# Patient Record
Sex: MALE | Race: WHITE | Employment: FULL TIME | URBAN - METROPOLITAN AREA
[De-identification: names, ages, dates, MRNs, and addresses within clinical notes are randomized per-mention and may not be internally consistent; named-entity substitution may affect disease eponyms.]

---

## 2018-12-29 ENCOUNTER — HOSPITAL ENCOUNTER (EMERGENCY)
Age: 53
Discharge: HOME | End: 2018-12-29
Attending: EMERGENCY MEDICINE
Payer: OTHER GOVERNMENT

## 2018-12-29 VITALS
TEMPERATURE: 98.3 F | RESPIRATION RATE: 18 BRPM | OXYGEN SATURATION: 99 % | DIASTOLIC BLOOD PRESSURE: 101 MMHG | BODY MASS INDEX: 35.31 KG/M2 | WEIGHT: 233 LBS | HEIGHT: 68 IN | SYSTOLIC BLOOD PRESSURE: 164 MMHG | HEART RATE: 86 BPM

## 2018-12-29 DIAGNOSIS — Z00.8 ENCOUNTER FOR WORK CAPABILITY ASSESSMENT: Primary | ICD-10-CM

## 2018-12-29 PROCEDURE — 99282 EMERGENCY DEPT VISIT SF MDM: CPT

## 2018-12-29 ASSESSMENT — ENCOUNTER SYMPTOMS
SORE THROAT: 0
EYE REDNESS: 0
EYE DISCHARGE: 0
NAUSEA: 0
SHORTNESS OF BREATH: 0
DIARRHEA: 0
VOMITING: 0
WHEEZING: 0
BACK PAIN: 0
EYE PAIN: 0
ABDOMINAL PAIN: 0
COUGH: 0
SINUS PRESSURE: 0

## 2019-02-16 ENCOUNTER — APPOINTMENT (OUTPATIENT)
Dept: GENERAL RADIOLOGY | Age: 54
End: 2019-02-16
Payer: OTHER GOVERNMENT

## 2019-02-16 ENCOUNTER — HOSPITAL ENCOUNTER (EMERGENCY)
Age: 54
Discharge: HOME | End: 2019-02-17
Attending: EMERGENCY MEDICINE
Payer: OTHER GOVERNMENT

## 2019-02-16 ENCOUNTER — APPOINTMENT (OUTPATIENT)
Dept: CT IMAGING | Age: 54
End: 2019-02-16
Payer: OTHER GOVERNMENT

## 2019-02-16 VITALS
SYSTOLIC BLOOD PRESSURE: 133 MMHG | HEART RATE: 95 BPM | DIASTOLIC BLOOD PRESSURE: 85 MMHG | HEIGHT: 68 IN | BODY MASS INDEX: 35.37 KG/M2 | TEMPERATURE: 97.4 F | WEIGHT: 233.38 LBS | RESPIRATION RATE: 22 BRPM | OXYGEN SATURATION: 100 %

## 2019-02-16 DIAGNOSIS — M54.31 SCIATICA OF RIGHT SIDE: Primary | ICD-10-CM

## 2019-02-16 DIAGNOSIS — M25.511 ACUTE PAIN OF RIGHT SHOULDER: ICD-10-CM

## 2019-02-16 LAB
CO2: 28 MMOL/L (ref 22–29)
GFR AFRICAN AMERICAN: >60
GFR NON-AFRICAN AMERICAN: >60 ML/MIN/1.73
GLUCOSE BLD-MCNC: 97 MG/DL (ref 74–99)
POC ANION GAP: 9 MMOL/L (ref 7–16)
POC BUN: 15 MG/DL (ref 8–23)
POC CHLORIDE: 104 MMOL/L (ref 100–108)
POC CREATININE: 1 MG/DL (ref 0.7–1.2)
POC POTASSIUM: 4 MMOL/L (ref 3.5–5)
POC SODIUM: 141 MMOL/L (ref 132–146)

## 2019-02-16 PROCEDURE — 82374 ASSAY BLOOD CARBON DIOXIDE: CPT

## 2019-02-16 PROCEDURE — 6360000002 HC RX W HCPCS: Performed by: EMERGENCY MEDICINE

## 2019-02-16 PROCEDURE — 82435 ASSAY OF BLOOD CHLORIDE: CPT

## 2019-02-16 PROCEDURE — 82947 ASSAY GLUCOSE BLOOD QUANT: CPT

## 2019-02-16 PROCEDURE — 85014 HEMATOCRIT: CPT

## 2019-02-16 PROCEDURE — 72131 CT LUMBAR SPINE W/O DYE: CPT

## 2019-02-16 PROCEDURE — 96374 THER/PROPH/DIAG INJ IV PUSH: CPT

## 2019-02-16 PROCEDURE — 82565 ASSAY OF CREATININE: CPT

## 2019-02-16 PROCEDURE — 99284 EMERGENCY DEPT VISIT MOD MDM: CPT

## 2019-02-16 PROCEDURE — 84520 ASSAY OF UREA NITROGEN: CPT

## 2019-02-16 PROCEDURE — 84295 ASSAY OF SERUM SODIUM: CPT

## 2019-02-16 PROCEDURE — 96372 THER/PROPH/DIAG INJ SC/IM: CPT

## 2019-02-16 PROCEDURE — 84132 ASSAY OF SERUM POTASSIUM: CPT

## 2019-02-16 PROCEDURE — 80051 ELECTROLYTE PANEL: CPT

## 2019-02-16 PROCEDURE — 73030 X-RAY EXAM OF SHOULDER: CPT

## 2019-02-16 RX ORDER — PREGABALIN 100 MG/1
200 CAPSULE ORAL 2 TIMES DAILY
COMMUNITY

## 2019-02-16 RX ORDER — NAPROXEN 500 MG/1
500 TABLET ORAL 2 TIMES DAILY
Qty: 14 TABLET | Refills: 0 | Status: SHIPPED | OUTPATIENT
Start: 2019-02-16 | End: 2019-03-12

## 2019-02-16 RX ORDER — KETOROLAC TROMETHAMINE 30 MG/ML
30 INJECTION, SOLUTION INTRAMUSCULAR; INTRAVENOUS ONCE
Status: COMPLETED | OUTPATIENT
Start: 2019-02-16 | End: 2019-02-16

## 2019-02-16 RX ORDER — DEXAMETHASONE SODIUM PHOSPHATE 10 MG/ML
10 INJECTION INTRAMUSCULAR; INTRAVENOUS ONCE
Status: COMPLETED | OUTPATIENT
Start: 2019-02-16 | End: 2019-02-16

## 2019-02-16 RX ORDER — METHYLPREDNISOLONE 4 MG/1
TABLET ORAL
Qty: 1 KIT | Refills: 0 | Status: SHIPPED | OUTPATIENT
Start: 2019-02-16 | End: 2019-02-22

## 2019-02-16 RX ADMIN — KETOROLAC TROMETHAMINE 30 MG: 30 INJECTION, SOLUTION INTRAMUSCULAR at 23:28

## 2019-02-16 RX ADMIN — DEXAMETHASONE SODIUM PHOSPHATE 10 MG: 10 INJECTION INTRAMUSCULAR; INTRAVENOUS at 23:28

## 2019-02-16 ASSESSMENT — PAIN SCALES - GENERAL
PAINLEVEL_OUTOF10: 9
PAINLEVEL_OUTOF10: 9

## 2019-02-16 ASSESSMENT — ENCOUNTER SYMPTOMS
SHORTNESS OF BREATH: 0
EYE DISCHARGE: 0
SINUS PRESSURE: 0
COUGH: 0
NAUSEA: 0
EYE PAIN: 0
BACK PAIN: 0
EYE REDNESS: 0
WHEEZING: 0
ABDOMINAL PAIN: 0
VOMITING: 0
DIARRHEA: 0
SORE THROAT: 0

## 2019-02-16 ASSESSMENT — PAIN DESCRIPTION - ORIENTATION: ORIENTATION: RIGHT

## 2019-02-16 ASSESSMENT — PAIN DESCRIPTION - FREQUENCY: FREQUENCY: CONTINUOUS

## 2019-02-16 ASSESSMENT — PAIN DESCRIPTION - LOCATION: LOCATION: LEG;FOOT;SHOULDER

## 2019-03-12 ENCOUNTER — INITIAL CONSULT (OUTPATIENT)
Dept: NEUROSURGERY | Age: 54
End: 2019-03-12

## 2019-03-12 VITALS
HEIGHT: 68 IN | HEART RATE: 97 BPM | BODY MASS INDEX: 35.61 KG/M2 | WEIGHT: 235 LBS | SYSTOLIC BLOOD PRESSURE: 144 MMHG | DIASTOLIC BLOOD PRESSURE: 95 MMHG

## 2019-03-12 DIAGNOSIS — M54.16 LUMBAR RADICULOPATHY: Primary | ICD-10-CM

## 2019-03-12 PROCEDURE — 99203 OFFICE O/P NEW LOW 30 MIN: CPT | Performed by: PHYSICIAN ASSISTANT

## 2019-03-12 ASSESSMENT — ENCOUNTER SYMPTOMS
EYES NEGATIVE: 1
RESPIRATORY NEGATIVE: 1
GASTROINTESTINAL NEGATIVE: 1
ALLERGIC/IMMUNOLOGIC NEGATIVE: 1
BACK PAIN: 1

## 2023-12-12 ENCOUNTER — DOCUMENTATION (OUTPATIENT)
Dept: SURGERY | Facility: HOSPITAL | Age: 58
End: 2023-12-12
Payer: OTHER GOVERNMENT

## 2023-12-12 NOTE — PROGRESS NOTES
Received vcml from [facility] with [facility] calling to confirm pt has been scheduled and asking for a call back to [phone] ext [phone]. I called and left her a voicemail to advise, yes the pt has been scheduled.

## 2023-12-19 ENCOUNTER — APPOINTMENT (OUTPATIENT)
Dept: PHYSICAL THERAPY | Facility: HOSPITAL | Age: 58
End: 2023-12-19
Payer: OTHER GOVERNMENT

## 2023-12-27 ENCOUNTER — APPOINTMENT (OUTPATIENT)
Dept: PHYSICAL THERAPY | Facility: HOSPITAL | Age: 58
End: 2023-12-27
Payer: OTHER GOVERNMENT

## 2024-01-23 ENCOUNTER — APPOINTMENT (OUTPATIENT)
Dept: SURGERY | Facility: HOSPITAL | Age: 59
End: 2024-01-23
Payer: OTHER GOVERNMENT

## 2024-01-25 ENCOUNTER — DOCUMENTATION (OUTPATIENT)
Dept: SURGERY | Facility: HOSPITAL | Age: 59
End: 2024-01-25
Payer: OTHER GOVERNMENT

## 2024-01-25 NOTE — PROGRESS NOTES
Received a fax from Travis Lipscomb with the [facility], requesting a status update for the pt. I called him back to advise the pt's appointments were cancelled at his request with no further follow-up.  advised he will notate this and cancel out consult and if the  wants to proceed at another time, he can.

## 2024-02-05 ENCOUNTER — APPOINTMENT (OUTPATIENT)
Dept: SURGERY | Facility: HOSPITAL | Age: 59
End: 2024-02-05
Payer: OTHER GOVERNMENT

## 2024-04-02 ENCOUNTER — TELEPHONE (OUTPATIENT)
Dept: SURGERY | Facility: CLINIC | Age: 59
End: 2024-04-02
Payer: OTHER GOVERNMENT

## 2024-04-02 NOTE — TELEPHONE ENCOUNTER
Outbound call to schedule patient for initial consults with Bariatric Surgery.  Had been scheduled in January and canceled those visits.  Patient was advised by [facility]in[facility] the [facility] to cancel them and try injectables for weight loss.  He has not been able to obtain those medications for over  year and would like to explore the option for surgery again.  Patient aware he needs to be seen through the main campus due to their partnership with the [facility]. Appointments scheduled.

## 2024-04-08 ENCOUNTER — PATIENT MESSAGE (OUTPATIENT)
Dept: SURGERY | Facility: HOSPITAL | Age: 59
End: 2024-04-08
Payer: OTHER GOVERNMENT

## 2024-04-12 ENCOUNTER — DOCUMENTATION (OUTPATIENT)
Dept: SURGERY | Facility: HOSPITAL | Age: 59
End: 2024-04-12
Payer: OTHER GOVERNMENT

## 2024-04-12 NOTE — PROGRESS NOTES
Received an email from Conch[facility] from [facility] calling to get status on referrals. Pt scheduled. [facility] contacted.

## 2024-04-15 ENCOUNTER — TELEPHONE (OUTPATIENT)
Dept: SURGERY | Facility: HOSPITAL | Age: 59
End: 2024-04-15
Payer: OTHER GOVERNMENT

## 2024-04-15 NOTE — TELEPHONE ENCOUNTER
----- Message from Tessa Ge sent at 4/12/2024  1:30 PM EDT -----  Regarding: FW: Bariatric Surgery - New Patient Visits  Contact: [phone]    ----- Message -----  From: Manuel Marrufo  Sent: 4/12/2024   1:17 PM EDT  To: [id number] Clinical Support Staff  Subject: Bariatric Surgery - New Patient Visits           Hello.        Whenever you have the time ma'am,  would you please give me a call? I have a few questions I am confused about.        Thank you. It is not urgent.     Manuel Marrufo  [facility]   [phone]

## 2024-04-15 NOTE — TELEPHONE ENCOUNTER
Telephone call placed to patient as requested per [employer] message. Inquiring if he will need to repeat testing/evaluations that he had [facility] the [facility]. He has records scanned into his chart and these will be reviewed. Will provide recommendations at the time of his visit with Dr. Sarmiento. He verbalized understanding.

## 2024-05-23 ENCOUNTER — PATIENT MESSAGE (OUTPATIENT)
Dept: SURGERY | Facility: HOSPITAL | Age: 59
End: 2024-05-23
Payer: OTHER GOVERNMENT

## 2024-05-31 RX ORDER — PREGABALIN 200 MG/1
200 CAPSULE ORAL 2 TIMES DAILY
COMMUNITY

## 2024-05-31 RX ORDER — ROSUVASTATIN CALCIUM 10 MG/1
10 TABLET, COATED ORAL DAILY
COMMUNITY

## 2024-05-31 RX ORDER — DIAZEPAM 5 MG/1
5 TABLET ORAL 2 TIMES DAILY
COMMUNITY

## 2024-05-31 RX ORDER — CHOLECALCIFEROL (VITAMIN D3) 50 MCG
50 TABLET ORAL DAILY
COMMUNITY

## 2024-05-31 RX ORDER — CYCLOBENZAPRINE HCL 10 MG
10 TABLET ORAL 3 TIMES DAILY PRN
COMMUNITY

## 2024-05-31 RX ORDER — LISINOPRIL 20 MG/1
20 TABLET ORAL DAILY
COMMUNITY

## 2024-05-31 RX ORDER — OMEPRAZOLE 20 MG/1
20 TABLET, DELAYED RELEASE ORAL 2 TIMES DAILY
COMMUNITY

## 2024-05-31 RX ORDER — LOPERAMIDE HYDROCHLORIDE 2 MG/1
2 CAPSULE ORAL 3 TIMES DAILY PRN
COMMUNITY

## 2024-05-31 RX ORDER — SERTRALINE HYDROCHLORIDE 100 MG/1
200 TABLET, FILM COATED ORAL DAILY
COMMUNITY

## 2024-05-31 NOTE — PROGRESS NOTES
Subjective     Date: 5/31/2024 Time: 4:13 PM  Name: Manuel Marrufo  MRN: [id number]    This is a 59 y.o. male with morbid obesity (There is no height or weight on file to calculate BMI.) who presents to clinic for consideration of bariatric surgery. he has attempted and failed multiple diet and exercise regimens for weight loss. Initial Onset of obesity was  8 Year(s) Ago.  Their goal for surgery is to  be healthier , lose weight, and improve liver function, spinal stenosis symptoms, sciatic nerve pain  . The patient has tried multiple diets to lose weight including Low Carb, Medications , and [facility] provided  and smaller portions, nutrisystem, smaller portions . The patient was most successful with the Low Carb, Medications , and [facility] provided  and smaller portions, nutrisystem, smaller portions. The most pounds lost on this diet were 25 lbs. The patient considers their dietary weakness to be  snacking/munchies, recently quit smoking  The patient reports a  highest weight ever of 267 pounds and lowest weight ever of 135 pounds {Distribution of Obesity:84364}. The patient exercises daily  30 Minutes/Day Types of Exercise : walking and stair climbing    Comorbidities: anxiety, back paintakes medications, depressed moodtakes medications and sees a therapist/psychiatrist, joint pain{NSAIDS (Optional):06468}, sleep apnea using an appliance, and hypertension controlled with oral meds, GERD on PPI   There is no problem list on file for this patient.      SLEEVE Gastric Surgery For Morbid Obesity Laparoscopic Longitudinal Gastrectomy     0 = No symptoms while on omeprazole     PMH:   Past Medical History:   Diagnosis Date    Fatty liver disease, nonalcoholic     GERD (gastroesophageal reflux disease)     HLD (hyperlipidemia)     HTN (hypertension)     FRANK on CPAP     PTSD (post-traumatic stress disorder)     Spinal stenosis         PSH:   Past Surgical History:   Procedure Laterality Date    BACK SURGERY      MALIGNANT  SKIN LESION EXCISION      left shoulder and armpit    TONSILLECTOMY          Denies personal/family hx of VTE.    FAMILY HISTORY:  No family history on file.     SOCIAL HISTORY:       MEDICATIONS:  Prior to Admission Medications:  Medication Documentation Review Audit    **Prior to Admission medications have not yet been reviewed**          ALLERGIES:  Not on File    REVIEW OF SYSTEMS:  GENERAL: Negative for malaise, significant weight loss and fever  HEAD: Negative for headache, swelling.  NECK: Negative for lumps, goiter, pain and significant neck swelling  RESPIRATORY: Negative for cough, wheezing or shortness of breath.  CARDIOVASCULAR: Negative for chest pain, leg swelling or palpitations.  GI: Negative for abdominal discomfort, blood in stools or black stools or change in bowel habits  : No history of dysuria, frequency or incontinence  MUSCULOSKELETAL: Negative for joint pain or swelling, back pain or muscle pain.  SKIN: Negative for lesions, rash, and itching.  PSYCH: Negative for sleep disturbance, mood disorder and recent psychosocial stressors.  ENDOCRINE: Negative for cold or heat intolerance, polyuria, polydipsia and goiter.    Objective   PHYSICAL EXAM:  There were no vitals taken for this visit.  General appearance: obese, NAD  Neuro: AOx3  Head: EOMI; no swelling or lesions of scalp or face  ENT:  no lumps or lymphadenopathy, thyroid normal to palpation; oropharynx clear, no swelling or erythema  Skin: warm, no erythema or rashes  Lungs: clear to percussion and auscultation  Heart: regular rhythm and S1, S2 normal  Abdomen: soft, non-tender, no masses, no organomegaly  Extremities: Normal exam of the extremities. No swelling or pain.  Psych: no hurried speech, no flight of ideas, normal affect    Assessment/Plan   IMPRESSION:  Manuel Marrufo is a 59 y.o. male with a BMI of There is no height or weight on file to calculate BMI. with the following diagnoses and co-morbidities:     Past Medical History:    Diagnosis Date    Fatty liver disease, nonalcoholic     GERD (gastroesophageal reflux disease)     HLD (hyperlipidemia)     HTN (hypertension)     FRANK on CPAP     PTSD (post-traumatic stress disorder)     Spinal stenosis        This patient does meet the criteria for a surgical weight loss procedure according to NIH guidelines.  The risks of sleeve gastrectomy, Michael-en-Y gastric bypass, and duodenal switch surgery including but not limited to bleeding, leak along staple lines, infection, dehydration, ulcers, internal hernia, DVT/PE, pneumonia, myocardial infarction, prolonged nausea/vomiting, incomplete resolution of associated medical conditions, reflux, weight regain, vitamin/mineral deficiencies, and death have been explained to the patient and Manuel Marrufo has expressed understanding and acceptance of them.     We discussed the lifestyle changes necessary to be successful following surgery.    The increased risk of substance and alcohol abuse following bariatric surgery was discussed with the patient, along with the negative consequences of substance/alcohol use after surgery including addiction, worsening of mental health disorders, and injury to the stomach. The risk of smoking and vaping (tobacco or any other substance) after bariatric surgery was explained to the patient. This includes risk of anastamotic ulcers, gastritis, bleeding, perforation, stricture, and PO intolerance.  The patient expressed understanding and acceptance of these risks.    The benefits of the above surgeries including weight loss, improvement/resolution of associated medical and mental health conditions, improved mobility, and decreased mortality have been explained the the patient and Manuel Marrufo has expressed understanding and acceptance of them.      PLAN:  The plan of treatment for Manuel Marrufo is to continue with the consultations and tests ordered today in hopes of qualifying for pre-operative clearance for bariatric surgery.  This includes:    Consult Nutrition for education and *** months of MSWL  Consult Psychology  Consult Cardiology  Consult Pulmonology  Labs ordered  EGD  PCP for medical optimization  Consult sleep medicine - concern for FRANK  Recommend at least *** lbs of weight loss prior to surgery.  ***        *** minutes were spent with patient including history, physical exam, and education.

## 2024-06-14 NOTE — PROGRESS NOTES
Surgeon: Torsten  Patient is considering:   Sleeve Gastrectomy    ASSESSMENT:  Current weight:  265 lbs   Ht: 68  in   BMI: 40.29       Initial start weight: 265  lbs  Pre-Op Excess Body Weight (EBW):   101 lbs  Target Post-Op weight goal:  199 - 214.2 lbs    Food allergies/intolerances:   mild lactose intolerance - avoids milk   Chewing/Swallowing/Dentition:  dentures   Nausea / Vomiting / Hx Gastroparesis:  denies  Diarrhea/ Constipation: denies  Exercise level:  30 minutes walking (to mailbox and back, uphill 1/2) every other day, limited d/t spinal stenosis, up and down stairs t/o day, leg lifts while sitting   Smoking/Tobacco use: quit smoking 1 month ago   Vitamins/Minerals supplements:  vitamin D, vitamin B12  Medications:   see list  Past diet attempts:   RD monitored, IMF, low calorie, low CHO, OTC diet pills, Rx diet pills - phentermine/topiramate, starvation  Hours of sleep/night:  6    24 HOUR RECALL/DIET HISTORY:  Breakfast:  scrambled eggs, with onions, peppers, sausage and slice of whole wheat toast  Snack:    Lunch: skipped  Snack:   Dinner: spaghetti and meatballs  Snack: pringles   Beverages: 16oz diet pop, coffee/tea 12oz, 64oz water, 12oz juice  Alcohol: denies    Person responsible for cooking & shopping? Self, health aid cooks   Grocery stores frequented:  [employer]    Grocery trips per week/month:  Every 2 weeks   Food Insecurity: Low  How often do you eat sweet snacks?  Once/week  How often do you eat savory snacks?  Once/week  How often do you eat out?  Once every 2 weeks - order in  Do you feel overly stuffed?  Denies   Binge Eating? Denies, but IDs with feeling disgusted after eating/over eating  Night Eating?  denies  Emotional Eating?  Yes, triggered by boredom, depression - opts for M7M's. Frequency is rare       READINESS TO LEARN:  Motivation to learn: Interested        Understanding of instruction: Good    Anticipated Compliance: Good       Family Support:  home  health aide, mom, sister    Educational Materials Provided:    Plate Method  High Protein Snack List  High Protein Drink  High Protein food list  Schedules for support group meetings  Goals sheet    Patient's weight is self-reported. Patient will scheduled to attend a Virtual Education Class to review the 2 week Pre-op diet, Post op fluid, protein, vitamin/mineral supplementation, exercise goals and post op diet progression.    Patient presents with excessive calorie obesity seeking  sleeve gastrectomy.    Patient seen today to complete nutrition evaluation for weight loss surgery. Patient reports he's been looking into have surgery. Patient reports trying multiple diets and has been taking topamax for weight loss. Patient reports limitations to exercise d/t spinal stenosis but tries to move his body daily. Patient reports main source of fluid is water. No significant dietary behaviors noted. Patient reports he tends to only eat 2 meals/day, due to low appetite.   Recommended to begin to eat 3 meals/day, avoid skipping meals. Encouraged to have a protein drink in replacement of meal to avoid skipping meals. Reviewed balanced meals by using plate method. Encouraged to eat protein rich foods at each meal, balanced with fiber rich foods. Reviewed fluids to eliminate and replace with SF, non-carbonated, caffeine free fluids. Reviewed postop behaviors and encouraged to begin practicing. Recommended to start daily MVI. Recommended to continue regular exercise. Encouraged to add resistance bands or weights if tolerated.     Patient was receptive to nutritional recommendations, asked numerous questions, and verbalized understanding of the weight loss surgery diet.  Patient expressed understanding about the importance of strict dietary compliance post-surgery to avoid nutritional deficiencies and achieve optimal weight loss and verbalized intent to follow dietary recommendations.      Malnutrition Screening:  Significant  unintentional weight loss? No  Eating less than 75% of usual intake for more than 2 weeks? No      Nutrition Diagnosis:   1. Overweight/obesity related to excess energy intake as evidenced by BMI = 40 kg/m^2.  2. Food- and nutrition-related knowledge deficit related to lack of prior exposure to surgical weight loss information as evidenced by pt new to surgical program.    Nutrition Interventions:   1. Modify type and amount of food and nutrients within meals and snacks.  2. Comprehensive Nutrition Education    Recommendations:  1. Structure meal patterns, eating three meals daily. Limit snacks to only as needed. You may have a protein drink in replacement to one meal/day to avoid skipping this meal.   2. Build meals around protein rich foods. Aim for 3-4 ounces (20-30 grams) protein per meal. Lean proteins include chicken, turkey, fish, lean cuts of beef and 90% lean ground beef, pork, shrimp, low fat dairy products, and eggs.   3. Fill half your plate with non-starchy vegetables. Select high fiber starches like  sweet potatoes, peas, beans, lentils, quinoa, whole wheat breads and pastas, and brown rice. Keep portion of starches to 1/4 plate (1/2 cup - 1 cup per meal).  4. Drink 64oz of calorie-free, caffeine-free, and non-carbonated beverages.   5. Practice no drinking 30 minutes before meals, nothing with meals and wait 30 minutes after meals to drink again. Make meals last 30 minutes-chew thoroughly.   6. Limit or omit eating out/sweets/savory snacks to 1-2 times per week.  7. Begin daily multivitamin.  No gummies. Centrum adult complete, equate kids chewable, or Women's One a Day are all acceptable options.  8. Increase physical activity by 10-15 minutes as tolerated to an end goal of 60 minutes 5 x per week. Consistency is the key.  9. We will follow up with your progress on 8/2/24 at 1:00 PM. You will need to weigh yourself before this appointment and provide a 24 hour food recall.    Pre-op Goal weight: lose  5% of body weight    Nutrition Monitoring and Evaluation: 1-2 pound weight loss per week  Criteria: weight check  Need for Follow-up:     Patient does meet National Institutes Health guidelines for weight loss surgery, however needs to demonstrate consistent effort in making dietary changes before giving clearance. It is anticipated that the patient will need at least 1    nutritional follow-up visits prior to clearance for surgery.

## 2024-06-17 ENCOUNTER — NUTRITION (OUTPATIENT)
Dept: SURGERY | Facility: HOSPITAL | Age: 59
End: 2024-06-17
Payer: OTHER GOVERNMENT

## 2024-06-17 VITALS — BODY MASS INDEX: 40.16 KG/M2 | HEIGHT: 68 IN | WEIGHT: 265 LBS

## 2024-06-17 DIAGNOSIS — Z71.3 ENCOUNTER FOR NUTRITION EVALUATION PRIOR TO BARIATRIC SURGERY: ICD-10-CM

## 2024-06-17 DIAGNOSIS — Z98.84 BARIATRIC SURGERY STATUS: Primary | ICD-10-CM

## 2024-06-17 NOTE — PROGRESS NOTES
Subjective     Date: 6/17/2024 Time: 3:07 PM  Name: Manuel Marrufo  MRN: [id number]    This is a 59 y.o. male with morbid obesity (Body mass index is 41.37 kg/m².) who presents to clinic for consideration of bariatric surgery. he has attempted and failed multiple diet and exercise regimens for weight loss. Initial Onset of obesity was  8 Year(s) Ago.  Their goal for surgery is to  be healthier , lose weight, and improve liver function, spinal stenosis symptoms, sciatic nerve pain  . The patient has tried multiple diets to lose weight including Low Carb, Medications , and [facility] provided  and smaller portions, nutrisystem, smaller portions . The patient was most successful with the Low Carb, Medications , and [facility] provided  and smaller portions, nutrisystem, smaller portions. The most pounds lost on this diet were 25 lbs. The patient considers their dietary weakness to be  snacking/munchies, recently quit smoking  The patient reports a  highest weight ever of 267 pounds and lowest weight ever of 135 pounds Distribution of Obesity: is central. The patient exercises daily  30 Minutes/Day Types of Exercise : walking and stair climbing    Comorbidities: anxiety, back paintakes medications, depressed moodtakes medications and sees a therapist/psychiatrist, joint pain, sleep apnea using an appliance, and hypertension controlled with oral meds, GERD on PPI   There is no problem list on file for this patient.      SLEEVE Gastric Surgery For Morbid Obesity Laparoscopic Longitudinal Gastrectomy     0 = No symptoms while on omeprazole     PMH:   Past Medical History:   Diagnosis Date    Fatty liver disease, nonalcoholic     GERD (gastroesophageal reflux disease)     HLD (hyperlipidemia)     HTN (hypertension)     FRANK on CPAP     PTSD (post-traumatic stress disorder)     Spinal stenosis         PSH:   Past Surgical History:   Procedure Laterality Date    BACK SURGERY      MALIGNANT SKIN LESION EXCISION      left shoulder and  armpit    TONSILLECTOMY          Denies personal/family hx of VTE.    FAMILY HISTORY:  No family history on file.     SOCIAL HISTORY:       MEDICATIONS:  Prior to Admission Medications:  Medication Documentation Review Audit    **Prior to Admission medications have not yet been reviewed**          ALLERGIES:  Not on File    REVIEW OF SYSTEMS:  GENERAL: Negative for malaise, significant weight loss and fever  HEAD: Negative for headache, swelling.  NECK: Negative for lumps, goiter, pain and significant neck swelling  RESPIRATORY: Negative for cough, wheezing or shortness of breath.  CARDIOVASCULAR: Negative for chest pain, leg swelling or palpitations.  GI: Negative for abdominal discomfort, blood in stools or black stools or change in bowel habits  : No history of dysuria, frequency or incontinence  MUSCULOSKELETAL: Negative for joint pain or swelling, back pain or muscle pain.  SKIN: Negative for lesions, rash, and itching.  PSYCH: Negative for sleep disturbance, mood disorder and recent psychosocial stressors.  ENDOCRINE: Negative for cold or heat intolerance, polyuria, polydipsia and goiter.    Objective   PHYSICAL EXAM:  There were no vitals taken for this visit.  General appearance: obese, NAD  Neuro: AOx3  Head: EOMI; no swelling or lesions of scalp or face  ENT:  no lumps or lymphadenopathy, thyroid normal to palpation; oropharynx clear, no swelling or erythema  Skin: warm, no erythema or rashes  Lungs: clear to percussion and auscultation  Heart: regular rhythm and S1, S2 normal  Abdomen: soft, non-tender, no masses, no organomegaly  Extremities: Normal exam of the extremities. No swelling or pain.  Psych: no hurried speech, no flight of ideas, normal affect    Assessment/Plan   IMPRESSION:  Manuel Marrufo is a 59 y.o. male with a BMI of Body mass index is 41.37 kg/m². with the following diagnoses and co-morbidities:     Past Medical History:   Diagnosis Date    Fatty liver disease, nonalcoholic     GERD  (gastroesophageal reflux disease)     HLD (hyperlipidemia)     HTN (hypertension)     FRANK on CPAP     PTSD (post-traumatic stress disorder)     Spinal stenosis        This patient does meet the criteria for a surgical weight loss procedure according to NIH guidelines.    The risks of sleeve gastrectomy, Michael-en-Y gastric bypass surgery including but not limited to bleeding, leak along staple lines, infection, dehydration, ulcers, internal hernia, DVT/PE, pneumonia, myocardial infarction, prolonged nausea/vomiting, incomplete resolution of associated medical conditions, reflux, weight regain, vitamin/mineral deficiencies, and death have been explained to the patient and Manuel Marrufo has expressed understanding and acceptance of them.     We discussed the lifestyle changes necessary to be successful following surgery.    [facility] and decided to proceed with sleeve gastrectomy.  He does not want gastric bypass.    The increased risk of substance and alcohol abuse following bariatric surgery was discussed with the patient, along with the negative consequences of substance/alcohol use after surgery including addiction, worsening of mental health disorders, and injury to the stomach. The risk of smoking and vaping (tobacco or any other substance) after bariatric surgery was explained to the patient. This includes risk of anastamotic ulcers, gastritis, bleeding, perforation, stricture, and PO intolerance.  The patient expressed understanding and acceptance of these risks.    The possible benefits of the above surgeries including weight loss, improvement/resolution of associated medical and mental health conditions, improved mobility, and decreased mortality have been explained the the patient and Manuel Marrufo has expressed understanding and acceptance of them.      PLAN:  The plan of treatment for Manuel Marrufo is to continue with the consultations and tests  ordered today in hopes of qualifying for pre-operative clearance for bariatric surgery. This includes:    Consult Nutrition for education and MSWL if required  Consult Psychology report  Consult Cardiology report  Consult Pulmonology report  Labs ordered  EGD report  PCP for medical optimization  Consult sleep medicine - concern for FRANK  Recommend at least 5-10 lbs of weight loss prior to surgery.

## 2024-06-18 ENCOUNTER — APPOINTMENT (OUTPATIENT)
Dept: SURGERY | Facility: HOSPITAL | Age: 59
End: 2024-06-18
Payer: OTHER GOVERNMENT

## 2024-06-18 ENCOUNTER — OFFICE VISIT (OUTPATIENT)
Dept: SURGERY | Facility: HOSPITAL | Age: 59
End: 2024-06-18
Payer: OTHER GOVERNMENT

## 2024-06-18 VITALS
BODY MASS INDEX: 41.24 KG/M2 | OXYGEN SATURATION: 99 % | WEIGHT: 272.1 LBS | HEART RATE: 65 BPM | SYSTOLIC BLOOD PRESSURE: 109 MMHG | DIASTOLIC BLOOD PRESSURE: 79 MMHG | HEIGHT: 68 IN

## 2024-06-18 DIAGNOSIS — Z13.21 ENCOUNTER FOR VITAMIN DEFICIENCY SCREENING: ICD-10-CM

## 2024-06-18 DIAGNOSIS — F43.10 POSTTRAUMATIC STRESS DISORDER: ICD-10-CM

## 2024-06-18 DIAGNOSIS — K21.9 GASTROESOPHAGEAL REFLUX DISEASE WITHOUT ESOPHAGITIS: ICD-10-CM

## 2024-06-18 DIAGNOSIS — E66.01 MORBID OBESITY (MULTI): ICD-10-CM

## 2024-06-18 DIAGNOSIS — G47.33 OBSTRUCTIVE SLEEP APNEA: ICD-10-CM

## 2024-06-18 DIAGNOSIS — K76.0 FATTY LIVER DISEASE, NONALCOHOLIC: ICD-10-CM

## 2024-06-18 DIAGNOSIS — Z71.3 PRE-BARIATRIC SURGERY NUTRITION EVALUATION: ICD-10-CM

## 2024-06-18 DIAGNOSIS — I10 PRIMARY HYPERTENSION: ICD-10-CM

## 2024-06-18 DIAGNOSIS — E66.01 MORBID OBESITY WITH BMI OF 40.0-44.9, ADULT (MULTI): Primary | ICD-10-CM

## 2024-06-18 PROCEDURE — 99205 OFFICE O/P NEW HI 60 MIN: CPT | Performed by: SURGERY

## 2024-06-18 PROCEDURE — 99215 OFFICE O/P EST HI 40 MIN: CPT | Performed by: SURGERY

## 2024-06-18 PROCEDURE — 3074F SYST BP LT 130 MM HG: CPT | Performed by: SURGERY

## 2024-06-18 PROCEDURE — 3078F DIAST BP <80 MM HG: CPT | Performed by: SURGERY

## 2024-06-18 PROCEDURE — 3008F BODY MASS INDEX DOCD: CPT | Performed by: SURGERY

## 2024-06-18 RX ORDER — TIZANIDINE 4 MG/1
4 TABLET ORAL EVERY 8 HOURS PRN
COMMUNITY
Start: 2024-06-13 | End: 2024-07-13

## 2024-06-18 RX ORDER — ARIPIPRAZOLE 10 MG/1
10 TABLET ORAL DAILY
COMMUNITY

## 2024-06-18 RX ORDER — PROPRANOLOL HYDROCHLORIDE 80 MG/1
120 TABLET ORAL 2 TIMES DAILY
COMMUNITY

## 2024-06-18 RX ORDER — ALBUTEROL SULFATE 0.83 MG/ML
2 SOLUTION RESPIRATORY (INHALATION) EVERY 4 HOURS PRN
COMMUNITY

## 2024-06-18 ASSESSMENT — ENCOUNTER SYMPTOMS
OCCASIONAL FEELINGS OF UNSTEADINESS: 0
DEPRESSION: 1
LOSS OF SENSATION IN FEET: 1

## 2024-06-20 ENCOUNTER — PATIENT MESSAGE (OUTPATIENT)
Dept: SURGERY | Facility: HOSPITAL | Age: 59
End: 2024-06-20
Payer: OTHER GOVERNMENT

## 2024-07-03 ENCOUNTER — PATIENT MESSAGE (OUTPATIENT)
Dept: SURGERY | Facility: HOSPITAL | Age: 59
End: 2024-07-03
Payer: OTHER GOVERNMENT

## 2024-07-09 ENCOUNTER — PATIENT MESSAGE (OUTPATIENT)
Dept: SURGERY | Facility: HOSPITAL | Age: 59
End: 2024-07-09
Payer: OTHER GOVERNMENT

## 2024-07-12 NOTE — PATIENT COMMUNICATION
Received patient message. Telephone call to patient. Unable to reach at this time. Left voice message. Did not receive sleep records, but have additional records to review with patient. Will follow up at a later time.

## 2024-07-19 ENCOUNTER — TELEPHONE (OUTPATIENT)
Dept: SURGERY | Facility: HOSPITAL | Age: 59
End: 2024-07-19
Payer: MEDICARE

## 2024-07-19 NOTE — TELEPHONE ENCOUNTER
Attempted to contact patient to review records. Unable to reach at this time. Left voicemail requesting return call.     Patient returned call a few minutes later. He states [facility]. He will call and clarify the provider. He is having his sleep study completed tonight. He is dropping his PCP form off to be completed and returned to the program. Encouraged to call with questions.     1154: Patient returned call. He states his psychological evaluation was for PTSD. He is reaching out to his PCP regarding psychological evaluation for bariatric surgery He states he will contact office when he has further information.

## 2024-07-25 ENCOUNTER — PATIENT MESSAGE (OUTPATIENT)
Dept: SURGERY | Facility: HOSPITAL | Age: 59
End: 2024-07-25
Payer: MEDICARE

## 2024-08-01 ENCOUNTER — TELEPHONE (OUTPATIENT)
Dept: SURGERY | Facility: HOSPITAL | Age: 59
End: 2024-08-01
Payer: MEDICARE

## 2024-08-01 NOTE — TELEPHONE ENCOUNTER
Received message from patient requesting return call in regards to his dietitian appointment scheduled for tomorrow. He states he will be admitted to the hospital and will need to cancel. He will send a inContactt message when he is ready to be rescheduled. Will send message to dietitan.

## 2024-08-02 ENCOUNTER — APPOINTMENT (OUTPATIENT)
Dept: SURGERY | Facility: CLINIC | Age: 59
End: 2024-08-02
Payer: OTHER GOVERNMENT

## 2024-08-09 ENCOUNTER — PATIENT MESSAGE (OUTPATIENT)
Dept: SURGERY | Facility: HOSPITAL | Age: 59
End: 2024-08-09
Payer: MEDICARE

## 2024-08-12 ENCOUNTER — DOCUMENTATION (OUTPATIENT)
Dept: SURGERY | Facility: HOSPITAL | Age: 59
End: 2024-08-12
Payer: MEDICARE

## 2024-08-12 NOTE — PROGRESS NOTES
Patient sent message notifying clinic of recent lung cancer diagnosis. He will not pursue bariatric surgery at this time.

## 2024-09-09 ENCOUNTER — APPOINTMENT (OUTPATIENT)
Dept: PRIMARY CARE | Facility: CLINIC | Age: 59
End: 2024-09-09
Payer: MEDICARE

## 2024-11-24 ENCOUNTER — APPOINTMENT (OUTPATIENT)
Dept: RADIOLOGY | Facility: HOSPITAL | Age: 59
End: 2024-11-24
Payer: OTHER GOVERNMENT

## 2024-11-24 ENCOUNTER — CLINICAL SUPPORT (OUTPATIENT)
Dept: EMERGENCY MEDICINE | Facility: HOSPITAL | Age: 59
End: 2024-11-24
Payer: OTHER GOVERNMENT

## 2024-11-24 ENCOUNTER — HOSPITAL ENCOUNTER (INPATIENT)
Facility: HOSPITAL | Age: 59
LOS: 3 days | Discharge: HOME | End: 2024-11-27
Attending: EMERGENCY MEDICINE | Admitting: INTERNAL MEDICINE
Payer: OTHER GOVERNMENT

## 2024-11-24 DIAGNOSIS — C79.31 MALIGNANT MELANOMA METASTATIC TO BRAIN (MULTI): Primary | ICD-10-CM

## 2024-11-24 LAB
ABO GROUP (TYPE) IN BLOOD: NORMAL
ALBUMIN SERPL BCP-MCNC: 4 G/DL (ref 3.4–5)
ALP SERPL-CCNC: 93 U/L (ref 33–120)
ALT SERPL W P-5'-P-CCNC: 29 U/L (ref 10–52)
ANION GAP SERPL CALC-SCNC: 13 MMOL/L (ref 10–20)
ANTIBODY SCREEN: NORMAL
APPEARANCE UR: CLEAR
APTT PPP: 30 SECONDS (ref 27–38)
AST SERPL W P-5'-P-CCNC: 42 U/L (ref 9–39)
BASOPHILS # BLD AUTO: 0.05 X10*3/UL (ref 0–0.1)
BASOPHILS NFR BLD AUTO: 0.5 %
BILIRUB SERPL-MCNC: 0.3 MG/DL (ref 0–1.2)
BILIRUB UR STRIP.AUTO-MCNC: NEGATIVE MG/DL
BUN SERPL-MCNC: 13 MG/DL (ref 6–23)
CALCIUM SERPL-MCNC: 8.7 MG/DL (ref 8.6–10.6)
CHLORIDE SERPL-SCNC: 102 MMOL/L (ref 98–107)
CO2 SERPL-SCNC: 25 MMOL/L (ref 21–32)
COLOR UR: NORMAL
CREAT SERPL-MCNC: 1 MG/DL (ref 0.5–1.3)
EGFRCR SERPLBLD CKD-EPI 2021: 87 ML/MIN/1.73M*2
EOSINOPHIL # BLD AUTO: 0.57 X10*3/UL (ref 0–0.7)
EOSINOPHIL NFR BLD AUTO: 6.2 %
ERYTHROCYTE [DISTWIDTH] IN BLOOD BY AUTOMATED COUNT: 16.6 % (ref 11.5–14.5)
GLUCOSE SERPL-MCNC: 94 MG/DL (ref 74–99)
GLUCOSE UR STRIP.AUTO-MCNC: NORMAL MG/DL
HCT VFR BLD AUTO: 29.5 % (ref 41–52)
HGB BLD-MCNC: 8.8 G/DL (ref 13.5–17.5)
IMM GRANULOCYTES # BLD AUTO: 0.07 X10*3/UL (ref 0–0.7)
IMM GRANULOCYTES NFR BLD AUTO: 0.8 % (ref 0–0.9)
INR PPP: 1 (ref 0.9–1.1)
KETONES UR STRIP.AUTO-MCNC: NEGATIVE MG/DL
LEUKOCYTE ESTERASE UR QL STRIP.AUTO: NEGATIVE
LYMPHOCYTES # BLD AUTO: 2.16 X10*3/UL (ref 1.2–4.8)
LYMPHOCYTES NFR BLD AUTO: 23.4 %
MCH RBC QN AUTO: 20.2 PG (ref 26–34)
MCHC RBC AUTO-ENTMCNC: 29.8 G/DL (ref 32–36)
MCV RBC AUTO: 68 FL (ref 80–100)
MONOCYTES # BLD AUTO: 0.27 X10*3/UL (ref 0.1–1)
MONOCYTES NFR BLD AUTO: 2.9 %
NEUTROPHILS # BLD AUTO: 6.1 X10*3/UL (ref 1.2–7.7)
NEUTROPHILS NFR BLD AUTO: 66.2 %
NITRITE UR QL STRIP.AUTO: NEGATIVE
NRBC BLD-RTO: 0 /100 WBCS (ref 0–0)
PH UR STRIP.AUTO: 6.5 [PH]
PLATELET # BLD AUTO: 421 X10*3/UL (ref 150–450)
POTASSIUM SERPL-SCNC: 4.4 MMOL/L (ref 3.5–5.3)
PROT SERPL-MCNC: 6.4 G/DL (ref 6.4–8.2)
PROT UR STRIP.AUTO-MCNC: NEGATIVE MG/DL
PROTHROMBIN TIME: 11.2 SECONDS (ref 9.8–12.8)
RBC # BLD AUTO: 4.35 X10*6/UL (ref 4.5–5.9)
RBC # UR STRIP.AUTO: NEGATIVE /UL
RH FACTOR (ANTIGEN D): NORMAL
SODIUM SERPL-SCNC: 136 MMOL/L (ref 136–145)
SP GR UR STRIP.AUTO: 1.03
UROBILINOGEN UR STRIP.AUTO-MCNC: NORMAL MG/DL
WBC # BLD AUTO: 9.2 X10*3/UL (ref 4.4–11.3)

## 2024-11-24 PROCEDURE — 93005 ELECTROCARDIOGRAM TRACING: CPT

## 2024-11-24 PROCEDURE — 93010 ELECTROCARDIOGRAM REPORT: CPT | Performed by: EMERGENCY MEDICINE

## 2024-11-24 PROCEDURE — 2500000004 HC RX 250 GENERAL PHARMACY W/ HCPCS (ALT 636 FOR OP/ED)

## 2024-11-24 PROCEDURE — 99285 EMERGENCY DEPT VISIT HI MDM: CPT | Performed by: EMERGENCY MEDICINE

## 2024-11-24 PROCEDURE — 70450 CT HEAD/BRAIN W/O DYE: CPT | Performed by: RADIOLOGY

## 2024-11-24 PROCEDURE — 70450 CT HEAD/BRAIN W/O DYE: CPT

## 2024-11-24 PROCEDURE — 1210000001 HC SEMI-PRIVATE ROOM DAILY

## 2024-11-24 PROCEDURE — 96374 THER/PROPH/DIAG INJ IV PUSH: CPT

## 2024-11-24 PROCEDURE — 2500000001 HC RX 250 WO HCPCS SELF ADMINISTERED DRUGS (ALT 637 FOR MEDICARE OP)

## 2024-11-24 PROCEDURE — 85025 COMPLETE CBC W/AUTO DIFF WBC: CPT

## 2024-11-24 PROCEDURE — 81003 URINALYSIS AUTO W/O SCOPE: CPT

## 2024-11-24 PROCEDURE — 71045 X-RAY EXAM CHEST 1 VIEW: CPT

## 2024-11-24 PROCEDURE — 36415 COLL VENOUS BLD VENIPUNCTURE: CPT

## 2024-11-24 PROCEDURE — 99285 EMERGENCY DEPT VISIT HI MDM: CPT | Mod: 25

## 2024-11-24 PROCEDURE — 85610 PROTHROMBIN TIME: CPT

## 2024-11-24 PROCEDURE — 96375 TX/PRO/DX INJ NEW DRUG ADDON: CPT

## 2024-11-24 PROCEDURE — 86901 BLOOD TYPING SEROLOGIC RH(D): CPT

## 2024-11-24 PROCEDURE — 80053 COMPREHEN METABOLIC PANEL: CPT

## 2024-11-24 RX ORDER — TADALAFIL 5 MG/1
5 TABLET ORAL DAILY
COMMUNITY

## 2024-11-24 RX ORDER — ONDANSETRON HYDROCHLORIDE 2 MG/ML
4 INJECTION, SOLUTION INTRAVENOUS EVERY 6 HOURS PRN
Status: DISCONTINUED | OUTPATIENT
Start: 2024-11-24 | End: 2024-11-25

## 2024-11-24 RX ORDER — OXYCODONE HYDROCHLORIDE 5 MG/1
5 TABLET ORAL ONCE
Status: COMPLETED | OUTPATIENT
Start: 2024-11-24 | End: 2024-11-24

## 2024-11-24 RX ORDER — ALBUTEROL SULFATE 90 UG/1
2 INHALANT RESPIRATORY (INHALATION) EVERY 6 HOURS PRN
COMMUNITY

## 2024-11-24 RX ORDER — ACETAMINOPHEN 325 MG/1
975 TABLET ORAL ONCE
Status: COMPLETED | OUTPATIENT
Start: 2024-11-24 | End: 2024-11-24

## 2024-11-24 RX ORDER — ACETAMINOPHEN 325 MG/1
325 TABLET ORAL EVERY 6 HOURS PRN
COMMUNITY

## 2024-11-24 RX ORDER — HYDROMORPHONE HYDROCHLORIDE 1 MG/ML
0.2 INJECTION, SOLUTION INTRAMUSCULAR; INTRAVENOUS; SUBCUTANEOUS
Status: DISCONTINUED | OUTPATIENT
Start: 2024-11-24 | End: 2024-11-25

## 2024-11-24 RX ORDER — ONDANSETRON HYDROCHLORIDE 2 MG/ML
4 INJECTION, SOLUTION INTRAVENOUS ONCE
Status: COMPLETED | OUTPATIENT
Start: 2024-11-24 | End: 2024-11-24

## 2024-11-24 RX ORDER — MONTELUKAST SODIUM 10 MG/1
10 TABLET ORAL NIGHTLY
COMMUNITY

## 2024-11-24 RX ORDER — FUROSEMIDE 40 MG/1
40 TABLET ORAL DAILY
COMMUNITY

## 2024-11-24 RX ORDER — DIAZEPAM 5 MG/ML
5 INJECTION, SOLUTION INTRAMUSCULAR; INTRAVENOUS ONCE
Status: COMPLETED | OUTPATIENT
Start: 2024-11-24 | End: 2024-11-24

## 2024-11-24 RX ORDER — OXYCODONE HYDROCHLORIDE 5 MG/1
5 TABLET ORAL EVERY 6 HOURS PRN
COMMUNITY
End: 2024-11-27 | Stop reason: HOSPADM

## 2024-11-24 RX ORDER — MIDAZOLAM HYDROCHLORIDE 1 MG/ML
2 INJECTION INTRAMUSCULAR; INTRAVENOUS ONCE
Status: DISCONTINUED | OUTPATIENT
Start: 2024-11-24 | End: 2024-11-24

## 2024-11-24 RX ORDER — ONDANSETRON 4 MG/1
4 TABLET, FILM COATED ORAL EVERY 8 HOURS PRN
COMMUNITY

## 2024-11-24 ASSESSMENT — COLUMBIA-SUICIDE SEVERITY RATING SCALE - C-SSRS
6. HAVE YOU EVER DONE ANYTHING, STARTED TO DO ANYTHING, OR PREPARED TO DO ANYTHING TO END YOUR LIFE?: NO
2. HAVE YOU ACTUALLY HAD ANY THOUGHTS OF KILLING YOURSELF?: NO
1. IN THE PAST MONTH, HAVE YOU WISHED YOU WERE DEAD OR WISHED YOU COULD GO TO SLEEP AND NOT WAKE UP?: NO

## 2024-11-24 ASSESSMENT — PAIN - FUNCTIONAL ASSESSMENT: PAIN_FUNCTIONAL_ASSESSMENT: 0-10

## 2024-11-24 ASSESSMENT — PAIN SCALES - GENERAL: PAINLEVEL_OUTOF10: 0 - NO PAIN

## 2024-11-24 ASSESSMENT — PAIN DESCRIPTION - PROGRESSION: CLINICAL_PROGRESSION: NOT CHANGED

## 2024-11-24 NOTE — ED PROVIDER NOTES
History of Present Illness     History provided by: Patient  Limitations to History: None  External Records Reviewed with Brief Summary:  [facility]    CT head shows interval development of innumerable supratentorial and infratentorial enhancing metastases with large amount of associated vasogenic edema. No midline shift herniation. CT C-spine shows no acute fracture or subluxation. CT PE is negative for acute PE. There are numerous bilateral pulmonary nodules which have increased in size.     HPI:  Manuel Marrufo is a 59 y.o. 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 Patient presented to ED for headache that had been present for eight days. He notes that he has been progressively feeling more off-balance and weak with  nausea and vomiting, but denies vision changes. The headache has stayed persistently in the occipital region and not radiated.         Physical Exam   Triage vitals:  T 36.9 °C (98.4 °F)  HR 72  /73  RR 16  O2 99 % None (Room air)    General: Awake, alert, in no acute distress  Eyes: Gaze conjugate. EOMI. No scleral icterus or injection. PERRLA  HENT: Normo-cephalic, atraumatic. No stridor. Healing wound to posterior left neck from previous procedure, slight drooping of R nasolabial fold  CV: Regular rate, regular rhythm. Radial pulses 2+ bilaterally. No murmurs.  Resp: Breathing non-labored, speaking in full sentences.  Clear to auscultation bilaterally. No wheezing, rales, rhonchi.   GI: Soft, non-distended, non-tender. No rebound or guarding.   MSK/Extremities: No gross bony deformities. Moving all extremities. No edema.   Skin: Warm. Appropriate color. Healing wound to mid thoracic region from previous procedure  Neuro: Alert and oriented x3. Mild drooping of R nasolabial fold. Speech is fluent.  Gross strength 5/5 in tact in B/l upper  and lower extremities. Sensation intact in b/l UE and LE, but endorses decreased sensation in LE. Finger to nose and heel to shin normal.   Psych: Appropriate mood and affect      Medical Decision Making & ED Course   Medical Decision Makin y.o. male [facility] to [facility]. Patient is hemodynamically stable on arrival. He is Aox3. Physical exam is notable for mild drooping of the R nasolabial fold. Per [facility], this has been present for some time. Patient also notes mild decreased sensation of the L upper extremity. CT head at outside hospital today showed interval development of innumerable supratentorial and infratentorial enhancing metastases with large amount of associated vasogenic edema. He received 10 mg decadron prior to arrival. Will order pre-op labs and consult neurosurgery for further recommendations. Patient provided pain medication and zofran.     CMP was unremarkable.  CBC showed no leukocytosis. Hemoglobin of 8.8 with no previous to compare to. This could be due to patient's melanoma. Patient denies any recent trauma. Chest x-ray shows small amount of patchy by basilar atelectasis.      ED Course:  ED Course as of 24 1500   Sun 2024   1632 ED Attending Documentation: This patient was seen by the resident physician.  I have seen and examined the patient, agree with the workup, evaluation, management and diagnosis. I reviewed and edited the above documentation where necessary. I have looked at the EKG and agree with the interpretation. On my evaluation of the patient: 59-year-old gentleman with a history of metastatic melanoma who presents with 8 days of headache and a slight right-sided facial droop a little bit of ataxia at home who [facility].  Neurosurgery to be consulted, no need for emergent intervention airway wise, will control symptoms here, obtain an MRI  and disposition according to neurosurgery.    Kenji Fontaine MD  EM Attending Physician   [RG]   2132 Urinalysis with Reflex Culture and Microscopic  Unremarkable [MG]   2133 CT head wo IV contrast  supra and infratentorial hemorrhagic  metastasis with involvement of the brainstem and associated vasogenic  Edema.  No midline shift. 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 It demonstrates ventricular rate of 69. MO interval 162, QRS 82, . Normal axis. No ST elevations. No previous EKGs to compare.        The patient was discussed with the following consultants/services:  neurosurgery  ----          Disposition   Patient was signed out to Dr. Patel at 19:00 pending completion of their work-up.  Please see the next provider's transition of care note for the remainder of the patient's care.     Procedures   Procedures    Patient seen and discussed with ED attending physician.    Mann Pérez DO  Emergency Medicine PGY1     Mann Pérez DO  Resident  11/25/24 1500

## 2024-11-25 LAB
ALBUMIN SERPL BCP-MCNC: 3.9 G/DL (ref 3.4–5)
ANION GAP SERPL CALC-SCNC: 15 MMOL/L (ref 10–20)
ATRIAL RATE: 69 BPM
BASOPHILS # BLD AUTO: 0.02 X10*3/UL (ref 0–0.1)
BASOPHILS NFR BLD AUTO: 0.2 %
BUN SERPL-MCNC: 13 MG/DL (ref 6–23)
CALCIUM SERPL-MCNC: 9.5 MG/DL (ref 8.6–10.6)
CHLORIDE SERPL-SCNC: 105 MMOL/L (ref 98–107)
CO2 SERPL-SCNC: 21 MMOL/L (ref 21–32)
CREAT SERPL-MCNC: 0.84 MG/DL (ref 0.5–1.3)
EGFRCR SERPLBLD CKD-EPI 2021: >90 ML/MIN/1.73M*2
EOSINOPHIL # BLD AUTO: 0 X10*3/UL (ref 0–0.7)
EOSINOPHIL NFR BLD AUTO: 0 %
ERYTHROCYTE [DISTWIDTH] IN BLOOD BY AUTOMATED COUNT: 16.5 % (ref 11.5–14.5)
GLUCOSE SERPL-MCNC: 127 MG/DL (ref 74–99)
HCT VFR BLD AUTO: 29.3 % (ref 41–52)
HGB BLD-MCNC: 9.4 G/DL (ref 13.5–17.5)
HOLD SPECIMEN: NORMAL
IMM GRANULOCYTES # BLD AUTO: 0.1 X10*3/UL (ref 0–0.7)
IMM GRANULOCYTES NFR BLD AUTO: 1.2 % (ref 0–0.9)
LYMPHOCYTES # BLD AUTO: 1.73 X10*3/UL (ref 1.2–4.8)
LYMPHOCYTES NFR BLD AUTO: 20.4 %
MAGNESIUM SERPL-MCNC: 2.3 MG/DL (ref 1.6–2.4)
MCH RBC QN AUTO: 20.8 PG (ref 26–34)
MCHC RBC AUTO-ENTMCNC: 32.1 G/DL (ref 32–36)
MCV RBC AUTO: 65 FL (ref 80–100)
MONOCYTES # BLD AUTO: 0.3 X10*3/UL (ref 0.1–1)
MONOCYTES NFR BLD AUTO: 3.5 %
NEUTROPHILS # BLD AUTO: 6.34 X10*3/UL (ref 1.2–7.7)
NEUTROPHILS NFR BLD AUTO: 74.7 %
NRBC BLD-RTO: 0 /100 WBCS (ref 0–0)
P AXIS: 42 DEGREES
P OFFSET: 195 MS
P ONSET: 137 MS
PHOSPHATE SERPL-MCNC: 3.7 MG/DL (ref 2.5–4.9)
PLATELET # BLD AUTO: 299 X10*3/UL (ref 150–450)
POTASSIUM SERPL-SCNC: 4.2 MMOL/L (ref 3.5–5.3)
PR INTERVAL: 162 MS
Q ONSET: 218 MS
QRS COUNT: 12 BEATS
QRS DURATION: 82 MS
QT INTERVAL: 432 MS
QTC CALCULATION(BAZETT): 462 MS
QTC FREDERICIA: 452 MS
R AXIS: 16 DEGREES
RBC # BLD AUTO: 4.51 X10*6/UL (ref 4.5–5.9)
SODIUM SERPL-SCNC: 137 MMOL/L (ref 136–145)
T AXIS: 17 DEGREES
T OFFSET: 434 MS
VENTRICULAR RATE: 69 BPM
WBC # BLD AUTO: 8.5 X10*3/UL (ref 4.4–11.3)

## 2024-11-25 PROCEDURE — 1200000002 HC GENERAL ROOM WITH TELEMETRY DAILY

## 2024-11-25 PROCEDURE — 2500000004 HC RX 250 GENERAL PHARMACY W/ HCPCS (ALT 636 FOR OP/ED)

## 2024-11-25 PROCEDURE — 2500000001 HC RX 250 WO HCPCS SELF ADMINISTERED DRUGS (ALT 637 FOR MEDICARE OP)

## 2024-11-25 PROCEDURE — 85025 COMPLETE CBC W/AUTO DIFF WBC: CPT

## 2024-11-25 PROCEDURE — 2500000002 HC RX 250 W HCPCS SELF ADMINISTERED DRUGS (ALT 637 FOR MEDICARE OP, ALT 636 FOR OP/ED)

## 2024-11-25 PROCEDURE — 99233 SBSQ HOSP IP/OBS HIGH 50: CPT

## 2024-11-25 PROCEDURE — 99223 1ST HOSP IP/OBS HIGH 75: CPT

## 2024-11-25 PROCEDURE — 99223 1ST HOSP IP/OBS HIGH 75: CPT | Performed by: NURSE PRACTITIONER

## 2024-11-25 PROCEDURE — 36415 COLL VENOUS BLD VENIPUNCTURE: CPT

## 2024-11-25 PROCEDURE — 80069 RENAL FUNCTION PANEL: CPT

## 2024-11-25 PROCEDURE — 83735 ASSAY OF MAGNESIUM: CPT

## 2024-11-25 RX ORDER — HYDROMORPHONE HYDROCHLORIDE 1 MG/ML
0.4 INJECTION, SOLUTION INTRAMUSCULAR; INTRAVENOUS; SUBCUTANEOUS
Status: DISCONTINUED | OUTPATIENT
Start: 2024-11-25 | End: 2024-11-27 | Stop reason: HOSPADM

## 2024-11-25 RX ORDER — ONDANSETRON 4 MG/1
4 TABLET, FILM COATED ORAL EVERY 6 HOURS PRN
Status: DISCONTINUED | OUTPATIENT
Start: 2024-11-25 | End: 2024-11-25

## 2024-11-25 RX ORDER — OXYCODONE HYDROCHLORIDE 5 MG/1
10 TABLET ORAL EVERY 6 HOURS PRN
Status: DISCONTINUED | OUTPATIENT
Start: 2024-11-25 | End: 2024-11-25

## 2024-11-25 RX ORDER — OXYCODONE HYDROCHLORIDE 5 MG/1
10 TABLET ORAL EVERY 4 HOURS PRN
Status: DISCONTINUED | OUTPATIENT
Start: 2024-11-25 | End: 2024-11-25

## 2024-11-25 RX ORDER — OXYCODONE HYDROCHLORIDE 10 MG/1
10 TABLET ORAL
Status: DISCONTINUED | OUTPATIENT
Start: 2024-11-25 | End: 2024-11-27 | Stop reason: HOSPADM

## 2024-11-25 RX ORDER — MONTELUKAST SODIUM 10 MG/1
10 TABLET ORAL NIGHTLY
Status: DISCONTINUED | OUTPATIENT
Start: 2024-11-25 | End: 2024-11-27 | Stop reason: HOSPADM

## 2024-11-25 RX ORDER — CHOLECALCIFEROL (VITAMIN D3) 25 MCG
2000 TABLET ORAL DAILY
Status: DISCONTINUED | OUTPATIENT
Start: 2024-11-25 | End: 2024-11-27 | Stop reason: HOSPADM

## 2024-11-25 RX ORDER — OXYCODONE HYDROCHLORIDE 5 MG/1
5 TABLET ORAL EVERY 4 HOURS PRN
Status: DISCONTINUED | OUTPATIENT
Start: 2024-11-25 | End: 2024-11-25

## 2024-11-25 RX ORDER — OXYCODONE HYDROCHLORIDE 5 MG/1
5 TABLET ORAL EVERY 6 HOURS PRN
Status: DISCONTINUED | OUTPATIENT
Start: 2024-11-25 | End: 2024-11-25

## 2024-11-25 RX ORDER — PROCHLORPERAZINE MALEATE 10 MG
10 TABLET ORAL EVERY 6 HOURS PRN
Status: DISCONTINUED | OUTPATIENT
Start: 2024-11-25 | End: 2024-11-27 | Stop reason: HOSPADM

## 2024-11-25 RX ORDER — DIAZEPAM 10 MG/1
10 TABLET ORAL DAILY
Status: DISCONTINUED | OUTPATIENT
Start: 2024-11-25 | End: 2024-11-27 | Stop reason: HOSPADM

## 2024-11-25 RX ORDER — LOPERAMIDE HYDROCHLORIDE 2 MG/1
2 CAPSULE ORAL 3 TIMES DAILY PRN
Status: DISCONTINUED | OUTPATIENT
Start: 2024-11-25 | End: 2024-11-27 | Stop reason: HOSPADM

## 2024-11-25 RX ORDER — MONTELUKAST SODIUM 10 MG/1
10 TABLET ORAL NIGHTLY
Status: DISCONTINUED | OUTPATIENT
Start: 2024-11-25 | End: 2024-11-25

## 2024-11-25 RX ORDER — LISINOPRIL 5 MG/1
5 TABLET ORAL DAILY
Status: DISCONTINUED | OUTPATIENT
Start: 2024-11-25 | End: 2024-11-27 | Stop reason: HOSPADM

## 2024-11-25 RX ORDER — SERTRALINE HYDROCHLORIDE 100 MG/1
200 TABLET, FILM COATED ORAL DAILY
Status: DISCONTINUED | OUTPATIENT
Start: 2024-11-25 | End: 2024-11-27 | Stop reason: HOSPADM

## 2024-11-25 RX ORDER — ARIPIPRAZOLE 10 MG/1
10 TABLET ORAL DAILY
Status: DISCONTINUED | OUTPATIENT
Start: 2024-11-25 | End: 2024-11-27 | Stop reason: HOSPADM

## 2024-11-25 RX ORDER — OXYCODONE HYDROCHLORIDE 5 MG/1
5 TABLET ORAL ONCE
Status: COMPLETED | OUTPATIENT
Start: 2024-11-25 | End: 2024-11-25

## 2024-11-25 RX ORDER — ROSUVASTATIN CALCIUM 10 MG/1
10 TABLET, COATED ORAL DAILY
Status: DISCONTINUED | OUTPATIENT
Start: 2024-11-25 | End: 2024-11-27 | Stop reason: HOSPADM

## 2024-11-25 RX ORDER — DIAZEPAM 10 MG/1
5 TABLET ORAL NIGHTLY
Status: DISCONTINUED | OUTPATIENT
Start: 2024-11-25 | End: 2024-11-27 | Stop reason: HOSPADM

## 2024-11-25 RX ORDER — PANTOPRAZOLE SODIUM 40 MG/1
40 TABLET, DELAYED RELEASE ORAL
Status: DISCONTINUED | OUTPATIENT
Start: 2024-11-25 | End: 2024-11-27 | Stop reason: HOSPADM

## 2024-11-25 RX ORDER — PROPRANOLOL HYDROCHLORIDE 120 MG/1
120 CAPSULE, EXTENDED RELEASE ORAL 2 TIMES DAILY
Status: DISCONTINUED | OUTPATIENT
Start: 2024-11-25 | End: 2024-11-27 | Stop reason: HOSPADM

## 2024-11-25 RX ORDER — POLYETHYLENE GLYCOL 3350 17 G/17G
17 POWDER, FOR SOLUTION ORAL DAILY PRN
Status: DISCONTINUED | OUTPATIENT
Start: 2024-11-25 | End: 2024-11-27 | Stop reason: HOSPADM

## 2024-11-25 RX ORDER — FLUTICASONE FUROATE AND VILANTEROL 200; 25 UG/1; UG/1
1 POWDER RESPIRATORY (INHALATION)
Status: DISCONTINUED | OUTPATIENT
Start: 2024-11-25 | End: 2024-11-27 | Stop reason: HOSPADM

## 2024-11-25 RX ORDER — ALBUTEROL SULFATE 90 UG/1
2 INHALANT RESPIRATORY (INHALATION) EVERY 6 HOURS PRN
Status: DISCONTINUED | OUTPATIENT
Start: 2024-11-25 | End: 2024-11-27 | Stop reason: HOSPADM

## 2024-11-25 RX ORDER — ACETAMINOPHEN 325 MG/1
975 TABLET ORAL EVERY 8 HOURS
Status: DISCONTINUED | OUTPATIENT
Start: 2024-11-25 | End: 2024-11-27 | Stop reason: HOSPADM

## 2024-11-25 SDOH — HEALTH STABILITY: MENTAL HEALTH: BEHAVIORS/MOOD: ANXIOUS;RESTLESS;COOPERATIVE

## 2024-11-25 SDOH — HEALTH STABILITY: MENTAL HEALTH: BEHAVIORAL HEALTH(WDL): EXCEPTIONS TO WDL

## 2024-11-25 ASSESSMENT — PAIN - FUNCTIONAL ASSESSMENT
PAIN_FUNCTIONAL_ASSESSMENT: 0-10

## 2024-11-25 ASSESSMENT — PAIN DESCRIPTION - FREQUENCY
FREQUENCY: CONSTANT/CONTINUOUS

## 2024-11-25 ASSESSMENT — ENCOUNTER SYMPTOMS
ENDOCRINE NEGATIVE: 1
BACK PAIN: 1
VOMITING: 1
ALLERGIC/IMMUNOLOGIC NEGATIVE: 1
CARDIOVASCULAR NEGATIVE: 1
NAUSEA: 1
RESPIRATORY NEGATIVE: 1
EYES NEGATIVE: 1
ACTIVITY CHANGE: 1
PSYCHIATRIC NEGATIVE: 1
HEADACHES: 1

## 2024-11-25 ASSESSMENT — PAIN DESCRIPTION - PROGRESSION
CLINICAL_PROGRESSION: RAPIDLY IMPROVING
CLINICAL_PROGRESSION: GRADUALLY IMPROVING
CLINICAL_PROGRESSION: GRADUALLY IMPROVING
CLINICAL_PROGRESSION: GRADUALLY WORSENING
CLINICAL_PROGRESSION: GRADUALLY WORSENING

## 2024-11-25 ASSESSMENT — ACTIVITIES OF DAILY LIVING (ADL)
AMBULATION_ASSISTANCE: INDEPENDENT
TOILETING: INDEPENDENT

## 2024-11-25 ASSESSMENT — PAIN DESCRIPTION - LOCATION
LOCATION: HEAD

## 2024-11-25 ASSESSMENT — PAIN DESCRIPTION - PAIN TYPE
TYPE: ACUTE PAIN

## 2024-11-25 ASSESSMENT — PAIN DESCRIPTION - ORIENTATION
ORIENTATION: RIGHT;LEFT
ORIENTATION: RIGHT;LEFT

## 2024-11-25 ASSESSMENT — PAIN SCALES - GENERAL
PAINLEVEL_OUTOF10: 6
PAINLEVEL_OUTOF10: 8
PAINLEVEL_OUTOF10: 9

## 2024-11-25 ASSESSMENT — PAIN DESCRIPTION - ONSET
ONSET: ONGOING

## 2024-11-25 ASSESSMENT — PAIN DESCRIPTION - DESCRIPTORS
DESCRIPTORS: ACHING;TIGHTNESS
DESCRIPTORS: ACHING

## 2024-11-25 NOTE — CONSULTS
"SUPPORTIVE AND PALLIATIVE ONCOLOGY CONSULT    Inpatient consult to [facility] Adult Supportive Oncology  Consult performed by: Edward Wagoner APRN-CNP  Consult ordered by: Mariam Joy MD  Reason for consult: pain        SERVICE DATE: 11/25/2024      PALLIATIVE MEDICINE OUTPATIENT PROVIDER:  None  CURRENT ATTENDING PROVIDER: Mariam Joy MD;Emily*     Medical Oncologist: No care team member to display   Radiation Oncologist: No care team member to display  Primary Physician: Amy Argueta  [phone]    REASON FOR CONSULT/CHIEF CONSULT COMPLAINT: pain management    Subjective   HISTORY OF PRESENT ILLNESS: Manuel Marrufo is a 59 y.o. male diagnosed with  RLL CA (s/p partial resection and biopsy), spinal stenosis (s/p L4-S1 lumbar decompression in 2021). PMH significant for L shoulder Melanoma (s/p resection in 2019),HTN, HLD, fatty liver disease, obesity, FRANK on CPAP, PTSD, GERD. Admitted 11/24/2024 for further evaluation and management of  hemorrhagic brain mets seen on imaging . Course complicated by lingering, moderate to severe H/A for the past 8 days and also reports increased nausea. Supportive and Palliative Oncology is consulted for pain management.     Patient was seen at bedside with his mother, brother Mason and they were providing support. Patient reports that he has pain to the back of his head that radiates down to his neck. These headaches started 9 days ago and it has persisted causing increased nausea and \"wobbling when I walk\". Patients family expressed their concerns of the unknown and how it is fueling patients increased anxiety. Patient reports that pending MRI result would determine if he would stay at  or go to [facility]. Patient endorsed that he is DNR/DNI. His brother Mason is his medical decision maker, and he has POA paperwork at home and he plans to bring it in.       Pain Assessment:  Onset: 9 days ago   Location:  back of head   Duration: Intermittent every half hour then goes " "away, only to come back an hour later   Characteristics:   Rating: Severe 8-9/10.    Descriptors: stabbing   Aggravating: movement    Relieving: Analgesics   Intolerances:Manuel Marrufo is allergic to buspirone and milk.   Personal Pain Goal: 2    Interference with Function: Very Much   Coping Strategies: none   Emotional Response: Anxiety   Barriers to Pain Management: None    Opioid Requirements  Past 24 h opioid requirements (11/24/24 at 0800 to 11/25/24 at 0800):   Hydromorphone 0.2 mg IV x 2 doses = 0.4 mg = 5 OME  Oxycodone IR 5 mg PO x 1 doses = 5 mg = 6.25mg OME  Total 24h OME use:  11.25mg     Note: OME calculations based on equianalgesic table below. Please note this table is based on best available evidence but conversions are still approximate. These are NOT opioid DOSES for individual patient use; this is equivalency information.  Drug Parenteral Enteral   Morphine 10 25   Oxycodone N/A 20   Hydromorphone 2 5   Fentanyl 0.15 N/A   Tramadol N/A 120   Citation: Pablo AVILES. Demystifying opioid conversion calculations: A guide for effective dosing, Second edition. [place]: American Society of Health-System Pharmacists, 2018.    OARRS/PDMP reviewed 11/25/2024 no aberrant behavior noted.    Symptom Assessment:  Pain:very much   Headache: very much   Dizziness:a little  Lack of energy: none  Difficulty sleeping:  situational given hospital environment   Worrying: very much  Anxiety: very much  Depressive symptoms: very much  Pain in mouth/swallowing: none  Dry mouth: none  Taste changes: none  Shortness of breath: none  Lack of appetite: a little   Nausea: very much  Vomiting: none  Constipation: none  Diarrhea:  has \"IBSD\" so has issues with diarrhea, usually takes Imodium in the morning every day to help with  diarrhea   Sore muscles: none  Numbness or tingling in hands/feet/other: none  Weight loss:  25lbs weight loss since July 2024  Other: none        Information obtained from: chart review, " interview of patient, interview of family, discussion with RN, and discussion with primary team  ______________________________________________________________________     Oncology History    No history exists.       Past Medical History:   Diagnosis Date    Awareness under anesthesia 2021    During back surgery    Fatty liver disease, nonalcoholic     GERD (gastroesophageal reflux disease)     HLD (hyperlipidemia)     HTN (hypertension)     FRANK on CPAP     PTSD (post-traumatic stress disorder)     Spinal stenosis      Past Surgical History:   Procedure Laterality Date    BACK SURGERY      MALIGNANT SKIN LESION EXCISION      left shoulder and armpit    TONSILLECTOMY       Family History   Problem Relation Name Age of Onset    Arthritis Mother Patrizia Marrufo     Alcohol abuse Father      Cirrhosis Father      Cervical cancer Sister Kaity Rebolledo     Hypertension Sister Kaity Rebolledo     Other (bladder cancer) Maternal Grandmother Coral Ferrera         SOCIAL HISTORY:  Support system Mother and brother Mason at bedside    Social History:  reports that he quit smoking about 6 months ago. His smoking use included cigarettes. He started smoking about 40 years ago. He has a 20 pack-year smoking history. He has never used smokeless tobacco. He reports that he does not drink alcohol and does not use drugs.    REVIEW OF SYSTEMS:  Review of systems negative unless noted in HPI.       Objective       Lab Results   Component Value Date    WBC 9.2 11/24/2024    HGB 8.8 (L) 11/24/2024    HCT 29.5 (L) 11/24/2024    MCV 68 (L) 11/24/2024     11/24/2024      Lab Results   Component Value Date    GLUCOSE 94 11/24/2024    CALCIUM 8.7 11/24/2024     11/24/2024    K 4.4 11/24/2024    CO2 25 11/24/2024     11/24/2024    BUN 13 11/24/2024    CREATININE 1.00 11/24/2024     Lab Results   Component Value Date    ALT 29 11/24/2024    AST 42 (H) 11/24/2024    ALKPHOS 93 11/24/2024    BILITOT 0.3 11/24/2024     Estimated Creatinine  Clearance: 100 mL/min (by C-G formula based on SCr of 1 mg/dL).     No results found for this or any previous visit (from the past 4464 hours).  Wt Readings from Last 5 Encounters:   11/24/24 117 kg (258 lb)   11/24/24 117 kg (258 lb)   06/18/24 123 kg (272 lb 1.6 oz)   06/17/24 120 kg (265 lb)       Current Outpatient Medications   Medication Instructions    acetaminophen (TYLENOL) 325 mg, oral, Every 6 hours PRN    albuterol 90 mcg/actuation inhaler 2 puffs, inhalation, Every 6 hours PRN    ARIPiprazole (ABILIFY) 10 mg, oral, Daily    budesonide-glycopyr-formoterol (BREZTRI) 160-9-4.8 mcg/actuation HFA aerosol inhaler 2 puffs, inhalation, 2 times daily RT    cholecalciferol (VITAMIN D-3) 50 mcg, oral, Daily    diazePAM (Valium) 5 mg tablet Take 2 tablets by mouth ( 10 mg ) in the morning and 1 tablet by mouth at bedtime ( 5 mg ) for a total dose of 15 mg daily.    furosemide (LASIX) 40 mg, oral, Daily    lisinopril 5 mg, oral, Daily    loperamide (IMODIUM) 2 mg, oral, 3 times daily PRN    montelukast (SINGULAIR) 10 mg, oral, Nightly    ondansetron (ZOFRAN) 4 mg, oral, Every 8 hours PRN    oxyCODONE (ROXICODONE) 5 mg, oral, Every 6 hours PRN, Take with Tylenol as directed     pregabalin (LYRICA) 200 mg, oral, 2 times daily    propranolol LA (INDERAL LA) 120 mg, oral, 2 times daily    rosuvastatin (CRESTOR) 10 mg, oral, Daily    sertraline (ZOLOFT) 200 mg, oral, Daily    tadalafil (CIALIS) 5 mg, oral, Daily     Scheduled medications   acetaminophen, 975 mg, oral, q8h  ARIPiprazole, 10 mg, oral, Daily  cholecalciferol, 2,000 Units, oral, Daily  dexAMETHasone, 4 mg, intravenous, q6h  diazePAM, 10 mg, oral, Daily  diazePAM, 5 mg, oral, Nightly  tiotropium, 2 puff, inhalation, Daily   And  fluticasone furoate-vilanteroL, 1 puff, inhalation, Daily  lisinopril, 5 mg, oral, Daily  montelukast, 10 mg, oral, Nightly  pantoprazole, 40 mg, oral, Daily before breakfast  pregabalin, 200 mg, oral, BID  propranolol LA, 120 mg,  oral, BID  rosuvastatin, 10 mg, oral, Daily  sertraline, 200 mg, oral, Daily      Continuous medications     PRN medications  albuterol, 2 puff, q6h PRN  alteplase, 2 mg, PRN  HYDROmorphone, 0.4 mg, q3h PRN  loperamide, 2 mg, TID PRN  ondansetron, 4 mg, q6h PRN  oxyCODONE, 5 mg, q4h PRN  oxyCODONE, 5 mg, q6h PRN  oxygen, , Continuous PRN - O2/gases  polyethylene glycol, 17 g, Daily PRN         Allergies:   Allergies   Allergen Reactions    Buspirone GI Upset and Other    Milk Diarrhea                PHYSICAL EXAMINATION:  Vital Signs:   Vital signs reviewed  Vitals:    11/25/24 0746   BP: (!) 151/105   Pulse: 80   Resp: 16   Temp:    SpO2: 98%     Pain Score: 6     Physical Exam  Cardiovascular:      Rate and Rhythm: Regular rhythm.      Heart sounds: Normal heart sounds.   Pulmonary:      Breath sounds: Normal breath sounds.   Abdominal:      General: Abdomen is flat. Bowel sounds are normal.      Comments: Obese appearing    Skin:     General: Skin is warm.   Neurological:      Mental Status: He is alert and oriented to person, place, and time.   Psychiatric:         Mood and Affect: Mood normal.         Behavior: Behavior normal.         Thought Content: Thought content normal.         Judgment: Judgment normal.           ASSESSMENT/PLAN:  Manuel Marrufo is a 59 y.o. male diagnosed with  RLL CA (s/p partial resection and biopsy), spinal stenosis (s/p L4-S1 lumbar decompression in 2021). PMH significant for L shoulder Melanoma (s/p resection in 2019),HTN, HLD, fatty liver disease, obesity, FRANK on CPAP, PTSD, GERD. Admitted 11/24/2024 for further evaluation and management of  hemorrhagic brain mets seen on imaging. Course complicated by lingering, moderate to severe H/A for the past 8 days and also reports increased nausea. Supportive and Palliative Oncology is consulted for pain management.      Pain:  headache pain related to metastatic melanoma to brain and RLL   Pain is: cancer related pain  Type:  neuropathic  Pain control: sub-optimally controlled  Home regimen:  Oxycodone 5mg q6hrs PRN and Pregabalin 200mg BID   Intolerances/previously tried:    Personalized pain goal: Mild  Total OME usage for the past 24 hours:  11.25  Continue Dex 4mg q6hrs scheduled   Continue Pregablin 200mg BID   discontinue Oxycodone 5mg q4hrs PRN patient reports that it is ineffective unless he uses 3 tablets of tylenol in addition.   Remove Oxycodone 5mg q6hrs PRN [dublicate order]   Start Oxycodone 10mg q3hrs PRN   Integrative oncology consult placed. Patient reports that he has had acupuncture in the past and it helped  Continue Acetaminophen 975mg q8hrs Scheduled   Continue dilaudid 0.4mg q3hrs PRN   Continue to monitor pain scores and administer PRN medications as appropriate  Continue/initiate nonpharmacologic pain management strategies including ice/heat therapy, distraction techniques, deep breathing/relaxation techniques, calming music, and repositioning  Continue to monitor for signs of opioid efficacy (pain scores, improved functionality) and toxicity (pinpoint pupils, excess sedation/drowsiness/confusion, respiratory depression, etc.)    Nausea:  Intermittent nausea with vomiting related to  brain mets    Home regimen:  Ondansetron   Improving  Continue Zofran 4mg q6hr PRN   Start Compazine 10mg q6hrs PRN for nausea second line      Constipation  At risk for constipation related to medication side effects (including opioids), currently not constipated  Usual bowel pattern: every day and has IBSD  Home regimen: none  LBM 11/24/24  Continue Miralax 17 grams daily   Continue Imodium 2 mg TID PRN   Goal to have BM without straining q48-72h, adjust regimen as needed    Altered Mood:  Acute on chronic anxiety and depression related to health concerns and PTSD    controlled with home regimen   Home regimen: sertraline 200mg and Valium 10mg   abilify 10mg daily   Continue Zoloft 200mg daily   Continue Abilify 10mg daily    Continue diazepam 5mg at night and Diazepam 10mg daily.    Sleeping Difficulty:  Impaired sleep related to hospital environment  Home regimen:  none  With change of room he will do better     Decreased appetite:  Appetite loss related to disease process  Weight loss 25lbs weight loss since July 2024   Home regimen:  none  Currently on Dex     Medical Decision Making/Goals of Care/Advance Care Planning:  Patient's current clinical condition, including diagnosis, prognosis, and management plan, and goals of care were discussed.   Life limiting disease: metastatic malignancy  Family: Supportive family   Performance status: Major  limitations due to pain  Joys/meaning/strength: Family  Understanding of health: Demonstrates fair understanding of disease process, understands plan for MRI then determination if he would undergo surgery with neurosurgery   Information:Wants full disclosure  Goals: symptom control and cancer directed therapy  Worries and fears now and future: ongoing symptoms, inability to receive cancer treatment, and MRI result     Minimum acceptable outcome/QOL:  In an event that my heart stops I do not want to be resuscitated . Additionally I do not want to be intubated  and do not want artificial nutrition via NG and Peg tube    Code status discussion:  DNR/DNI    Advance Directives  Existence of Advance Directives:  Yes, but NOT documented in medical record  Decision maker: TELMACHANDLER is Brother Mason Be @ [phone]   Code Status: DNR DNI    Introduction to Supportive and Palliative Oncology:  Spoke with patient, brother and mom at bedside  Introduced the role and philosophy of Supportive and Palliative oncology in the evaluation and management of symptoms during cancer treatment  Palliative care was introduced as a service for patients with serious illness to help with symptoms, assist with goals of care conversations, navigate complex decision making, improve quality of life for patients, and  provide support both patients and families.  Patient seemed to appreciate the extra layer of support.     Supportive Interventions: Interventions: Music Therapy: offered declined, Art Therapy: offered declined, SPO Spiritual Care: offered declined  Integrative oncology referral placed     Disposition:  Please  start the process of having prior authorization with meds to beds deliver medications to patient prior to discharge via [facility] pharmacy. Prescriptions will need to be sent 48-72 hours prior to discharge so that a prior authorization can be completed.     Discharge date: unknown pending acute issues, pain control, and symptom control  Will assess if patient needs an appointment with Outpatient Supportive Oncology None      Signature and billing:    Medical complexity was high level due to due to complexity of problems, extensive data review, and high risk of management/treatment.    I spent 75 minutes in the care of this patient which included chart review, interviewing patient/family, discussion with primary team, coordination of care, and documentation.      DATA   Diagnostic tests and information reviewed for today's visit:  Conversation with primary team, Conversation with mother and brother at bedside        Some elements copied from  oncology and Neurosurgery  note on 11/25/24, the elements have been updated and all reflect current decision making from today, 11/25/2024.    Plan of Care discussed with: Provider, RN, Patient and Family/Significant Other: mother and brother Mason at bedside     Thank you for asking Supportive and Palliative Oncology to assist with care of this patient.  Recommendations will be communicated back to the consulting service by way of shared electronic medical record/secure chat/email or face-to-face.   We will continue to follow.  Please contact us for additional questions or concerns.      SIGNATURE: ROSSI Awan  PAGER/CONTACT:  Contact information:  Supportive and  Palliative Oncology  Monday-Friday 8 AM-5 PM  Epic Secure chat or pager [phone].  After hours and weekends:  pager [phone]

## 2024-11-25 NOTE — PROGRESS NOTES
Manuel Marrufo is a 59 y.o. 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    Subjective   No acute events overnight        Objective     Physical Exam  Alert and oriented x 3  Face symmetric  Tongue midline  Shrug shoulders symmetrically  Follows command x 4 5/5  No pronator drift  Sensation intact to light touch.  No dysmetria       Last Recorded Vitals  Blood pressure (!) 151/105, pulse 80, temperature 36.9 °C (98.4 °F), temperature source Temporal, resp. rate 16, weight 117 kg (258 lb), SpO2 98%.  Intake/Output last 3 Shifts:  No intake/output data recorded.    Relevant Results                 CT head wo IV contrast   Final Result   1. Numerous hyperdense metastatic lesions throughout the cerebrum,   cerebellum, and brainstem with associated vasogenic edema. Varying   degrees of density within some of the lesions suggests trace   intralesional hemorrhage. MRI with contrast is recommended for   further evaluation.   2. No evidence of midline shift.        I personally reviewed the images/study and I agree with the findings   as stated by Rebecca Russell DO, PGY-3. This study was interpreted   at [place], [place].        MACRO:   None        Signed by: Tomi Varma 11/24/2024 10:05 PM   Dictation workstation:   [id number]      XR chest 1 view   Final Result   1. Small amount of patchy bibasilar atelectasis. No other acute   intrathoracic.        I personally reviewed the images/study and I agree with the findings   as stated by Dr. Eduardo Mccullough. This study was interpreted at   [place], [place].        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 male w/ h/o L shoulder melanoma s/p resection in 2019, recently diagnosed RLL mass s/p partial resection and biopsy [facility]), HTN, HLD, fatty liver disease, obesity, FRANK on CPAP, PTSD, GERD, p/w HAx8 days, CTH enumerous hemorrhagic supra and infra tentorial small mets w associated edema. Neurosurgery is consulted for further evaluation.     Recommendations:   [place] team primary   - Please obtain MRI brain with and without contrast  - Recommend rad onc consultation for eval of whole-body radiation   - Recommend dex 4q6    - Neurosurgery will continue to follow and provide further recs pending MRI            Travon Patterson MD

## 2024-11-25 NOTE — PROGRESS NOTES
Pharmacy Medication History Review    Manuel Marrufo is a 59 y.o. 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 Pharmacy reviewed the patient's [id number] medications and allergies for accuracy.    Medications ADDED:  Acetaminophen 325 mg tablet   Albuterol 90 mcg / actuation inhaler   Montelukast 10 mg tablet   Ondansetron 4 mg tablet   Oxycodone Hcl 5 mg tablet   Tadalafil 5 mg tablet     Medications CHANGED:  Lisinopril 20 mg tablet - updated to Lisinopril 5 mg tablets   Propranolol 80 mg capsule - updated to Propranolol 120 mg capsule   Medications REMOVED:   Cyclobenzaprine 10 mg tablet   Omeprazole 20 mg capsule EC       The list below reflects the updated PTA list.   Prior to Admission Medications   Prescriptions Last Dose Informant   ARIPiprazole (Abilify) 10 mg tablet 11/23/2024 Morning Self, Other   Sig: Take 1 tablet (10 mg) by mouth once daily.   acetaminophen (Tylenol) 325 mg tablet 11/24/2024 Morning Self, Other   Sig: Take 1 tablet (325 mg) by mouth every 6 hours if needed for mild pain (1 - 3).   albuterol 90 mcg/actuation inhaler 11/24/2024 Morning Self, Other   Sig: Inhale 2 puffs every 6 hours if needed for wheezing or shortness of breath.   budesonide-glycopyr-formoterol (BREZTRI) 160-9-4.8 mcg/actuation HFA aerosol inhaler 11/24/2024 Morning Self, Other   Sig: Inhale 2 puffs 2 times a day.   cholecalciferol (Vitamin D-3) 50 MCG (2000 UT) tablet 11/23/2024 Morning Self, Other   Sig: Take 1 tablet (50 mcg) by mouth once daily.   diazePAM (Valium) 5 mg tablet 11/23/2024 Bedtime Self, Other   Sig: Take 2 tablets by mouth ( 10 mg ) in the morning and 1 tablet by mouth at bedtime ( 5 mg ) for a total dose of 15 mg daily.   furosemide (Lasix) 40 mg tablet 11/24/2024 Morning Self, Other   Sig: Take 1 tablet (40 mg) by mouth once daily.   lisinopril 5 mg tablet 11/24/2024 Morning Self, Other   Sig: Take 1 tablet (5 mg) by mouth once daily.   loperamide (Imodium) 2 mg capsule  11/23/2024 Evening Self, Other   Sig: Take 1 capsule (2 mg) by mouth 3 times a day as needed for diarrhea.   montelukast (Singulair) 10 mg tablet Past Week Self, Other   Sig: Take 1 tablet (10 mg) by mouth once daily at bedtime.   ondansetron (Zofran) 4 mg tablet Past Month Self, Other   Sig: Take 1 tablet (4 mg) by mouth every 8 hours if needed for nausea or vomiting.   oxyCODONE (Roxicodone) 5 mg immediate release tablet 11/24/2024 Morning Self, Other   Sig: Take 1 tablet (5 mg) by mouth every 6 hours if needed for severe pain (7 - 10). Take with Tylenol as directed   pregabalin (Lyrica) 200 mg capsule 11/24/2024 Morning Self, Other   Sig: Take 1 capsule (200 mg) by mouth 2 times a day.   propranolol LA (Inderal LA) 120 mg 24 hr capsule 11/23/2024 Evening Self, Other   Sig: Take 1 capsule (120 mg) by mouth 2 times a day.   rosuvastatin (Crestor) 10 mg tablet 11/24/2024 Morning Self, Other   Sig: Take 1 tablet (10 mg) by mouth once daily.   sertraline (Zoloft) 100 mg tablet 11/23/2024 Self, Other   Sig: Take 2 tablets (200 mg) by mouth once daily.   tadalafil (Cialis) 5 mg tablet 11/23/2024 Self, Other   Sig: Take 1 tablet (5 mg) by mouth once daily.      Facility-Administered Medications: None            The list below reflects the updated allergy list. Please review each documented allergy for additional clarification and justification.  Allergies  Reviewed by Minal Milton RN on 11/24/2024        Severity Reactions Comments    Buspirone Not Specified GI Upset, Other     Milk Not Specified Diarrhea             Patient declines M2B at discharge.     Sources:   [facility]RS - 11/19/2024 Diazepam 5 mg tablet 75 # / 30 days                     - 11/13/2024 Pregabalin 200 mg capsule 180 # / 90 days                     -  11/12/2024 Oxycodone Hcl ( Ir ) 5 mg tablet 120 # / 30 days                     -  11/06/2024 Oxycodone Hcl ( Ir ) 5 mg tablet 28 # / 7 days   Patient Interview  Patient dispense [facility]  "Continuity of Care Document generated on 11/24/2024    chart review  Care Everywhere     Additional Comments:  Patients PTA list was completed using [facility] Continuity of Care Document generated on 11/24/2024. Medications listed on this document were compared to patients most recent dispense history and then verified with patient during interview for accuracy. Any changes , updates , or removals were made to reflect [facility] document and also again were confirmed with patient.     Patient is a reliable historian and appears adherent to all current home medications,       Clare Peterson  Pharmacy Technician  11/24/24     Secure Chat preferred   If no response call [phone] or Vocera \"Med Rec\"   "

## 2024-11-25 NOTE — ASSESSMENT & PLAN NOTE
Manuel Marrufo is a 59 y.o. male w/ PMHx of L shoulder Melanoma (s/p resection in 2019), recently diagnosed RLL CA (s/p partial resection and biopsy), spinal stenosis (s/p L4-S1 lumbar decompression in 2021), HTN, HLD, nonalcoholic fatty liver disease, obesity, FRANK on CPAP, PTSD and GERD who [place] [facility] [place] with c/f hemorrhagic brain mets seen on imaging. [place] [facility] [place]surgery evaluation and workup.    #New Hemorrhagic Brain lesions  - pt reporting moderate-severe H/A x8 days, new ataxia s/s x1 day  - CTH (11/24 @ OSH) completed which showed numerous hemorrhagic lesions w/ associated edema  - CT C-spine (11/24 @ OSH) showed no acute fracture or subluxation, CT PE (11/24 @ OSH) was negative for acute PE, but showed numerous bilateral pulmonary nodules which have increased in size  - s/p x1 dose Dexamethasone 10mg @ OSH  - CTH w/o contrast (11/24) showed numerous hyperdense metastatic lesions throughout the cerebrum, cerebellum, and brainstem with associated vasogenic edema. Varying degrees of density within some of the lesions suggests trace intralesional hemorrhage, no evidence of midline shift  - CXR (11/24) small amount of bibasilar atelectasis  - MRI Brain w/ and w/o contrast (11/24) pending  - Neurosurgery consulted (11/24), rec obtaining MRI brain, CTH noncontrast, starting maintainance Dexamethasone 4mg Q6 and consulting Radiation Oncology for eval of whole-body radiation  - start Dexamethasone 4mg Q6  - Radiation Oncology consulted (11/25) recs pending  - start Tylenol 975mg Q8 for mild pain and H/A  - start Dilaudid IV 0.2mg Q3 PRN for severe pain (to avoid PO i/s/o nausea, pt states takes Percocet at home)  - start Zofran IV 4mg Q6 PRN  - Consider Supportive Onc consult in AM    #Melanoma w/ mets to RLL  - Melanoma of L shoulder (dx. 2019) s/p excision, no [facility]  - RLL CA (dx. 6/2024, on immunotherapy), bx consistent w/ metastatic melanoma  - pt reported only completing 1 cycle of  immunotherapy, unsure of drug/dose (likely nivulumab and ipilumab, [facility] to confirm)  - confirm and update primary oncologist in AM    # Spinal Stenosis of lumbar region w/ neurogenic claudication  # Hx Sciatica  - Previously underwent L4-S1 lumbar decompression in 2021 for preoperative symptoms of low back pain that radiated to lateral thighs and anterior shins  - pt previously receiving [facility] injections for pain at [facility], last injection 7/2/24  - continue w/ home Pregabalin 200mg BID    #Hx HTN  #Hx HLD  - continue w/ home Propranolol LA 120mg BID  - continue w/ home Rosuvastatin 10mg nightly  - continue w/ home Lisinopril 5mg daily    #FRANK  - CPAP for sleep    #Hx IBS  - continue w/ home Imodium 2mg TID PRN    #PTSD  #Generalized anxiety disorder  - continue w/ home Sertraline 200mg daily  - continue w/ home Aripiprazole 10mg daily  - continue w/ home PO Diazepam 10mg daily, 5mg at bedtime    #Prophy  - HOLD AC i/s/o active hemorrhagic lesions  - start Protonix 40mg daily (home Omeprazole alternative)    DISPO  - Full Code - confirmed on admit  - NOK: Patrizia Marrufo (mother) #[phone]  - PT/OT thomas

## 2024-11-25 NOTE — CONSULTS
Consults    Reason For Consult  Metastatic melanoma    History Of Present Illness  Manuel Marrufo is a 59 y.o. male w/ h/o L shoulder melanoma s/p resection in 2019, recently diagnosed RLL mass s/p partial resection and biopsy [facility]), HTN, HLD, fatty liver disease, obesity, FRANK on CPAP, PTSD, GERD, p/w HAx8 days, CTH enumerous hemorrhagic supra and infra tentorial small mets w associated edema. Neurosurgery is consulted for further evaluation.     Patient is a very pleasant 59-year-old male who is a  and gets most of his care at [facility].  He reports that in 2019 he was found to have a left shoulder skin lesion that was first biopsied and then resected which found to be melanoma.  He did not have any other follow-up for that.  Around a month and a half ago he was found to have right lower lobe lung lesions that underwent a biopsy for and found to be metastatic melanoma.  Since then he was has been on to immunotherapy medications that he does not remember their names.  Rest of his history is as mentioned above.  He denies any blood thinner use.  Over the past 8 days he developed moderate to severe headaches that has been lingering every day.  He also has generalized anxiety is at baseline with vomiting however noticed that with these headaches exacerbate his nausea symptoms.  He had 1 episode of emesis this morning.  Currently he is wide-awake and denies any altered mental status, loss of consciousness, weakness, numbness, vision changes.  He also gets treatment for his lumbar spine stenosis with pain management and injections.  He denies any prior history of brain cancer or surgeries.  He is a former smoker and quit smoking in May 2024.     Past Medical History  He has a past medical history of Awareness under anesthesia (2021), Fatty liver disease, nonalcoholic, GERD (gastroesophageal reflux disease), HLD (hyperlipidemia), HTN (hypertension), FRANK on CPAP, PTSD (post-traumatic stress  disorder), and Spinal stenosis.    Surgical History  He has a past surgical history that includes Back surgery; Malignant skin lesion excision; and Tonsillectomy.     Social History  He reports that he quit smoking about 6 months ago. His smoking use included cigarettes. He started smoking about 40 years ago. He has a 20 pack-year smoking history. He has never used smokeless tobacco. He reports that he does not drink alcohol and does not use drugs.    Family History  Family History   Problem Relation Name Age of Onset    Arthritis Mother Patrizia Marrufo     Alcohol abuse Father      Cirrhosis Father      Cervical cancer Sister Kaity Rebolledo     Hypertension Sister Kaity Rebolledo     Other (bladder cancer) Maternal Grandmother Coral Ferrera         Allergies  Buspirone and Milk    Review of Systems   10 point ROS is obtained and negative except the ones mentioned in the HPI    Physical Exam  Constitutional:       Appearance: He is normal weight.   HENT:      Head: Normocephalic and atraumatic.      Right Ear: External ear normal.      Left Ear: External ear normal.      Nose: Nose normal.      Mouth/Throat:      Mouth: Mucous membranes are moist.   Eyes:      Extraocular Movements: Extraocular movements intact.      Pupils: Pupils are equal, round, and reactive to light.   Cardiovascular:      Rate and Rhythm: Normal rate.      Pulses: Normal pulses.   Pulmonary:      Effort: Pulmonary effort is normal.   Abdominal:      Palpations: Abdomen is soft.   Musculoskeletal:      Cervical back: Normal range of motion.   Skin:     General: Skin is warm.   Neurological:      Comments: Alert and oriented x 3  Face symmetric  Tongue midline  Shrug shoulders symmetrically  Follows command x 4 5/5  No pronator drift  Sensation intact to light touch.  No dysmetria          Last Recorded Vitals  Blood pressure (!) 183/101, pulse 72, temperature 36.9 °C (98.4 °F), temperature source Temporal, resp. rate 16, weight 117 kg (258 lb), SpO2  98%.    Relevant Results  As above         Assessment/Plan     Manuel Marrufo is a 59 y.o. male w/ h/o L shoulder melanoma s/p resection in 2019, recently diagnosed RLL mass s/p partial resection and biopsy ([facility]), HTN, HLD, fatty liver disease, obesity, FRANK on CPAP, PTSD, GERD, p/w HAx8 days, CTH enumerous hemorrhagic supra and infra tentorial small mets w associated edema. Neurosurgery is consulted for further evaluation.       Recs:  Recommend admission to medical oncology.  Please obtain MRI brain with and without contrast.  If MRI cannot be obtained tonight recommend obtaining a repeat CT head noncontrast for stability of hemorrhagic lesions.  Recommend radiation oncology consultation for evaluation of whole-body radiation.  Patient already received 10 mg dexamethasone.  Recommend maintenance dose 4mg every 6hr.  Neurosurgery will continue to follow and provide further recs pending MRI

## 2024-11-25 NOTE — PROGRESS NOTES
Internal Medicine Progress Note     Manuel Marrufo is a 59 y.o. male w/ PMHx of L shoulder Melanoma (s/p resection in 2019), recently diagnosed RLL CA (s/p partial resection and biopsy), spinal stenosis (s/p L4-S1 lumbar decompression in 2021), HTN, HLD, nonalcoholic fatty liver disease, obesity, FRANK on CPAP, PTSD and GERD who presents to [facility] ED with c/f hemorrhagic brain mets seen on imaging. Admitted to [facility] for urgent Neurosurgery evaluation and workup.     Subjective     Patient was seen and evaluated at bedside. Patient reports that he has been experiencing increased headache for the last 8-9 days, some radiation to the neck but nowhere else, no changes in vision or hearing, and associated nausea. He does not feel IV zofran helps and would prefer PO instead. Patient has stated [facility] and [facility], but understands limitations of insurance. Patient also stated he is on immunotherapy (likely nivulumab + ipilumab) recently got 3 weeks ago, with next immunotherapy scheduled for early Dec. Patient pending MRI, however patient has VNS delaying MRI.         Medications     Medications:   Scheduled medications  acetaminophen, 975 mg, oral, q8h  ARIPiprazole, 10 mg, oral, Daily  cholecalciferol, 2,000 Units, oral, Daily  dexAMETHasone, 4 mg, intravenous, q6h  diazePAM, 10 mg, oral, Daily  diazePAM, 5 mg, oral, Nightly  tiotropium, 2 puff, inhalation, Daily   And  fluticasone furoate-vilanteroL, 1 puff, inhalation, Daily  lisinopril, 5 mg, oral, Daily  montelukast, 10 mg, oral, Nightly  pantoprazole, 40 mg, oral, Daily before breakfast  pregabalin, 200 mg, oral, BID  propranolol LA, 120 mg, oral, BID  rosuvastatin, 10 mg, oral, Daily  sertraline, 200 mg, oral, Daily      Continuous medications     PRN medications  PRN medications: albuterol, alteplase, HYDROmorphone, loperamide, ondansetron, oxyCODONE, oxyCODONE, oxygen, polyethylene glycol     Objective     Vitals  Visit Vitals  BP  123/77 (BP Location: Right arm, Patient Position: Lying)   Pulse 73   Temp 36.9 °C (98.4 °F) (Temporal)   Resp 17   Wt 117 kg (258 lb)   SpO2 96%   BMI 39.23 kg/m²   Smoking Status Former   BSA 2.37 m²     No intake or output data in the 24 hours ending 11/25/24 1347    Physical Exam  Physical Exam  Constitutional:       Appearance: Normal appearance.      Comments: Slight droop in R of face   Cardiovascular:      Rate and Rhythm: Normal rate and regular rhythm.      Pulses: Normal pulses.      Heart sounds: Normal heart sounds.   Pulmonary:      Effort: Pulmonary effort is normal.      Breath sounds: Normal breath sounds.   Abdominal:      General: Abdomen is flat. Bowel sounds are normal.      Palpations: Abdomen is soft.   Neurological:      Mental Status: He is alert and oriented to person, place, and time. Mental status is at baseline.      Comments: 5/5 UE and LE sensation and motor function bilaterally  CNS intact II to XII although facial droop on R face prominent   Psychiatric:         Mood and Affect: Mood normal.         Behavior: Behavior normal.         Thought Content: Thought content normal.          Labs  Lab Results   Component Value Date    WBC 8.5 11/25/2024    HGB 9.4 (L) 11/25/2024    HCT 29.3 (L) 11/25/2024    MCV 65 (L) 11/25/2024     11/25/2024      Lab Results   Component Value Date    GLUCOSE 127 (H) 11/25/2024    CALCIUM 9.5 11/25/2024     11/25/2024    K 4.2 11/25/2024    CO2 21 11/25/2024     11/25/2024    BUN 13 11/25/2024    CREATININE 0.84 11/25/2024      Lab Results   Component Value Date    ALT 29 11/24/2024    AST 42 (H) 11/24/2024    ALKPHOS 93 11/24/2024    BILITOT 0.3 11/24/2024        Imaging  === 11/24/24 ===    XR CHEST 1 VIEW    - Impression -  1. Small amount of patchy bibasilar atelectasis. No other acute  intrathoracic.    I personally reviewed the images/study and I agree with the findings  as stated by Dr. Eduardo Mccullough. This study was interpreted  at  [place], [place].    MACRO:  None    Signed by: Bowen Peterson 11/24/2024 5:34 PM  Dictation workstation:   [id number]   === 11/24/24 ===    CT HEAD WO IV CONTRAST    - Impression -  1. Numerous hyperdense metastatic lesions throughout the cerebrum,  cerebellum, and brainstem with associated vasogenic edema. Varying  degrees of density within some of the lesions suggests trace  intralesional hemorrhage. MRI with contrast is recommended for  further evaluation.  2. No evidence of midline shift.    I personally reviewed the images/study and I agree with the findings  as stated by Rebecca Russell DO, PGY-3. This study was interpreted  at [place], [place].    MACRO:  None    Signed by: Tomi Varma 11/24/2024 10:05 PM  Dictation workstation:   [id number]         Assessment/Plan     Manuel Marrufo is a 59 y.o. male      #New Hemorrhagic Brain lesions likely secondary to mets from melanoma    :: CTH (11/24 @ OSH) completed which showed numerous hemorrhagic lesions w/ associated edema  ::  CT C-spine (11/24 @ OSH) showed no acute fracture or subluxation, CT PE (11/24 @ OSH) was negative for acute PE, but showed numerous bilateral pulmonary nodules which have increased in size  :: s/p x1 dose Dexamethasone 10mg @ OSH  :: CTH w/o contrast (11/24) showed numerous hyperdense metastatic lesions throughout the cerebrum, cerebellum, and brainstem with associated vasogenic edema. Varying degrees of density within some of the lesions suggests trace intralesional hemorrhage, no evidence of midline shift  :: CXR (11/24) small amount of bibasilar atelectasis  :: MRI Brain w/ and w/o contrast (11/24) pending  :: Neurosurgery consulted (11/24), rec obtaining MRI brain, CTH noncontrast, starting maintainance Dexamethasone 4mg Q6 and consulting Radiation Oncology for eval of whole-body radiation  :: [facility] [place]  [facility]/28/07. Looks like battery was replaced 3/3/21 and it says Vendor [employer], Model 106, serial number [id number]     Plan:   - Continue with Dexamethasone 4mg Q6  - Neurosurgery following, appreciate further recs. MRI pending (delayed due to VNS)   - Radiation Oncology consulted (11/25) recs pending  - Pain regimen: oxy 5 PRN for moderate pain, oxy 10 PRN for severe pain, 0.4 Dilaudid for breakthrough   - Zofran PO 4mg Q6 PRN  - Supportive Onc consulted, appreciate recs      #Melanoma w/ mets to RLL  - Melanoma of L shoulder (dx. 2019) s/p excision, no [facility]  - RLL CA (dx. 6/2024, on immunotherapy), bx consistent w/ metastatic melanoma  - pt reported only completing 1 cycle of immunotherapy, unsure of drug/dose (likely nivulumab and ipilumab)       # Spinal Stenosis of lumbar region w/ neurogenic claudication  # Hx Sciatica  - Previously underwent L4-S1 lumbar decompression in 2021 for preoperative symptoms of low back pain that radiated to lateral thighs and anterior shins  - pt previously receiving steroid injections for pain at [facility], last injection 7/2/24  - continue w/ home Pregabalin 200mg BID     #Hx HTN  #Hx HLD  - continue w/ home Propranolol LA 120mg BID  - continue w/ home Rosuvastatin 10mg nightly  - continue w/ home Lisinopril 5mg daily     #FRANK  - CPAP for sleep     #Hx IBS  - continue w/ home Imodium 2mg TID PRN     #PTSD  #Generalized anxiety disorder  - continue w/ home Sertraline 200mg daily  - continue w/ home Aripiprazole 10mg daily  - continue w/ home PO Diazepam 10mg daily, 5mg at bedtime     #Prophy  - HOLD AC i/s/o active hemorrhagic lesions  - start Protonix 40mg daily (home Omeprazole alternative)         NOK: Patrizia Marrufo (mother) #[phone]      Diet: Regular   DVT Prophylaxis: SCDs  Code Status: DNR/DNI   NOK: Patrizia Marrufo (mother) #[phone]    Patient and plan discussed with attending physician Dr. Joy.              Mehul Villanueva DO  Department of Internal  Medicine, PGY-1    Mehul Villanueva, DO

## 2024-11-25 NOTE — PROGRESS NOTES
Emergency Department Transition of Care Note       Signout   I received Manuel Marrufo in signout from Dr. Pérez.  Please see the ED Provider Note for all HPI, PE and MDM up to the time of signout at 1900.  This is in addition to the primary record.    In brief Manuel Marrufo is an 59 y.o. male  with metastatic melanoma (currently on nivulumab and ipilumab) to the lungs [facility] to [facility] brain with headache x 8 days.    At the time of signout we were awaiting:  MR brain per NSGY  Final NSGY recs    ED Course & Medical Decision Making   Medical Decision Making:  Under my care, neurosurgery evaluated the patient at the bedside.  As MR brain is not yet scheduled, will obtain CT head now to follow hemorrhagic mets for any further bleeding.  Patient [facility], and we will now start 4 mg Decadron every 6 hours.  Following head CT, the patient was admitted to the medical oncology team with neurosurgery following, with plan to still obtain MR brain as ordered.    ED Course:  ED Course as of 11/24/24 2133   Sun Nov 24, 2024   1632 ED Attending Documentation: This patient was seen by the resident physician.  I have seen and examined the patient, agree with the workup, evaluation, management and diagnosis. I reviewed and edited the above documentation where necessary. I have looked at the EKG and agree with the interpretation. On my evaluation of the patient: 59-year-old gentleman with a history of metastatic melanoma who presents with 8 days of headache and a slight right-sided facial droop a little bit of ataxia at home who is [facility] onset [facility].  Neurosurgery to be consulted, no need for emergent intervention airway wise, will control symptoms here, obtain an MRI and disposition according to neurosurgery.    Kenji Fontaine MD  EM Attending Physician   [RG]   2132 Urinalysis with Reflex Culture and  Microscopic  Unremarkable [MG]   2133 CT head wo IV contrast  supra and infratentorial hemorrhagic  metastasis with involvement of the brainstem and associated vasogenic  Edema.  No midline shift. 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  The patient was informed of his diagnosis.  The patient was given the opportunity to ask questions and I answered them. The patient agreed to be admitted to the hospital.    Patient seen and discussed with ED attending physician.    Camila Patel MD  Emergency Medicine

## 2024-11-25 NOTE — H&P
History Of Present Illness  Manuel Marrufo is a 59 y.o. male w/ PMHx of L shoulder Melanoma (s/p resection in 2019), recently diagnosed RLL CA (s/p partial resection and biopsy), spinal stenosis (s/p L4-S1 lumbar decompression in 2021), HTN, HLD, nonalcoholic fatty liver disease, obesity, FRANK on CPAP, PTSD and GERD who presents to [facility] ED with c/f hemorrhagic brain mets seen on imaging. He endorses having a lingering, moderate to severe H/A for the past 8 days and also reports increased nausea associated with his H/A w/ some emesis at home this AM. He also reports new ataxia and issues with his balance/coordination which he initially noticed last night at a family holiday gathering where he had to “hold onto the walls” to get around his sisters house, [facility]. [facility] which showed numerous hemorrhagic lesions w/ associated edema. CT C-spine also showed no acute fracture or subluxation and a CT PE was negative for acute PE, but showed numerous bilateral pulmonary nodules which have increased in size. He was then transferred to  for urgent Neurosurgery consultation.    Pt states that he is currently on immunotherapy but has only received one dose and cannot remember what the drug is called. He currently denies CP or SOB, remains on RA. He reports some mild swelling located in his BL hands and feet. Also reporting no issues with voiding, last BM 11/24. Neuro examination: A&Ox4, positive slight R-sided facial droop, shoulder shrug symmetrical, strength 5/5 intact in BL UE and LE, sensation 5/5 intact in BL LE but slight decrease in sensation of BL LE. Discussed with him plan to review MRI results, and await further next steps with primary team tomorrow. Admitted for further workup. Denies recent exposure to sick contacts. Denies fever/chills, C/D, incontinence, numbness or tingling in extremities, bleeding, dizziness, cough, SOB, CP and vision  changes.     ROS: A complete review of systems was performed and is negative except for as mentioned above in the HPI.    ED Course:  Vitals: T 36.9, HR 72, RR 16, /95, Spox 98% on RA  Labs: WBC 9.9, hgb 8.8, plts 421, Scr. 1.00, AST 42  Imaging: CTH w/o contrast (11/24) Numerous hyperdense metastatic lesions throughout the cerebrum, cerebellum, and brainstem with associated vasogenic edema. Varying degrees of density within some of the lesions suggests trace intralesional hemorrhage. No evidence of midline shift  CXR (11/24) Small amount of patchy bibasilar atelectasis  EKG (11/24) NSR, no ST elevations  Medications: Tylenol 975 x1, Zofran 4mg x1, Oxycodone 5mg x1     Past Medical History  He has a past medical history of Awareness under anesthesia (2021), Fatty liver disease, nonalcoholic, GERD (gastroesophageal reflux disease), HLD (hyperlipidemia), HTN (hypertension), FRANK on CPAP, PTSD (post-traumatic stress disorder), and Spinal stenosis.    Surgical History  He has a past surgical history that includes Back surgery; Malignant skin lesion excision; and Tonsillectomy.    Oncology History  - Melanoma of L shoulder (dx. 2019) s/p excision, no [facility] follow-[facility]  - RLL CA (dx. 6/2024, on immunotherapy), bx consistent w/ metastatic melanoma  - pt reported only completing 1 cycle of immunotherapy, unsure of drug/dose (likely nivulumab and ipilumab, request records from [facility] to confirm)    Social History  He reports that he quit smoking about 6 months ago. His smoking use included cigarettes. He started smoking about 40 years ago. He has a 20 pack-year smoking history. He has never used smokeless tobacco. He reports that he does not drink alcohol and does not use drugs.     Allergies  Buspirone and Milk    Review of Systems   Constitutional:  Positive for activity change.   Eyes: Negative.    Respiratory: Negative.     Cardiovascular: Negative.    Gastrointestinal:  Positive for nausea and vomiting.   Endocrine:  Negative.    Genitourinary: Negative.    Musculoskeletal:  Positive for back pain.   Skin:  Positive for pallor.   Allergic/Immunologic: Negative.    Neurological:  Positive for headaches.   Psychiatric/Behavioral: Negative.          Physical Exam  Vitals reviewed.   Constitutional:       Appearance: Normal appearance. He is ill-appearing.   HENT:      Head: Normocephalic and atraumatic.      Nose: Nose normal.      Mouth/Throat:      Mouth: Mucous membranes are moist.      Pharynx: Oropharynx is clear.   Eyes:      Extraocular Movements: Extraocular movements intact.      Pupils: Pupils are equal, round, and reactive to light.   Cardiovascular:      Rate and Rhythm: Normal rate and regular rhythm.      Pulses: Normal pulses.      Heart sounds: Normal heart sounds.   Pulmonary:      Effort: Pulmonary effort is normal.      Breath sounds: Normal breath sounds.   Abdominal:      General: Bowel sounds are normal.      Palpations: Abdomen is soft.   Musculoskeletal:         General: Normal range of motion.   Skin:     General: Skin is warm.      Coloration: Skin is pale.   Neurological:      Mental Status: He is alert and oriented to person, place, and time.      Motor: Weakness present.      Coordination: Coordination abnormal.   Psychiatric:         Mood and Affect: Mood normal.         Behavior: Behavior normal.       Last Recorded Vitals  Blood pressure (!) 178/95, pulse 77, temperature 36.9 °C (98.4 °F), temperature source Temporal, resp. rate 16, weight 117 kg (258 lb), SpO2 96%.    Relevant Results  Scheduled medications  acetaminophen, 975 mg, oral, q8h  ARIPiprazole, 10 mg, oral, Daily  cholecalciferol, 2,000 Units, oral, Daily  dexAMETHasone, 4 mg, intravenous, q6h  diazePAM, 10 mg, oral, Daily  diazePAM, 5 mg, oral, Nightly  tiotropium, 2 puff, inhalation, Daily   And  fluticasone furoate-vilanteroL, 1 puff, inhalation, Daily  lisinopril, 5 mg, oral, Daily  montelukast, 10 mg, oral, Nightly  pantoprazole, 40  mg, oral, Daily before breakfast  pregabalin, 200 mg, oral, BID  propranolol LA, 120 mg, oral, BID  rosuvastatin, 10 mg, oral, Daily  sertraline, 200 mg, oral, Daily      Continuous medications     PRN medications  PRN medications: albuterol, alteplase, HYDROmorphone, loperamide, ondansetron, oxygen, polyethylene glycol     CT head wo IV contrast    Result Date: 11/24/2024  Interpreted By:  Tomi Varma,  and Drew Fernandez STUDY: CT HEAD WO IV CONTRAST;  11/24/2024 9:02 pm   INDICATION: Signs/Symptoms:hemorrhagic mets, [facility].   Hx of L shoulder melanoma s/p resection in 2019, recently diagnosed RLL mass s/p partial resection and biopsy ([facility]) presenting with HA 8 days, CTH from OSH with numerous hemorrhagic supra and infra tentorial small mets w associated edema. Neurosurgery is consulted for further evaluation.   COMPARISON: None.   ACCESSION NUMBER(S): [id number]   ORDERING CLINICIAN: BERKLEY CEVALLOS   TECHNIQUE: Noncontrast axial CT images of head were obtained with coronal and sagittal reconstructed images.   FINDINGS: BRAIN PARENCHYMA: There are numerous scattered hyperdense foci throughout the bilateral cerebral and cerebellar hemispheres with involvement of the brainstem highly suggestive of metastasis (ex. Series 201, image 23, 20, 15, 12 and series 206, image 90). There are scattered areas of white matter hypoattenuation throughout the bilateral and cerebellar hemispheres suggestive of vasogenic edema with overlying sulcal effacement. No midline shift.   VENTRICLES and EXTRA-AXIAL SPACES: There is prominence of ventricles and sulci compatible with diffuse parenchymal volume loss.  No abnormal extra-axial fluid collection.   ORBITS: The visualized orbits and globes are within normal limits.   EXTRACRANIAL SOFT TISSUES: Within normal limits.   PARANASAL SINUSES/MASTOIDS: The visualized paranasal sinuses and mastoid air cells are well aerated.   CALVARIUM: No depressed skull  fracture.       1. Numerous hyperdense metastatic lesions throughout the cerebrum, cerebellum, and brainstem with associated vasogenic edema. Varying degrees of density within some of the lesions suggests trace intralesional hemorrhage. MRI with contrast is recommended for further evaluation. 2. No evidence of midline shift.   I personally reviewed the images/study and I agree with the findings as stated by Rebecca Russell DO, PGY-3. This study was interpreted at [facility], [place], [place].   MACRO: None   Signed by: Tomi Varma 11/24/2024 10:05 PM Dictation workstation:   [id number]    XR chest 1 view    Result Date: 11/24/2024  Interpreted By:  Bowen Peterson and Ohs Zachary STUDY: XR CHEST 1 VIEW;  11/24/2024 4:17 pm   INDICATION: Signs/Symptoms:pre-op.   COMPARISON: Chest radiograph 01/24/2019.   ACCESSION NUMBER(S): [id number]   ORDERING CLINICIAN: BERKLEY CEVALLOS   FINDINGS: AP radiograph of the chest was provided.   MEDICAL DEVICES: Left chest wall device with leads terminating over the left neck.   CARDIOMEDIASTINAL SILHOUETTE: Cardiomediastinal silhouette is normal in size and configuration.   LUNGS: Mild bibasilar atelectasis. No pneumothorax, pleural effusion or focal consolidation.   ABDOMEN: No remarkable upper abdominal findings.   BONES: No acute osseous changes.       1. Small amount of patchy bibasilar atelectasis. No other acute intrathoracic.   I personally reviewed the images/study and I agree with the findings as stated by Dr. Eduardo Mccullough. This study was interpreted at [place], [place].   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 male w/ PMHx of L shoulder Melanoma (s/p resection in 2019), recently diagnosed RLL CA (s/p partial resection  and biopsy), spinal stenosis (s/p L4-S1 lumbar decompression in 2021), HTN, HLD, nonalcoholic fatty liver disease, obesity, FRANK on CPAP, PTSD and GERD who presents [place] [facility] with c/f hemorrhagic brain mets seen on imaging. Admitted to [facility] for urgent Neurosurgery evaluation and workup.    #New Hemorrhagic Brain lesions  - pt reporting moderate-severe H/A x8 days, new ataxia s/s x1 day  - CTH (11/24 @ OSH) completed which showed numerous hemorrhagic lesions w/ associated edema  - CT C-spine (11/24 @ OSH) showed no acute fracture or subluxation, CT PE (11/24 @ OSH) was negative for acute PE, but showed numerous bilateral pulmonary nodules which have increased in size  - s/p x1 dose Dexamethasone 10mg @ OSH  - CTH w/o contrast (11/24) showed numerous hyperdense metastatic lesions throughout the cerebrum, cerebellum, and brainstem with associated vasogenic edema. Varying degrees of density within some of the lesions suggests trace intralesional hemorrhage, no evidence of midline shift  - CXR (11/24) small amount of bibasilar atelectasis  - MRI Brain w/ and w/o contrast (11/24) pending  - Neurosurgery consulted (11/24), rec obtaining MRI brain, CTH noncontrast, starting maintainance Dexamethasone 4mg Q6 and consulting Radiation Oncology for eval of whole-body radiation  - start Dexamethasone 4mg Q6  - Radiation Oncology consulted (11/25) recs pending  - start Tylenol 975mg Q8 for mild pain and H/A  - start Dilaudid IV 0.2mg Q3 PRN for severe pain (to avoid PO i/s/o nausea, pt states takes Percocet at home)  - start Zofran IV 4mg Q6 PRN  - Consider Supportive Onc consult in AM    #Melanoma w/ mets to RLL  - Melanoma of L shoulder (dx. 2019) s/p excision, no [facility] follow-[facility]  - RLL CA (dx. 6/2024, on immunotherapy), bx consistent w/ metastatic melanoma  - pt reported only completing 1 cycle of immunotherapy, unsure of drug/dose (likely nivulumab and ipilumab, [facility] records from [facility] to confirm)  - confirm and update primary  oncologist in AM    # Spinal Stenosis of lumbar region w/ neurogenic claudication  # Hx Sciatica  - Previously underwent L4-S1 lumbar decompression in 2021 for preoperative symptoms of low back pain that radiated to lateral thighs and anterior shins  - pt previously receiving steroid injections for pain at [facility], last injection 7/2/24  - continue w/ home Pregabalin 200mg BID    #Hx HTN  #Hx HLD  - continue w/ home Propranolol LA 120mg BID  - continue w/ home Rosuvastatin 10mg nightly  - continue w/ home Lisinopril 5mg daily    #FRANK  - CPAP for sleep    #Hx IBS  - continue w/ home Imodium 2mg TID PRN    #PTSD  #Generalized anxiety disorder  - continue w/ home Sertraline 200mg daily  - continue w/ home Aripiprazole 10mg daily  - continue w/ home PO Diazepam 10mg daily, 5mg at bedtime    #Prophy  - HOLD AC i/s/o active hemorrhagic lesions  - start Protonix 40mg daily (home Omeprazole alternative)    DISPO  - Full Code - confirmed on admit  - NOK: Patrizia Marrufo (mother) #[phone]  - PT/OT thomas    I spent 90 minutes in the professional and overall care of this patient.    Cristin Ayala, APRN-CNP

## 2024-11-26 ENCOUNTER — APPOINTMENT (OUTPATIENT)
Dept: RADIOLOGY | Facility: HOSPITAL | Age: 59
End: 2024-11-26
Payer: OTHER GOVERNMENT

## 2024-11-26 LAB
ALBUMIN SERPL BCP-MCNC: 4 G/DL (ref 3.4–5)
ANION GAP SERPL CALC-SCNC: 14 MMOL/L (ref 10–20)
BASOPHILS # BLD AUTO: 0.03 X10*3/UL (ref 0–0.1)
BASOPHILS NFR BLD AUTO: 0.3 %
BUN SERPL-MCNC: 23 MG/DL (ref 6–23)
CALCIUM SERPL-MCNC: 9.5 MG/DL (ref 8.6–10.6)
CHLORIDE SERPL-SCNC: 102 MMOL/L (ref 98–107)
CO2 SERPL-SCNC: 25 MMOL/L (ref 21–32)
CREAT SERPL-MCNC: 1.07 MG/DL (ref 0.5–1.3)
EGFRCR SERPLBLD CKD-EPI 2021: 80 ML/MIN/1.73M*2
EOSINOPHIL # BLD AUTO: 0.01 X10*3/UL (ref 0–0.7)
EOSINOPHIL NFR BLD AUTO: 0.1 %
ERYTHROCYTE [DISTWIDTH] IN BLOOD BY AUTOMATED COUNT: 17 % (ref 11.5–14.5)
GLUCOSE SERPL-MCNC: 114 MG/DL (ref 74–99)
HCT VFR BLD AUTO: 29.6 % (ref 41–52)
HGB BLD-MCNC: 8.7 G/DL (ref 13.5–17.5)
IMM GRANULOCYTES # BLD AUTO: 0.13 X10*3/UL (ref 0–0.7)
IMM GRANULOCYTES NFR BLD AUTO: 1.1 % (ref 0–0.9)
LYMPHOCYTES # BLD AUTO: 2.48 X10*3/UL (ref 1.2–4.8)
LYMPHOCYTES NFR BLD AUTO: 21 %
MAGNESIUM SERPL-MCNC: 2.33 MG/DL (ref 1.6–2.4)
MCH RBC QN AUTO: 20.4 PG (ref 26–34)
MCHC RBC AUTO-ENTMCNC: 29.4 G/DL (ref 32–36)
MCV RBC AUTO: 70 FL (ref 80–100)
MONOCYTES # BLD AUTO: 0.43 X10*3/UL (ref 0.1–1)
MONOCYTES NFR BLD AUTO: 3.6 %
NEUTROPHILS # BLD AUTO: 8.71 X10*3/UL (ref 1.2–7.7)
NEUTROPHILS NFR BLD AUTO: 73.9 %
NRBC BLD-RTO: 0 /100 WBCS (ref 0–0)
PHOSPHATE SERPL-MCNC: 3.7 MG/DL (ref 2.5–4.9)
PLATELET # BLD AUTO: 419 X10*3/UL (ref 150–450)
POTASSIUM SERPL-SCNC: 3.9 MMOL/L (ref 3.5–5.3)
RBC # BLD AUTO: 4.26 X10*6/UL (ref 4.5–5.9)
SODIUM SERPL-SCNC: 137 MMOL/L (ref 136–145)
WBC # BLD AUTO: 11.8 X10*3/UL (ref 4.4–11.3)

## 2024-11-26 PROCEDURE — 70553 MRI BRAIN STEM W/O & W/DYE: CPT

## 2024-11-26 PROCEDURE — A9575 INJ GADOTERATE MEGLUMI 0.1ML: HCPCS | Performed by: STUDENT IN AN ORGANIZED HEALTH CARE EDUCATION/TRAINING PROGRAM

## 2024-11-26 PROCEDURE — 99222 1ST HOSP IP/OBS MODERATE 55: CPT | Performed by: PHYSICIAN ASSISTANT

## 2024-11-26 PROCEDURE — 83735 ASSAY OF MAGNESIUM: CPT

## 2024-11-26 PROCEDURE — 99223 1ST HOSP IP/OBS HIGH 75: CPT

## 2024-11-26 PROCEDURE — 36415 COLL VENOUS BLD VENIPUNCTURE: CPT

## 2024-11-26 PROCEDURE — 97161 PT EVAL LOW COMPLEX 20 MIN: CPT | Mod: GP | Performed by: PHYSICAL THERAPIST

## 2024-11-26 PROCEDURE — 99233 SBSQ HOSP IP/OBS HIGH 50: CPT | Performed by: STUDENT IN AN ORGANIZED HEALTH CARE EDUCATION/TRAINING PROGRAM

## 2024-11-26 PROCEDURE — 2500000002 HC RX 250 W HCPCS SELF ADMINISTERED DRUGS (ALT 637 FOR MEDICARE OP, ALT 636 FOR OP/ED)

## 2024-11-26 PROCEDURE — 1200000003 HC ONCOLOGY  ROOM WITH TELEMETRY DAILY

## 2024-11-26 PROCEDURE — 84100 ASSAY OF PHOSPHORUS: CPT

## 2024-11-26 PROCEDURE — 2500000001 HC RX 250 WO HCPCS SELF ADMINISTERED DRUGS (ALT 637 FOR MEDICARE OP)

## 2024-11-26 PROCEDURE — 2500000004 HC RX 250 GENERAL PHARMACY W/ HCPCS (ALT 636 FOR OP/ED)

## 2024-11-26 PROCEDURE — 99233 SBSQ HOSP IP/OBS HIGH 50: CPT | Performed by: NURSE PRACTITIONER

## 2024-11-26 PROCEDURE — 85025 COMPLETE CBC W/AUTO DIFF WBC: CPT

## 2024-11-26 PROCEDURE — 70553 MRI BRAIN STEM W/O & W/DYE: CPT | Performed by: RADIOLOGY

## 2024-11-26 PROCEDURE — 2550000001 HC RX 255 CONTRASTS: Performed by: STUDENT IN AN ORGANIZED HEALTH CARE EDUCATION/TRAINING PROGRAM

## 2024-11-26 RX ORDER — GADOTERATE MEGLUMINE 376.9 MG/ML
21 INJECTION INTRAVENOUS
Status: COMPLETED | OUTPATIENT
Start: 2024-11-26 | End: 2024-11-26

## 2024-11-26 RX ORDER — HYDROXYZINE HYDROCHLORIDE 10 MG/1
10 TABLET, FILM COATED ORAL ONCE
Status: COMPLETED | OUTPATIENT
Start: 2024-11-26 | End: 2024-11-26

## 2024-11-26 RX ORDER — DEXAMETHASONE 4 MG/1
4 TABLET ORAL EVERY 6 HOURS SCHEDULED
Status: DISCONTINUED | OUTPATIENT
Start: 2024-11-26 | End: 2024-11-27

## 2024-11-26 SDOH — SOCIAL STABILITY: SOCIAL INSECURITY: WITHIN THE LAST YEAR, HAVE YOU BEEN AFRAID OF YOUR PARTNER OR EX-PARTNER?: NO

## 2024-11-26 SDOH — SOCIAL STABILITY: SOCIAL INSECURITY: DO YOU FEEL UNSAFE GOING BACK TO THE PLACE WHERE YOU ARE LIVING?: NO

## 2024-11-26 SDOH — ECONOMIC STABILITY: FOOD INSECURITY: WITHIN THE PAST 12 MONTHS, THE FOOD YOU BOUGHT JUST DIDN'T LAST AND YOU DIDN'T HAVE MONEY TO GET MORE.: NEVER TRUE

## 2024-11-26 SDOH — SOCIAL STABILITY: SOCIAL INSECURITY
WITHIN THE LAST YEAR, HAVE YOU BEEN KICKED, HIT, SLAPPED, OR OTHERWISE PHYSICALLY HURT BY YOUR PARTNER OR EX-PARTNER?: NO

## 2024-11-26 SDOH — SOCIAL STABILITY: SOCIAL INSECURITY
WITHIN THE LAST YEAR, HAVE YOU BEEN RAPED OR FORCED TO HAVE ANY KIND OF SEXUAL ACTIVITY BY YOUR PARTNER OR EX-PARTNER?: NO

## 2024-11-26 SDOH — ECONOMIC STABILITY: INCOME INSECURITY: IN THE PAST 12 MONTHS HAS THE ELECTRIC, GAS, OIL, OR WATER COMPANY THREATENED TO SHUT OFF SERVICES IN YOUR HOME?: NO

## 2024-11-26 SDOH — ECONOMIC STABILITY: HOUSING INSECURITY: IN THE LAST 12 MONTHS, WAS THERE A TIME WHEN YOU WERE NOT ABLE TO PAY THE MORTGAGE OR RENT ON TIME?: NO

## 2024-11-26 SDOH — ECONOMIC STABILITY: FOOD INSECURITY: HOW HARD IS IT FOR YOU TO PAY FOR THE VERY BASICS LIKE FOOD, HOUSING, MEDICAL CARE, AND HEATING?: NOT HARD AT ALL

## 2024-11-26 SDOH — SOCIAL STABILITY: SOCIAL INSECURITY: ABUSE: ADULT

## 2024-11-26 SDOH — ECONOMIC STABILITY: FOOD INSECURITY: WITHIN THE PAST 12 MONTHS, YOU WORRIED THAT YOUR FOOD WOULD RUN OUT BEFORE YOU GOT THE MONEY TO BUY MORE.: NEVER TRUE

## 2024-11-26 SDOH — ECONOMIC STABILITY: TRANSPORTATION INSECURITY: IN THE PAST 12 MONTHS, HAS LACK OF TRANSPORTATION KEPT YOU FROM MEDICAL APPOINTMENTS OR FROM GETTING MEDICATIONS?: YES

## 2024-11-26 SDOH — SOCIAL STABILITY: SOCIAL INSECURITY: HAVE YOU HAD ANY THOUGHTS OF HARMING ANYONE ELSE?: NO

## 2024-11-26 SDOH — ECONOMIC STABILITY: HOUSING INSECURITY: AT ANY TIME IN THE PAST 12 MONTHS, WERE YOU HOMELESS OR LIVING IN A SHELTER (INCLUDING NOW)?: NO

## 2024-11-26 SDOH — SOCIAL STABILITY: SOCIAL INSECURITY: WITHIN THE LAST YEAR, HAVE YOU BEEN HUMILIATED OR EMOTIONALLY ABUSED IN OTHER WAYS BY YOUR PARTNER OR EX-PARTNER?: NO

## 2024-11-26 SDOH — ECONOMIC STABILITY: HOUSING INSECURITY: IN THE PAST 12 MONTHS, HOW MANY TIMES HAVE YOU MOVED WHERE YOU WERE LIVING?: 1

## 2024-11-26 SDOH — SOCIAL STABILITY: SOCIAL INSECURITY: DO YOU FEEL ANYONE HAS EXPLOITED OR TAKEN ADVANTAGE OF YOU FINANCIALLY OR OF YOUR PERSONAL PROPERTY?: NO

## 2024-11-26 SDOH — SOCIAL STABILITY: SOCIAL INSECURITY: ARE YOU OR HAVE YOU BEEN THREATENED OR ABUSED PHYSICALLY, EMOTIONALLY, OR SEXUALLY BY ANYONE?: NO

## 2024-11-26 SDOH — SOCIAL STABILITY: SOCIAL INSECURITY: WERE YOU ABLE TO COMPLETE ALL THE BEHAVIORAL HEALTH SCREENINGS?: YES

## 2024-11-26 SDOH — SOCIAL STABILITY: SOCIAL INSECURITY: HAS ANYONE EVER THREATENED TO HURT YOUR FAMILY OR YOUR PETS?: NO

## 2024-11-26 SDOH — SOCIAL STABILITY: SOCIAL INSECURITY: HAVE YOU HAD THOUGHTS OF HARMING ANYONE ELSE?: NO

## 2024-11-26 SDOH — SOCIAL STABILITY: SOCIAL INSECURITY: DOES ANYONE TRY TO KEEP YOU FROM HAVING/CONTACTING OTHER FRIENDS OR DOING THINGS OUTSIDE YOUR HOME?: NO

## 2024-11-26 SDOH — SOCIAL STABILITY: SOCIAL INSECURITY: ARE THERE ANY APPARENT SIGNS OF INJURIES/BEHAVIORS THAT COULD BE RELATED TO ABUSE/NEGLECT?: NO

## 2024-11-26 ASSESSMENT — COGNITIVE AND FUNCTIONAL STATUS - GENERAL
MOVING FROM LYING ON BACK TO SITTING ON SIDE OF FLAT BED WITH BEDRAILS: A LITTLE
MOBILITY SCORE: 18
DRESSING REGULAR LOWER BODY CLOTHING: A LITTLE
HELP NEEDED FOR BATHING: A LOT
CLIMB 3 TO 5 STEPS WITH RAILING: A LOT
CLIMB 3 TO 5 STEPS WITH RAILING: A LOT
STANDING UP FROM CHAIR USING ARMS: A LITTLE
WALKING IN HOSPITAL ROOM: A LITTLE
DRESSING REGULAR LOWER BODY CLOTHING: A LOT
MOBILITY SCORE: 17
STANDING UP FROM CHAIR USING ARMS: A LITTLE
MOVING TO AND FROM BED TO CHAIR: A LITTLE
DAILY ACTIVITIY SCORE: 20
TURNING FROM BACK TO SIDE WHILE IN FLAT BAD: A LITTLE
DAILY ACTIVITIY SCORE: 16
MOVING TO AND FROM BED TO CHAIR: A LITTLE
CLIMB 3 TO 5 STEPS WITH RAILING: A LITTLE
PERSONAL GROOMING: A LITTLE
TOILETING: A LITTLE
HELP NEEDED FOR BATHING: A LITTLE
MOBILITY SCORE: 22
TOILETING: A LITTLE
TURNING FROM BACK TO SIDE WHILE IN FLAT BAD: A LITTLE
EATING MEALS: A LITTLE
PERSONAL GROOMING: A LITTLE
WALKING IN HOSPITAL ROOM: A LITTLE
WALKING IN HOSPITAL ROOM: A LITTLE
DRESSING REGULAR UPPER BODY CLOTHING: A LITTLE
PATIENT BASELINE BEDBOUND: NO

## 2024-11-26 ASSESSMENT — LIFESTYLE VARIABLES
HOW OFTEN DO YOU HAVE A DRINK CONTAINING ALCOHOL: NEVER
HOW OFTEN DO YOU HAVE 6 OR MORE DRINKS ON ONE OCCASION: NEVER
AUDIT-C TOTAL SCORE: 0
SKIP TO QUESTIONS 9-10: 1
PRESCIPTION_ABUSE_PAST_12_MONTHS: NO
AUDIT-C TOTAL SCORE: 0
SUBSTANCE_ABUSE_PAST_12_MONTHS: NO
HOW MANY STANDARD DRINKS CONTAINING ALCOHOL DO YOU HAVE ON A TYPICAL DAY: PATIENT DOES NOT DRINK

## 2024-11-26 ASSESSMENT — ACTIVITIES OF DAILY LIVING (ADL)
BATHING: NEEDS ASSISTANCE
PATIENT'S MEMORY ADEQUATE TO SAFELY COMPLETE DAILY ACTIVITIES?: YES
LACK_OF_TRANSPORTATION: YES
GROOMING: NEEDS ASSISTANCE
HEARING - LEFT EAR: FUNCTIONAL
LACK_OF_TRANSPORTATION: YES
DRESSING YOURSELF: NEEDS ASSISTANCE
HEARING - RIGHT EAR: FUNCTIONAL
TOILETING: NEEDS ASSISTANCE
JUDGMENT_ADEQUATE_SAFELY_COMPLETE_DAILY_ACTIVITIES: YES
ADEQUATE_TO_COMPLETE_ADL: YES
WALKS IN HOME: NEEDS ASSISTANCE
ADLS_ADDRESSED: NO
FEEDING YOURSELF: INDEPENDENT
ADL_ASSISTANCE: INDEPENDENT

## 2024-11-26 ASSESSMENT — PAIN - FUNCTIONAL ASSESSMENT
PAIN_FUNCTIONAL_ASSESSMENT: 0-10

## 2024-11-26 ASSESSMENT — PAIN SCALES - GENERAL
PAINLEVEL_OUTOF10: 8
PAINLEVEL_OUTOF10: 7
PAINLEVEL_OUTOF10: 5 - MODERATE PAIN
PAINLEVEL_OUTOF10: 8
PAINLEVEL_OUTOF10: 8

## 2024-11-26 ASSESSMENT — ENCOUNTER SYMPTOMS
NERVOUS/ANXIOUS: 1
FATIGUE: 1
NAUSEA: 1
DYSPHORIC MOOD: 1
ACTIVITY CHANGE: 1
NECK PAIN: 1

## 2024-11-26 ASSESSMENT — PAIN DESCRIPTION - DESCRIPTORS
DESCRIPTORS: ACHING
DESCRIPTORS: SORE

## 2024-11-26 ASSESSMENT — PAIN DESCRIPTION - LOCATION: LOCATION: HEAD

## 2024-11-26 ASSESSMENT — COLUMBIA-SUICIDE SEVERITY RATING SCALE - C-SSRS
1. IN THE PAST MONTH, HAVE YOU WISHED YOU WERE DEAD OR WISHED YOU COULD GO TO SLEEP AND NOT WAKE UP?: NO
6. HAVE YOU EVER DONE ANYTHING, STARTED TO DO ANYTHING, OR PREPARED TO DO ANYTHING TO END YOUR LIFE?: NO
2. HAVE YOU ACTUALLY HAD ANY THOUGHTS OF KILLING YOURSELF?: NO

## 2024-11-26 ASSESSMENT — PATIENT HEALTH QUESTIONNAIRE - PHQ9
1. LITTLE INTEREST OR PLEASURE IN DOING THINGS: NEARLY EVERY DAY
2. FEELING DOWN, DEPRESSED OR HOPELESS: NEARLY EVERY DAY
SUM OF ALL RESPONSES TO PHQ9 QUESTIONS 1 & 2: 6

## 2024-11-26 NOTE — PROGRESS NOTES
11/26/24 1100   Discharge Planning   Living Arrangements Alone   Support Systems Parent;Family members   [facility]istance [facility]eded [facility] through [facility]   Type of Residence Private residence   Number of Stairs to Enter Residence 0   Number of Stairs Within Residence 10   Who is requesting discharge planning? Provider   Home or Post Acute Services In home services   Type of Home Care Services Home health aide   Expected Discharge Disposition Home   Does the patient need discharge transport arranged? No   Financial Resource Strain   How hard is it for you to pay for the very basics like food, housing, medical care, and heating? Not hard   Housing Stability   In the last 12 months, was there a time when you were not able to pay the mortgage or rent on time? N   At any time in the past 12 months, were you homeless or living in a shelter (including now)? N     Care Transitions Note   11/26/24  Met with pt at bedside. Pt has hx of melanoma. [facility]. Pt is admitted for further workup and potential radiation planning. He needs an MRI.  Pt lives in [place] by himself. His brother Mason Be is his emergency contact (p: [phone]). He is  and he has two adult children that live in [place]. He is supported by his mother, brother, and sister who live nearby in [place]. Pt is not currently working due to being disabled.   He has a HHA through the VA that comes 4 days a week to assist with cleaning, cooking, and laundry. He has spinal stenosis and states he has not been walking well for years. He has a cane, but only uses it at home. He has a chair lift installed on both sets of his stairs so he does not need to go up and down. No skilled stays or home care in the past. He drives himself and says his mom or siblings will pick him up at discharge.   Pt follows with PCP and oncologist at the [facility]. LSW made call to [facility] to report patient admission at  ([facility] p: [phone] ext. [phone]). Will follow for dc planning  needs.   Ghada Matamoros, MSW, LSW

## 2024-11-26 NOTE — PROGRESS NOTES
Massage Therapy / AcupunctAcupuncture Visit:     Manuel Marrufo was referred by [facility] Visit:     Manuel Marrufo was referred by [facility]  .                      Provider reviewed plan for the acupuncture session, precautions and contraindications. Patient/guardian/hospital staff has given consent to treat with full understanding of what to expect during thesession. Before acupuncture began, provider explained to the patient to communicate at any time if the procedure was causing discomfort past their tolerance level. Patient agreed to advise acupuncturist. The acupuncturist counseled the patient on the risks of acupuncture treatment including pain, infection, bleeding, and no relief of pain. The patient was positioned comfortably. There was no evidence of infection at the site of needle insertions.       No annotated images are attached to the encounter.                       .                      Provider reviewed plan for the acupuncture session, precautions and contraindications. Patient/guardian/hospital staff has given consent to treat with full understanding of what to expect during thesession. Before acupuncture began, provider explained to the patient to communicate at any time if the procedure was causing discomfort past their tolerance level. Patient agreed to advise acupuncturist. The acupuncturist counseled the patient on the risks of acupuncture treatment including pain, infection, bleeding, and no relief of pain. The patient was positioned comfortably. There was no evidence of infection at the site of needle insertions.       No annotated images are attached to the encounter.                     ure Note:

## 2024-11-26 NOTE — NURSING NOTE
Patient ambulating in room on his own. Patient educated on need for bed alarm and asking for assistance when getting out of bed. Patient still refusing bed alarm at this time. Patient re-educated on risk for falling. Patient understands and continues to ambulate in room without assistance

## 2024-11-26 NOTE — PROGRESS NOTES
"  Internal Medicine Progress Note     Manuel Marrufo is a 59 y.o. male w/ PMHx of L shoulder Melanoma (s/p resection in 2019), recently diagnosed RLL CA (s/p partial resection and biopsy), spinal stenosis (s/p L4-S1 lumbar decompression in 2021), HTN, HLD, nonalcoholic fatty liver disease, obesity, FRANK on CPAP, PTSD and GERD who [place] [facility] [place] with c/f hemorrhagic brain mets seen on imaging. Admitted to [facility] for urgent Neurosurgery evaluation and workup.     Subjective     Patient was seen and evaluated at bedside. No acute events overnight. Still pending MRI. Sent over information about VNS to radiology tech yesterday afternoon. Neurosurgery eval pending MRI. Otherwise, patient reports that pain is well controlled with current pain regimen and understands why MRI is delayed and says that it has happened before. Still insistent on leaving before Thursday but understands if he has to stay.         Medications     Medications:   Scheduled medications  acetaminophen, 975 mg, oral, q8h  ARIPiprazole, 10 mg, oral, Daily  cholecalciferol, 2,000 Units, oral, Daily  dexAMETHasone, 4 mg, intravenous, q6h  diazePAM, 10 mg, oral, Daily  diazePAM, 5 mg, oral, Nightly  tiotropium, 2 puff, inhalation, Daily   And  fluticasone furoate-vilanteroL, 1 puff, inhalation, Daily  lisinopril, 5 mg, oral, Daily  montelukast, 10 mg, oral, Nightly  pantoprazole, 40 mg, oral, Daily before breakfast  pregabalin, 200 mg, oral, BID  propranolol LA, 120 mg, oral, BID  rosuvastatin, 10 mg, oral, Daily  sertraline, 200 mg, oral, Daily      Continuous medications     PRN medications  PRN medications: albuterol, alteplase, HYDROmorphone, loperamide, oxyCODONE, oxygen, polyethylene glycol, prochlorperazine     Objective     Vitals  Visit Vitals  /89 (BP Location: Right arm, Patient Position: Lying)   Pulse 65   Temp 36.5 °C (97.7 °F) (Temporal)   Resp 18   Ht 1.727 m (5' 7.99\")   Wt 117 kg (257 lb 15 oz)   SpO2 95%   BMI 39.23 kg/m²   Smoking " Status Former   BSA 2.37 m²       Intake/Output Summary (Last 24 hours) at 11/26/2024 0827  Last data filed at 11/26/2024 0700  Gross per 24 hour   Intake 360 ml   Output 0 ml   Net 360 ml       Physical Exam  Physical Exam  Constitutional:       Appearance: Normal appearance.      Comments: Slight droop in R of face   Cardiovascular:      Rate and Rhythm: Normal rate and regular rhythm.      Pulses: Normal pulses.      Heart sounds: Normal heart sounds.   Pulmonary:      Effort: Pulmonary effort is normal.      Breath sounds: Normal breath sounds.   Abdominal:      General: Abdomen is flat. Bowel sounds are normal.      Palpations: Abdomen is soft.   Neurological:      Mental Status: He is alert and oriented to person, place, and time. Mental status is at baseline.      Comments: 5/5 UE and LE sensation and motor function bilaterally  CNS intact II to XII although facial droop on R face prominent   Psychiatric:         Mood and Affect: Mood normal.         Behavior: Behavior normal.         Thought Content: Thought content normal.          Labs  Lab Results   Component Value Date    WBC 8.5 11/25/2024    HGB 9.4 (L) 11/25/2024    HCT 29.3 (L) 11/25/2024    MCV 65 (L) 11/25/2024     11/25/2024      Lab Results   Component Value Date    GLUCOSE 127 (H) 11/25/2024    CALCIUM 9.5 11/25/2024     11/25/2024    K 4.2 11/25/2024    CO2 21 11/25/2024     11/25/2024    BUN 13 11/25/2024    CREATININE 0.84 11/25/2024      Lab Results   Component Value Date    ALT 29 11/24/2024    AST 42 (H) 11/24/2024    ALKPHOS 93 11/24/2024    BILITOT 0.3 11/24/2024        Imaging  === 11/24/24 ===    XR CHEST 1 VIEW    - Impression -  1. Small amount of patchy bibasilar atelectasis. No other acute  intrathoracic.    I personally reviewed the images/study and I agree with the findings  as stated by Dr. Eduardo Mccullough. This study was interpreted at  [facility], [place],  [place].    MACRO:  None    Signed by: Bowen Peterson 11/24/2024 5:34 PM  Dictation workstation:   [id number]   === 11/24/24 ===    CT HEAD WO IV CONTRAST    - Impression -  1. Numerous hyperdense metastatic lesions throughout the cerebrum,  cerebellum, and brainstem with associated vasogenic edema. Varying  degrees of density within some of the lesions suggests trace  intralesional hemorrhage. MRI with contrast is recommended for  further evaluation.  2. No evidence of midline shift.    I personally reviewed the images/study and I agree with the findings  as stated by Rebecca Russell DO, PGY-3. This study was interpreted  at [place], [place].    MACRO:  None    Signed by: Tomi Varma 11/24/2024 10:05 PM  Dictation workstation:   [id number]         Assessment/Plan     Manuel Marrufo is a 59 y.o. male w/ PMHx of L shoulder Melanoma (s/p resection in 2019), recently diagnosed RLL CA (s/p partial resection and biopsy), spinal stenosis (s/p L4-S1 lumbar decompression in 2021), HTN, HLD, nonalcoholic fatty liver disease, obesity, FRANK on CPAP, PTSD and GERD who presents to [facility] [place] ED with c/f hemorrhagic brain mets seen on imaging. Admitted to [facility] [place] [facility] for urgent Neurosurgery evaluation and workup.       #New Hemorrhagic Brain lesions likely secondary to mets from melanoma    :: CTH (11/24 @ OSH) completed which showed numerous hemorrhagic lesions w/ associated edema  ::  CT C-spine (11/24 @ OSH) showed no acute fracture or subluxation, CT PE (11/24 @ OSH) was negative for acute PE, but showed numerous bilateral pulmonary nodules which have increased in size  :: s/p x1 dose Dexamethasone 10mg @ OSH  :: CTH w/o contrast (11/24) showed numerous hyperdense metastatic lesions throughout the cerebrum, cerebellum, and brainstem with associated vasogenic edema. Varying degrees of density within some of the lesions suggests trace intralesional hemorrhage, no evidence of midline shift  ::  CXR (11/24) small amount of bibasilar atelectasis  :: MRI Brain w/ and w/o contrast (11/24) pending  :: Neurosurgery consulted (11/24), rec obtaining MRI brain, CTH noncontrast, starting maintainance Dexamethasone 4mg Q6 and consulting Radiation Oncology for eval of whole-body radiation  :: [facility] by Dr. James 6/28/07. Looks like battery was replaced 3/3/21 and it says Vendor [employer], Model 106, serial number [id number]     Plan:   - Continue with Dexamethasone 4mg Q6  - Neurosurgery following, appreciate further recs. MRI pending (delayed due to VNS)   - Radiation Oncology consulted (11/25), recs pending. If no neurosurgery needed, then will likely be set up with [facility].   - Pain regimen: oxy 10 PRN for moderate to  severe pain, 0.4 Dilaudid for breakthrough   - Zofran PO 4mg Q6 PRN  - Supportive Onc consulted, appreciate recs      #Melanoma w/ mets to RLL  - Melanoma of L shoulder (dx. 2019) s/p excision, no [facility]  - RLL CA (dx. 6/2024, on immunotherapy), bx consistent w/ metastatic melanoma  - pt reported only completing 1 cycle of immunotherapy, unsure of drug/dose (likely nivulumab and ipilumab)       # Spinal Stenosis of lumbar region w/ neurogenic claudication  # Hx Sciatica  - Previously underwent L4-S1 lumbar decompression in 2021 for preoperative symptoms of low back pain that radiated to lateral thighs and anterior shins  - pt previously receiving [facility] for [facility], last injection 7/2/24  - continue w/ home Pregabalin 200mg BID     #Hx HTN  #Hx HLD  - continue w/ home Propranolol LA 120mg BID  - continue w/ home Rosuvastatin 10mg nightly  - continue w/ home Lisinopril 5mg daily     #FRANK  - CPAP for sleep     #Hx IBS  - continue w/ home Imodium 2mg TID PRN     #PTSD  #Generalized anxiety disorder  - continue w/ home Sertraline 200mg daily  - continue w/ home Aripiprazole 10mg daily  - continue w/ home PO Diazepam 10mg daily, 5mg  at bedtime     #Prophy  - HOLD AC i/s/o active hemorrhagic lesions  - start Protonix 40mg daily (home Omeprazole alternative)         NOK: Patrizia Marrufo (mother) #[phone]      Diet: Regular   DVT Prophylaxis: SCDs  Code Status: DNR/DNI   NOK: Patrizia Marrufo (mother) #[phone]    Patient and plan discussed with attending physician Dr. Joy.              Mehul Villanueva DO  Department of Internal Medicine, PGY-1    Mehul Villanueva DO

## 2024-11-26 NOTE — HOSPITAL COURSE
Manuel Marrufo is a 59 y.o. male w/ PMHx of L shoulder Melanoma (s/p resection in 2019), recently diagnosed RLL CA (s/p partial resection and biopsy), spinal stenosis (s/p L4-S1 lumbar decompression in 2021), HTN, HLD, nonalcoholic fatty liver disease, obesity, FRANK on CPAP, PTSD and GERD who presents to [facility] [place] [place] ED with c/f hemorrhagic brain mets seen on imaging. He endorses having a lingering, moderate to severe H/A for the past 8 days and also reports increased nausea associated with his H/A w/ some emesis at home this AM. He also reports new ataxia and issues with his balance/coordination, [facility]. [facility], [facility] was completed which showed numerous hemorrhagic lesions w/ associated edema. CT C-spine also showed no acute fracture or subluxation and a CT PE was negative for acute PE, but showed numerous bilateral pulmonary nodules which have increased in size. He was then transferred to  for urgent Neurosurgery consultation. Oncology history is as follows: Melanoma of L shoulder (dx. 2019) s/p excision, no [facility] follow-up  - RLL CA (dx. 6/2024, on immunotherapy), bx consistent w/ metastatic melanoma  - pt reported only completing 1 cycle of immunotherapy, unsure of drug/dose (likely nivulumab and ipilumab)      Upon arrival to , patient was started on dexamethasone 4 mg q6 with Neurosurgery consult placed. Neurosurgery recommended MRI, however given that patient has VNS device, MRI was delayed since epilepsy had to get involved in order to turn off the device for the MRI. MRI brain 11/26  revealed Diffuse, innumerable infratentorial and supratentorial intraparenchymal avidly enhancing and mildly diffusion restricting lesions consistent with metastatic disease. No midline shift or herniation. Tumor board discussion 11/27 determined best approach would be WBT outpatient at the [facility], no acute surgical intervention needed. Patient to be discharged with outpatient follow up  [facility] and Dr. Schaefer (primary oncology) follow up 12/5. Will take dexamethasone 4 mg BID until follow up.     Follow up:   [ ] [facility]  [ ] Follow up with radiation oncology at the [facility] next week with Dr. Katerin Dias. If you do not hear from the [facility], please contact [phone]   [ ] Continue dexamethasone 4 mg twice a day until your follow up appointment at the [facility] with radiation oncology

## 2024-11-26 NOTE — PROGRESS NOTES
Spiritual Care Visit    Clinical Encounter Type  Visited With: Patient  Routine Visit: Introduction  Continue Visiting: Yes  Referral From:   Referral To:     Taoism Encounters  Taoism Needs: Prayer, Taoism articles         Sacramental Encounters  Sacrament of Sick-Anointing: Anointed    Patient received the Sacrament of the Anointing of the Sick by Fr. Amadeo Carmen, [facility] .      Within the Gnosticist Baptism the Sacrament of the Sick, also known as the Anointing of the Sick, is a prayer in which we invoke the healing power of God.  Although considered part of 'Last Rites' the Anointing of the Sick, along with Eucharist and Reconciliation, is a repeatable sacrament and should be received by anyone about to undergo surgery, those in recovery and the elderly.  Those who are actively dying should receive the Anointing of the Sick for physical and spiritual healing.

## 2024-11-26 NOTE — CARE PLAN
The patient's goals for the shift include To remain HDS    The clinical goals for the shift include To remain HDS    Patient remained HDS. Patient had a HA and received medication as ordered. Patient has a rash from immunotherapy      Problem: Nutrition  Goal: Less than 5 days NPO/clear liquids  Outcome: Progressing  Goal: Oral intake greater than 50%  Outcome: Progressing  Goal: Oral intake greater 75%  Outcome: Progressing  Goal: Consume prescribed supplement  Outcome: Progressing  Goal: Adequate PO fluid intake  Outcome: Progressing  Goal: Nutrition support goals are met within 48 hrs  Outcome: Progressing  Goal: Nutrition support is meeting 75% of nutrient needs  Outcome: Progressing  Goal: Tube feed tolerance  Outcome: Progressing  Goal: BG  mg/dL  Outcome: Progressing  Goal: Lab values WNL  Outcome: Progressing  Goal: Electrolytes WNL  Outcome: Progressing  Goal: Promote healing  Outcome: Progressing  Goal: Maintain stable weight  Outcome: Progressing  Goal: Reduce weight from edema/fluid  Outcome: Progressing  Goal: Gradual weight gain  Outcome: Progressing  Goal: Improve ostomy output  Outcome: Progressing     Problem: Pain - Adult  Goal: Verbalizes/displays adequate comfort level or baseline comfort level  Outcome: Progressing     Problem: Safety - Adult  Goal: Free from fall injury  Outcome: Progressing     Problem: Discharge Planning  Goal: Discharge to home or other facility with appropriate resources  Outcome: Progressing     Problem: Chronic Conditions and Co-morbidities  Goal: Patient's chronic conditions and co-morbidity symptoms are monitored and maintained or improved  Outcome: Progressing

## 2024-11-26 NOTE — PROGRESS NOTES
SUPPORTIVE AND PALLIATIVE ONCOLOGY PROGRESS NOTE      SERVICE DATE: 2024      SUBJECTIVE:  Interval Events:    Patient was seen at bedside with no family present. He reports that oxycodone 10mg is more effective at reducing his pain than oxycodone 5mg. He reports that he just moved from the ED at 4am, so he did not have good night sleep. He hopes to discharge from the hospital sometime tomorrow so he can spend the holidays with his family. He hopes that MRI can be done sometime this morning so he can get the information necessary.   He narrates [facility].  LBM: yesterday    N/V: denies   Anxiety: hightened due to unknown. Also given that if he needs surgery he would have to stay in the hospital and miss the holiday. He would like a situation where he can leave and spend time with his family and come back  to discuss his regimen.  H reports good appetite.   Primary team should consider discharging patient on oxycodone 10mg q3hrs PRN for at least 2 weeks, until patient [facility]. [facility] he would not be eligible for outpatient supportive oncology, [facility] would [facility].     Pain Assessment:  Location:  headache   Duration: Intermittent  Characteristics:   Ratin   Descriptors:  stabbing pain    Aggravating: none    Relieving: Analgesics oxycodone    Interference with Function: None    Opioid Requirements  Past 24 h opioid requirements ( at 0800 to 24 at 0800):   Oxycodone IR 10 mg PO x 1 doses = 10 mg = 12.5 OME  Oxycodone IR 5 mg PO x 2 doses = 10 mg = 12.5 OME  Hydromorphone 0.4 mg IV x 1 doses = 0.4 mg = 5 OME  Total 24h OME use:  30      Note: OME calculations based on equianalgesic table below. Please note this table is based on best available evidence but conversions are still approximate. These are NOT opioid DOSES for individual patient use; this is equivalency  information.  Drug Parenteral Enteral   Morphine 10 25   Oxycodone N/A 20   Hydromorphone 2 5   Fentanyl 0.15 N/A   Tramadol N/A 120   Citation: Pablo AVILES. Demystifying opioid conversion calculations: A guide for effective dosing, Second edition. [place]: American Society of Health-System Pharmacists, 2018.    Symptom Assessment:  Nausea none  Vomiting none  Constipation none  Diarrhea  history of IBSD  Difficulty Sleeping  situational due to chaos of the ED     Information obtained from: chart review, interview of patient, and discussion with primary team  ______________________________________________________________________        Objective       Lab Results   Component Value Date    WBC 8.5 11/25/2024    HGB 9.4 (L) 11/25/2024    HCT 29.3 (L) 11/25/2024    MCV 65 (L) 11/25/2024     11/25/2024      Lab Results   Component Value Date    GLUCOSE 127 (H) 11/25/2024    CALCIUM 9.5 11/25/2024     11/25/2024    K 4.2 11/25/2024    CO2 21 11/25/2024     11/25/2024    BUN 13 11/25/2024    CREATININE 0.84 11/25/2024     Lab Results   Component Value Date    ALT 29 11/24/2024    AST 42 (H) 11/24/2024    ALKPHOS 93 11/24/2024    BILITOT 0.3 11/24/2024     Estimated Creatinine Clearance: 119 mL/min (by C-G formula based on SCr of 0.84 mg/dL).       Scheduled medications   acetaminophen, 975 mg, oral, q8h  ARIPiprazole, 10 mg, oral, Daily  cholecalciferol, 2,000 Units, oral, Daily  dexAMETHasone, 4 mg, intravenous, q6h  diazePAM, 10 mg, oral, Daily  diazePAM, 5 mg, oral, Nightly  tiotropium, 2 puff, inhalation, Daily   And  fluticasone furoate-vilanteroL, 1 puff, inhalation, Daily  lisinopril, 5 mg, oral, Daily  montelukast, 10 mg, oral, Nightly  pantoprazole, 40 mg, oral, Daily before breakfast  pregabalin, 200 mg, oral, BID  propranolol LA, 120 mg, oral, BID  rosuvastatin, 10 mg, oral, Daily  sertraline, 200 mg, oral, Daily      Continuous medications     PRN medications  albuterol, 2 puff, q6h  PRN  alteplase, 2 mg, PRN  HYDROmorphone, 0.4 mg, q3h PRN  loperamide, 2 mg, TID PRN  oxyCODONE, 10 mg, q3h PRN  oxygen, , Continuous PRN - O2/gases  polyethylene glycol, 17 g, Daily PRN  prochlorperazine, 10 mg, q6h PRN                    PHYSICAL EXAMINATION:  Vital Signs:   Vital signs reviewed  Vitals:    11/26/24 0833   BP: 124/77   Pulse: 72   Resp: 18   Temp: 36.5 °C (97.7 °F)   SpO2: 94%     Pain Score: 8     Physical Exam  Constitutional:       General: He is not in acute distress.     Appearance: He is obese. He is not ill-appearing.   Cardiovascular:      Heart sounds: Normal heart sounds.   Pulmonary:      Breath sounds: Normal breath sounds.   Abdominal:      Palpations: Abdomen is soft.      Comments: Obese appearing    Musculoskeletal:      Comments: Unsteady gate    Skin:     General: Skin is warm.   Neurological:      Mental Status: He is alert and oriented to person, place, and time.   Psychiatric:         Mood and Affect: Mood normal.         Behavior: Behavior normal.         Thought Content: Thought content normal.         Judgment: Judgment normal.           ASSESSMENT/PLAN:  Manuel Marrufo is a 59 y.o. male diagnosed with  RLL CA (s/p partial resection and biopsy), spinal stenosis (s/p L4-S1 lumbar decompression in 2021). PMH significant for L shoulder Melanoma (s/p resection in 2019),HTN, HLD, fatty liver disease, obesity, FRANK on CPAP, PTSD, GERD. Admitted 11/24/2024 for further evaluation and management of  hemorrhagic brain mets seen on imaging. Course complicated by lingering, moderate to severe H/A for the past 8 days and also reports increased nausea. Supportive and Palliative Oncology is consulted for pain management.      Pain:  headache pain related to metastatic melanoma to brain and RLL   Pain is: cancer related pain  Type: neuropathic  Pain control: sub-optimally controlled  Home regimen:  Oxycodone 5mg q6hrs PRN and Pregabalin 200mg BID   Intolerances/previously tried:     Personalized pain goal: Mild  Total OME usage for the past 24 hours:  30 OME   Continue Dex 4mg q6hrs scheduled   Continue Pregablin 200mg BID   continue Oxycodone 10mg q3hrs PRN   Primary team should consider discharging patient on 2 weeks supply and or until patient [facility] an [facility], if he does not follow with  oncologist.   If he follows with  oncologist outpatient supportive oncology referral will need to be placed for symptom management if necessary.   Pending MRI with result to determine subsequent plan of care.   Integrative oncology consult placed. Patient reports that he has had acupuncture in the past and it helped  Continue Acetaminophen 975mg q8hrs Scheduled   Continue dilaudid 0.4mg q3hrs PRN [discontinue 24 hrs before discharge to maximize oral opioid use].   Continue to monitor pain scores and administer PRN medications as appropriate  Continue/initiate nonpharmacologic pain management strategies including ice/heat therapy, distraction techniques, deep breathing/relaxation techniques, calming music, and repositioning  Continue to monitor for signs of opioid efficacy (pain scores, improved functionality) and toxicity (pinpoint pupils, excess sedation/drowsiness/confusion, respiratory depression, etc.)     Nausea:  Intermittent nausea with vomiting related to  brain mets    Home regimen:  Ondansetron   Improving  Continue Zofran 4mg q6hr PRN   Continue Compazine 10mg q6hrs PRN for nausea second line       Constipation  At risk for constipation related to medication side effects (including opioids), currently not constipated  Usual bowel pattern: every day and has IBSD  Home regimen: none  LBM 11/25/24  Continue Miralax 17 grams daily   Continue Imodium 2 mg TID PRN   Goal to have BM without straining q48-72h, adjust regimen as needed     Altered Mood:  Acute on chronic anxiety and depression related to health concerns and PTSD    controlled with home regimen   Home  regimen: sertraline 200mg and Valium 10mg   abilify 10mg daily   Continue Zoloft 200mg daily   Continue Abilify 10mg daily   Continue diazepam 5mg at night and Diazepam 10mg daily.     Sleeping Difficulty:  Impaired sleep related to hospital environment  Home regimen:  none  With change of room he will do better      Decreased appetite:  Appetite loss related to disease process  Weight loss 25lbs weight loss since July 2024   Home regimen:  none  Currently on Dex      Medical Decision Making/Goals of Care/Advance Care Planning:  Patient's current clinical condition, including diagnosis, prognosis, and management plan, and goals of care were discussed.   Life limiting disease: metastatic malignancy  Family: Supportive family   Performance status: Major  limitations due to pain  Joys/meaning/strength: Family  Understanding of health: Demonstrates fair understanding of disease process, understands plan for MRI then determination if he would undergo surgery with neurosurgery   Information:Wants full disclosure  Goals: symptom control and cancer directed therapy  Worries and fears now and future: ongoing symptoms, inability to receive cancer treatment, and MRI result     Minimum acceptable outcome/QOL:  In an event that my heart stops I do not want to be resuscitated . Additionally I do not want to be intubated  and do not want artificial nutrition via NG and Peg tube    Code status discussion:  DNR/DNI     Advance Directives  Existence of Advance Directives:  Yes, but NOT documented in medical record  Decision maker: FRANCISCO is Brother Mason Be @ [phone]   Code Status: DNR DNI        Supportive and Palliative Oncology encounter:  Spoke with patient at bedside  Emotional support provided  Coordination of care:  medication changes and home-going prescription recommendations    Signature and billing:    Medical complexity was high level due to due to complexity of problems, extensive data review, and high risk of  management/treatment.    I spent 50 minutes in the care of this patient which included chart review, interviewing patient/family, discussion with primary team, coordination of care, and documentation.      DATA   Diagnostic tests and information reviewed for today's visit:  Conversation with primary team       Some elements copied from my note on 11/25/24, the elements have been updated and all reflect current decision making from today, 11/26/2024.    Plan of Care discussed with: Provider, RN, Patient    Thank you for asking Supportive and Palliative Oncology to assist with care of this patient.  Recommendations will be communicated back to the consulting service by way of shared electronic medical record/secure chat/email or face-to-face.   We will continue to follow.  Please contact us for additional questions or concerns.      SIGNATURE: ROSSI Awan  PAGER/CONTACT:  Contact information:  Supportive and Palliative Oncology  Monday-Friday 8 AM-5 PM  Epic Secure chat or pager [phone].  After hours and weekends:  pager [phone]

## 2024-11-26 NOTE — CONSULTS
Inpatient consult to Integrative Medicine  Consult performed by: ROSSI Paul  Consult ordered by: ROSSI Awan          Reason For Consult  Symptom management    History Of Present Illness  Manuel Marrufo is a 59 y.o. male with PMH of left shoulder melanoma s/p resection 2019, recently diagnosed RLL CA s/p partial resection and biopsy, spinal stenosis s/p L4-S1 lumbar decompression in 2021, HTN, HLD, nonalcoholic fatty liver disease, obesity, FRANK on CPAP, PTSD and GERD who presents on 11/24 with c/f hemorrhagic brain mets seen on imaging, with associated symptoms of HA and emesis at home, imbalance with ambulation. Integrative medicine consulted by supportive oncology for non-pharmacological symptom management of pain, anxiety.     Pt resting in bed at time of exam.     Family Hx: Father: cirrhosis. Mother: no known medical Hx     Social Hx: lives at home alone, previously , 2 children. Currently on disability   Tobacco: previous   ETOH: denies   Illicits: denies      Spiritual Hx: Uatsdin     Joys: TV, yard and housework         Information obtained from chart review, discussion with patient/family, and discussion with primary team.       Past Medical History  He has a past medical history of Awareness under anesthesia (2021), Fatty liver disease, nonalcoholic, GERD (gastroesophageal reflux disease), HLD (hyperlipidemia), HTN (hypertension), FRANK on CPAP, PTSD (post-traumatic stress disorder), and Spinal stenosis.    Surgical History  He has a past surgical history that includes Back surgery; Malignant skin lesion excision; and Tonsillectomy.     Social History  He reports that he quit smoking about 6 months ago. His smoking use included cigarettes. He started smoking about 40 years ago. He has a 20 pack-year smoking history. He has never used smokeless tobacco. He reports that he does not drink alcohol and does not use drugs.    Family History  Family History   Problem Relation Name  "Age of Onset    Arthritis Mother Patrizia Marrufo     Alcohol abuse Father      Cirrhosis Father      Cervical cancer Sister Kaity Rebolledo     Hypertension Sister Kaity Rebolledo     Other (bladder cancer) Maternal Grandmother Coral Ferrera         Allergies  Buspirone    Review of Systems   Constitutional:  Positive for activity change and fatigue.   Gastrointestinal:  Positive for nausea.   Musculoskeletal:  Positive for neck pain.   Psychiatric/Behavioral:  Positive for dysphoric mood. The patient is nervous/anxious.    All other systems reviewed and are negative.       Physical Exam  Vitals reviewed.   Constitutional:       General: He is not in acute distress.     Appearance: Normal appearance. He is normal weight. He is ill-appearing.   HENT:      Head: Normocephalic and atraumatic.      Nose: Nose normal.      Mouth/Throat:      Mouth: Mucous membranes are moist.   Eyes:      Extraocular Movements: Extraocular movements intact.      Pupils: Pupils are equal, round, and reactive to light.   Cardiovascular:      Rate and Rhythm: Normal rate.   Pulmonary:      Effort: Pulmonary effort is normal.   Abdominal:      General: There is distension.      Palpations: Abdomen is soft.   Skin:     General: Skin is warm and dry.   Neurological:      General: No focal deficit present.      Mental Status: He is alert and oriented to person, place, and time. Mental status is at baseline.   Psychiatric:         Mood and Affect: Mood normal.         Behavior: Behavior normal.          Last Recorded Vitals  Blood pressure 124/77, pulse 72, temperature 36.5 °C (97.7 °F), temperature source Temporal, resp. rate 18, height 1.727 m (5' 7.99\"), weight 117 kg (257 lb 15 oz), SpO2 94%.    Relevant Results  Scheduled medications  acetaminophen, 975 mg, oral, q8h  ARIPiprazole, 10 mg, oral, Daily  cholecalciferol, 2,000 Units, oral, Daily  dexAMETHasone, 4 mg, intravenous, q6h  diazePAM, 10 mg, oral, Daily  diazePAM, 5 mg, oral, " Nightly  tiotropium, 2 puff, inhalation, Daily   And  fluticasone furoate-vilanteroL, 1 puff, inhalation, Daily  lisinopril, 5 mg, oral, Daily  montelukast, 10 mg, oral, Nightly  pantoprazole, 40 mg, oral, Daily before breakfast  pregabalin, 200 mg, oral, BID  propranolol LA, 120 mg, oral, BID  rosuvastatin, 10 mg, oral, Daily  sertraline, 200 mg, oral, Daily      Continuous medications     PRN medications  PRN medications: albuterol, alteplase, HYDROmorphone, loperamide, oxyCODONE, oxygen, polyethylene glycol, prochlorperazine    Results for orders placed or performed during the hospital encounter of 11/24/24 (from the past 24 hours)   CBC and Auto Differential   Result Value Ref Range    WBC 11.8 (H) 4.4 - 11.3 x10*3/uL    nRBC 0.0 0.0 - 0.0 /100 WBCs    RBC 4.26 (L) 4.50 - 5.90 x10*6/uL    Hemoglobin 8.7 (L) 13.5 - 17.5 g/dL    Hematocrit 29.6 (L) 41.0 - 52.0 %    MCV 70 (L) 80 - 100 fL    MCH 20.4 (L) 26.0 - 34.0 pg    MCHC 29.4 (L) 32.0 - 36.0 g/dL    RDW 17.0 (H) 11.5 - 14.5 %    Platelets 419 150 - 450 x10*3/uL    Neutrophils % 73.9 40.0 - 80.0 %    Immature Granulocytes %, Automated 1.1 (H) 0.0 - 0.9 %    Lymphocytes % 21.0 13.0 - 44.0 %    Monocytes % 3.6 2.0 - 10.0 %    Eosinophils % 0.1 0.0 - 6.0 %    Basophils % 0.3 0.0 - 2.0 %    Neutrophils Absolute 8.71 (H) 1.20 - 7.70 x10*3/uL    Immature Granulocytes Absolute, Automated 0.13 0.00 - 0.70 x10*3/uL    Lymphocytes Absolute 2.48 1.20 - 4.80 x10*3/uL    Monocytes Absolute 0.43 0.10 - 1.00 x10*3/uL    Eosinophils Absolute 0.01 0.00 - 0.70 x10*3/uL    Basophils Absolute 0.03 0.00 - 0.10 x10*3/uL   Magnesium   Result Value Ref Range    Magnesium 2.33 1.60 - 2.40 mg/dL   Renal Function Panel   Result Value Ref Range    Glucose 114 (H) 74 - 99 mg/dL    Sodium 137 136 - 145 mmol/L    Potassium 3.9 3.5 - 5.3 mmol/L    Chloride 102 98 - 107 mmol/L    Bicarbonate 25 21 - 32 mmol/L    Anion Gap 14 10 - 20 mmol/L    Urea Nitrogen 23 6 - 23 mg/dL    Creatinine 1.07  0.50 - 1.30 mg/dL    eGFR 80 >60 mL/min/1.73m*2    Calcium 9.5 8.6 - 10.6 mg/dL    Phosphorus 3.7 2.5 - 4.9 mg/dL    Albumin 4.0 3.4 - 5.0 g/dL     ECG 12 Lead    Result Date: 11/25/2024  Normal sinus rhythm Normal ECG No previous ECGs available See ED provider note for full interpretation and clinical correlation Confirmed by Cris Huerta ([id number]) on 11/25/2024 11:32:37 PM    CT head wo IV contrast    Result Date: 11/24/2024  Interpreted By:  Tomi Varma,  and Drew Fernandez STUDY: CT HEAD WO IV CONTRAST;  11/24/2024 9:02 pm   INDICATION: Signs/Symptoms:hemorrhagic mets, [facility]   Hx of L shoulder melanoma s/p resection in 2019, recently diagnosed RLL mass s/p partial resection and biopsy ([facility]) presenting with HA 8 days, CTH from OSH with numerous hemorrhagic supra and infra tentorial small mets w associated edema. Neurosurgery is consulted for further evaluation.   COMPARISON: None.   ACCESSION NUMBER(S): [id number]   ORDERING CLINICIAN: BERKLEY CEVALLOS   TECHNIQUE: Noncontrast axial CT images of head were obtained with coronal and sagittal reconstructed images.   FINDINGS: BRAIN PARENCHYMA: There are numerous scattered hyperdense foci throughout the bilateral cerebral and cerebellar hemispheres with involvement of the brainstem highly suggestive of metastasis (ex. Series 201, image 23, 20, 15, 12 and series 206, image 90). There are scattered areas of white matter hypoattenuation throughout the bilateral and cerebellar hemispheres suggestive of vasogenic edema with overlying sulcal effacement. No midline shift.   VENTRICLES and EXTRA-AXIAL SPACES: There is prominence of ventricles and sulci compatible with diffuse parenchymal volume loss.  No abnormal extra-axial fluid collection.   ORBITS: The visualized orbits and globes are within normal limits.   EXTRACRANIAL SOFT TISSUES: Within normal limits.   PARANASAL SINUSES/MASTOIDS: The visualized paranasal sinuses and mastoid air  cells are well aerated.   CALVARIUM: No depressed skull fracture.       1. Numerous hyperdense metastatic lesions throughout the cerebrum, cerebellum, and brainstem with associated vasogenic edema. Varying degrees of density within some of the lesions suggests trace intralesional hemorrhage. MRI with contrast is recommended for further evaluation. 2. No evidence of midline shift.   I personally reviewed the images/study and I agree with the findings as stated by Rebecca Russell DO, PGY-3. This study was interpreted at [place], [place].   MACRO: None   Signed by: Tomi Varma 11/24/2024 10:05 PM Dictation workstation:   [id number]    XR chest 1 view    Result Date: 11/24/2024  Interpreted By:  Bowen Peterson and Ohs Zachary STUDY: XR CHEST 1 VIEW;  11/24/2024 4:17 pm   INDICATION: Signs/Symptoms:pre-op.   COMPARISON: Chest radiograph 01/24/2019.   ACCESSION NUMBER(S): [id number]   ORDERING CLINICIAN: BERKLEY CEVALLOS   FINDINGS: AP radiograph of the chest was provided.   MEDICAL DEVICES: Left chest wall device with leads terminating over the left neck.   CARDIOMEDIASTINAL SILHOUETTE: Cardiomediastinal silhouette is normal in size and configuration.   LUNGS: Mild bibasilar atelectasis. No pneumothorax, pleural effusion or focal consolidation.   ABDOMEN: No remarkable upper abdominal findings.   BONES: No acute osseous changes.       1. Small amount of patchy bibasilar atelectasis. No other acute intrathoracic.   I personally reviewed the images/study and I agree with the findings as stated by Dr. Eduardo Mccullough. This study was interpreted at [place], [place].   MACRO: None   Signed by: Bowen Peterson 11/24/2024 5:34 PM Dictation workstation:   [id number]        Assessment/Plan   Introduction to Integrative Medicine:  Spoke with pt at bedside. Patient seemed to appreciate the extra layer of support.   Integrative Medicine  was introduced as a service for patients with serious illness to help with symptoms through non-pharmacological management, such as mindfulness, acupuncture, and gentle bodywork. Such interventions can assist with symptoms such as anxiety, fatigue, nausea, depression and pain.     [facility] Integrative Medicine Symptom Management program offers multi-disciplinary supervised care of cancer patients using Integrative Modalities billed to insurance using NCCN and SIO/ASCO guideline-driven practices.  ESAS is obtained prior to and after each treatment by the practitioner       Pre- Post-   Wellbeing   8  N/A - no treatment today   Coping  4     Pain  9     Fatigue  3     Anxiety   10     Depression  8     Stress  10     Nausea  9              Headache and neck pain:   pain related to malignancy   Pain is well-controlled  Defer to supportive oncology team for adequate PO/IV pain regimen   Recommend integrative therapy modalities as pt allows:  -Acupuncture; provided pt education today. Pt declined treatment, given testing and radiation today. Provided outpatient scheduling information.       - Pt is an adequate candidate for acupuncture; pt is afebrile, WBC 11.8, ANC 8.71  -Acupressure, gua sha   -Gentle bodywork and stretching as tolerated      -Art therapy  -Music therapy  -Chaplaincy   -Pet therapy        Nausea/Vomiting:  Intermittent nausea and vomiting related to opioids, brain metastasis  -Defer to supportive oncology recs for pharmacological management  -Recommend integrative medicine modalities as listed above        Altered Mood:  Anxiety and/or depression r/t health concerns  History of anxiety/depression  -Recommend integrative medicine modalities as listed above      Thank you for allowing us to participate in the care of this patient. Integrative Medicine Team will continue to follow as needed.  Please contact team with any questions or concerns.     Recommend outpatient integrative  medicine interventions through [facility]. Please place referral to [facility] at time of discharge.   In addition, pt can call to establish care; appointment line for [facility]: [phone],  Integrative Oncology Clinic: [phone].        ANNELISE Gilman-CNP (available by [employer])  [facility]  Inpatient Integrative Medicine      I spent 80 minutes in the care of this patient which included chart review, interviewing patient/family, discussion with primary team, coordination of care, and documentation.     Medical Decision Making was high level due to high complexity of problems, extensive data review, and high risk of management/treatment.

## 2024-11-26 NOTE — DISCHARGE INSTRUCTIONS
Mica Marrufo,     [facility] were [facility] which showed some concern for possible spread of your cancer to your brain. Because of this, we started you on steroids and got further imaging of your brain through the MRI and found that there were some masses in your brain likely from spread from your primary cancer (melanoma) . When we asked neurosurgery about this, they decided that there was not any acute intervention needed at this time. Because of this, we also had our radiation oncologist evaluate you and got you set up for radiation [facility] which [facility] after discussing your case at the tumor board. You will continue taking dexamethasone 4 mg twice a day until your follow up appointment with radiation oncology.  Your primary oncologist, Dr. Annabel Schaefer, was also made aware of the admission and will coordinate your immunotherapy once you see her in the outpatient setting. It was a pleasure taking care of you.      Inpatient Oncology Service     Follow Up:   [ ] [facility]  [ ] [facility] with Dr. Katerin Dias. If you do not hear from the [facility], please contact [phone]   [ ] Continue dexamethasone 4 mg twice a day until your [facility] with radiation oncology

## 2024-11-26 NOTE — PROGRESS NOTES
Physical Therapy    Physical Therapy Evaluation    Patient Name: Manuel Marrufo  MRN: [id number]  Department: [facility]  Room: [facility]402-  Today's Date: 11/26/2024   Time Calculation  Start Time: 1440  Stop Time: 1459  Time Calculation (min): 19 min    Assessment/Plan   PT Assessment  PT Assessment Results: Decreased strength, Decreased endurance, Impaired balance, Decreased mobility, Decreased coordination, Decreased cognition, Decreased safety awareness, Orthopedic restrictions, Pain  Rehab Prognosis: Good  Barriers to Discharge: pending MRI and treatment plan  Evaluation/Treatment Tolerance: Patient tolerated treatment well  Medical Staff Made Aware: Yes  Strengths: Ability to acquire knowledge, Attitude of self, Coping skills, Premorbid level of function, Support of Caregivers, Living arrangement secure, Housing layout  Barriers to Participation: Other (Comment) (none)  End of Session Communication: Bedside nurse  Assessment Comment: 58 yo male with malignant metastatic melanoma (mets to lungs) with new ataxia/blanadeficits and new hemorrhagic brain lesions (pending MRI) presents with subtle balance deficits, decreased activity tolerance and reports of subtle cognitive deficits. WIll benefit from intermittent PT while here but is not interested in home therapies.  End of Session Patient Position: Bed, 3 rail up, Alarm off, not on at start of session (per RN pt refusing bed alarm)  IP OR SWING BED PT PLAN  Inpatient or Swing Bed: Inpatient  PT Plan  Treatment/Interventions: Bed mobility, Transfer training, Gait training, Balance training, Neuromuscular re-education, Strengthening, Endurance training, Therapeutic exercise, Therapeutic activity, Home exercise program, Postural re-education  PT Plan: Ongoing PT  PT Frequency: 3 times per week  PT Discharge Recommendations: Other (comment) (pt politely declining PT upon DC (is not interested in home PT))  Equipment Recommended upon Discharge: Other (comment) (no PT  "equipment needs)  PT Recommended Transfer Status: Assist x1, Stand by assist (no device)  PT - OK to Discharge: Yes (PT evaluation completed & DC recs made)    Subjective   General Visit Information:  General  Reason for Referral: Admitted 11/24 with HA x 8 days and ataxia (presenting as transfer from OSH for concern of new mets to the brain); dx: malignant metastatic melanoma (mets to lung), new hemorrhagic brain lesions (cerebrum, cerebellum, brainstem) with vasogenic edema  Past Medical History Relevant to Rehab: metastatic melanoma (currently on nivulumab and ipilumab) to the lungs,  L shoulder melanoma s/p resection in 2019, recently diagnosed RLL mass s/p partial resection and biopsy ([facility]), HTN, HLD, fatty liver disease, obesity, FRANK on CPAP, PTSD, GERD, spinal stenosis (s/p L4-S1 lumbar decompression 2021_, FRANK on CPAP  PT Missed Visit:  (no)  Family/Caregiver Present: No  Prior to Session Communication: Bedside nurse  Patient Position Received: Bed, 2 rail up, Alarm off, not on at start of session (per RN notes, pt refusing bed alarm)  Preferred Learning Style: verbal  General Comment: Pt alert and engaged, reports hx of intermittent chronic back pain (not now); able to eat \"because of the steroids\". Pt able to ambulate as noted, subtle balance deficits. Pt reports being a little confused and having newer memory issues. Pt is frustrated b/c he has been waiting to get an MRI (per RN going to MRI this afternoon); pt wants to go home. Pt reports having an anxiety disorder.  Home Living:  Home Living  Type of Home: House  Lives With: Alone  Home Adaptive Equipment: Cane (shower equipment as noted, WW, 2 chair lifts (has 3 floors in house))  Home Layout: Multi-level (1 KIMBERLY, half bath 1st floor, chairlift up to bed/WIS; other chair lift down to basement laundry (A does it))  Home Access: Stairs to enter with rails  Entrance Stairs-Rails: None  Entrance Stairs-Number of Steps: 1  Bathroom " Shower/Tub: Walk-in shower  Bathroom Toilet: Handicapped height  Bathroom Equipment: Grab bars in shower, Hand-held shower hose, Shower chair with back  Bathroom Accessibility: chairlift up to it  Prior Level of Function:  Prior Function Per Pt/Caregiver Report  Level of [place]:  (limited by back pain chronically; independent ambulation in house with cane/outdoors no device; doesn't need to do stairs (has chairlift), no falls)  Receives Help From:  (HHA 40 hours/week (clean, laundry, cooking); mom takes him grocery shopping every week)  ADL Assistance: Independent  Homemaking Assistance: Needs assistance  Ambulatory Assistance: Independent  Vocational:  (not working)  Hand Dominance: Right  Prior Function Comments: doesn't drive, limited by chronic back pain  Precautions:  Precautions  Medical Precautions: Fall precautions (anemia, protective, back pain, PTSD, anxiety, subtle cognitive deficits)     Vital Signs (Past 2hrs)        Date/Time Vitals Session Patient Position Pulse Resp SpO2 BP MAP (mmHg)    11/26/24 1440 Post PT  Lying  68  --  95 %  126/54  --                   Vital Signs Comment: post gait     Objective   Pain:  Pain Assessment  Pain Assessment: 0-10  0-10 (Numeric) Pain Score:  (reports hx of chronic back pain (not today))  Pain Interventions: Repositioned, Ambulation/increased activity, Rest, Therapeutic presence, Therapeutic touch  Response to Interventions:  (comfortable supine)  Cognition:  Cognition  Overall Cognitive Status: Impaired  Orientation Level: Oriented X4  Following Commands: Follows all commands and directions without difficulty  Safety Judgment: Decreased awareness of need for assistance  Problem Solving: Able to problem solve independently  Cognition Comments: alert, engaged, slow processing possibly, flat affect at times  Safety/Judgement: Exceptions to WFL  Insight: Mild  Impulsive: Mildly    General Assessments:          Activity Tolerance  Endurance: Endurance does not  limit participation in activity    Sensation  Light Touch:  (appears decreased bilateral feet)       Coordination  Movements are Fluid and Coordinated: No (slow, mild dysmetria overall (L > R, UEs > LEs) with FTN, opposition, HTS, toe tapping)    Postural Control  Postural Control: Impaired  Posture Comment: standing: slightly wider MIGUEL ANGEL, mild anterior lean    Static Sitting Balance  Static Sitting-Balance Support: Feet supported, No upper extremity supported  Static Sitting-Level of Assistance: Modified independent  Dynamic Sitting Balance  Dynamic Sitting-Balance Support: Feet supported, Bilateral upper extremity supported  Dynamic Sitting-Level of Assistance: Close supervision  Dynamic Sitting-Balance: Reaching for objects    Static Standing Balance  Static Standing-Balance Support: No upper extremity supported (no device)  Static Standing-Level of Assistance: Close supervision (SBA)  Static Standing-Comment/Number of Minutes: +sway feet together eyes closed, min A for tandem stand either leg, CG/min A for single leg stand either leg  Dynamic Standing Balance  Dynamic Standing-Balance Support: No upper extremity supported (no device)  Dynamic Standing-Level of Assistance: Contact guard (SBA/CGA, no LOB)  Functional Assessments:  ADL  ADL's Addressed: No    Bed Mobility  Bed Mobility: Yes  Bed Mobility 1  Bed Mobility 1: Supine to sitting, Sitting to supine  Level of Assistance 1: Close supervision  Bed Mobility Comments 1: HOB slightly elevated, use of rail, in/out on Left side    Transfers  Transfer: Yes  Transfer 1  Transfer From 1: Sit to, Stand to  Transfer to 1: Sit, Stand  Technique 1: Sit to stand, Stand to sit  Transfer Device 1:  (no device)  Transfer Level of Assistance 1: Contact guard (SBA)    Ambulation/Gait Training  Ambulation/Gait Training Performed: Yes  Ambulation/Gait Training 1  Surface 1: Level tile  Device 1: No device  Assistance 1: Contact guard (SBA, no LOB)  Quality of Gait 1:  (slow,  slightly wider MIGUEL ANGEL, small steps bilaterally, slight anterior lean)  Comments/Distance (ft) 1: 155    Stairs  Stairs: No (N/A (pt has chairlifts))  Extremity/Trunk Assessments:  RUE   RUE :  (AROM grossly: grasp, elbow flex/ext WFL; shoulder elevation approximately 165 with some discomfort; strength:  grasp 5/5, elbow flex/ext >4, shoulder elevation >3 (not resisted))  LUE   LUE:  (AROM grossly: grasp, elbow flex/ext and shoulder elevation WFL; strength: grasp 5/5, elbow flex/ext >4, shoulder elevation >3 (not resisted))  RLE   RLE :  (AROM: ankle DF/PF, knee flexion/ext and hip flexion grossly WFL; strength: ankle DF and knee extension 4+)  LLE   LLE :  (AROM: ankle DF/PF, knee flexion/ext and hip flexion grossly WFL; strength: ankle DF and knee extension 4+)  Outcome Measures:  [facility] Basic Mobility  Turning from your back to your side while in a flat bed without using bedrails: None  Moving from lying on your back to sitting on the side of a flat bed without using bedrails: A little  Moving to and from bed to chair (including a wheelchair): A little  Standing up from a chair using your arms (e.g. wheelchair or bedside chair): A little  To walk in hospital room: A little  Climbing 3-5 steps with railing: A lot  Basic Mobility - Total Score: 18    Encounter Problems       Encounter Problems (Active)       General Goals       Pt will be independent with HEP for back pain and general conditioning (Not Progressing)       Start:  11/26/24    Expected End:  12/10/24            Pt will come supine to/from sit via logrolling (HOB flat, no rail) independently (Progressing)       Start:  11/26/24    Expected End:  12/10/24               Mobility       Pt will come sit to/from stand, bed to/from chair, stand to/from toilet no device independently, no LOB (Progressing)       Start:  11/26/24    Expected End:  12/10/24            Pt will ambulate 1000' no device at gait speed > 0.8 meters seconds independently (Progressing)        Start:  11/26/24    Expected End:  12/10/24            Pt will score >46 on the [place] Balance scale indicating lower risk for falls (Not Progressing)       Start:  11/26/24    Expected End:  12/10/24               Pain - Adult              Education Documentation  Precautions, taught by Yohana Doss PT at 11/26/2024  3:09 PM.  Learner: Patient  Readiness: Acceptance  Method: Explanation, Demonstration  Response: Demonstrated Understanding, Verbalizes Understanding  Comment: PT purpose/POC, safe transfers/ambulation, vitals    Body Mechanics, taught by Yohana Doss PT at 11/26/2024  3:09 PM.  Learner: Patient  Readiness: Acceptance  Method: Explanation, Demonstration  Response: Demonstrated Understanding, Verbalizes Understanding  Comment: PT purpose/POC, safe transfers/ambulation, vitals    Mobility Training, taught by Yohana Doss PT at 11/26/2024  3:09 PM.  Learner: Patient  Readiness: Acceptance  Method: Explanation, Demonstration  Response: Demonstrated Understanding, Verbalizes Understanding  Comment: PT purpose/POC, safe transfers/ambulation, vitals    Education Comments  No comments found.

## 2024-11-26 NOTE — CONSULTS
Radiation Oncology Inpatient Consult    Patient Name:  Manuel Marrufo  MRN:  [id number]  :  1965    Referring Provider: UNSPECIFIEDPROVIDER, IN*  Primary Care Provider: Amy Argueta MD  Care Team: Patient Care Team:  Amy Argueta MD as PCP - General (Internal Medicine)  Dawn Estrada MD as PCP - [employer] PCP    Date of Service: 2024    SUBJECTIVE  History of Present Illness:  This is a 59 year old male with a history of left shoulder melanoma s/p resection in 2019, who was recently diagnosed with lung metastases in 2024, s/p 1 cycle of immunotherapy.  [facility]  He presents to the ED with new headaches, nausea, changes in balance and coordination.  CTH showed numerous hemorrhagic lesions.  CTA chest showed multiple pulmonary nodules.  The patient was transferred to [facility] for neurosurgical evaluation.      MRI brain has been requested, and is pending clearance of spinal stimulator.  Radiation oncology has been asked to follow and assist with potential care coordination.    The patient is found to be stable, resting comfortably.  He endorses some improvement in symptoms following initiation of dexamethasone.  He endorses gradual changes in gait, coordination over a period of 1 week.  He has a history of lumbar stenosis, and received care at the [facility], which has included injections and spinal stimulator.        Prior Radiotherapy: no    Current Systemic Treatment:   Recently initiated on immunotherapy, [facility].     Presence of Pacemaker or ICD: No implantable cardiac device.  The patient does have a spinal stimulator,    Past Medical History:    Past Medical History:   Diagnosis Date    Awareness under anesthesia     During back surgery    Fatty liver disease, nonalcoholic     GERD (gastroesophageal reflux disease)     HLD (hyperlipidemia)     HTN (hypertension)     FRANK on CPAP     PTSD (post-traumatic stress  disorder)     Spinal stenosis         Past Surgical History:    Past Surgical History:   Procedure Laterality Date    BACK SURGERY      MALIGNANT SKIN LESION EXCISION      left shoulder and armpit    TONSILLECTOMY          Family History:  Cancer-related family history includes Cervical cancer in his sister.    Social History:    Social History     Tobacco Use    Smoking status: Former     Current packs/day: 0.00     Average packs/day: 0.5 packs/day for 40.1 years (20.0 ttl pk-yrs)     Types: Cigarettes     Start date: 1984     Quit date: 2024     Years since quittin.5    Smokeless tobacco: Never   Vaping Use    Vaping status: Never Used   Substance Use Topics    Alcohol use: Never     Comment: tried it but never been a drinker    Drug use: Never       Allergies:    Allergies   Allergen Reactions    Buspirone GI Upset and Other        Medications:    Current Facility-Administered Medications:     acetaminophen (Tylenol) tablet 975 mg, 975 mg, oral, q8h, ROSSI Aguilera, 975 mg at 24 0828    albuterol 90 mcg/actuation inhaler 2 puff, 2 puff, inhalation, q6h PRN, ROSSI Aguilera    alteplase (Cathflo Activase) injection 2 mg, 2 mg, intra-catheter, PRN, ROSSI Aguilera    ARIPiprazole (Abilify) tablet 10 mg, 10 mg, oral, Daily, ROSSI Aguilera, 10 mg at 24 0823    cholecalciferol (Vitamin D-3) tablet 2,000 Units, 2,000 Units, oral, Daily, ROSSI Aguilera, 2,000 Units at 24 0824    dexAMETHasone (Decadron) injection 4 mg, 4 mg, intravenous, q6h, Camila Patel MD, 4 mg at 24 0824    diazePAM (Valium) tablet 10 mg, 10 mg, oral, Daily, ROSSI Aguilera, 10 mg at 24 0824    diazePAM (Valium) tablet 5 mg, 5 mg, oral, Nightly, ROSSI Aguilera, 5 mg at 24    tiotropium (Spiriva Respimat) 2.5 mcg/actuation inhaler 2 puff, 2 puff, inhalation, Daily, 2 puff at 24 8804 **AND**  fluticasone furoate-vilanteroL (Breo Ellipta) 200-25 mcg/dose inhaler 1 puff, 1 puff, inhalation, Daily, ROSSI Aguilera, 1 puff at 11/25/24 0645    HYDROmorphone (Dilaudid) injection 0.4 mg, 0.4 mg, intravenous, q3h PRN, Dudley Healy MD, 0.4 mg at 11/25/24 1153    lisinopril tablet 5 mg, 5 mg, oral, Daily, ROSSI Aguilera, 5 mg at 11/26/24 0824    loperamide (Imodium) capsule 2 mg, 2 mg, oral, TID PRN, ROSSI Aguilera    montelukast (Singulair) tablet 10 mg, 10 mg, oral, Nightly, ROSSI Aguilera, 10 mg at 11/25/24 2152    oxyCODONE (Roxicodone) immediate release tablet 10 mg, 10 mg, oral, q3h PRN, Mehul Villanueva, DO, 10 mg at 11/26/24 0707    oxygen (O2) therapy, , inhalation, Continuous PRN - O2/gases, ROSSI Aguilera    pantoprazole (ProtoNix) EC tablet 40 mg, 40 mg, oral, Daily before breakfast, ROSSI Aguilera, 40 mg at 11/26/24 0703    polyethylene glycol (Glycolax, Miralax) packet 17 g, 17 g, oral, Daily PRN, ROSSI Aguilera    pregabalin (Lyrica) capsule 200 mg, 200 mg, oral, BID, ROSSI Aguilera, 200 mg at 11/26/24 0823    prochlorperazine (Compazine) tablet 10 mg, 10 mg, oral, q6h PRN, Mehul Villanueva, DO    propranolol LA (Inderal LA) 24 hr capsule 120 mg, 120 mg, oral, BID, ROSSI Aguilera, 120 mg at 11/26/24 0952    rosuvastatin (Crestor) tablet 10 mg, 10 mg, oral, Daily, ROSSI Aguilera, 10 mg at 11/26/24 0828    sertraline (Zoloft) tablet 200 mg, 200 mg, oral, Daily, ROSSI Aguilera, 200 mg at 11/26/24 0824      Review of Systems:    The patient describes chronic lower back pain with radiation into his upper hips.  He is on disability.  He does not describe having any fevers or chills, no recent illnesses.  He has developed a rash over recent days, which he believes may be related to his immunotherapy.  Denies chest pain or difficulty with breathing.  No  "hemoptysis.  No abdominal discomfort.    Performance Status:  The Karnofsky performance scale today is 50, Requires considerable assistance and frequent medical care (ECOG equivalent 2).        OBJECTIVE  Physical Exam:  /77 (BP Location: Right arm, Patient Position: Lying)   Pulse 72   Temp 36.5 °C (97.7 °F) (Temporal)   Resp 18   Ht 1.727 m (5' 7.99\")   Wt 117 kg (257 lb 15 oz)   SpO2 94%   BMI 39.23 kg/m²    General: awake, alert, resting comfortably, well developed and well nourished in appearance  HEENT: pupils equal and round, no scleral icterus  Pulmonary: Breathing comfortably at rest   Cardiac: regular rate  Abdomen: soft, nondistended, non tender to palpation   EXT: no peripheral edema, no asymmetry noted  MSK: no focal joint swelling noted  Neuro: A&O x 3, expressive aphasia, cranial nerves II through XII grossly intact, sensation and strength intact  Psych: Normal affect       Laboratory Review:         11/26/24 1012  Renal Function Panel  Collected: 11/26/24 0750  Final result  Specimen: Blood, Venous    Glucose 114 High  mg/dL Urea Nitrogen 23 mg/dL   Sodium 137 mmol/L Creatinine 1.07 mg/dL   Potassium 3.9 mmol/L eGFR 80 mL/min/1.73m*2    Chloride 102 mmol/L Calcium 9.5 mg/dL   Bicarbonate 25 mmol/L Phosphorus 3.7 mg/dL    Anion Gap 14 mmol/L Albumin 4.0 g/dL          11/26/24 1011  Magnesium  Collected: 11/26/24 0750  Final result  Specimen: Blood, Venous    Magnesium 2.33 mg/dL            11/26/24 0946  CBC and Auto Differential  Collected: 11/26/24 0750  Final result  Specimen: Blood, Venous    WBC 11.8 High  x10*3/uL Immature Granulocytes %, Automated 1.1 High  %    nRBC 0.0 /100 WBCs Lymphocytes % 21.0 %   RBC 4.26 Low  x10*6/uL Monocytes % 3.6 %   Hemoglobin 8.7 Low  g/dL Eosinophils % 0.1 %   Hematocrit 29.6 Low  % Basophils % 0.3 %   MCV 70 Low  fL Neutrophils Absolute 8.71 High  x10*3/uL    MCH 20.4 Low  pg Immature Granulocytes Absolute, Automated 0.13 x10*3/uL   MCHC 29.4 " Low  g/dL Lymphocytes Absolute 2.48 x10*3/uL   RDW 17.0 High  % Monocytes Absolute 0.43 x10*3/uL   Platelets 419 x10*3/uL Eosinophils Absolute 0.01 x10*3/uL   Neutrophils % 73.9 % Basophils Absolute 0.03 x10*3/uL              Pathology Review:   SURGICAL PATHOLOGY REFERENCE LAB CONSULT  Order: [id number]  Component 3 mo ago   Case Report Surgical Pathology Report                         Case: [id number]                                  Authorizing Provider:  Kenan iGlbert MD    Collected:           08/14/2024 08:17 AM          Ordering Location:     [facility]      Received:            08/14/2024 08:15 AM                                 [facility]                                                  Pathologist:           Mneimneh, Wadad, MD                                                          Specimen:    Block(s) and/or Slide(s), 12 SLIDES & 1 BLOCK CG-ZF-; B1                         FINAL DIAGNOSIS    Lung, right lower lobe,, RB 10, biopsy (SP-CL  A1; 8/7/2024):  -Malignant pleomorphic neoplasm (see comment).        Lung, right lower lobe, RB 6, biopsy (SP-CL  B2; 8/7/2024):   -Benign lung parenchyma, nondiagnostic.   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  PET scan reportedly showed two hypermetabolic densities in the right lower lobe of the lung measuring respectively 4 cm and 1.2 cm, suspicious for malignancy.     Sections of from the RB 10 right lower lobe biopsy reveals a markedly pleomorphic malignant neoplasm involving respiratory mucosa.  The provided immunostains reveal tumor cells to be positive for vimentin and negative for TTF-1, p40, SOX10, MART1, MOC-31, CD45, keratin AE1/AE3, CK5/6 and CK7 (faint nonspecific staining with some  keratin's is noted).  Additional stains were performed at [facility] on block A1, showing tumor cells to be positive for PRAME and negative for CK8/18, S100, SOX10, HMB45, BRAF, NRAS and ERG. MITF is positive in occasional cells.     The findings are consistent with a malignant pleomorphic neoplasm, not otherwise classifiable based on these biopsy findings. However, given the history of melanoma and presence of 2 lung lesions, the morphologic and immunohistochemical findings in the current biopsy, although not entirely specific, raise the possibility of a metastatic dedifferentiated melanoma. Comprehensive broad molecular testing or testing for UV signature mutations may be helpful to further classify the tumor.     Imaging:    CT 11/24  IMPRESSION:  1. Numerous hyperdense metastatic lesions throughout the cerebrum,  cerebellum, and brainstem with associated vasogenic edema. Varying  degrees of density within some of the lesions suggests trace  intralesional hemorrhage. MRI with contrast is recommended for  further evaluation.  2. No evidence of midline shift       ASSESSMENT:  This is a 59 year old male with a history of left shoulder melanoma s/p resection in 2019, who was recently diagnosed with lung metastases in June/2024, s/p 1 cycle of immunotherapy.  [facility]  He presents to the ED with new headaches, nausea, changes in balance and coordination.  CTH showed numerous hemorrhagic lesions.  CTA chest showed multiple pulmonary nodules.  The patient was transferred to [facility] for neurosurgical evaluation.      MRI brain has been requested, and is pending clearance of spinal stimulator.  Radiation oncology has been asked to follow and assist with potential care coordination.    PLAN:   The patient was discussed with my attending physician, Dr. Rowell.     Imaging results, and indications for radiotherapy were reviewed with the patient and his family.  CTH shows multiple small  lesions, consistent with secondary malignancy.  MRI brain is pending.    We discussed treatment strategies, comparing and contrasting whole brain radiotherapy with targeted stereotactic radiosurgery.  We will require MRI for final recommendations.    -Continue Dex with GI Ppx  -Stat MRI brain w/wo contrast  -[facility], pending [facility] results and NSGY recommendations    I spent 55 minutes in the professional and overall care of this patient.

## 2024-11-27 ENCOUNTER — PHARMACY VISIT (OUTPATIENT)
Dept: PHARMACY | Facility: CLINIC | Age: 59
End: 2024-11-27
Payer: MEDICARE

## 2024-11-27 ENCOUNTER — TUMOR BOARD CONFERENCE (OUTPATIENT)
Dept: NEUROSURGERY | Facility: HOSPITAL | Age: 59
End: 2024-11-27
Payer: MEDICARE

## 2024-11-27 VITALS
DIASTOLIC BLOOD PRESSURE: 71 MMHG | WEIGHT: 257.94 LBS | OXYGEN SATURATION: 97 % | HEIGHT: 68 IN | TEMPERATURE: 97.2 F | BODY MASS INDEX: 39.09 KG/M2 | HEART RATE: 60 BPM | RESPIRATION RATE: 16 BRPM | SYSTOLIC BLOOD PRESSURE: 126 MMHG

## 2024-11-27 LAB
ALBUMIN SERPL BCP-MCNC: 4 G/DL (ref 3.4–5)
ANION GAP SERPL CALC-SCNC: 16 MMOL/L (ref 10–20)
BASOPHILS # BLD AUTO: 0.02 X10*3/UL (ref 0–0.1)
BASOPHILS NFR BLD AUTO: 0.1 %
BUN SERPL-MCNC: 30 MG/DL (ref 6–23)
CALCIUM SERPL-MCNC: 9.1 MG/DL (ref 8.6–10.6)
CHLORIDE SERPL-SCNC: 104 MMOL/L (ref 98–107)
CO2 SERPL-SCNC: 19 MMOL/L (ref 21–32)
CREAT SERPL-MCNC: 1.05 MG/DL (ref 0.5–1.3)
EGFRCR SERPLBLD CKD-EPI 2021: 82 ML/MIN/1.73M*2
EOSINOPHIL # BLD AUTO: 0 X10*3/UL (ref 0–0.7)
EOSINOPHIL NFR BLD AUTO: 0 %
ERYTHROCYTE [DISTWIDTH] IN BLOOD BY AUTOMATED COUNT: 16.7 % (ref 11.5–14.5)
GLUCOSE SERPL-MCNC: 166 MG/DL (ref 74–99)
HCT VFR BLD AUTO: 30.4 % (ref 41–52)
HGB BLD-MCNC: 9.1 G/DL (ref 13.5–17.5)
IMM GRANULOCYTES # BLD AUTO: 0.23 X10*3/UL (ref 0–0.7)
IMM GRANULOCYTES NFR BLD AUTO: 1.5 % (ref 0–0.9)
LYMPHOCYTES # BLD AUTO: 3.15 X10*3/UL (ref 1.2–4.8)
LYMPHOCYTES NFR BLD AUTO: 21 %
MAGNESIUM SERPL-MCNC: 2.65 MG/DL (ref 1.6–2.4)
MCH RBC QN AUTO: 20.7 PG (ref 26–34)
MCHC RBC AUTO-ENTMCNC: 29.9 G/DL (ref 32–36)
MCV RBC AUTO: 69 FL (ref 80–100)
MONOCYTES # BLD AUTO: 1.44 X10*3/UL (ref 0.1–1)
MONOCYTES NFR BLD AUTO: 9.6 %
NEUTROPHILS # BLD AUTO: 10.19 X10*3/UL (ref 1.2–7.7)
NEUTROPHILS NFR BLD AUTO: 67.8 %
NRBC BLD-RTO: 0 /100 WBCS (ref 0–0)
PHOSPHATE SERPL-MCNC: 3.9 MG/DL (ref 2.5–4.9)
PLATELET # BLD AUTO: 453 X10*3/UL (ref 150–450)
POTASSIUM SERPL-SCNC: 4.3 MMOL/L (ref 3.5–5.3)
RBC # BLD AUTO: 4.39 X10*6/UL (ref 4.5–5.9)
SODIUM SERPL-SCNC: 135 MMOL/L (ref 136–145)
WBC # BLD AUTO: 15 X10*3/UL (ref 4.4–11.3)

## 2024-11-27 PROCEDURE — 36415 COLL VENOUS BLD VENIPUNCTURE: CPT

## 2024-11-27 PROCEDURE — 2500000002 HC RX 250 W HCPCS SELF ADMINISTERED DRUGS (ALT 637 FOR MEDICARE OP, ALT 636 FOR OP/ED)

## 2024-11-27 PROCEDURE — 80069 RENAL FUNCTION PANEL: CPT

## 2024-11-27 PROCEDURE — RXMED WILLOW AMBULATORY MEDICATION CHARGE

## 2024-11-27 PROCEDURE — 2500000004 HC RX 250 GENERAL PHARMACY W/ HCPCS (ALT 636 FOR OP/ED)

## 2024-11-27 PROCEDURE — 82960 TEST FOR G6PD ENZYME: CPT

## 2024-11-27 PROCEDURE — 94640 AIRWAY INHALATION TREATMENT: CPT

## 2024-11-27 PROCEDURE — 2500000001 HC RX 250 WO HCPCS SELF ADMINISTERED DRUGS (ALT 637 FOR MEDICARE OP)

## 2024-11-27 PROCEDURE — 99239 HOSP IP/OBS DSCHRG MGMT >30: CPT | Performed by: STUDENT IN AN ORGANIZED HEALTH CARE EDUCATION/TRAINING PROGRAM

## 2024-11-27 PROCEDURE — 83735 ASSAY OF MAGNESIUM: CPT

## 2024-11-27 PROCEDURE — 85025 COMPLETE CBC W/AUTO DIFF WBC: CPT

## 2024-11-27 PROCEDURE — 97165 OT EVAL LOW COMPLEX 30 MIN: CPT | Mod: GO

## 2024-11-27 RX ORDER — DEXAMETHASONE 4 MG/1
4 TABLET ORAL EVERY 12 HOURS SCHEDULED
Status: DISCONTINUED | OUTPATIENT
Start: 2024-11-27 | End: 2024-11-27 | Stop reason: HOSPADM

## 2024-11-27 RX ORDER — DAPSONE 100 MG/1
100 TABLET ORAL DAILY
Qty: 30 TABLET | Refills: 0 | Status: SHIPPED | OUTPATIENT
Start: 2024-11-27 | End: 2024-12-27

## 2024-11-27 RX ORDER — SULFAMETHOXAZOLE AND TRIMETHOPRIM 800; 160 MG/1; MG/1
1 TABLET ORAL 3 TIMES WEEKLY
Qty: 12 TABLET | Refills: 0 | Status: SHIPPED | OUTPATIENT
Start: 2024-11-27 | End: 2024-11-27 | Stop reason: HOSPADM

## 2024-11-27 RX ORDER — PANTOPRAZOLE SODIUM 40 MG/1
40 TABLET, DELAYED RELEASE ORAL
Qty: 10 TABLET | Refills: 0 | Status: SHIPPED | OUTPATIENT
Start: 2024-11-28

## 2024-11-27 RX ORDER — OXYCODONE HYDROCHLORIDE 10 MG/1
10 TABLET ORAL
Qty: 16 TABLET | Refills: 0 | Status: SHIPPED | OUTPATIENT
Start: 2024-11-27

## 2024-11-27 RX ORDER — DEXAMETHASONE 4 MG/1
4 TABLET ORAL 2 TIMES DAILY
Qty: 16 TABLET | Refills: 0 | Status: SHIPPED | OUTPATIENT
Start: 2024-11-27 | End: 2024-12-05

## 2024-11-27 ASSESSMENT — COGNITIVE AND FUNCTIONAL STATUS - GENERAL
TOILETING: A LITTLE
WALKING IN HOSPITAL ROOM: A LITTLE
TOILETING: A LITTLE
DAILY ACTIVITIY SCORE: 20
DRESSING REGULAR LOWER BODY CLOTHING: A LITTLE
HELP NEEDED FOR BATHING: A LITTLE
MOBILITY SCORE: 22
CLIMB 3 TO 5 STEPS WITH RAILING: A LITTLE
DAILY ACTIVITIY SCORE: 21
DRESSING REGULAR LOWER BODY CLOTHING: A LITTLE
HELP NEEDED FOR BATHING: A LITTLE
PERSONAL GROOMING: A LITTLE

## 2024-11-27 ASSESSMENT — PAIN SCALES - GENERAL
PAINLEVEL_OUTOF10: 0 - NO PAIN
PAINLEVEL_OUTOF10: 7
PAINLEVEL_OUTOF10: 7
PAINLEVEL_OUTOF10: 4
PAINLEVEL_OUTOF10: 4

## 2024-11-27 ASSESSMENT — ACTIVITIES OF DAILY LIVING (ADL): ADL_ASSISTANCE: INDEPENDENT

## 2024-11-27 ASSESSMENT — PAIN - FUNCTIONAL ASSESSMENT
PAIN_FUNCTIONAL_ASSESSMENT: 0-10
PAIN_FUNCTIONAL_ASSESSMENT: 0-10
PAIN_FUNCTIONAL_ASSESSMENT: UNABLE TO SELF-REPORT

## 2024-11-27 NOTE — CARE PLAN
The patient's goals for the shift include To remain HDS    The clinical goals for the shift include Pt will remain HDS and VSS throughout shift 11/27/24 by 1900.      Problem: Nutrition  Goal: Less than 5 days NPO/clear liquids  Outcome: Progressing  Goal: Oral intake greater than 50%  Outcome: Progressing  Goal: Oral intake greater 75%  Outcome: Progressing  Goal: Consume prescribed supplement  Outcome: Progressing  Goal: Adequate PO fluid intake  Outcome: Progressing  Goal: Nutrition support goals are met within 48 hrs  Outcome: Progressing  Goal: Nutrition support is meeting 75% of nutrient needs  Outcome: Progressing  Goal: Tube feed tolerance  Outcome: Progressing  Goal: BG  mg/dL  Outcome: Progressing  Goal: Lab values WNL  Outcome: Progressing  Goal: Electrolytes WNL  Outcome: Progressing  Goal: Promote healing  Outcome: Progressing  Goal: Maintain stable weight  Outcome: Progressing  Goal: Reduce weight from edema/fluid  Outcome: Progressing  Goal: Gradual weight gain  Outcome: Progressing  Goal: Improve ostomy output  Outcome: Progressing     Problem: Pain - Adult  Goal: Verbalizes/displays adequate comfort level or baseline comfort level  Outcome: Progressing     Problem: Safety - Adult  Goal: Free from fall injury  Outcome: Progressing     Problem: Discharge Planning  Goal: Discharge to home or other facility with appropriate resources  Outcome: Progressing     Problem: Chronic Conditions and Co-morbidities  Goal: Patient's chronic conditions and co-morbidity symptoms are monitored and maintained or improved  Outcome: Progressing     Problem: Pain  Goal: Takes deep breaths with improved pain control throughout the shift  Outcome: Progressing  Goal: Turns in bed with improved pain control throughout the shift  Outcome: Progressing  Goal: Walks with improved pain control throughout the shift  Outcome: Progressing  Goal: Performs ADL's with improved pain control throughout shift  Outcome:  Progressing  Goal: Participates in PT with improved pain control throughout the shift  Outcome: Progressing  Goal: Free from opioid side effects throughout the shift  Outcome: Progressing  Goal: Free from acute confusion related to pain meds throughout the shift  Outcome: Progressing     Problem: Fall/Injury  Goal: Not fall by end of shift  Outcome: Progressing  Goal: Be free from injury by end of the shift  Outcome: Progressing  Goal: Verbalize understanding of personal risk factors for fall in the hospital  Outcome: Progressing  Goal: Verbalize understanding of risk factor reduction measures to prevent injury from fall in the home  Outcome: Progressing  Goal: Use assistive devices by end of the shift  Outcome: Progressing  Goal: Pace activities to prevent fatigue by end of the shift  Outcome: Progressing

## 2024-11-27 NOTE — NURSING NOTE
11/27/24 at 1633  Pt discharged home via wheelchair to lobby in private vehicle. Pt HDS and VSS. Pts IV removed prior to discharge. Pt provided with meds to beds delivery and denies questions on prescriptions. Pt also provided with discharge paperwork and instructions for follow up appointments. Pt denies any further questions.     Chanell Glasgow RN

## 2024-11-27 NOTE — CARE PLAN
The patient's goals for the shift include To remain HDS    The clinical goals for the shift include Pt will remain free of fall or injury through end of shift 11/27  0700      Problem: Pain - Adult  Goal: Verbalizes/displays adequate comfort level or baseline comfort level  Outcome: Progressing     Problem: Safety - Adult  Goal: Free from fall injury  Outcome: Progressing     Problem: Discharge Planning  Goal: Discharge to home or other facility with appropriate resources  Outcome: Progressing     Problem: Chronic Conditions and Co-morbidities  Goal: Patient's chronic conditions and co-morbidity symptoms are monitored and maintained or improved  Outcome: Progressing     Problem: Pain  Goal: Takes deep breaths with improved pain control throughout the shift  Outcome: Progressing  Goal: Turns in bed with improved pain control throughout the shift  Outcome: Progressing  Goal: Walks with improved pain control throughout the shift  Outcome: Progressing  Goal: Performs ADL's with improved pain control throughout shift  Outcome: Progressing  Goal: Participates in PT with improved pain control throughout the shift  Outcome: Progressing  Goal: Free from opioid side effects throughout the shift  Outcome: Progressing  Goal: Free from acute confusion related to pain meds throughout the shift  Outcome: Progressing     Problem: Fall/Injury  Goal: Not fall by end of shift  Outcome: Progressing  Goal: Be free from injury by end of the shift  Outcome: Progressing  Goal: Verbalize understanding of personal risk factors for fall in the hospital  Outcome: Progressing  Goal: Verbalize understanding of risk factor reduction measures to prevent injury from fall in the home  Outcome: Progressing  Goal: Use assistive devices by end of the shift  Outcome: Progressing  Goal: Pace activities to prevent fatigue by end of the shift  Outcome: Progressing

## 2024-11-27 NOTE — PROGRESS NOTES
Occupational Therapy    Evaluation    Patient Name: Manuel Marrufo  MRN: [id number]  Department: [facility]  Room: [address] [place] [address]  Today's Date: 11/27/2024  Time Calculation  Start Time: 1152  Stop Time: 1205  Time Calculation (min): 13 min        Assessment:  End of Session Communication: Bedside nurse  End of Session Patient Position: Bed, 3 rail up, Alarm off, not on at start of session  OT Assessment Results: Decreased endurance, Decreased IADLs    Plan:  Treatment Interventions: ADL retraining, Endurance training, Compensatory technique education  OT Frequency: 2 times per week  OT Discharge Recommendations: No OT needed after discharge  Treatment Interventions: ADL retraining, Endurance training, Compensatory technique education    Subjective   Current Problem:  1. 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 (Multi)  Tumor Board Request    oxyCODONE (Roxicodone) 10 mg immediate release tablet    pantoprazole (ProtoNix) 40 mg EC tablet    dexAMETHasone (Decadron) 4 mg tablet    sulfamethoxazole-trimethoprim (Bactrim DS) 800-160 mg tablet        General:  General  Reason for Referral: Admitted 11/24 with HA x 8 days and ataxia (presenting as transfer from OSH for concern of new mets to the brain); dx: malignant metastatic melanoma (mets to lung), new hemorrhagic brain lesions (cerebrum, cerebellum, brainstem) with vasogenic edema  Past Medical History Relevant to Rehab: metastatic melanoma (currently on nivulumab and ipilumab) to the lungs,  L shoulder melanoma s/p resection in 2019, recently diagnosed RLL mass s/p partial resection and biopsy [facility]), HTN, HLD, fatty liver disease, obesity, FRANK on CPAP, PTSD, GERD, spinal stenosis (s/p L4-S1 lumbar decompression 2021_, FRANK on CPAP  Prior to Session Communication: Bedside nurse  Patient Position Received: Bed, 2 rail up, Alarm off, not on at start of session  General Comment: Pt supine in bed upon arrival, willing to participate in therapy.    Precautions:  Medical  Precautions: Fall precautions    Pain:  Pain Assessment  Pain Assessment: 0-10  0-10 (Numeric) Pain Score: 0 - No pain    Objective   Cognition:  Arousal/Alertness: Appropriate responses to stimuli  Orientation Level: Oriented X4  Following Commands: Follows multistep commands consistently  Insight: Mild  Impulsive: Mildly    Cognition Comments: OT facilitates education re: OT role in psychosocial health intervention to max indep, safety, and engagement in I/ADL tasks; pt with increased interest in additional resources. OT provides pt with interactive handouts including mindful breathing, positive affirmations, journaling, guided imagery, and positive coping skills. Pt highly appreciative and receptive of OT this date.      Home Living:  Type of Home: House  Lives With: Alone (+1 cat)  Home Adaptive Equipment: Cane, Walker rolling or standard  Home Layout: Multi-level (chair lift on both sets of stairs)  Home Access: Stairs to enter without rails  Entrance Stairs-Number of Steps: 1  Bathroom Shower/Tub: Walk-in shower  Bathroom Toilet: Handicapped height  Bathroom Equipment: Grab bars in shower, Shower chair with back, Hand-held shower hose    Prior Function:  ADL Assistance: Independent  Homemaking Assistance: Needs assistance  Ambulatory Assistance: Independent  Hand Dominance: Right  Prior Function Comments: HHA 40hrs/wk to assist with IADLs    IADL History:  Current License: No  Mode of Transportation: Family (mom to assist with transport to-from grocery)  Leisure and Hobbies: Enjoys relaxing, watching TV    ADL:  ADL Comments: Pt politely declines BADL completion this date; anticipate SUP-CGA overall as evidenced by functional ROM and mobility    Bed Mobility/Transfers:   Bed Mobility 1  Bed Mobility 1: Supine to sitting, Sitting to supine  Level of Assistance 1: Close supervision  Bed Mobility Comments 1: HOB slightly elevated    Transfer 1  Technique 1: Sit to stand, Stand to sit  Transfer Device 1:  (no  AD)  Transfer Level of Assistance 1: Close supervision  Trials/Comments 1: demiban safe body mechanics    Vision:  Vision - Basic Assessment  Current Vision: Wears glasses all the time  Patient Visual Report:  (Denies acute visual deficits)    Sensation:  Sensation Comment: Denies N/T; no apparent deficits    Strength:  Strength Comments: BUE grossly WFL    Outcome Measures:  [facility] Daily Activity  Putting on and taking off regular lower body clothing: A little  Bathing (including washing, rinsing, drying): A little  Putting on and taking off regular upper body clothing: None  Toileting, which includes using toilet, bedpan or urinal: A little  Taking care of personal grooming such as brushing teeth: None  Eating Meals: None  Daily Activity - Total Score: 21    OT Adult Other Outcome Measures  4AT: Negative    Education Documentation  Handouts, taught by Juliet Steiner OT at 11/27/2024 12:37 PM.  Learner: Patient  Readiness: Acceptance  Method: Explanation  Response: Verbalizes Understanding, Demonstrated Understanding    Body Mechanics, taught by Juliet Steiner OT at 11/27/2024 12:37 PM.  Learner: Patient  Readiness: Acceptance  Method: Explanation  Response: Verbalizes Understanding, Demonstrated Understanding    Precautions, taught by Juliet Steiner OT at 11/27/2024 12:37 PM.  Learner: Patient  Readiness: Acceptance  Method: Explanation  Response: Verbalizes Understanding, Demonstrated Understanding    ADL Training, taught by Juliet Steiner OT at 11/27/2024 12:37 PM.  Learner: Patient  Readiness: Acceptance  Method: Explanation  Response: Verbalizes Understanding, Demonstrated Understanding    Education Comments  No comments found.        Goals:  Encounter Problems       Encounter Problems (Active)       ADLs       Pt will complete UB /LB bathing tasks with modified independence while seated and AE as needed.  (Progressing)       Start:  11/27/24    Expected End:  12/18/24            Pt will  complete LB dressing with modified independence while seated and/or standing and AE as needed.  (Progressing)       Start:  11/27/24    Expected End:  12/18/24            Pt will complete toilet hygiene while seated /standing with independent level of assistance.   (Progressing)       Start:  11/27/24    Expected End:  12/18/24               BALANCE       Pt will demo improved dynamic sitting /standing balance while engaging in I/ADL task with independent level of assistance and no LOB.  (Progressing)       Start:  11/27/24    Expected End:  12/18/24               MOBILITY       Pt will complete functional ambulation household /community distance with modified independence and LRAD.  (Progressing)       Start:  11/27/24    Expected End:  12/18/24               PSYCHOSOCIAL HEALTH        Pt will identify and implement and least one positive coping strategy during symptoms of anxiousness, fear, anger, and /or depression with minimal cues.   (Progressing)       Start:  11/27/24    Expected End:  12/18/24                    ---------------  Juliet Steiner (OTR/L, OTD)  Inpatient Occupational Therapist   Rehab Office: [phone]

## 2024-11-27 NOTE — PROGRESS NOTES
"  Internal Medicine Progress Note     Manuel Marrufo is a 59 y.o. male w/ PMHx of L shoulder Melanoma (s/p resection in 2019), recently diagnosed RLL CA (s/p partial resection and biopsy), spinal stenosis (s/p L4-S1 lumbar decompression in 2021), HTN, HLD, nonalcoholic fatty liver disease, obesity, FRANK on CPAP, PTSD and GERD who presents to [facility] ED with c/f hemorrhagic brain mets seen on imaging. Admitted to [facility] for urgent Neurosurgery evaluation and workup.     Subjective     Patient was seen and evaluated at bedside. No acute events overnight. Patient reports that pain has been controlled with the oxycodone. He received his MRI brain yesterday which revealed Diffuse, innumerable infratentorial and supratentorial intraparenchymal avidly enhancing and mildly diffusion restricting lesions consistent with metastatic disease. No midline shift or herniation. Neurosurgery made aware of results, final recs pending tumor board discussion.         Medications     Medications:   Scheduled medications  acetaminophen, 975 mg, oral, q8h  ARIPiprazole, 10 mg, oral, Daily  cholecalciferol, 2,000 Units, oral, Daily  dexAMETHasone, 4 mg, oral, q6h TING  diazePAM, 10 mg, oral, Daily  diazePAM, 5 mg, oral, Nightly  tiotropium, 2 puff, inhalation, Daily   And  fluticasone furoate-vilanteroL, 1 puff, inhalation, Daily  lisinopril, 5 mg, oral, Daily  montelukast, 10 mg, oral, Nightly  pantoprazole, 40 mg, oral, Daily before breakfast  pregabalin, 200 mg, oral, BID  propranolol LA, 120 mg, oral, BID  rosuvastatin, 10 mg, oral, Daily  sertraline, 200 mg, oral, Daily      Continuous medications     PRN medications  PRN medications: albuterol, alteplase, HYDROmorphone, loperamide, oxyCODONE, oxygen, polyethylene glycol, prochlorperazine     Objective     Vitals  Visit Vitals  /76 (BP Location: Left arm, Patient Position: Lying)   Pulse 58   Temp 36 °C (96.8 °F) (Temporal)   Resp 16   Ht 1.727 m (5' 7.99\")   Wt 117 kg (257 lb 15 oz)   SpO2 " 96%   BMI 39.23 kg/m²   Smoking Status Former   BSA 2.37 m²       Intake/Output Summary (Last 24 hours) at 11/27/2024 0826  Last data filed at 11/26/2024 2043  Gross per 24 hour   Intake 60 ml   Output --   Net 60 ml       Physical Exam  Physical Exam  Constitutional:       Appearance: Normal appearance.      Comments: Slight droop in R of face   Cardiovascular:      Rate and Rhythm: Normal rate and regular rhythm.      Pulses: Normal pulses.      Heart sounds: Normal heart sounds.   Pulmonary:      Effort: Pulmonary effort is normal.      Breath sounds: Normal breath sounds.   Abdominal:      General: Abdomen is flat. Bowel sounds are normal.      Palpations: Abdomen is soft.   Neurological:      Mental Status: He is alert and oriented to person, place, and time. Mental status is at baseline.      Comments: 5/5 UE and LE sensation and motor function bilaterally  CNS intact II to XII although facial droop on R face prominent   Psychiatric:         Mood and Affect: Mood normal.         Behavior: Behavior normal.         Thought Content: Thought content normal.          Labs  Lab Results   Component Value Date    WBC 11.8 (H) 11/26/2024    HGB 8.7 (L) 11/26/2024    HCT 29.6 (L) 11/26/2024    MCV 70 (L) 11/26/2024     11/26/2024      Lab Results   Component Value Date    GLUCOSE 114 (H) 11/26/2024    CALCIUM 9.5 11/26/2024     11/26/2024    K 3.9 11/26/2024    CO2 25 11/26/2024     11/26/2024    BUN 23 11/26/2024    CREATININE 1.07 11/26/2024      Lab Results   Component Value Date    ALT 29 11/24/2024    AST 42 (H) 11/24/2024    ALKPHOS 93 11/24/2024    BILITOT 0.3 11/24/2024        Imaging  === 11/24/24 ===    XR CHEST 1 VIEW    - Impression -  1. Small amount of patchy bibasilar atelectasis. No other acute  intrathoracic.    I personally reviewed the images/study and I agree with the findings  as stated by Dr. Eduardo Mccullough. This study was interpreted at  [facility]  [place], [place].    MACRO:  None    Signed by: Bowen Peterson 11/24/2024 5:34 PM  Dictation workstation:   [id number]   === 11/24/24 ===    CT HEAD WO IV CONTRAST    - Impression -  1. Numerous hyperdense metastatic lesions throughout the cerebrum,  cerebellum, and brainstem with associated vasogenic edema. Varying  degrees of density within some of the lesions suggests trace  intralesional hemorrhage. MRI with contrast is recommended for  further evaluation.  2. No evidence of midline shift.    I personally reviewed the images/study and I agree with the findings  as stated by Rebecca Russell DO, PGY-3. This study was interpreted  at [place], [place].    MACRO:  None    Signed by: Tomi Varma 11/24/2024 10:05 PM  Dictation workstation:   [id number]         Assessment/Plan     Manuel Marrufo is a 59 y.o. male w/ PMHx of L shoulder Melanoma (s/p resection in 2019), recently diagnosed RLL CA (s/p partial resection and biopsy), spinal stenosis (s/p L4-S1 lumbar decompression in 2021), HTN, HLD, nonalcoholic fatty liver disease, obesity, FRANK on CPAP, PTSD and GERD who presents to [facility] ED with c/f hemorrhagic brain mets seen on imaging. Admitted to [facility] for urgent Neurosurgery evaluation and workup.       #New Hemorrhagic Brain lesions likely secondary to mets from melanoma    :: CTH (11/24 @ OSH) completed which showed numerous hemorrhagic lesions w/ associated edema  ::  CT C-spine (11/24 @ OSH) showed no acute fracture or subluxation, CT PE (11/24 @ OSH) was negative for acute PE, but showed numerous bilateral pulmonary nodules which have increased in size  :: s/p x1 dose Dexamethasone 10mg @ OSH  :: CTH w/o contrast (11/24) showed numerous hyperdense metastatic lesions throughout the cerebrum, cerebellum, and brainstem with associated vasogenic edema. Varying degrees of density within some of the lesions suggests trace intralesional hemorrhage, no evidence of  midline shift  :: CXR (11/24) small amount of bibasilar atelectasis  :: MRI Brain w/ and w/o contrast (11/24) pending  :: Neurosurgery consulted (11/24), rec obtaining MRI brain, CTH noncontrast, starting maintainance Dexamethasone 4mg Q6 and consulting Radiation Oncology for eval of whole-body radiation  :: [facility] VNS placed [facility] by Dr. James 6/28/07. Looks like battery was replaced 3/3/21 and it says Vendor [employer], Model 106, serial number [id number]   :: MRI 11/26 revealed diffuse, innumerable infratentorial and supratentorial intraparenchymal avidly enhancing and mildly diffusion restricting lesions consistent with metastatic disease. No midline shift or herniation.   :: Follows with Dr. Sharon Schaefer at [facility], who was made aware of admission. Patient scheduled for follow up 12/5 to coordinate immunotherapy with nivulumab + ipilumab (confirmed), last received Nov 2024.     Plan:   - Continue with Dexamethasone 4mg Q6. 4mg BID on discharge until follow up with radiation oncology.   - Neurosurgery following, unlikely surgical intervention at this time  appreciate further recs.  - Radiation Oncology consulted (11/25), will need Whole Brain Radiation. [facility] Dias  - Pain regimen: oxy 10 PRN for moderate to  severe pain, 0.4 Dilaudid for breakthrough   - Zofran PO 4mg Q6 PRN         #Melanoma w/ mets to RLL  - Melanoma of L shoulder (dx. 2019) s/p excision, no [facility]  - RLL CA (dx. 6/2024, on immunotherapy), bx consistent w/ metastatic melanoma  - pt reported only completing 1 cycle of immunotherapy, unsure of drug/dose (likely nivulumab and ipilumab)       # Spinal Stenosis of lumbar region w/ neurogenic claudication  # Hx Sciatica  - Previously underwent L4-S1 lumbar decompression in 2021 for preoperative symptoms of low back pain that radiated to lateral thighs and anterior shins  - pt previously receiving steroid injections for pain  at [facility], last injection 7/2/24  - continue w/ home Pregabalin 200mg BID     #Hx HTN  #Hx HLD  - continue w/ home Propranolol LA 120mg BID  - continue w/ home Rosuvastatin 10mg nightly  - continue w/ home Lisinopril 5mg daily     #FRANK  - CPAP for sleep     #Hx IBS  - continue w/ home Imodium 2mg TID PRN     #PTSD  #Generalized anxiety disorder  - continue w/ home Sertraline 200mg daily  - continue w/ home Aripiprazole 10mg daily  - continue w/ home PO Diazepam 10mg daily, 5mg at bedtime     #Prophy  - HOLD AC i/s/o active hemorrhagic lesions  - start Protonix 40mg daily (home Omeprazole alternative)         NOK: Patrizia Marrufo (mother) #[phone]      Diet: Regular   DVT Prophylaxis: SCDs  Code Status: DNR/DNI   NOK: Patrizia Marrufo (mother) #[phone]    Patient and plan discussed with attending physician Dr. Joy.              Mehul Villanueva DO  Department of Internal Medicine, PGY-1    Mehul Villanueva DO

## 2024-11-27 NOTE — DISCHARGE SUMMARY
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  [ ] [facility] up [facility] with Dr. Katerin Dias. If you do not hear from the [facility], please contact [phone]   [ ] Continue dexamethasone 4 mg twice a day until your follow up appointment at the [facility] with radiation oncology     Discharge Meds     Medication List      ASK your doctor about these medications     acetaminophen 325 mg tablet; Commonly known as: Tylenol   albuterol 90 mcg/actuation inhaler; Ask about: Which instructions should   I use?   ARIPiprazole 10 mg tablet; Commonly known as: Abilify   budesonide-glycopyr-formoterol 160-9-4.8 mcg/actuation HFA aerosol   inhaler; Commonly known as: BREZTRI   cholecalciferol 50 MCG (2000 UT) tablet; Commonly known as: Vitamin D-3   diazePAM 5 mg tablet; Commonly known as: Valium   furosemide 40 mg tablet; Commonly known as: Lasix   lisinopril 5 mg tablet   loperamide 2 mg capsule; Commonly known as: Imodium   montelukast 10 mg tablet; Commonly known as: Singulair   ondansetron 4 mg tablet; Commonly known as: Zofran   oxyCODONE 5 mg immediate release tablet; Commonly known as: Roxicodone   pregabalin 200 mg capsule; Commonly known as: Lyrica   propranolol  mg 24 hr capsule; Commonly known as: Inderal LA   rosuvastatin 10 mg tablet; Commonly known as: Crestor   sertraline 100 mg tablet; Commonly known as: Zoloft   tadalafil 5 mg tablet; Commonly known as: Cialis       Test Results Pending At Discharge  Pending Labs       No current pending labs.            Hospital Course  Manuel Marrufo is a 59 y.o. male w/ PMHx of L shoulder Melanoma (s/p resection in 2019), recently diagnosed RLL CA (s/p partial resection and biopsy), spinal stenosis (s/p L4-S1 lumbar decompression in 2021), HTN, HLD, nonalcoholic fatty liver disease, obesity, FRANK on CPAP, PTSD and GERD who presents to [facility]  ED with c/f hemorrhagic brain mets seen on imaging. He endorses having a lingering, moderate to severe H/A for the past 8 days and also reports increased nausea associated with his H/A w/ some emesis at home this AM. He also reports new ataxia and issues with his balance/coordination, prompting [facility]. [facility], [facility] was completed which showed numerous hemorrhagic lesions w/ associated edema. CT C-spine also showed no acute fracture or subluxation and a CT PE was negative for acute PE, but showed numerous bilateral pulmonary nodules which have increased in size. He was then transferred to  for urgent Neurosurgery consultation. Oncology history is as follows: Melanoma of L shoulder (dx. 2019) s/p excision, no [facility] follow-[facility]  - RLL CA (dx. 6/2024, on immunotherapy), bx consistent w/ metastatic melanoma  - pt reported only completing 1 cycle of immunotherapy, unsure of drug/dose (likely nivulumab and ipilumab)      Upon arrival to , patient was started on dexamethasone 4 mg q6 with Neurosurgery consult placed. Neurosurgery recommended MRI, however given that patient has VNS device, MRI was delayed since epilepsy had to get involved in order to turn off the device for the MRI. MRI brain 11/26  revealed Diffuse, innumerable infratentorial and supratentorial intraparenchymal avidly enhancing and mildly diffusion restricting lesions consistent with metastatic disease. No midline shift or herniation. Tumor board discussion 11/27 determined best approach would be WBT outpatient at the [facility], no acute surgical intervention needed. Patient to be discharged with outpatient [facility] and Dr. Schaefer (primary oncology) follow up 12/5. Will take dexamethasone 4 mg BID until follow up.     Follow up:   [ ] [facility]  [ ] Follow up with radiation oncology at the [facility] next week with Dr. Katerin Dias. If  you do not hear from the [facility], please contact [phone]   [ ] Continue dexamethasone 4 mg twice a day until your follow up appointment at the [facility] with radiation oncology     Pertinent Physical Exam At Time of Discharge  Physical Exam  Constitutional:       Appearance: Normal appearance.      Comments: Slight droop in R of face   Cardiovascular:      Rate and Rhythm: Normal rate and regular rhythm.      Pulses: Normal pulses.      Heart sounds: Normal heart sounds.   Pulmonary:      Effort: Pulmonary effort is normal.      Breath sounds: Normal breath sounds.   Abdominal:      General: Abdomen is flat. Bowel sounds are normal.      Palpations: Abdomen is soft.   Neurological:      Mental Status: He is alert and oriented to person, place, and time. Mental status is at baseline.      Comments: 5/5 UE and LE sensation and motor function bilaterally  CNS intact II to XII although facial droop on R face prominent   Psychiatric:         Mood and Affect: Mood normal.         Behavior: Behavior normal.         Thought Content: Thought content normal.     Outpatient Follow-Up  No future appointments.      Mehul Villanueva, DO

## 2024-11-27 NOTE — TUMOR BOARD NOTE
CNS Tumor Board Recommendations       Patient was presented by Bowen Chowdhury PA-C at our CNS Tumor Board on 11/27/24 which included representatives from Radiation oncology, Surgical oncology, Neuro-oncology, Pathology, Radiology, Research, Neurosurgery, Social Work (Neurosurgery).     Current patient presents with history of the following treatment history: PMH left shoulder melanoma s/p resection (2019), recent diagnosis of lung metastases in 06/2024 treated with one cycle of immunotherapy. P/w headaches, nausea, changes in balance and coordination. MRI was difficult to obtain due to spinal stimulator placement. CT with numerous hemorrhagic lesions. CTA with multiple pulmonary nodules.     MRI brain 11/26/24: Numerous hyperdense metastatic lesions through the cerebrum, cerebellum, and brainstem with vasogenic edema.     The CNS Tumor Board tumor board considered available treatment options and made the following recommendations: [facility].     Clinical Trial Status: N/A     National site-specific guidelines were discussed with respect to the case.

## 2024-11-28 LAB — G6PD RBC QL: NORMAL

## 2024-12-04 ENCOUNTER — APPOINTMENT (OUTPATIENT)
Dept: HEMATOLOGY/ONCOLOGY | Facility: HOSPITAL | Age: 59
End: 2024-12-04
Payer: MEDICARE

## 2025-01-27 ENCOUNTER — APPOINTMENT (OUTPATIENT)
Dept: RADIOLOGY | Facility: HOSPITAL | Age: 60
End: 2025-01-27
Payer: OTHER GOVERNMENT

## 2025-01-27 ENCOUNTER — APPOINTMENT (OUTPATIENT)
Dept: CARDIOLOGY | Facility: HOSPITAL | Age: 60
End: 2025-01-27
Payer: OTHER GOVERNMENT

## 2025-01-27 ENCOUNTER — HOSPITAL ENCOUNTER (EMERGENCY)
Facility: HOSPITAL | Age: 60
Discharge: OTHER INSTITUTION | End: 2025-01-29
Attending: STUDENT IN AN ORGANIZED HEALTH CARE EDUCATION/TRAINING PROGRAM
Payer: OTHER GOVERNMENT

## 2025-01-27 DIAGNOSIS — C43.9 METASTATIC MELANOMA (MULTI): Primary | ICD-10-CM

## 2025-01-27 LAB
ALBUMIN SERPL BCP-MCNC: 4 G/DL (ref 3.4–5)
ALP SERPL-CCNC: 63 U/L (ref 33–120)
ALT SERPL W P-5'-P-CCNC: 37 U/L (ref 10–52)
ANION GAP SERPL CALC-SCNC: 11 MMOL/L (ref 10–20)
AST SERPL W P-5'-P-CCNC: 39 U/L (ref 9–39)
ATRIAL RATE: 68 BPM
BASOPHILS # BLD AUTO: 0.04 X10*3/UL (ref 0–0.1)
BASOPHILS NFR BLD AUTO: 0.6 %
BILIRUB SERPL-MCNC: 0.3 MG/DL (ref 0–1.2)
BUN SERPL-MCNC: 17 MG/DL (ref 6–23)
CALCIUM SERPL-MCNC: 9.2 MG/DL (ref 8.6–10.3)
CARDIAC TROPONIN I PNL SERPL HS: 5 NG/L (ref 0–20)
CARDIAC TROPONIN I PNL SERPL HS: 5 NG/L (ref 0–20)
CHLORIDE SERPL-SCNC: 105 MMOL/L (ref 98–107)
CO2 SERPL-SCNC: 27 MMOL/L (ref 21–32)
CREAT SERPL-MCNC: 1.01 MG/DL (ref 0.5–1.3)
DACRYOCYTES BLD QL SMEAR: NORMAL
EGFRCR SERPLBLD CKD-EPI 2021: 86 ML/MIN/1.73M*2
EOSINOPHIL # BLD AUTO: 0.14 X10*3/UL (ref 0–0.7)
EOSINOPHIL NFR BLD AUTO: 2.1 %
ERYTHROCYTE [DISTWIDTH] IN BLOOD BY AUTOMATED COUNT: 21.6 % (ref 11.5–14.5)
GLUCOSE SERPL-MCNC: 89 MG/DL (ref 74–99)
HCT VFR BLD AUTO: 33.4 % (ref 41–52)
HGB BLD-MCNC: 9.8 G/DL (ref 13.5–17.5)
HYPOCHROMIA BLD QL SMEAR: NORMAL
IMM GRANULOCYTES # BLD AUTO: 0.15 X10*3/UL (ref 0–0.7)
IMM GRANULOCYTES NFR BLD AUTO: 2.2 % (ref 0–0.9)
LYMPHOCYTES # BLD AUTO: 1.6 X10*3/UL (ref 1.2–4.8)
LYMPHOCYTES NFR BLD AUTO: 23.7 %
MCH RBC QN AUTO: 19.8 PG (ref 26–34)
MCHC RBC AUTO-ENTMCNC: 29.3 G/DL (ref 32–36)
MCV RBC AUTO: 67 FL (ref 80–100)
MONOCYTES # BLD AUTO: 0.51 X10*3/UL (ref 0.1–1)
MONOCYTES NFR BLD AUTO: 7.5 %
NEUTROPHILS # BLD AUTO: 4.32 X10*3/UL (ref 1.2–7.7)
NEUTROPHILS NFR BLD AUTO: 63.9 %
NRBC BLD-RTO: 0 /100 WBCS (ref 0–0)
OVALOCYTES BLD QL SMEAR: NORMAL
P AXIS: 38 DEGREES
PLATELET # BLD AUTO: 188 X10*3/UL (ref 150–450)
POTASSIUM SERPL-SCNC: 3.8 MMOL/L (ref 3.5–5.3)
PR INTERVAL: 157 MS
PROT SERPL-MCNC: 6.9 G/DL (ref 6.4–8.2)
Q ONSET: 254 MS
QRS COUNT: 11 BEATS
QRS DURATION: 98 MS
QT INTERVAL: 408 MS
QTC CALCULATION(BAZETT): 437 MS
QTC FREDERICIA: 427 MS
R AXIS: 1 DEGREES
RBC # BLD AUTO: 4.96 X10*6/UL (ref 4.5–5.9)
RBC MORPH BLD: NORMAL
SODIUM SERPL-SCNC: 139 MMOL/L (ref 136–145)
T AXIS: 20 DEGREES
T OFFSET: 458 MS
VENTRICULAR RATE: 69 BPM
WBC # BLD AUTO: 6.8 X10*3/UL (ref 4.4–11.3)

## 2025-01-27 PROCEDURE — 2500000002 HC RX 250 W HCPCS SELF ADMINISTERED DRUGS (ALT 637 FOR MEDICARE OP, ALT 636 FOR OP/ED): Performed by: NURSE PRACTITIONER

## 2025-01-27 PROCEDURE — 2500000001 HC RX 250 WO HCPCS SELF ADMINISTERED DRUGS (ALT 637 FOR MEDICARE OP): Performed by: NURSE PRACTITIONER

## 2025-01-27 PROCEDURE — 96365 THER/PROPH/DIAG IV INF INIT: CPT

## 2025-01-27 PROCEDURE — 36415 COLL VENOUS BLD VENIPUNCTURE: CPT | Performed by: NURSE PRACTITIONER

## 2025-01-27 PROCEDURE — 70450 CT HEAD/BRAIN W/O DYE: CPT

## 2025-01-27 PROCEDURE — 94640 AIRWAY INHALATION TREATMENT: CPT

## 2025-01-27 PROCEDURE — 2500000004 HC RX 250 GENERAL PHARMACY W/ HCPCS (ALT 636 FOR OP/ED): Performed by: NURSE PRACTITIONER

## 2025-01-27 PROCEDURE — 2500000004 HC RX 250 GENERAL PHARMACY W/ HCPCS (ALT 636 FOR OP/ED): Mod: JZ

## 2025-01-27 PROCEDURE — 84484 ASSAY OF TROPONIN QUANT: CPT | Performed by: NURSE PRACTITIONER

## 2025-01-27 PROCEDURE — 99221 1ST HOSP IP/OBS SF/LOW 40: CPT | Performed by: NURSE PRACTITIONER

## 2025-01-27 PROCEDURE — 84075 ASSAY ALKALINE PHOSPHATASE: CPT | Performed by: NURSE PRACTITIONER

## 2025-01-27 PROCEDURE — 96372 THER/PROPH/DIAG INJ SC/IM: CPT | Mod: 59 | Performed by: NURSE PRACTITIONER

## 2025-01-27 PROCEDURE — 70450 CT HEAD/BRAIN W/O DYE: CPT | Performed by: RADIOLOGY

## 2025-01-27 PROCEDURE — 93005 ELECTROCARDIOGRAM TRACING: CPT

## 2025-01-27 PROCEDURE — 85025 COMPLETE CBC W/AUTO DIFF WBC: CPT | Performed by: NURSE PRACTITIONER

## 2025-01-27 PROCEDURE — 2500000002 HC RX 250 W HCPCS SELF ADMINISTERED DRUGS (ALT 637 FOR MEDICARE OP, ALT 636 FOR OP/ED)

## 2025-01-27 PROCEDURE — 96376 TX/PRO/DX INJ SAME DRUG ADON: CPT

## 2025-01-27 PROCEDURE — 96375 TX/PRO/DX INJ NEW DRUG ADDON: CPT

## 2025-01-27 PROCEDURE — 99285 EMERGENCY DEPT VISIT HI MDM: CPT | Mod: 25 | Performed by: STUDENT IN AN ORGANIZED HEALTH CARE EDUCATION/TRAINING PROGRAM

## 2025-01-27 PROCEDURE — 96361 HYDRATE IV INFUSION ADD-ON: CPT

## 2025-01-27 RX ORDER — FLUTICASONE FUROATE AND VILANTEROL 200; 25 UG/1; UG/1
1 POWDER RESPIRATORY (INHALATION)
Status: DISCONTINUED | OUTPATIENT
Start: 2025-01-27 | End: 2025-01-29 | Stop reason: HOSPADM

## 2025-01-27 RX ORDER — HYDROMORPHONE HYDROCHLORIDE 1 MG/ML
1 INJECTION, SOLUTION INTRAMUSCULAR; INTRAVENOUS; SUBCUTANEOUS ONCE
Status: COMPLETED | OUTPATIENT
Start: 2025-01-27 | End: 2025-01-27

## 2025-01-27 RX ORDER — MECLIZINE HCL 12.5 MG 12.5 MG/1
25 TABLET ORAL ONCE
Status: DISCONTINUED | OUTPATIENT
Start: 2025-01-27 | End: 2025-01-27

## 2025-01-27 RX ORDER — HYDROMORPHONE HYDROCHLORIDE 1 MG/ML
INJECTION, SOLUTION INTRAMUSCULAR; INTRAVENOUS; SUBCUTANEOUS
Status: COMPLETED
Start: 2025-01-27 | End: 2025-01-27

## 2025-01-27 RX ORDER — ARIPIPRAZOLE 10 MG/1
10 TABLET ORAL DAILY
Status: DISCONTINUED | OUTPATIENT
Start: 2025-01-27 | End: 2025-01-29 | Stop reason: HOSPADM

## 2025-01-27 RX ORDER — DIAZEPAM 5 MG/1
10 TABLET ORAL DAILY
Status: DISCONTINUED | OUTPATIENT
Start: 2025-01-27 | End: 2025-01-29 | Stop reason: HOSPADM

## 2025-01-27 RX ORDER — MECLIZINE HYDROCHLORIDE 25 MG/1
25 TABLET ORAL 3 TIMES DAILY PRN
Status: DISCONTINUED | OUTPATIENT
Start: 2025-01-27 | End: 2025-01-28

## 2025-01-27 RX ORDER — ENOXAPARIN SODIUM 100 MG/ML
40 INJECTION SUBCUTANEOUS DAILY
Status: DISCONTINUED | OUTPATIENT
Start: 2025-01-27 | End: 2025-01-29 | Stop reason: HOSPADM

## 2025-01-27 RX ORDER — ROSUVASTATIN CALCIUM 10 MG/1
10 TABLET, COATED ORAL NIGHTLY
Status: DISCONTINUED | OUTPATIENT
Start: 2025-01-27 | End: 2025-01-29 | Stop reason: HOSPADM

## 2025-01-27 RX ORDER — MECLIZINE HCL 12.5 MG 12.5 MG/1
25 TABLET ORAL ONCE
Status: COMPLETED | OUTPATIENT
Start: 2025-01-27 | End: 2025-01-27

## 2025-01-27 RX ORDER — MONTELUKAST SODIUM 10 MG/1
10 TABLET ORAL NIGHTLY
Status: DISCONTINUED | OUTPATIENT
Start: 2025-01-27 | End: 2025-01-29 | Stop reason: HOSPADM

## 2025-01-27 RX ORDER — PROCHLORPERAZINE EDISYLATE 5 MG/ML
10 INJECTION INTRAMUSCULAR; INTRAVENOUS ONCE
Status: COMPLETED | OUTPATIENT
Start: 2025-01-27 | End: 2025-01-27

## 2025-01-27 RX ORDER — SERTRALINE HYDROCHLORIDE 50 MG/1
200 TABLET, FILM COATED ORAL DAILY
Status: DISCONTINUED | OUTPATIENT
Start: 2025-01-27 | End: 2025-01-29 | Stop reason: HOSPADM

## 2025-01-27 RX ORDER — PROPRANOLOL HYDROCHLORIDE 40 MG/1
120 TABLET ORAL 2 TIMES DAILY
Status: DISCONTINUED | OUTPATIENT
Start: 2025-01-27 | End: 2025-01-29 | Stop reason: HOSPADM

## 2025-01-27 RX ORDER — PANTOPRAZOLE SODIUM 40 MG/1
40 TABLET, DELAYED RELEASE ORAL
Status: DISCONTINUED | OUTPATIENT
Start: 2025-01-28 | End: 2025-01-29 | Stop reason: HOSPADM

## 2025-01-27 RX ORDER — OXYCODONE HYDROCHLORIDE 5 MG/1
5 TABLET ORAL EVERY 6 HOURS PRN
Status: DISCONTINUED | OUTPATIENT
Start: 2025-01-27 | End: 2025-01-29 | Stop reason: HOSPADM

## 2025-01-27 RX ORDER — LISINOPRIL 10 MG/1
5 TABLET ORAL DAILY
Status: DISCONTINUED | OUTPATIENT
Start: 2025-01-27 | End: 2025-01-29 | Stop reason: HOSPADM

## 2025-01-27 RX ORDER — OXYCODONE HYDROCHLORIDE 5 MG/1
10 TABLET ORAL ONCE
Status: COMPLETED | OUTPATIENT
Start: 2025-01-27 | End: 2025-01-27

## 2025-01-27 RX ORDER — ONDANSETRON HYDROCHLORIDE 2 MG/ML
4 INJECTION, SOLUTION INTRAVENOUS ONCE
Status: COMPLETED | OUTPATIENT
Start: 2025-01-27 | End: 2025-01-27

## 2025-01-27 RX ADMIN — PROCHLORPERAZINE EDISYLATE 10 MG: 5 INJECTION INTRAMUSCULAR; INTRAVENOUS at 11:22

## 2025-01-27 RX ADMIN — ROSUVASTATIN 10 MG: 10 TABLET, FILM COATED ORAL at 20:09

## 2025-01-27 RX ADMIN — SODIUM CHLORIDE 1000 ML: 9 INJECTION, SOLUTION INTRAVENOUS at 11:22

## 2025-01-27 RX ADMIN — HYDROMORPHONE HYDROCHLORIDE 1 MG: 1 INJECTION, SOLUTION INTRAMUSCULAR; INTRAVENOUS; SUBCUTANEOUS at 15:03

## 2025-01-27 RX ADMIN — KETAMINE HYDROCHLORIDE 25 MG: 50 INJECTION INTRAMUSCULAR; INTRAVENOUS at 18:16

## 2025-01-27 RX ADMIN — PROMETHAZINE HYDROCHLORIDE 25 MG: 25 INJECTION INTRAMUSCULAR; INTRAVENOUS at 20:57

## 2025-01-27 RX ADMIN — OXYCODONE HYDROCHLORIDE 5 MG: 5 TABLET ORAL at 22:55

## 2025-01-27 RX ADMIN — FLUTICASONE FUROATE AND VILANTEROL TRIFENATATE 1 PUFF: 200; 25 POWDER RESPIRATORY (INHALATION) at 20:58

## 2025-01-27 RX ADMIN — ONDANSETRON 4 MG: 2 INJECTION INTRAMUSCULAR; INTRAVENOUS at 16:17

## 2025-01-27 RX ADMIN — PREGABALIN 200 MG: 75 CAPSULE ORAL at 20:09

## 2025-01-27 RX ADMIN — OXYCODONE 10 MG: 5 TABLET ORAL at 13:42

## 2025-01-27 RX ADMIN — SERTRALINE HYDROCHLORIDE 200 MG: 50 TABLET ORAL at 19:32

## 2025-01-27 RX ADMIN — TIOTROPIUM BROMIDE INHALATION SPRAY 2 PUFF: 3.12 SPRAY, METERED RESPIRATORY (INHALATION) at 21:08

## 2025-01-27 RX ADMIN — MECLIZINE 25 MG: 12.5 TABLET ORAL at 21:07

## 2025-01-27 RX ADMIN — DIAZEPAM 10 MG: 5 TABLET ORAL at 19:32

## 2025-01-27 RX ADMIN — PROPRANOLOL HYDROCHLORIDE 120 MG: 40 TABLET ORAL at 20:11

## 2025-01-27 RX ADMIN — ENOXAPARIN SODIUM 40 MG: 40 INJECTION SUBCUTANEOUS at 19:32

## 2025-01-27 RX ADMIN — MONTELUKAST 10 MG: 10 TABLET, FILM COATED ORAL at 20:09

## 2025-01-27 RX ADMIN — HYDROMORPHONE HYDROCHLORIDE 0.5 MG: 0.5 INJECTION, SOLUTION INTRAMUSCULAR; INTRAVENOUS; SUBCUTANEOUS at 12:02

## 2025-01-27 RX ADMIN — ARIPIPRAZOLE 10 MG: 10 TABLET ORAL at 19:49

## 2025-01-27 RX ADMIN — MECLIZINE 25 MG: 12.5 TABLET ORAL at 11:23

## 2025-01-27 ASSESSMENT — COLUMBIA-SUICIDE SEVERITY RATING SCALE - C-SSRS
6. HAVE YOU EVER DONE ANYTHING, STARTED TO DO ANYTHING, OR PREPARED TO DO ANYTHING TO END YOUR LIFE?: NO
1. IN THE PAST MONTH, HAVE YOU WISHED YOU WERE DEAD OR WISHED YOU COULD GO TO SLEEP AND NOT WAKE UP?: NO
2. HAVE YOU ACTUALLY HAD ANY THOUGHTS OF KILLING YOURSELF?: NO

## 2025-01-27 ASSESSMENT — PAIN SCALES - GENERAL
PAINLEVEL_OUTOF10: 9
PAINLEVEL_OUTOF10: 8
PAINLEVEL_OUTOF10: 7

## 2025-01-27 ASSESSMENT — PAIN DESCRIPTION - PAIN TYPE: TYPE: ACUTE PAIN;CHRONIC PAIN

## 2025-01-27 ASSESSMENT — PAIN - FUNCTIONAL ASSESSMENT: PAIN_FUNCTIONAL_ASSESSMENT: 0-10

## 2025-01-27 NOTE — ED TRIAGE NOTES
Patient presents to the ED due to Head Pain.  Patient has a history of brain,lung and spine cancer.  Last treatment was on Eva.  Patient states his pain is 8/10.

## 2025-01-27 NOTE — ED PROVIDER NOTES
HPI   Chief Complaint   Patient presents with    Headache     ARMAS , hx brain tumor        This is a 59-year-old  male presenting to the emergency room with complaints of a headache and dizziness.  The patient was diagnosed with malignant melanoma to the left shoulder last year.   He started immunotherapy started in November.  He had 2 sessions of immunotherapy.  The cancer metastasized to the brain and lungs.  They discontinued the immunotherapy and started radiation.  His last treatment was on December 24th.  They were unable to administer steroids because of conflicts with the immunotherapy.  The patient reports that he has been feeling dizzy since last night.  He feels like the room is spinning.  He has associated nausea and photophobia.  It is worse with movement.   He denies any chest pain, shortness of breath, palpitations, paresthesias, focal weakness.  Denies any abdominal pain.  Denies any alteration in his urine or bowel function.  He is not experiencing any fever or cold-like symptoms.  [facility].      History provided by:  Patient   used: No            Patient History   Past Medical History:   Diagnosis Date    Awareness under anesthesia 2021    During back surgery    Fatty liver disease, nonalcoholic     GERD (gastroesophageal reflux disease)     HLD (hyperlipidemia)     HTN (hypertension)     FRANK on CPAP     PTSD (post-traumatic stress disorder)     Spinal stenosis      Past Surgical History:   Procedure Laterality Date    BACK SURGERY      MALIGNANT SKIN LESION EXCISION      left shoulder and armpit    TONSILLECTOMY       Family History   Problem Relation Name Age of Onset    Arthritis Mother Patrizia Marrufo     Alcohol abuse Father      Cirrhosis Father      Cervical cancer Sister Kaity Rebolledo     Hypertension Sister Kaity Rebolledo     Other (bladder cancer) Maternal Grandmother Coral Ferrera       Social History     Tobacco Use    Smoking status: Former     Current packs/day: 0.00     Average packs/day: 0.5 packs/day for 40.1 years (20.0 ttl pk-yrs)     Types: Cigarettes     Start date: 1984     Quit date: 2024     Years since quittin.7    Smokeless tobacco: Never   Vaping Use    Vaping status: Never Used   Substance Use Topics    Alcohol use: Never     Comment: tried it but never been a drinker    Drug use: Never       Physical Exam   ED Triage Vitals [25 1011]   Temperature Heart Rate Respirations BP   35.5 °C (95.9 °F) 75 18 132/56      Pulse Ox Temp src Heart Rate Source Patient Position   99 % -- -- --      BP Location FiO2 (%)     -- --       Physical Exam  Vitals and nursing note reviewed.   HENT:      Head: Normocephalic and atraumatic.      Mouth/Throat:      Mouth: Mucous membranes are moist.   Eyes:      Extraocular Movements: Extraocular movements intact.      Pupils: Pupils are equal, round, and reactive to light.   Cardiovascular:      Rate and Rhythm: Normal rate and regular rhythm.   Pulmonary:      Effort: Pulmonary effort is normal.      Breath sounds: Normal breath sounds.   Abdominal:      General: Bowel sounds are normal.      Palpations: Abdomen is soft.   Musculoskeletal:         General: Normal range of motion.      Cervical back: Normal range of motion.   Skin:     General: Skin is warm.      Capillary Refill: Capillary refill takes less than 2 seconds.   Neurological:      General: No focal deficit present.      Mental Status: He is alert.      GCS: GCS eye subscore is 4. GCS verbal subscore is 5. GCS motor subscore is 6.      Cranial Nerves: Cranial nerves 2-12 are intact.      Sensory: Sensation is intact.      Motor: Motor function is intact.      Coordination: Coordination is intact.      Gait: Gait is intact.      Deep Tendon Reflexes: Reflexes are normal and symmetric.      Comments: Negative Hallpike maneuver.  No nystagmus.   Psychiatric:         Mood  and Affect: Mood normal.         Speech: Speech normal.           ED Course & MDM   ED Course as of 01/27/25 2000 Mon Jan 27, 2025   1148 ECG 12 lead  Twelve-lead EKG interpreted by me.  Sinus rhythm at a rate of 69.  AL interval is 157, QRS is 98, QT is 408, QTc is 437.  Normal axis.  Normal interval.  No acute ischemia or injury pattern noted. [CT]      ED Course User Index  [CT] Lena Tim APRN-CNP         Diagnoses as of 01/27/25 2000   Metastatic melanoma (Multi)                 No data recorded     [place] Coma Scale Score: 15 (01/27/25 1018 : Alicia Albarran RN)                           Medical Decision Making  The patient was seen and evaluated with the attending physician, Dr. Van.  The patient is presenting to the emergency room complaints of dizziness, nausea, and photophobia.  Differential diagnosis includes worsening metastases, cerebral edema, CVA, vertigo, or other acute process.  Patient was placed on cardiac monitor and continuous pulse oximetry on his assessment the patient does not have any acute neurological deficits.  He has NIH score of 0.  The patient does not have any nystagmus and has a negative Hallpike.  A saline lock was established.  Laboratory studies were drawn with results as noted.  The patient has a hemoglobin of 9.8 which has been stable.  Admission delta troponins were negative.  CMP was unremarkable.  Twelve-lead EKG did not show any signs of injury or arrhythmia.  The patient was medicated with 1 L of normal saline wide open for hydration, Compazine 10 mg IVP, meclizine 25 mg p.o. and Dilaudid 0.5 mg IVP.  The patient takes oxycodone 10 mg every 6 at home.  He was provided his home medication.  The patient had persistent pain and was further additional doses of Dilaudid without any significant relief of his pain symptoms.  The patient stated that his stomach felt queasy and requested nausea medication.  He was given Zofran 4 mg IVP.  The patient was given pain dose  ketamine with no significant relief of his symptoms.  He states that he still feels like the room is spinning.  He states that the meclizine helped.  We attempted to get an MRI at our facility however the patient has a vagus nerve stimulator.  The patient reports that the stimulator has been turned off for radiation.  He does not have the card or the remote to do with the stimulator.  Further investigation was performed and we will not be able to perform the MRI here.  We consulted with his radiation oncologist and she requested [facility]  She also suggested that we obtain a CAT scan in the interim until we can transfer the patient.  She also suggested that we administer dexamethasone if he has significant edema on his CAT scan.  CT of the head was performed and showed multifocal irregular regions of low-attenuation of parenchymal edema throughout both cerebral hemispheres as well as the left cerebellar hemisphere which are again seen and likely related to patient's known history of intracranial metastatic disease.  There is a round approximate 1 cm probable metastasis evident in the right parietal lobe posteriorly.  We consulted with oncology at [facility].  Week the case was discussed with Dr. Osullivan and the patient was accepted for transfer.  The patient was started on dexamethasone 4 mg every 6 hours.  The patient is to be transferred once a bed is made available.          Procedure  Procedures     ANNELISE Madden-BRAD  01/27/25 2010

## 2025-01-27 NOTE — CONSULTS
[facility] MEDICINE CONSULT NOTE    History Of Present Illness     Manuel Marrufo is a 59 y.o. male with PMHx of left shoulder melanoma s/p resection () with lung, brain and spine metastases who presented with headache and dizziness. He has been feeling dizzy since last night, feels like the room is spinning. He has associated nausea and photophobia. It is worse with movement. He had two sessions of immunotherapy since last November and just finished 2 weeks of radiation. He is not able to be on steroid therapy due to the immunotherapy per his POA/brother at the bedside. His last treatment was on 25. [facility]. He is awaiting transfer to [facility] on the oncology service.  Remainder of ROS reviewed and negative except as indicated in HPI.     Objective     Past Medical History  He has a past medical history of Agoraphobia with panic attacks, Anxiety, Awareness under anesthesia (), COPD (chronic obstructive pulmonary disease) (Multi), Fatty liver disease, nonalcoholic, GERD (gastroesophageal reflux disease), HLD (hyperlipidemia), Melanoma (Multi), FRANK on CPAP, and PTSD (post-traumatic stress disorder).    Surgical History  He has a past surgical history that includes Back surgery; Malignant skin lesion excision; and Tonsillectomy.    Social History     Tobacco Use    Smoking status: Former     Current packs/day: 0.00     Average packs/day: 0.5 packs/day for 40.1 years (20.0 ttl pk-yrs)     Types: Cigarettes     Start date: 1984     Quit date: 2024     Years since quittin.7    Smokeless tobacco: Never   Vaping Use    Vaping status: Never Used   Substance Use Topics    Alcohol use: Never     Comment: tried it but never been a drinker    Drug use: Never       Family History   Problem Relation Name Age of Onset    Arthritis Mother Patrizia Marrufo     Alcohol abuse Father      Cirrhosis Father      Cervical cancer  Sister Kaity Rebolledo     Hypertension Sister Kaity Rebolledo     Other (bladder cancer) Maternal Grandmother Coral Ferrera        Bactrim [sulfamethoxazole-trimethoprim], Buspirone, and Penicillin    Vitals:    01/27/25 1806   BP: 138/80   Pulse: 69   Resp: 16   Temp:    SpO2: 99%       Vitals:    01/27/25 1011   Weight: 119 kg (262 lb 5.6 oz)       Scheduled medications  ARIPiprazole, 10 mg, oral, Daily  diazePAM, 10 mg, oral, Daily  enoxaparin, 40 mg, subcutaneous, Daily  tiotropium, 2 puff, inhalation, Daily   And  fluticasone furoate-vilanteroL, 1 puff, inhalation, Daily  lisinopril, 5 mg, oral, Daily  montelukast, 10 mg, oral, Nightly  [START ON 1/28/2025] pantoprazole, 40 mg, oral, Daily before breakfast  pregabalin, 200 mg, oral, BID  propranolol, 120 mg, oral, BID  rosuvastatin, 10 mg, oral, Nightly  sertraline, 200 mg, oral, Daily      Continuous medications     PRN medications  PRN medications: oxyCODONE, promethazine    Results for orders placed or performed during the hospital encounter of 01/27/25 (from the past 24 hours)   CBC and Auto Differential   Result Value Ref Range    WBC 6.8 4.4 - 11.3 x10*3/uL    nRBC 0.0 0.0 - 0.0 /100 WBCs    RBC 4.96 4.50 - 5.90 x10*6/uL    Hemoglobin 9.8 (L) 13.5 - 17.5 g/dL    Hematocrit 33.4 (L) 41.0 - 52.0 %    MCV 67 (L) 80 - 100 fL    MCH 19.8 (L) 26.0 - 34.0 pg    MCHC 29.3 (L) 32.0 - 36.0 g/dL    RDW 21.6 (H) 11.5 - 14.5 %    Platelets 188 150 - 450 x10*3/uL    Neutrophils % 63.9 40.0 - 80.0 %    Immature Granulocytes %, Automated 2.2 (H) 0.0 - 0.9 %    Lymphocytes % 23.7 13.0 - 44.0 %    Monocytes % 7.5 2.0 - 10.0 %    Eosinophils % 2.1 0.0 - 6.0 %    Basophils % 0.6 0.0 - 2.0 %    Neutrophils Absolute 4.32 1.20 - 7.70 x10*3/uL    Immature Granulocytes Absolute, Automated 0.15 0.00 - 0.70 x10*3/uL    Lymphocytes Absolute 1.60 1.20 - 4.80 x10*3/uL    Monocytes Absolute 0.51 0.10 - 1.00 x10*3/uL    Eosinophils Absolute 0.14 0.00 - 0.70 x10*3/uL    Basophils Absolute 0.04  0.00 - 0.10 x10*3/uL   Comprehensive metabolic panel   Result Value Ref Range    Glucose 89 74 - 99 mg/dL    Sodium 139 136 - 145 mmol/L    Potassium 3.8 3.5 - 5.3 mmol/L    Chloride 105 98 - 107 mmol/L    Bicarbonate 27 21 - 32 mmol/L    Anion Gap 11 10 - 20 mmol/L    Urea Nitrogen 17 6 - 23 mg/dL    Creatinine 1.01 0.50 - 1.30 mg/dL    eGFR 86 >60 mL/min/1.73m*2    Calcium 9.2 8.6 - 10.3 mg/dL    Albumin 4.0 3.4 - 5.0 g/dL    Alkaline Phosphatase 63 33 - 120 U/L    Total Protein 6.9 6.4 - 8.2 g/dL    AST 39 9 - 39 U/L    Bilirubin, Total 0.3 0.0 - 1.2 mg/dL    ALT 37 10 - 52 U/L   Troponin I, High Sensitivity, Initial   Result Value Ref Range    Troponin I, High Sensitivity 5 0 - 20 ng/L   Morphology   Result Value Ref Range    RBC Morphology See Below     Hypochromia Mild     Ovalocytes Few     Teardrop Cells Few    ECG 12 lead   Result Value Ref Range    Ventricular Rate 69 BPM    Atrial Rate 68 BPM    UT Interval 157 ms    QRS Duration 98 ms    QT Interval 408 ms    QTC Calculation(Bazett) 437 ms    P Axis 38 degrees    R Axis 1 degrees    T Axis 20 degrees    QRS Count 11 beats    Q Onset 254 ms    T Offset 458 ms    QTC Fredericia 427 ms   Troponin, High Sensitivity, 1 Hour   Result Value Ref Range    Troponin I, High Sensitivity 5 0 - 20 ng/L       I personally reviewed all pertinent labwork, imaging and vital signs, as well as medications, nursing, therapy, discharge planning and consult notes.     Constitutional: Well developed, awake, alert, calm, oriented x4, no acute distress, cooperative   Eyes: EOMI, clear sclerae   ENMT: mucous membranes moist, no lesions seen   Head/Neck: Neck supple, no apparent injury, head atraumatic   Respiratory/Thorax: CTAB, good chest expansion, respirations even and unlabored   Cardiovascular: Regular rate and rhythm, no murmurs/rubs/gallops, normal S1 and S 2   Gastrointestinal: Abdomen nondistended, soft, nontender, +BS, no bruits   Musculoskeletal: ROM intact, no joint  swelling, normal  strength   Extremities: no cyanosis, contusions or clubbing, or edema   Neurological: no focal deficit, pt alert and oriented x4   Psychological: Appropriate affect and behavior, pleasant   Skin: Warm and dry, no lesions, no rashes       Assessment/Plan     Headache and dizziness  Hx L shoulder melanoma s/p resection (2019) with lung, brain and spine metastases  Pt completed two sessions of immunotherapy since last November and just finished 2 weeks of radiation  [facility]  He is awaiting transfer to [facility] on the oncology service  Continue Phenergan as he says this is most effective  Ketamine being given in the ED presently    Hx anxiety/PTSD/panic attacks  Continue home Abilify, Valium and Zoloft    COPD  Continue home inhalers, pt denies dyspnea    DVT ppx: Lovenoinga Gardner, CNP  [facility]

## 2025-01-28 PROCEDURE — 2500000004 HC RX 250 GENERAL PHARMACY W/ HCPCS (ALT 636 FOR OP/ED): Performed by: NURSE PRACTITIONER

## 2025-01-28 PROCEDURE — 96376 TX/PRO/DX INJ SAME DRUG ADON: CPT

## 2025-01-28 PROCEDURE — 99233 SBSQ HOSP IP/OBS HIGH 50: CPT | Performed by: INTERNAL MEDICINE

## 2025-01-28 PROCEDURE — 2500000002 HC RX 250 W HCPCS SELF ADMINISTERED DRUGS (ALT 637 FOR MEDICARE OP, ALT 636 FOR OP/ED): Performed by: NURSE PRACTITIONER

## 2025-01-28 PROCEDURE — 2500000004 HC RX 250 GENERAL PHARMACY W/ HCPCS (ALT 636 FOR OP/ED): Performed by: INTERNAL MEDICINE

## 2025-01-28 PROCEDURE — 2500000001 HC RX 250 WO HCPCS SELF ADMINISTERED DRUGS (ALT 637 FOR MEDICARE OP): Performed by: NURSE PRACTITIONER

## 2025-01-28 PROCEDURE — 96375 TX/PRO/DX INJ NEW DRUG ADDON: CPT

## 2025-01-28 PROCEDURE — 96372 THER/PROPH/DIAG INJ SC/IM: CPT | Performed by: NURSE PRACTITIONER

## 2025-01-28 PROCEDURE — 96361 HYDRATE IV INFUSION ADD-ON: CPT

## 2025-01-28 PROCEDURE — 2500000001 HC RX 250 WO HCPCS SELF ADMINISTERED DRUGS (ALT 637 FOR MEDICARE OP): Performed by: INTERNAL MEDICINE

## 2025-01-28 RX ORDER — ONDANSETRON HYDROCHLORIDE 2 MG/ML
4 INJECTION, SOLUTION INTRAVENOUS EVERY 8 HOURS
Status: DISCONTINUED | OUTPATIENT
Start: 2025-01-28 | End: 2025-01-29 | Stop reason: HOSPADM

## 2025-01-28 RX ORDER — LOPERAMIDE HYDROCHLORIDE 2 MG/1
2 CAPSULE ORAL 4 TIMES DAILY PRN
Status: DISCONTINUED | OUTPATIENT
Start: 2025-01-28 | End: 2025-01-29 | Stop reason: HOSPADM

## 2025-01-28 RX ADMIN — SERTRALINE HYDROCHLORIDE 200 MG: 50 TABLET ORAL at 08:10

## 2025-01-28 RX ADMIN — DIAZEPAM 10 MG: 5 TABLET ORAL at 08:10

## 2025-01-28 RX ADMIN — TIOTROPIUM BROMIDE INHALATION SPRAY 2 PUFF: 3.12 SPRAY, METERED RESPIRATORY (INHALATION) at 07:18

## 2025-01-28 RX ADMIN — PROMETHAZINE HYDROCHLORIDE 25 MG: 25 INJECTION INTRAMUSCULAR; INTRAVENOUS at 08:09

## 2025-01-28 RX ADMIN — PROPRANOLOL HYDROCHLORIDE 120 MG: 40 TABLET ORAL at 21:42

## 2025-01-28 RX ADMIN — ENOXAPARIN SODIUM 40 MG: 40 INJECTION SUBCUTANEOUS at 08:11

## 2025-01-28 RX ADMIN — PANTOPRAZOLE SODIUM 40 MG: 40 TABLET, DELAYED RELEASE ORAL at 06:00

## 2025-01-28 RX ADMIN — PROPRANOLOL HYDROCHLORIDE 120 MG: 40 TABLET ORAL at 08:29

## 2025-01-28 RX ADMIN — DEXAMETHASONE SODIUM PHOSPHATE 4 MG: 4 INJECTION, SOLUTION INTRAMUSCULAR; INTRAVENOUS at 07:18

## 2025-01-28 RX ADMIN — ONDANSETRON 4 MG: 2 INJECTION INTRAMUSCULAR; INTRAVENOUS at 17:59

## 2025-01-28 RX ADMIN — DEXAMETHASONE SODIUM PHOSPHATE 8 MG: 4 INJECTION, SOLUTION INTRAMUSCULAR; INTRAVENOUS at 13:40

## 2025-01-28 RX ADMIN — OXYCODONE HYDROCHLORIDE 5 MG: 5 TABLET ORAL at 20:21

## 2025-01-28 RX ADMIN — OXYCODONE HYDROCHLORIDE 5 MG: 5 TABLET ORAL at 13:40

## 2025-01-28 RX ADMIN — DEXAMETHASONE SODIUM PHOSPHATE 8 MG: 4 INJECTION, SOLUTION INTRAMUSCULAR; INTRAVENOUS at 20:21

## 2025-01-28 RX ADMIN — OXYCODONE HYDROCHLORIDE 5 MG: 5 TABLET ORAL at 05:53

## 2025-01-28 RX ADMIN — LOPERAMIDE HYDROCHLORIDE 2 MG: 2 CAPSULE ORAL at 21:42

## 2025-01-28 RX ADMIN — PREGABALIN 200 MG: 75 CAPSULE ORAL at 20:21

## 2025-01-28 RX ADMIN — ARIPIPRAZOLE 10 MG: 10 TABLET ORAL at 08:29

## 2025-01-28 RX ADMIN — FLUTICASONE FUROATE AND VILANTEROL TRIFENATATE 1 PUFF: 200; 25 POWDER RESPIRATORY (INHALATION) at 07:19

## 2025-01-28 RX ADMIN — LISINOPRIL 5 MG: 10 TABLET ORAL at 08:11

## 2025-01-28 RX ADMIN — ONDANSETRON 4 MG: 2 INJECTION INTRAMUSCULAR; INTRAVENOUS at 10:48

## 2025-01-28 RX ADMIN — PREGABALIN 200 MG: 75 CAPSULE ORAL at 08:10

## 2025-01-28 RX ADMIN — MONTELUKAST 10 MG: 10 TABLET, FILM COATED ORAL at 20:21

## 2025-01-28 RX ADMIN — SODIUM CHLORIDE, POTASSIUM CHLORIDE, SODIUM LACTATE AND CALCIUM CHLORIDE 500 ML: 600; 310; 30; 20 INJECTION, SOLUTION INTRAVENOUS at 10:49

## 2025-01-28 ASSESSMENT — PAIN - FUNCTIONAL ASSESSMENT
PAIN_FUNCTIONAL_ASSESSMENT: 0-10

## 2025-01-28 ASSESSMENT — PAIN SCALES - GENERAL
PAINLEVEL_OUTOF10: 5 - MODERATE PAIN
PAINLEVEL_OUTOF10: 5 - MODERATE PAIN
PAINLEVEL_OUTOF10: 8
PAINLEVEL_OUTOF10: 8
PAINLEVEL_OUTOF10: 6
PAINLEVEL_OUTOF10: 8
PAINLEVEL_OUTOF10: 0 - NO PAIN
PAINLEVEL_OUTOF10: 0 - NO PAIN
PAINLEVEL_OUTOF10: 2
PAINLEVEL_OUTOF10: 8
PAINLEVEL_OUTOF10: 8

## 2025-01-28 ASSESSMENT — PAIN DESCRIPTION - PAIN TYPE
TYPE: ACUTE PAIN

## 2025-01-28 ASSESSMENT — PAIN DESCRIPTION - LOCATION
LOCATION: HEAD

## 2025-01-28 ASSESSMENT — PAIN DESCRIPTION - DESCRIPTORS: DESCRIPTORS: ACHING

## 2025-01-28 NOTE — PROGRESS NOTES
Pt currently awaiting transfer to [facility] on oncology service.     Subjective   Manuel Marrufo is a 59 y.o. 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 He has been feeling dizzy since last night, feels like the room is spinning. He has associated nausea and photophobia. It is worse with movement. He had two sessions of immunotherapy since last November and just finished 2 weeks of radiation. He is not able to be on steroid therapy due to the immunotherapy per his POA/brother at the bedside. His last treatment was on 12/24/25. [facility]. He is awaiting transfer to [facility] on the oncology service.      1/28: Pt still experiencing throbbing HA, nausea, and dizziness. Last immunotherapy treatment was last Friday for his metastatic melanoma. VSS, labs at baseline. CT head negative for any acute abnormality, edema and mets from metastatic melanoma visualized on imaging. Plan to increase Decadron to 8mg IV q 6hrs, schedule zofran q 8hrs for nausea. Continue phenergan, oxycodone prn. Pt awaiting bed to transfer to [facility].     Objective     Last Recorded Vitals  /72 (BP Location: Left arm, Patient Position: Sitting)   Pulse 74   Temp 36.2 °C (97.1 °F) (Temporal)   Resp 20   Wt 119 kg (262 lb 5.6 oz)   SpO2 96%   Intake/Output last 3 Shifts:    Intake/Output Summary (Last 24 hours) at 1/28/2025 1311  Last data filed at 1/27/2025 1857  Gross per 24 hour   Intake 100 ml   Output --   Net 100 ml       Admission Weight  Weight: 119 kg (262 lb 5.6 oz) (01/27/25 1011)    Daily Weight  01/27/25 : 119 kg (262 lb 5.6 oz)    Image Results  CT head wo IV contrast  Narrative: Interpreted By:  Pedro Hay,   STUDY:  CT HEAD WO IV CONTRAST;  1/27/2025 3:38 pm      INDICATION:  Signs/Symptoms:headache.          COMPARISON:  CT head  without contrast of 11/24/2024. MRI brain of 11/26/2024.      ACCESSION NUMBER(S):  [id number]      ORDERING CLINICIAN:  KAILYN LAYTON      TECHNIQUE:  Contiguous unenhanced axial images were obtained through the brain.      FINDINGS:  INTRACRANIAL:  Generalized atrophy is again seen with compensatory ventricular and  subarachnoid space prominence. Multifocal varying sized regions of  low-attenuation parenchymal edema are again seen throughout both  cerebral hemispheres as well as within the left cerebellar hemisphere  most compatible with previously described edema related to multifocal  metastatic disease. There is a round approximate 1 cm mildly  hyperattenuating mass of the right parietal lobe posteriorly likely  representing a metastasis. No acute intracranial bleed is seen. No  midline shift. No extra-axial fluid collection or hydrocephalus is  seen.      Bones are intact.      EXTRACRANIAL:  Minimal mucosal thickening present in the paranasal sinuses without  air-fluid level. Mastoid air cells are clear.      Impression: Multifocal irregular regions of low-attenuation parenchymal edema  throughout both cerebral hemispheres as well as the left cerebellar  hemispheric are again seen which are likely related to patient's  known history of intracranial metastatic disease.      Round approximate 1 cm probable metastasis evident in the right  parietal lobe posteriorly. MRI could be considered for further  characterization as clinically indicated.      No acute intracranial bleed.      MACRO:  None.      Signed by: Pedro Hay 1/27/2025 3:47 PM  Dictation workstation:   [id number]  ECG 12 lead  Sinus rhythm  Abnormal R-wave progression, early transition  Borderline repolarization abnormality      Physical Exam  General: Appears in pain, grimacing, mild distress.  HEENT: EOMI.  Respiratory: Breathing non labored, CTAB.  Cardiovascular: Normal rate and rhythm, no m/r/g auscultated.   Abdomen: Soft, non-tender,  non-distended.  Neuro: A&O x 3, muscular strength grossly intact.  Extremities: No cyanosis, edema present.     Relevant Results  Results for orders placed or performed during the hospital encounter of 01/27/25 (from the past 96 hours)   CBC and Auto Differential   Result Value Ref Range    WBC 6.8 4.4 - 11.3 x10*3/uL    nRBC 0.0 0.0 - 0.0 /100 WBCs    RBC 4.96 4.50 - 5.90 x10*6/uL    Hemoglobin 9.8 (L) 13.5 - 17.5 g/dL    Hematocrit 33.4 (L) 41.0 - 52.0 %    MCV 67 (L) 80 - 100 fL    MCH 19.8 (L) 26.0 - 34.0 pg    MCHC 29.3 (L) 32.0 - 36.0 g/dL    RDW 21.6 (H) 11.5 - 14.5 %    Platelets 188 150 - 450 x10*3/uL    Neutrophils % 63.9 40.0 - 80.0 %    Immature Granulocytes %, Automated 2.2 (H) 0.0 - 0.9 %    Lymphocytes % 23.7 13.0 - 44.0 %    Monocytes % 7.5 2.0 - 10.0 %    Eosinophils % 2.1 0.0 - 6.0 %    Basophils % 0.6 0.0 - 2.0 %    Neutrophils Absolute 4.32 1.20 - 7.70 x10*3/uL    Immature Granulocytes Absolute, Automated 0.15 0.00 - 0.70 x10*3/uL    Lymphocytes Absolute 1.60 1.20 - 4.80 x10*3/uL    Monocytes Absolute 0.51 0.10 - 1.00 x10*3/uL    Eosinophils Absolute 0.14 0.00 - 0.70 x10*3/uL    Basophils Absolute 0.04 0.00 - 0.10 x10*3/uL   Comprehensive metabolic panel   Result Value Ref Range    Glucose 89 74 - 99 mg/dL    Sodium 139 136 - 145 mmol/L    Potassium 3.8 3.5 - 5.3 mmol/L    Chloride 105 98 - 107 mmol/L    Bicarbonate 27 21 - 32 mmol/L    Anion Gap 11 10 - 20 mmol/L    Urea Nitrogen 17 6 - 23 mg/dL    Creatinine 1.01 0.50 - 1.30 mg/dL    eGFR 86 >60 mL/min/1.73m*2    Calcium 9.2 8.6 - 10.3 mg/dL    Albumin 4.0 3.4 - 5.0 g/dL    Alkaline Phosphatase 63 33 - 120 U/L    Total Protein 6.9 6.4 - 8.2 g/dL    AST 39 9 - 39 U/L    Bilirubin, Total 0.3 0.0 - 1.2 mg/dL    ALT 37 10 - 52 U/L   Troponin I, High Sensitivity, Initial   Result Value Ref Range    Troponin I, High Sensitivity 5 0 - 20 ng/L   Morphology   Result Value Ref Range    RBC Morphology See Below     Hypochromia Mild     Ovalocytes Few      Teardrop Cells Few    ECG 12 lead   Result Value Ref Range    Ventricular Rate 69 BPM    Atrial Rate 68 BPM    HI Interval 157 ms    QRS Duration 98 ms    QT Interval 408 ms    QTC Calculation(Bazett) 437 ms    P Axis 38 degrees    R Axis 1 degrees    T Axis 20 degrees    QRS Count 11 beats    Q Onset 254 ms    T Offset 458 ms    QTC Fredericia 427 ms   Troponin, High Sensitivity, 1 Hour   Result Value Ref Range    Troponin I, High Sensitivity 5 0 - 20 ng/L     CT head wo IV contrast    Result Date: 1/27/2025  Interpreted By:  Pedro Hay, STUDY: CT HEAD WO IV CONTRAST;  1/27/2025 3:38 pm   INDICATION: Signs/Symptoms:headache.     COMPARISON: CT head without contrast of 11/24/2024. MRI brain of 11/26/2024.   ACCESSION NUMBER(S): [id number]   ORDERING CLINICIAN: KAILYN LAYTON   TECHNIQUE: Contiguous unenhanced axial images were obtained through the brain.   FINDINGS: INTRACRANIAL: Generalized atrophy is again seen with compensatory ventricular and subarachnoid space prominence. Multifocal varying sized regions of low-attenuation parenchymal edema are again seen throughout both cerebral hemispheres as well as within the left cerebellar hemisphere most compatible with previously described edema related to multifocal metastatic disease. There is a round approximate 1 cm mildly hyperattenuating mass of the right parietal lobe posteriorly likely representing a metastasis. No acute intracranial bleed is seen. No midline shift. No extra-axial fluid collection or hydrocephalus is seen.   Bones are intact.   EXTRACRANIAL: Minimal mucosal thickening present in the paranasal sinuses without air-fluid level. Mastoid air cells are clear.       Multifocal irregular regions of low-attenuation parenchymal edema throughout both cerebral hemispheres as well as the left cerebellar hemispheric are again seen which are likely related to patient's known history of intracranial metastatic disease.   Round approximate 1 cm probable  metastasis evident in the right parietal lobe posteriorly. MRI could be considered for further characterization as clinically indicated.   No acute intracranial bleed.   MACRO: None.   Signed by: Pedro Hay 1/27/2025 3:47 PM Dictation workstation:   [id number]    ECG 12 lead    Result Date: 1/27/2025  Sinus rhythm Abnormal R-wave progression, early transition Borderline repolarization abnormality     Assessment/Plan    Scheduled medications  ARIPiprazole, 10 mg, oral, Daily  dexAMETHasone, 8 mg, intravenous, q6h  diazePAM, 10 mg, oral, Daily  enoxaparin, 40 mg, subcutaneous, Daily  tiotropium, 2 puff, inhalation, Daily   And  fluticasone furoate-vilanteroL, 1 puff, inhalation, Daily  [Held by provider] lisinopril, 5 mg, oral, Daily  montelukast, 10 mg, oral, Nightly  ondansetron, 4 mg, intravenous, q8h  pantoprazole, 40 mg, oral, Daily before breakfast  pregabalin, 200 mg, oral, BID  propranolol, 120 mg, oral, BID  [Held by provider] rosuvastatin, 10 mg, oral, Nightly  sertraline, 200 mg, oral, Daily      Continuous medications     PRN medications  PRN medications: oxyCODONE, promethazine      Assessment & Plan    Metastatic Melanoma  Headache and Dizziness, Nausea following immunotherapy.  -Hx L shoulder melanoma s/p resection (2019) with lung, brain and spine metastases  -Pt completed last immunotherapy last Friday.   -HA, nausea, dizziness, and photophobia following treatment.   -Awaiting transfer to [facility] for oncology service.   -Continue Phenergan, schedule Zofran for nausea and vomiting.  -Increase Decadron to 8 mg IV q6hrs.     Hx anxiety/PTSD/panic attacks  Continue home Abilify, Valium and Zoloft     COPD  Continue home inhalers, pt denies dyspnea     DVT ppx: Lovenox    BRENDAN ELIZONDO    Pt seen and examined  with my PA student . I have modified the note to reflect my documentation of HPI and assessment and plan.    Chavo Norman MD MRCP

## 2025-01-28 NOTE — PROGRESS NOTES
This progress note represents a emergency department transition note for signout of care.    Patient care was signed out to me. Please see the previous provider's notes for the full history and physical. Briefly, Manuel Marrufo is 59 y.o. male who Had presented to the emergency department the headache and found to have metastases to the brain.  During my shift, I had contacted the transfer center to check if there would be bed availability at [facility].  There will be no [facility] bed availability for this shift.  I attempted to order the MRI but the MRI scan cannot be performed here and would have to done at [facility].  Patient signed out pending transfer to [facility].    Marcelo Nicolas DO  Emergency Medicine    ED Course as of 01/29/25 0946 Mon Jan 27, 2025   1148 ECG 12 lead  Twelve-lead EKG interpreted by me.  Sinus rhythm at a rate of 69.  MS interval is 157, QRS is 98, QT is 408, QTc is 437.  Normal axis.  Normal interval.  No acute ischemia or injury pattern noted. [CT]   Tue Jan 28, 2025   1645 this patient cannot have MRI here due to VNS stimulator. it requires transmit receive coil for the MRI which [facility] do not have here. His other MRI's were at [facility]      ED Course User Index  [[facility]] ANNELISE Madden-CNP  [[facility]] Anshul Nicolas DO         Diagnoses as of 01/29/25 0946   Metastatic melanoma (Multi)

## 2025-01-29 ENCOUNTER — HOSPITAL ENCOUNTER (INPATIENT)
Facility: HOSPITAL | Age: 60
LOS: 2 days | Discharge: HOME | End: 2025-01-31
Attending: INTERNAL MEDICINE | Admitting: INTERNAL MEDICINE
Payer: OTHER GOVERNMENT

## 2025-01-29 VITALS
HEIGHT: 67 IN | BODY MASS INDEX: 41.18 KG/M2 | RESPIRATION RATE: 16 BRPM | TEMPERATURE: 97.1 F | HEART RATE: 67 BPM | DIASTOLIC BLOOD PRESSURE: 88 MMHG | WEIGHT: 262.35 LBS | OXYGEN SATURATION: 95 % | SYSTOLIC BLOOD PRESSURE: 151 MMHG

## 2025-01-29 DIAGNOSIS — R51.9 HEADACHE: Primary | ICD-10-CM

## 2025-01-29 DIAGNOSIS — C79.31 MALIGNANT MELANOMA METASTATIC TO BRAIN (MULTI): ICD-10-CM

## 2025-01-29 LAB
ALBUMIN SERPL BCP-MCNC: 4.1 G/DL (ref 3.4–5)
ANION GAP SERPL CALC-SCNC: 14 MMOL/L (ref 10–20)
BASOPHILS # BLD MANUAL: 0 X10*3/UL (ref 0–0.1)
BASOPHILS NFR BLD MANUAL: 0 %
BUN SERPL-MCNC: 20 MG/DL (ref 6–23)
CALCIUM SERPL-MCNC: 9.4 MG/DL (ref 8.6–10.6)
CHLORIDE SERPL-SCNC: 106 MMOL/L (ref 98–107)
CO2 SERPL-SCNC: 23 MMOL/L (ref 21–32)
CREAT SERPL-MCNC: 1.07 MG/DL (ref 0.5–1.3)
DACRYOCYTES BLD QL SMEAR: ABNORMAL
EGFRCR SERPLBLD CKD-EPI 2021: 80 ML/MIN/1.73M*2
EOSINOPHIL # BLD MANUAL: 0 X10*3/UL (ref 0–0.7)
EOSINOPHIL NFR BLD MANUAL: 0 %
ERYTHROCYTE [DISTWIDTH] IN BLOOD BY AUTOMATED COUNT: 21.5 % (ref 11.5–14.5)
FERRITIN SERPL-MCNC: 34 NG/ML (ref 20–300)
GLUCOSE SERPL-MCNC: 106 MG/DL (ref 74–99)
HCT VFR BLD AUTO: 29.9 % (ref 41–52)
HGB BLD-MCNC: 8.7 G/DL (ref 13.5–17.5)
HGB RETIC QN: 24 PG (ref 28–38)
HYPOCHROMIA BLD QL SMEAR: ABNORMAL
IMM GRANULOCYTES # BLD AUTO: 0.22 X10*3/UL (ref 0–0.7)
IMM GRANULOCYTES NFR BLD AUTO: 2.6 % (ref 0–0.9)
IMMATURE RETIC FRACTION: 28.5 %
IRON SATN MFR SERPL: ABNORMAL %
IRON SERPL-MCNC: 24 UG/DL (ref 35–150)
LYMPHOCYTES # BLD MANUAL: 1.14 X10*3/UL (ref 1.2–4.8)
LYMPHOCYTES NFR BLD MANUAL: 13.4 %
MAGNESIUM SERPL-MCNC: 2.33 MG/DL (ref 1.6–2.4)
MCH RBC QN AUTO: 19.8 PG (ref 26–34)
MCHC RBC AUTO-ENTMCNC: 29.1 G/DL (ref 32–36)
MCV RBC AUTO: 68 FL (ref 80–100)
MONOCYTES # BLD MANUAL: 0.15 X10*3/UL (ref 0.1–1)
MONOCYTES NFR BLD MANUAL: 1.8 %
MYELOCYTES # BLD MANUAL: 0.15 X10*3/UL
MYELOCYTES NFR BLD MANUAL: 1.8 %
NEUTS SEG # BLD MANUAL: 6.98 X10*3/UL (ref 1.2–7)
NEUTS SEG NFR BLD MANUAL: 82.1 %
NRBC BLD-RTO: 0 /100 WBCS (ref 0–0)
OVALOCYTES BLD QL SMEAR: ABNORMAL
PHOSPHATE SERPL-MCNC: 3.4 MG/DL (ref 2.5–4.9)
PLATELET # BLD AUTO: 204 X10*3/UL (ref 150–450)
POTASSIUM SERPL-SCNC: 4.2 MMOL/L (ref 3.5–5.3)
RBC # BLD AUTO: 4.39 X10*6/UL (ref 4.5–5.9)
RBC MORPH BLD: ABNORMAL
RETICS #: 0.1 X10*6/UL (ref 0.02–0.12)
RETICS/RBC NFR AUTO: 2.3 % (ref 0.5–2)
SODIUM SERPL-SCNC: 139 MMOL/L (ref 136–145)
TIBC SERPL-MCNC: ABNORMAL UG/DL
TOTAL CELLS COUNTED BLD: 112
TRANSFERRIN SERPL-MCNC: 409 MG/DL (ref 200–360)
UIBC SERPL-MCNC: >450 UG/DL (ref 110–370)
VARIANT LYMPHS # BLD MANUAL: 0.08 X10*3/UL (ref 0–0.5)
VARIANT LYMPHS NFR BLD: 0.9 %
WBC # BLD AUTO: 8.5 X10*3/UL (ref 4.4–11.3)

## 2025-01-29 PROCEDURE — 82728 ASSAY OF FERRITIN: CPT

## 2025-01-29 PROCEDURE — 85007 BL SMEAR W/DIFF WBC COUNT: CPT

## 2025-01-29 PROCEDURE — 2500000002 HC RX 250 W HCPCS SELF ADMINISTERED DRUGS (ALT 637 FOR MEDICARE OP, ALT 636 FOR OP/ED)

## 2025-01-29 PROCEDURE — 85027 COMPLETE CBC AUTOMATED: CPT

## 2025-01-29 PROCEDURE — 80069 RENAL FUNCTION PANEL: CPT

## 2025-01-29 PROCEDURE — 84466 ASSAY OF TRANSFERRIN: CPT

## 2025-01-29 PROCEDURE — 2500000004 HC RX 250 GENERAL PHARMACY W/ HCPCS (ALT 636 FOR OP/ED)

## 2025-01-29 PROCEDURE — 85045 AUTOMATED RETICULOCYTE COUNT: CPT

## 2025-01-29 PROCEDURE — 36415 COLL VENOUS BLD VENIPUNCTURE: CPT

## 2025-01-29 PROCEDURE — 2500000001 HC RX 250 WO HCPCS SELF ADMINISTERED DRUGS (ALT 637 FOR MEDICARE OP)

## 2025-01-29 PROCEDURE — 83735 ASSAY OF MAGNESIUM: CPT

## 2025-01-29 PROCEDURE — 99223 1ST HOSP IP/OBS HIGH 75: CPT

## 2025-01-29 PROCEDURE — 1170000001 HC PRIVATE ONCOLOGY ROOM DAILY

## 2025-01-29 PROCEDURE — 99222 1ST HOSP IP/OBS MODERATE 55: CPT

## 2025-01-29 PROCEDURE — 94640 AIRWAY INHALATION TREATMENT: CPT

## 2025-01-29 PROCEDURE — 83540 ASSAY OF IRON: CPT

## 2025-01-29 RX ORDER — DIAZEPAM 10 MG/1
5 TABLET ORAL DAILY
Status: DISCONTINUED | OUTPATIENT
Start: 2025-01-30 | End: 2025-01-30

## 2025-01-29 RX ORDER — LISINOPRIL 5 MG/1
5 TABLET ORAL DAILY
Status: DISCONTINUED | OUTPATIENT
Start: 2025-01-29 | End: 2025-01-29

## 2025-01-29 RX ORDER — FLUTICASONE FUROATE AND VILANTEROL 200; 25 UG/1; UG/1
1 POWDER RESPIRATORY (INHALATION)
Status: DISCONTINUED | OUTPATIENT
Start: 2025-01-29 | End: 2025-01-31 | Stop reason: HOSPADM

## 2025-01-29 RX ORDER — OMEPRAZOLE 20 MG/1
20 CAPSULE, DELAYED RELEASE ORAL
COMMUNITY
Start: 2024-02-01

## 2025-01-29 RX ORDER — ARIPIPRAZOLE 10 MG/1
10 TABLET ORAL DAILY
Status: DISCONTINUED | OUTPATIENT
Start: 2025-01-29 | End: 2025-01-31 | Stop reason: HOSPADM

## 2025-01-29 RX ORDER — OXYCODONE HYDROCHLORIDE 10 MG/1
10 TABLET ORAL EVERY 6 HOURS PRN
Status: DISCONTINUED | OUTPATIENT
Start: 2025-01-29 | End: 2025-01-30

## 2025-01-29 RX ORDER — MONTELUKAST SODIUM 10 MG/1
10 TABLET ORAL NIGHTLY
Status: DISCONTINUED | OUTPATIENT
Start: 2025-01-29 | End: 2025-01-31 | Stop reason: HOSPADM

## 2025-01-29 RX ORDER — TADALAFIL 5 MG/1
5 TABLET ORAL DAILY
Status: DISCONTINUED | OUTPATIENT
Start: 2025-01-29 | End: 2025-01-29

## 2025-01-29 RX ORDER — ONDANSETRON HYDROCHLORIDE 2 MG/ML
4 INJECTION, SOLUTION INTRAVENOUS EVERY 6 HOURS
Status: DISCONTINUED | OUTPATIENT
Start: 2025-01-29 | End: 2025-01-31 | Stop reason: HOSPADM

## 2025-01-29 RX ORDER — LOPERAMIDE HYDROCHLORIDE 2 MG/1
6 CAPSULE ORAL DAILY
Status: DISCONTINUED | OUTPATIENT
Start: 2025-01-29 | End: 2025-01-31 | Stop reason: HOSPADM

## 2025-01-29 RX ORDER — ONDANSETRON 4 MG/1
4 TABLET, FILM COATED ORAL EVERY 8 HOURS PRN
Status: DISCONTINUED | OUTPATIENT
Start: 2025-01-29 | End: 2025-01-29

## 2025-01-29 RX ORDER — HYDROMORPHONE HYDROCHLORIDE 1 MG/ML
0.4 INJECTION, SOLUTION INTRAMUSCULAR; INTRAVENOUS; SUBCUTANEOUS
Status: DISCONTINUED | OUTPATIENT
Start: 2025-01-29 | End: 2025-01-30

## 2025-01-29 RX ORDER — ENOXAPARIN SODIUM 100 MG/ML
40 INJECTION SUBCUTANEOUS EVERY 12 HOURS SCHEDULED
Status: DISCONTINUED | OUTPATIENT
Start: 2025-01-29 | End: 2025-01-30

## 2025-01-29 RX ORDER — ACETAMINOPHEN 325 MG/1
650 TABLET ORAL EVERY 6 HOURS PRN
Status: DISCONTINUED | OUTPATIENT
Start: 2025-01-29 | End: 2025-01-30

## 2025-01-29 RX ORDER — DIAZEPAM 10 MG/1
5 TABLET ORAL EVERY 8 HOURS PRN
Status: DISCONTINUED | OUTPATIENT
Start: 2025-01-29 | End: 2025-01-29

## 2025-01-29 RX ORDER — CHOLECALCIFEROL (VITAMIN D3) 25 MCG
2000 TABLET ORAL DAILY
Status: DISCONTINUED | OUTPATIENT
Start: 2025-01-29 | End: 2025-01-31 | Stop reason: HOSPADM

## 2025-01-29 RX ORDER — PROPRANOLOL HYDROCHLORIDE 120 MG/1
120 CAPSULE, EXTENDED RELEASE ORAL 2 TIMES DAILY
Status: DISCONTINUED | OUTPATIENT
Start: 2025-01-29 | End: 2025-01-31 | Stop reason: HOSPADM

## 2025-01-29 RX ORDER — DIAZEPAM 5 MG/1
7.5 TABLET ORAL NIGHTLY
Status: DISCONTINUED | OUTPATIENT
Start: 2025-01-29 | End: 2025-01-30

## 2025-01-29 RX ORDER — OXYCODONE HCL 10 MG/1
10 TABLET, FILM COATED, EXTENDED RELEASE ORAL DAILY
COMMUNITY

## 2025-01-29 RX ORDER — ALBUTEROL SULFATE 90 UG/1
2 INHALANT RESPIRATORY (INHALATION) EVERY 4 HOURS PRN
Status: DISCONTINUED | OUTPATIENT
Start: 2025-01-29 | End: 2025-01-31 | Stop reason: HOSPADM

## 2025-01-29 RX ORDER — OXYCODONE HCL 10 MG/1
10 TABLET, FILM COATED, EXTENDED RELEASE ORAL EVERY 12 HOURS SCHEDULED
Status: DISCONTINUED | OUTPATIENT
Start: 2025-01-29 | End: 2025-01-31 | Stop reason: HOSPADM

## 2025-01-29 RX ORDER — ROSUVASTATIN CALCIUM 10 MG/1
10 TABLET, COATED ORAL NIGHTLY
Status: DISCONTINUED | OUTPATIENT
Start: 2025-01-29 | End: 2025-01-31 | Stop reason: HOSPADM

## 2025-01-29 RX ORDER — DIAZEPAM 10 MG/1
10 TABLET ORAL DAILY
Status: DISCONTINUED | OUTPATIENT
Start: 2025-01-29 | End: 2025-01-29

## 2025-01-29 RX ORDER — LIDOCAINE 50 MG/G
3 PATCH TOPICAL DAILY
COMMUNITY
Start: 2024-09-30 | End: 2025-01-31 | Stop reason: HOSPADM

## 2025-01-29 RX ORDER — TIZANIDINE 2 MG/1
2 TABLET ORAL 2 TIMES DAILY PRN
COMMUNITY

## 2025-01-29 RX ORDER — SERTRALINE HYDROCHLORIDE 100 MG/1
200 TABLET, FILM COATED ORAL DAILY
Status: DISCONTINUED | OUTPATIENT
Start: 2025-01-29 | End: 2025-01-31 | Stop reason: HOSPADM

## 2025-01-29 RX ORDER — MEMANTINE HYDROCHLORIDE 5 MG/1
2 TABLET ORAL 2 TIMES DAILY
COMMUNITY

## 2025-01-29 RX ORDER — DIAZEPAM 10 MG/1
5 TABLET ORAL NIGHTLY
Status: DISCONTINUED | OUTPATIENT
Start: 2025-01-29 | End: 2025-01-29

## 2025-01-29 RX ORDER — OXYCODONE HYDROCHLORIDE 10 MG/1
10 TABLET ORAL EVERY 4 HOURS PRN
Status: DISCONTINUED | OUTPATIENT
Start: 2025-01-29 | End: 2025-01-29

## 2025-01-29 RX ORDER — PANTOPRAZOLE SODIUM 40 MG/1
40 TABLET, DELAYED RELEASE ORAL
Status: DISCONTINUED | OUTPATIENT
Start: 2025-01-29 | End: 2025-01-31 | Stop reason: HOSPADM

## 2025-01-29 RX ORDER — ONDANSETRON 8 MG/1
8 TABLET, ORALLY DISINTEGRATING ORAL 3 TIMES DAILY PRN
COMMUNITY
Start: 2024-11-08

## 2025-01-29 RX ADMIN — ROSUVASTATIN CALCIUM 10 MG: 10 TABLET, FILM COATED ORAL at 21:14

## 2025-01-29 RX ADMIN — Medication 2000 UNITS: at 09:51

## 2025-01-29 RX ADMIN — DEXAMETHASONE SODIUM PHOSPHATE 4 MG: 4 INJECTION, SOLUTION INTRA-ARTICULAR; INTRALESIONAL; INTRAMUSCULAR; INTRAVENOUS; SOFT TISSUE at 21:13

## 2025-01-29 RX ADMIN — PROPRANOLOL HYDROCHLORIDE 120 MG: 120 CAPSULE, EXTENDED RELEASE ORAL at 09:50

## 2025-01-29 RX ADMIN — OXYCODONE HYDROCHLORIDE 10 MG: 10 TABLET ORAL at 13:52

## 2025-01-29 RX ADMIN — ROSUVASTATIN CALCIUM 10 MG: 10 TABLET, FILM COATED ORAL at 03:08

## 2025-01-29 RX ADMIN — PREGABALIN 200 MG: 75 CAPSULE ORAL at 21:12

## 2025-01-29 RX ADMIN — TIOTROPIUM BROMIDE INHALATION SPRAY 2 PUFF: 3.12 SPRAY, METERED RESPIRATORY (INHALATION) at 10:13

## 2025-01-29 RX ADMIN — DEXAMETHASONE SODIUM PHOSPHATE 8 MG: 4 INJECTION, SOLUTION INTRA-ARTICULAR; INTRALESIONAL; INTRAMUSCULAR; INTRAVENOUS; SOFT TISSUE at 03:34

## 2025-01-29 RX ADMIN — OXYCODONE HYDROCHLORIDE 10 MG: 10 TABLET ORAL at 23:33

## 2025-01-29 RX ADMIN — ARIPIPRAZOLE 10 MG: 10 TABLET ORAL at 09:50

## 2025-01-29 RX ADMIN — SERTRALINE 200 MG: 100 TABLET, FILM COATED ORAL at 09:50

## 2025-01-29 RX ADMIN — PROPRANOLOL HYDROCHLORIDE 120 MG: 120 CAPSULE, EXTENDED RELEASE ORAL at 21:12

## 2025-01-29 RX ADMIN — ONDANSETRON 4 MG: 2 INJECTION INTRAMUSCULAR; INTRAVENOUS at 04:56

## 2025-01-29 RX ADMIN — LOPERAMIDE HYDROCHLORIDE 6 MG: 2 CAPSULE ORAL at 09:51

## 2025-01-29 RX ADMIN — ALBUTEROL SULFATE 2 PUFF: 90 AEROSOL, METERED RESPIRATORY (INHALATION) at 10:17

## 2025-01-29 RX ADMIN — FLUTICASONE FUROATE AND VILANTEROL TRIFENATATE 1 PUFF: 200; 25 POWDER RESPIRATORY (INHALATION) at 10:15

## 2025-01-29 RX ADMIN — HYDROMORPHONE HYDROCHLORIDE 0.4 MG: 1 INJECTION, SOLUTION INTRAMUSCULAR; INTRAVENOUS; SUBCUTANEOUS at 15:18

## 2025-01-29 RX ADMIN — DIAZEPAM 7.5 MG: 5 TABLET ORAL at 21:45

## 2025-01-29 RX ADMIN — OXYCODONE HYDROCHLORIDE 10 MG: 10 TABLET, FILM COATED, EXTENDED RELEASE ORAL at 21:13

## 2025-01-29 RX ADMIN — OXYCODONE HYDROCHLORIDE 10 MG: 10 TABLET ORAL at 03:03

## 2025-01-29 RX ADMIN — DIAZEPAM 10 MG: 10 TABLET ORAL at 10:17

## 2025-01-29 RX ADMIN — DEXAMETHASONE SODIUM PHOSPHATE 4 MG: 4 INJECTION, SOLUTION INTRA-ARTICULAR; INTRALESIONAL; INTRAMUSCULAR; INTRAVENOUS; SOFT TISSUE at 15:18

## 2025-01-29 RX ADMIN — PREGABALIN 200 MG: 75 CAPSULE ORAL at 09:51

## 2025-01-29 RX ADMIN — DEXAMETHASONE SODIUM PHOSPHATE 4 MG: 4 INJECTION, SOLUTION INTRA-ARTICULAR; INTRALESIONAL; INTRAMUSCULAR; INTRAVENOUS; SOFT TISSUE at 09:51

## 2025-01-29 RX ADMIN — MONTELUKAST 10 MG: 10 TABLET, FILM COATED ORAL at 21:12

## 2025-01-29 RX ADMIN — ENOXAPARIN SODIUM 40 MG: 100 INJECTION SUBCUTANEOUS at 21:12

## 2025-01-29 SDOH — SOCIAL STABILITY: SOCIAL INSECURITY: ABUSE: ADULT

## 2025-01-29 SDOH — SOCIAL STABILITY: SOCIAL INSECURITY: HAVE YOU HAD THOUGHTS OF HARMING ANYONE ELSE?: NO

## 2025-01-29 SDOH — ECONOMIC STABILITY: INCOME INSECURITY: IN THE PAST 12 MONTHS HAS THE ELECTRIC, GAS, OIL, OR WATER COMPANY THREATENED TO SHUT OFF SERVICES IN YOUR HOME?: NO

## 2025-01-29 SDOH — ECONOMIC STABILITY: FOOD INSECURITY: WITHIN THE PAST 12 MONTHS, THE FOOD YOU BOUGHT JUST DIDN'T LAST AND YOU DIDN'T HAVE MONEY TO GET MORE.: NEVER TRUE

## 2025-01-29 SDOH — SOCIAL STABILITY: SOCIAL INSECURITY: DOES ANYONE TRY TO KEEP YOU FROM HAVING/CONTACTING OTHER FRIENDS OR DOING THINGS OUTSIDE YOUR HOME?: NO

## 2025-01-29 SDOH — SOCIAL STABILITY: SOCIAL INSECURITY: WITHIN THE LAST YEAR, HAVE YOU BEEN AFRAID OF YOUR PARTNER OR EX-PARTNER?: NO

## 2025-01-29 SDOH — SOCIAL STABILITY: SOCIAL INSECURITY: WERE YOU ABLE TO COMPLETE ALL THE BEHAVIORAL HEALTH SCREENINGS?: YES

## 2025-01-29 SDOH — SOCIAL STABILITY: SOCIAL INSECURITY: HAS ANYONE EVER THREATENED TO HURT YOUR FAMILY OR YOUR PETS?: NO

## 2025-01-29 SDOH — SOCIAL STABILITY: SOCIAL INSECURITY: DO YOU FEEL UNSAFE GOING BACK TO THE PLACE WHERE YOU ARE LIVING?: NO

## 2025-01-29 SDOH — ECONOMIC STABILITY: FOOD INSECURITY: WITHIN THE PAST 12 MONTHS, YOU WORRIED THAT YOUR FOOD WOULD RUN OUT BEFORE YOU GOT THE MONEY TO BUY MORE.: NEVER TRUE

## 2025-01-29 SDOH — SOCIAL STABILITY: SOCIAL INSECURITY: WITHIN THE LAST YEAR, HAVE YOU BEEN HUMILIATED OR EMOTIONALLY ABUSED IN OTHER WAYS BY YOUR PARTNER OR EX-PARTNER?: NO

## 2025-01-29 SDOH — SOCIAL STABILITY: SOCIAL INSECURITY: ARE THERE ANY APPARENT SIGNS OF INJURIES/BEHAVIORS THAT COULD BE RELATED TO ABUSE/NEGLECT?: NO

## 2025-01-29 SDOH — SOCIAL STABILITY: SOCIAL INSECURITY: ARE YOU OR HAVE YOU BEEN THREATENED OR ABUSED PHYSICALLY, EMOTIONALLY, OR SEXUALLY BY ANYONE?: NO

## 2025-01-29 SDOH — SOCIAL STABILITY: SOCIAL INSECURITY: HAVE YOU HAD ANY THOUGHTS OF HARMING ANYONE ELSE?: NO

## 2025-01-29 SDOH — SOCIAL STABILITY: SOCIAL INSECURITY: DO YOU FEEL ANYONE HAS EXPLOITED OR TAKEN ADVANTAGE OF YOU FINANCIALLY OR OF YOUR PERSONAL PROPERTY?: NO

## 2025-01-29 ASSESSMENT — PAIN - FUNCTIONAL ASSESSMENT
PAIN_FUNCTIONAL_ASSESSMENT: 0-10

## 2025-01-29 ASSESSMENT — LIFESTYLE VARIABLES
HOW OFTEN DO YOU HAVE A DRINK CONTAINING ALCOHOL: NEVER
AUDIT-C TOTAL SCORE: 0
SKIP TO QUESTIONS 9-10: 1
AUDIT-C TOTAL SCORE: 0
HOW MANY STANDARD DRINKS CONTAINING ALCOHOL DO YOU HAVE ON A TYPICAL DAY: PATIENT DOES NOT DRINK
HOW OFTEN DO YOU HAVE 6 OR MORE DRINKS ON ONE OCCASION: NEVER

## 2025-01-29 ASSESSMENT — PAIN SCALES - GENERAL
PAINLEVEL_OUTOF10: 7
PAINLEVEL_OUTOF10: 6
PAINLEVEL_OUTOF10: 8
PAINLEVEL_OUTOF10: 9
PAINLEVEL_OUTOF10: 7
PAINLEVEL_OUTOF10: 8
PAINLEVEL_OUTOF10: 6
PAINLEVEL_OUTOF10: 8
PAINLEVEL_OUTOF10: 10 - WORST POSSIBLE PAIN
PAINLEVEL_OUTOF10: 8
PAINLEVEL_OUTOF10: 9

## 2025-01-29 ASSESSMENT — ACTIVITIES OF DAILY LIVING (ADL)
ADEQUATE_TO_COMPLETE_ADL: YES
LACK_OF_TRANSPORTATION: YES
JUDGMENT_ADEQUATE_SAFELY_COMPLETE_DAILY_ACTIVITIES: YES
GROOMING: INDEPENDENT
BATHING: INDEPENDENT
TOILETING: INDEPENDENT
WALKS IN HOME: INDEPENDENT
DRESSING YOURSELF: INDEPENDENT
HEARING - RIGHT EAR: FUNCTIONAL
PATIENT'S MEMORY ADEQUATE TO SAFELY COMPLETE DAILY ACTIVITIES?: YES
LACK_OF_TRANSPORTATION: YES
FEEDING YOURSELF: INDEPENDENT
HEARING - LEFT EAR: FUNCTIONAL
ASSISTIVE_DEVICE: CANE;EYEGLASSES

## 2025-01-29 ASSESSMENT — COGNITIVE AND FUNCTIONAL STATUS - GENERAL
PATIENT BASELINE BEDBOUND: NO
MOBILITY SCORE: 24
DAILY ACTIVITIY SCORE: 24

## 2025-01-29 ASSESSMENT — PATIENT HEALTH QUESTIONNAIRE - PHQ9
1. LITTLE INTEREST OR PLEASURE IN DOING THINGS: NEARLY EVERY DAY
SUM OF ALL RESPONSES TO PHQ9 QUESTIONS 1 & 2: 6
2. FEELING DOWN, DEPRESSED OR HOPELESS: NEARLY EVERY DAY

## 2025-01-29 NOTE — CONSULTS
SUPPORTIVE AND PALLIATIVE ONCOLOGY CONSULT    Inpatient consult to [facility] Adult Supportive Oncology  Consult performed by: Mandi Pretty, APRN-CNP  Consult ordered by: Padmaja Osullivan MD      SERVICE DATE: 1/29/2025    PALLIATIVE MEDICINE OUTPATIENT PROVIDER:  [facility] [place]  CURRENT ATTENDING PROVIDER: Padmaja Osullivan MD     Medical Oncologist: No care team member to display   Radiation Oncologist: No care team member to display  Primary Physician: No Assigned PCP Generic Provider  None    REASON FOR CONSULT/CHIEF CONSULT COMPLAINT: Pain and symptom control    Subjective   HISTORY OF PRESENT ILLNESS: Manuel Marrufo is a 59 y.o. 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 PMHx significant for spinal stenosis (s/p L4-S1 lumbar decompression in 2021), HTN, HLD, non-alcoholic fatty liver disease, COPD, obesity, FRANK on CPAP, depression s/p vagal nerve stimulator (SA x2, 2007), PTSD, and GERD. Admitted 1/29/2025 for further evaluation and management of headache pain with associated n/v, dizziness, and photophobia. Supportive and Palliative Oncology is consulted for pain and symptom management.     Summarized Recommendations (1/29/25):   Discontinue acetaminophen PRN   Start scheduled acetaminophen 975mg PO q8h   Start lidocaine 4% TD patch once daily   Consider changing dexamethasone to PO in anticipation for eventual discharge, no c/f n/v  Change oxycodone IR 10mg PO q4h PRN for moderate pain [hold for AMS/lethargy]  Start oxycodone IR 20mg PO q4h PRN for severe pain [reflective of home dosing, hold for AMS/lethargy]  Change hydromorphone 0.5mg IV q3h PRN for breakthrough pain [hold for AMS/lethargy]  Discontinue scheduled ondansetron  Start ondansetron 4mg PO/IV q6h PRN for n/v, first line  Change diazepam 10mg PO AM / 7.5mg PO  "PM [home regimen]  Consider consulting Psych if medications need adjusting given complexity of existing regimen and extensive psych hx    Pain Assessment:  Onset: Acute on chronic  Location: Head, posterior neck, low back--radiates down BLE to feet  Duration: Constant  Characteristics:  Rating: \"it's doing better,\" 8/10  Descriptors: Aching, sore, sharp, shooting  Aggravating: Bright lights, loud noises, movement   Relieving: Analgesics--see EMR, positioning, and modifying activity  Intolerances: None  Personal Pain Goal: Mild   Interference with Function: Very Much  Coping Strategies: Distraction, rest, relaxation  Emotional Response: Anxiety, sadness  Barriers to Pain Management: None    Opioid Requirements  Past 24h opioid requirements:  (1/28-1/29, 7375-4596)  oxycodone IR 5mg x 1 = 6.25 OME  oxycodone IR 10mg x 2 = 20mg = 25 OME  hydromorphone 0.4mg IV x 1 = 5 OME     Total 24h OME use: 36.25 OME    Note: OME calculations based on equianalgesic table below. Please note this table is based on best available evidence but conversions are still approximate. These are NOT opioid DOSES for individual patient use; this is equivalency information.  Drug Parenteral Enteral   Morphine 10 25   Oxycodone N/A 20   Hydromorphone 2 5   Fentanyl 0.15 N/A   Tramadol N/A 120   Citation: Pablo AVILES. Demystifying opioid conversion calculations: A guide for effective dosing, Second edition. [place]: American Society of Health-System Pharmacists, 2018.    OARRS/PDMP reviewed, no aberrant behavior noted.    Symptom Assessment:  Pain: very much   Headache: very much   Dizziness: none  Lack of energy: a little  Difficulty sleeping: somewhat--chronic insomnia, worsened by pain  Worrying: a little  Anxiety: a little  Depressive symptoms: a little  Pain in mouth/swallowing: none  Dry mouth: none  Taste changes: none  Shortness of breath: none  Lack of appetite: none   Nausea: none  Vomiting: none  Constipation: none  Diarrhea: " none--has IBS well controlled with home loperamide  Sore muscles: none  Numbness or tingling in hands/feet/other: none  Weight loss: None per chart review    Information obtained from: chart review, interview of patient, and discussion with primary team  ______________________________________________________________________     Oncology History    No history exists.     Past Medical History:   Diagnosis Date    Agoraphobia with panic attacks     Anxiety     Awareness under anesthesia 2021    During back surgery    COPD (chronic obstructive pulmonary disease) (Multi)     Fatty liver disease, nonalcoholic     GERD (gastroesophageal reflux disease)     HLD (hyperlipidemia)     Melanoma (Multi)     with mets to brain, spine and lungs    FRANK on CPAP     PTSD (post-traumatic stress disorder)      Past Surgical History:   Procedure Laterality Date    BACK SURGERY      MALIGNANT SKIN LESION EXCISION      left shoulder and armpit    TONSILLECTOMY       Family History   Problem Relation Name Age of Onset    Arthritis Mother Patrizia Marrufo     Alcohol abuse Father      Cirrhosis Father      Cervical cancer Sister Kaity Rebolledo     Hypertension Sister Kaity Rebolledo     Other (bladder cancer) Maternal Grandmother Coral Ferrera       SOCIAL HISTORY:   Social History:  reports that he quit smoking about 8 months ago. His smoking use included cigarettes. He started smoking about 40 years ago. He has a 20 pack-year smoking history. He has never used smokeless tobacco. He reports that he does not drink alcohol and does not use drugs.  Mother, brother, and sister are involved in pt's life  Single  Does not drive, relies on family for transportation    REVIEW OF SYSTEMS:  Review of systems negative unless noted in HPI.     Objective     Lab Results   Component Value Date    WBC 8.5 01/29/2025    HGB 8.7 (L) 01/29/2025    HCT 29.9 (L) 01/29/2025    MCV 68 (L) 01/29/2025     01/29/2025      Lab Results   Component Value Date    GLUCOSE  106 (H) 01/29/2025    CALCIUM 9.4 01/29/2025     01/29/2025    K 4.2 01/29/2025    CO2 23 01/29/2025     01/29/2025    BUN 20 01/29/2025    CREATININE 1.07 01/29/2025     Lab Results   Component Value Date    ALT 37 01/27/2025    AST 39 01/27/2025    ALKPHOS 63 01/27/2025    BILITOT 0.3 01/27/2025     Estimated Creatinine Clearance: 96.9 mL/min (by C-G formula based on SCr of 1.07 mg/dL).     Encounter Date: 01/27/25   ECG 12 lead   Result Value    Ventricular Rate 69    Atrial Rate 68    WI Interval 157    QRS Duration 98    QT Interval 408    QTC Calculation(Bazett) 437    P Axis 38    R Axis 1    T Axis 20    QRS Count 11    Q Onset 254    T Offset 458    QTC Fredericia 427    Narrative    Sinus rhythm  Abnormal R-wave progression, early transition  Borderline repolarization abnormality     Wt Readings from Last 5 Encounters:   01/29/25 125 kg (275 lb 5.7 oz)   01/27/25 119 kg (262 lb 5.6 oz)   11/26/24 117 kg (257 lb 15 oz)   11/24/24 117 kg (258 lb)   06/18/24 123 kg (272 lb 1.6 oz)     Current Outpatient Medications   Medication Instructions    acetaminophen (TYLENOL) 325 mg, oral, 4 times daily PRN    albuterol 90 mcg/actuation inhaler 2 puffs, inhalation, 4 times daily PRN    ARIPiprazole (ABILIFY) 10 mg, oral, Daily    budesonide-glycopyr-formoterol (BREZTRI) 160-9-4.8 mcg/actuation HFA aerosol inhaler 2 puffs, inhalation, 2 times daily RT    cholecalciferol (VITAMIN D-3) 50 mcg, oral, Daily    dexAMETHasone (DECADRON) 4 mg, oral, 2 times daily    diazePAM (Valium) 5 mg tablet Take 1 tablet (5 mg) by mouth in the morning and take 1.5 tablets (7.5 mg) in the evening.    furosemide (LASIX) 40 mg, oral, Daily, Edema    lidocaine (Lidoderm) 5 % patch 3 patches, transdermal, Daily    loperamide (Imodium) 2 mg capsule 3 capsules, oral, Daily PRN    memantine (Namenda) 5 mg tablet 2 tablets, oral, 2 times daily    omeprazole (PRILOSEC) 20 mg, oral, 2 times daily before meals    ondansetron ODT  (ZOFRAN-ODT) 8 mg, oral, 3 times daily PRN    oxyCODONE (ROXICODONE) 10 mg, oral, Every 3 hours PRN    oxyCODONE ER (OXYCONTIN) 10 mg, oral, Daily, Do not crush, chew, or split.    pantoprazole (PROTONIX) 40 mg, oral, Daily before breakfast, Do not crush, chew, or split.    pregabalin (LYRICA) 200 mg, oral, 2 times daily    propranolol LA (INDERAL LA) 120 mg, oral, 2 times daily    rosuvastatin (CRESTOR) 10 mg, oral, Daily    sertraline (ZOLOFT) 200 mg, oral, Daily    tadalafil (CIALIS) 5 mg, oral, Daily    tiZANidine (ZANAFLEX) 2 mg, oral, 2 times daily PRN     Scheduled medications   ARIPiprazole, 10 mg, oral, Daily  cholecalciferol, 2,000 Units, oral, Daily  dexAMETHasone, 4 mg, intravenous, q6h  [START ON 1/30/2025] diazePAM, 5 mg, oral, Daily   And  diazePAM, 7.5 mg, oral, Nightly  enoxaparin, 40 mg, subcutaneous, q12h TING  tiotropium, 2 puff, inhalation, Daily   And  fluticasone furoate-vilanteroL, 1 puff, inhalation, Daily  loperamide, 6 mg, oral, Daily  montelukast, 10 mg, oral, Nightly  ondansetron, 4 mg, intravenous, q6h  oxyCODONE ER, 10 mg, oral, q12h TING  pantoprazole, 40 mg, oral, Daily before breakfast  pregabalin, 200 mg, oral, BID  propranolol LA, 120 mg, oral, BID  rosuvastatin, 10 mg, oral, Nightly  sertraline, 200 mg, oral, Daily    Continuous medications     PRN medications  acetaminophen, 650 mg, q6h PRN  albuterol, 2 puff, q4h PRN  HYDROmorphone, 0.4 mg, q3h PRN  oxyCODONE, 10 mg, q6h PRN    Allergies:   Allergies   Allergen Reactions    Bactrim [Sulfamethoxazole-Trimethoprim] Nausea/vomiting    Buspirone GI Upset    Penicillin Unknown     PHYSICAL EXAMINATION:  Vital Signs:   Vital signs reviewed  Vitals:    01/29/25 1330   BP: 113/70   Pulse: 72   Resp: 18   Temp: 36.4 °C (97.5 °F)   SpO2: 95%     Pain Score: 8    Physical Exam  Vitals reviewed.   Constitutional:       Comments: Alert, ill appearing gentleman laying in bed. No signs of acute distress. Pleasant, cooperative, and participating  in interview.     HENT:      Head:      Comments: Normocephalic, atraumatic.     Eyes:      Comments: Sclera clear, EOM intact, wearing glasses.    Pulmonary:      Comments: Symmetrical chest rise. Regular rate and depth of respirations. Room air.   Abdominal:      Comments: Abdomen obese, slightly distended, non tender, and soft.   Musculoskeletal:      Comments: Normal muscle strength and tone. RANDLE x4. Generalized non-pitting edema.   Skin:     Comments: No lesions, rash, or abrasions present on visible skin. Skin color appropriate for ethnicity.   Neurological:      Comments: A&Ox4, follows commands, no apparent sensory deficits.   Psychiatric:      Comments: Mild anxiety and sadness, but behavior otherwise appropriate.       ASSESSMENT/PLAN:  Manuel Marrufo is a 59 y.o. 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 PMHx significant for spinal stenosis (s/p L4-S1 lumbar decompression in 2021), HTN, HLD, non-alcoholic fatty liver disease, COPD, obesity, FRANK on CPAP, depression s/p vagal nerve stimulator (SA x2, 2007), PTSD, and GERD. Admitted 1/29/2025 for further evaluation and management of headache pain with associated n/v, dizziness, and photophobia. Supportive and Palliative Oncology is consulted for pain and symptom management.      Neoplasm Related Pain  Headache, neck, and BLE/feet pain related to known malignancy with metastatic lung and brain involvement.   Type: Somatic, neuropathic  Pain control: Fairly controlled  Home regimen: oxycodone ER (OxyContin) 10mg q12h [per pt he had not yet started], oxycodone IR 10-20mg q6h PRN [using x3-4/day], lidocaine TD patch once daily, acetaminophen PRN [usually 3g/day]  Intolerances: Vicodin [rx: sweating, shaking]  Personalized pain goal: Mild  Total OME usage for the past 24 hours: 36.25 OME  Renal and  hepatic function WNL.  Discontinue acetaminophen PRN   Start scheduled acetaminophen 975mg PO q8h   Start lidocaine 4% TD patch once daily   dexamethasone 4mg IV q6h per primary--likely assisting with pain. Consider changing to PO in anticipation for eventual discharge, no c/f n/v  Continue scheduled oxycodone ER (OxyContin) 10mg PO q12h  Change oxycodone IR 10mg PO q4h PRN for moderate pain [hold for AMS/lethargy]  Start oxycodone IR 20mg PO q4h PRN for severe pain [reflective of home dosing, hold for AMS/lethargy]  Change hydromorphone 0.5mg IV q3h PRN for breakthrough pain [hold for AMS/lethargy]  Continue to monitor pain scores and administer PRN medications as appropriate  Continue/initiate nonpharmacologic pain management strategies including ice/heat therapy, distraction techniques, deep breathing/relaxation techniques, calming music, and repositioning  Continue to monitor for signs of opioid efficacy (pain scores, improved functionality) and toxicity (pinpoint pupils, excess sedation/drowsiness/confusion, respiratory depression, etc.)    Nausea  At risk for nausea and vomiting related to opioid use. Currently denies.   Home regimen: PPI  EKG reviewed from 1/27/25, QTc 437.   PPI per primary   Discontinue scheduled ondansetron  Start ondansetron 4mg PO/IV q6h PRN for n/v, first line    Constipation  At risk for constipation related to opioids and reduced mobility, currently not constipated.  Has hx of diarrhea prone IBS per pt.   Usual bowel pattern: Once daily while on home loperamide  Home regimen: loperamide 6mg once daily [scheduled]  LBM: 1/28/25 AM per pt   Continue loperamide 6mg PO once daily   Goal to have BM without straining q48-72h, adjust regimen as needed  Encourage mobility as tolerated    Altered Mood  Hx of anxiety, PTSD, and depression s/p vagal nerve stimulator (SA x2, 2007).  Well controlled with home regimen.  Allergy to buspirone [rx: n/v]  Previously tried: alprazolam [rx: ineffective],  clonazepam [rx: ineffective]  Home regimen: propanol LA 120mg BID (for anxiety per CPRS), diazepam 10mg AM / 7.5mg PM, aripiprazole 10mg once daily, sertraline 200mg once daily, pregabalin 200mg BID  Continue aripiprazole 10mg PO once daily  Change diazepam 10mg PO AM / 7.5mg PO PM [home regimen]  Continue pregabalin 200mg PO BID  Continue propanol LA 120mg PO BID  Continue sertraline 200mg PO once daily   Consider consulting Psych if medications need adjusting given complexity of existing regimen and extensive psych hx    Sleeping Difficulty  Impaired sleep related to pain, anxiety, and hospital environment.  Hx of chronic insomnia per pt.   Home regimen: None, see pain and anxiety home meds  See pain and anxiety recs    Medical Decision Making/Goals of Care/Advance Care Planning:  (1/29/25) Patient's current clinical condition, including diagnosis, prognosis, and management plan, and goals of care were discussed.   Life limiting disease: Metastatic malignancy  Family: Supportive, brother, mother, sister--although pt states sometimes their supportiveness can fluctuate  Performance status: Moderate limitations due to pain.   Joys/meaning/strength: [place], family   Understanding of health: Demonstrates good understanding of disease process, understands updated recommendations concerning his pain and symptom regimen.   Information: Appreciates full disclosure and timely updates as possible  Medical update: Per primary team   Goals: Pain and symptom control, continued cancer tx  Worries and fears now and future: Death, worsening symptoms   Code status discussion: Confirmed DNR-DNI code status with pt. He endorses that he would be OK with ICU care escalation if needed.     Advance Directives  Existence of Advance Directives: Yes, but NOT on file in medical record  Decision maker: FRANCISCO is pt's brotherMason  Code Status: DNR-DNI    Introduction to Supportive and Palliative Oncology:  Spoke with pt at  bedside.  Introduced the role and philosophy of Supportive and Palliative oncology in the evaluation and management of symptoms during cancer treatment.  Palliative care was introduced as a service for patients with serious illness to help with symptoms, assist with goals of care conversations, navigate complex decision making, improve quality of life for patients, and provide support both patients and families.  Patient seemed to appreciate the extra layer of support.     Disposition:  Please start the process of having prior authorization with meds to beds deliver medications to patient prior to discharge via [facility] pharmacy. Prescriptions will need to be sent 48-72 hours prior to discharge so that a prior authorization can be completed.     Discharge date pending resolution of acute hospital issues.   [facility] will [facility] closer to [facility].     Signature and billing:  Medical complexity was high level due to due to complexity of problems, extensive data review, and high risk of management/treatment.    I spent 75 minutes in the care of this patient which included chart review, interviewing patient/family, discussion with primary team, coordination of care, and documentation.    DATA   Diagnostic tests and information reviewed for today's visit:  Conversation with primary team, Most recent labs and imaging results, Most recent EKG, Medications     Some elements copied from NSGY and Oncology's notes on 1/28/25, the elements have been updated and all reflect current decision making from today, 1/29/2025.    Plan of Care discussed with: Primary team, pt     Thank you for asking Supportive and Palliative Oncology to assist with care of this patient.  Recommendations will be communicated back to the consulting service by way of shared electronic medical record/secure chat/email or face-to-face.   We will continue to follow.  Please contact us for additional questions or concerns.    SIGNATURE:  Mandi Pretty, APRN-CNP  PAGER/CONTACT:  Contact information:  Supportive and Palliative Oncology  Monday-Friday 8 AM-5 PM  Epic Secure chat or pager [phone].  After hours and weekends:  pager [phone]

## 2025-01-29 NOTE — PROGRESS NOTES
Subjective   No headache, well controlled with pain medication. No seizure or behavioral changes.     Objective     Vitals:    01/29/25 1330   BP: 113/70   Pulse: 72   Resp: 18   Temp: 36.4 °C (97.5 °F)   SpO2: 95%      Physical exam:     General:  no pallor, clubbing, cyanosis, dehydration, rash  Neuro: alert and oriented fully, normal sensory / motor exam, no meningeal signs  CV:  RRR, S1 S2 MO. No friction rubs  Lungs: B/L equal air entry, normal breath sound, no crackles   Abd:  Soft, benign, non-tender, distended   Psych:  Appropriate affect, linear thought, insight present   Upper extremities: No edema  Lower extremities: Bilateral pitting edema    Labs:  Most recent labs:   Results from last 7 days   Lab Units 01/29/25  0750 01/27/25  1109   WBC AUTO x10*3/uL 8.5 6.8   HEMOGLOBIN g/dL 8.7* 9.8*   HEMATOCRIT % 29.9* 33.4*   PLATELETS AUTO x10*3/uL 204 188     Results from last 7 days   Lab Units 01/29/25  0750 01/27/25  1109   SODIUM mmol/L 139 139   POTASSIUM mmol/L 4.2 3.8   CHLORIDE mmol/L 106 105   CO2 mmol/L 23 27   ANION GAP mmol/L 14 11   BUN mg/dL 20 17   CREATININE mg/dL 1.07 1.01   GLUCOSE mg/dL 106* 89   CALCIUM mg/dL 9.4 9.2   MAGNESIUM mg/dL 2.33  --    PHOSPHORUS mg/dL 3.4  --         1/27 CT head:     Multifocal irregular regions of low-attenuation parenchymal edema throughout both cerebral hemispheres as well as the left cerebellar hemispheric are again seen which are likely related to patient's known history of intracranial metastatic disease.      Round approximate 1 cm probable metastasis evident in the right parietal lobe posteriorly. MRI could be considered for further characterization as clinically indicated.      No acute intracranial bleed.  Assessment & Plan  Headache    Manuel Marrufo is a 59 y.o. 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 He is a transfer from the [facility] for a brain MRI with the VNS device in place. [facility]  with a severe headache rated 9 out of 10, characterized as throbbing and located in the parieto-occipital region. The headache was not accompanied by nausea or vomiting but was associated with tinnitus, blurred vision, and photophobia. The pain responded to oxycodone and was positional, worsening with forward bending and improving when lying flat. He has experienced these headaches since July 2024, following a diagnosis of brain metastasis from previously diagnosed melanoma.    The patient was first diagnosed with melanoma in 2019 but lost follow-up care. A mass was later discovered during a lung cancer screening in the right lower lobe, leading to a PET scan that confirmed malignancy. An attempt at robotic surgery was aborted upon discovering metastatic implants in the intrathoracic area, with pathology confirming metastatic melanoma. He was admitted to [facility] November 2024 due to concerns of hemorrhagic brain metastases identified on a CT scan after [facility] with [facility], resulting in his transfer to . He was started on steroids and underwent an MRI, which revealed diffuse metastatic disease. Consequently, a decision was made to [facility] the [facility], and he completed ten sessions of palliative WBRT in December 2024. In January, the patient has undergone two cycles of dual immunotherapy with nivolumab and ipilimumab, with the last dose administered on January 10, 2025. [facility], while Dr. Schaefer is seeing chemo/immunotherapy.     On examination, the patient exhibited no focal neurological deficits or meningeal signs. His current headache is similar in nature to previous episodes but more severe. Differential diagnoses include metastatic disease, venous sinus thrombosis, immune checkpoint inhibitor-induced autoimmune encephalitis, and leptomeningeal or pachymeningeal metastasis.     #Metastatic melanoma  - Supportive oncology consulted for pain,  nausea and vomiting management   - Pain and nausea adequately controlled   - MRI scheduled for 1/30   - Continue dexamethasone  - Further plans depending on MRI results   - Recent CT AP and chest reviewed - no metastasis     Fluid: PRN   Electrolytes: PRN   Code status: DNR/DNI  NOK: TwylabarbPatrizia (Parent) [phone]   DVT PPX: Lovenox BID  GI PPX: Protonix 40 mg.  Diet: Regular adult diet    Andrew Jean MD  PGY-1 Neurology

## 2025-01-29 NOTE — PROGRESS NOTES
01/29/25 1400   Discharge Planning   Living Arrangements Alone   Support Systems Parent;Family members   Type of Residence Private residence   Home or Post Acute Services In home services   Expected Discharge Disposition Home   Financial Resource Strain   How hard is it for you to pay for the very basics like food, housing, medical care, and heating? Not hard   Housing Stability   In the last 12 months, was there a time when you were not able to pay the mortgage or rent on time? N   In the past 12 months, how many times have you moved where you were living? 0   At any time in the past 12 months, were you homeless or living in a shelter (including now)? N   Transportation Needs   In the past 12 months, has lack of transportation kept you from medical appointments or from getting medications? yes   In the past 12 months, has lack of transportation kept you from meetings, work, or from getting things needed for daily living? Yes     01/29/2025:  Care Transitions Note    Plan per Medical/Surgical Team:  Partial Nephrectomy   Status: Inpatient   Payor Source: [employer] Medicare   Discharge disposition: Home with [facility]  Expected date of discharge: Unknown at this time   Barriers: No barriers identified at this time.  PCP / Primary Oncologist: [facility]   Preferred Pharmacy: [facility] [address] [place] [address]  [facility] agency[facility]     01/29/2025: TCC spoke with patient bedside regarding discharge planning. Patient states he is in the process of transitioning his Oncology care to . Patient states that he currently receives [facility] care [facility] the [facility]. Patient has Skilled Nursing as well as 40hours/week for HHA. Also prefers medications still processed through the [facility] as he has no out of pocket expense associated with this. Currently utilizes as can for walking and also has a manual w/c and walker at home. TCC to  follow up with [facility] intake regarding resuming HHC when medically ready for discharge. ADOD unknown at this time. Demographics and insurance verifed with patient. Will continue to follow for any discharge planning needs.  SAL Ortega, TCC

## 2025-01-29 NOTE — CONSULTS
Wound Care Consult     Visit Date: 1/29/2025      Patient Name: Manuel Marrufo         MRN: [id number]           YOB: 1965     Reason for Consult: assess back wound         Wound History: Tumor removed in 11/24. Patient states that at last follow up the wound was packed.     Pertinent Labs:   Albumin   Date Value Ref Range Status   01/29/2025 4.1 3.4 - 5.0 g/dL Final       Wound Assessment:  Wound 01/29/25 Back Medial;Upper (Active)   Wound Image   01/29/25 1755   Wound Length (cm) 0.8 cm 01/29/25 1755   Wound Width (cm) 0.8 cm 01/29/25 1755   Wound Surface Area (cm^2) 0.64 cm^2 01/29/25 1755   Wound Depth (cm) 0.8 cm 01/29/25 1755   Wound Volume (cm^3) 0.512 cm^3 01/29/25 1755   Margins Well-defined edges 01/29/25 1755   Drainage Description Serous 01/29/25 1755   Drainage Amount Small 01/29/25 1755       Wound Team Summary Assessment: Red clean tissue with yellow base     Recommendation:   Cleanse wound with Vashe   Pack with Aquacel AG   Cover with Mepilex Border dressing     Patient will need to follow up with [facility] provider at discharge      Wound Team Plan: Please review recommendation      .[email or web address]  1/29/2025  6:44 PM

## 2025-01-29 NOTE — ED NOTES
Ray 4 bed 403A @ [facility], N2N [phone], PAS ETA 0220     Kailash Carrasquillo RN  01/29/25 0014

## 2025-01-29 NOTE — CARE PLAN
Problem: Fall/Injury  Goal: Not fall by end of shift  Outcome: Progressing  Goal: Be free from injury by end of the shift  Outcome: Progressing  Goal: Verbalize understanding of personal risk factors for fall in the hospital  Outcome: Progressing  Goal: Verbalize understanding of risk factor reduction measures to prevent injury from fall in the home  Outcome: Progressing  Goal: Use assistive devices by end of the shift  Outcome: Progressing  Goal: Pace activities to prevent fatigue by end of the shift  Outcome: Progressing     Problem: Pain  Goal: Takes deep breaths with improved pain control throughout the shift  Outcome: Progressing  Goal: Turns in bed with improved pain control throughout the shift  Outcome: Progressing  Goal: Walks with improved pain control throughout the shift  Outcome: Progressing  Goal: Performs ADL's with improved pain control throughout shift  Outcome: Progressing  Goal: Participates in PT with improved pain control throughout the shift  Outcome: Progressing  Goal: Free from opioid side effects throughout the shift  Outcome: Progressing  Goal: Free from acute confusion related to pain meds throughout the shift  Outcome: Progressing   The patient's goals for the shift include      The clinical goals for the shift include pt will remain safe and free from falls throughout shift on 1/28-1/29 at 0700    Pt arrived on floor around 0230 and complained of headache at 9/10 pain. Pt received pain medication and reported that pain began to subside about an hour later. Pt is complaining of difficulty breathing but is HDS and VSS and continuing to monitor. Plan for pt includes consult with neurosurgery, respiratory, and to get an MRI. Pt remained safe and free from falls and injury during shift as well.

## 2025-01-29 NOTE — CONSULTS
Inpatient consult to Neurosurgery  Consult performed by: Celi Zhu MD  Consult ordered by: Padmaja Osullivan MD      Date of Service:  1/29/2025 Attending Provider:  Padmaja sOullivan MD     Reason for Consultation:  Manuel Marrufo is being seen today for a consult requested by Padmaja Osullivan MD for brain mets.    Subjective   History of Present Illness:  Manuel is a 59 y.o. male with Headache    Patient endorsed unbearable throbbing headaches, otherwise patient denied any weakness, numbness, vision changes, fevers or chills.    Review of Systems negative other than listed in HPI.    Objective   Vitals:  Vitals:    01/29/25 0355   BP: 135/77   Pulse: 60   Resp: 18   Temp: 36.3 °C (97.3 °F)   SpO2: 95%         Exam:  Constitutional: No acute distress  Resp: breathing comfortably  Cardio: well perfused  GI: nondistended  MSK: full range of motion  Neuro: Awake, Ox3  face symmetric  RUE 5/5  LUE 5/5  RLE 5/5  LLE 5/5  sensation intact to light touch  Psych: appropriate  Skin: no obvious lesions    Medical History  Past Medical History:   Diagnosis Date    Agoraphobia with panic attacks     Anxiety     Awareness under anesthesia 2021    During back surgery    COPD (chronic obstructive pulmonary disease) (Multi)     Fatty liver disease, nonalcoholic     GERD (gastroesophageal reflux disease)     HLD (hyperlipidemia)     Melanoma (Multi)     with mets to brain, spine and lungs    FRANK on CPAP     PTSD (post-traumatic stress disorder)        Surgical History  Past Surgical History:   Procedure Laterality Date    BACK SURGERY      MALIGNANT SKIN LESION EXCISION      left shoulder and armpit    TONSILLECTOMY          Medications  Current Outpatient Medications   Medication Instructions    acetaminophen (TYLENOL) 325 mg, oral, Every 6 hours PRN    albuterol 90 mcg/actuation inhaler 2 puffs, inhalation, Every 6 hours PRN    ARIPiprazole (ABILIFY) 10 mg, oral, Daily    budesonide-glycopyr-formoterol (BREZTRI) 160-9-4.8 mcg/actuation  HFA aerosol inhaler 2 puffs, inhalation, 2 times daily RT    cholecalciferol (VITAMIN D-3) 50 mcg, oral, Daily    dexAMETHasone (DECADRON) 4 mg, oral, 2 times daily    diazePAM (Valium) 5 mg tablet Take 2 tablets by mouth ( 10 mg ) in the morning and 1 tablet by mouth at bedtime ( 5 mg ) for a total dose of 15 mg daily.    furosemide (LASIX) 40 mg, oral, Daily    lisinopril 5 mg, oral, Daily    loperamide (IMODIUM) 2 mg, oral, 3 times daily PRN    montelukast (SINGULAIR) 10 mg, oral, Nightly    ondansetron (ZOFRAN) 4 mg, oral, Every 8 hours PRN    oxyCODONE (ROXICODONE) 10 mg, oral, Every 3 hours PRN    pantoprazole (PROTONIX) 40 mg, oral, Daily before breakfast, Do not crush, chew, or split.    pregabalin (LYRICA) 200 mg, oral, 2 times daily    propranolol LA (INDERAL LA) 120 mg, oral, 2 times daily    rosuvastatin (CRESTOR) 10 mg, oral, Daily    sertraline (ZOLOFT) 200 mg, oral, Daily    tadalafil (CIALIS) 5 mg, oral, Daily        Diagnostic Results:    Lab Results   Component Value Date    WBC 6.8 01/27/2025    HGB 9.8 (L) 01/27/2025    HCT 33.4 (L) 01/27/2025    MCV 67 (L) 01/27/2025     01/27/2025     Lab Results   Component Value Date    CREATININE 1.01 01/27/2025    BUN 17 01/27/2025     01/27/2025    K 3.8 01/27/2025     01/27/2025    CO2 27 01/27/2025     Lab Results   Component Value Date    INR 1.0 11/24/2024    PROTIME 11.2 11/24/2024       === 01/27/25 ===    CT HEAD WO IV CONTRAST    - Impression -  Multifocal irregular regions of low-attenuation parenchymal edema  throughout both cerebral hemispheres as well as the left cerebellar  hemispheric are again seen which are likely related to patient's  known history of intracranial metastatic disease.    Round approximate 1 cm probable metastasis evident in the right  parietal lobe posteriorly. MRI could be considered for further  characterization as clinically indicated.    No acute intracranial bleed.    MACRO:  None.    Signed by:  Pedro Sfiligoj 1/27/2025 3:47 PM  Dictation workstation:   [id number]  === 11/24/24 ===    MR BRAIN W AND WO CONTRAST    - Impression -  1. Diffuse, innumerable infratentorial and supratentorial  intraparenchymal avidly enhancing and mildly diffusion restricting  lesions consistent with metastatic disease. No midline shift or  herniation.  2. Additional findings as described above.      I personally reviewed the images/study and I agree with the findings  as stated by Resident Ki Toribio MD.    MACRO:  None    Signed by: Mary Daniel 11/26/2024 6:41 PM  Dictation workstation:   [id number]      Assessment/Plan   Assessment:  59yM h/o L shoulder melanoma s/p resection in 2019, recently diagnosed RLL mass s/p partial resection and biopsy [facility]), has VNS, HTN, HLD, fatty liver disease, obesity, FRANK on CPAP, PTSD, GERD, p/w HAx8 days, CTH enumerous hemorrhagic supra and infra tentorial small mets w associated edema, 11/26 MRI brain diffuse, innumerable brain mets, s/p 10 sessions WBRT (last session 12/24), s/p 2 cycles immunotherapy, 1/29 p/w HA, CTH stable    Plan:  DNR/DNI  [place] primary  Rad onc recs  TB recs-radiation  Recommend MRI PPF to evaluate for progression  Agree with dexamethasone      Celi Zhu MD

## 2025-01-29 NOTE — PROGRESS NOTES
Pharmacy Medication History Review    Manuel Marrufo is a 59 y.o. male admitted for Headache. Pharmacy reviewed the patient's qsxuv-[id number] medications and allergies for accuracy.    Medications ADDED:  Oxycodone 10mg ER - [facility] 1/22/2025  Memantine 5mg x2 BID (titrated to most recent dosage of 10mg BID, started sometime taking it in November; reports prescribed for preventative memory loss)   Omeprazole 20mg BID  Tizanidine 2mg BID PRN  Lidocaine patches - 3 daily  Medications CHANGED:  Diazepam to 5mg qAM + 7.5mg HS - patients home regimen O[facility] 12/20/2024  Oxycodone 10mg IR to q6h PRN   Imodium to 6mg daily PRN - per patient has IBS-D and 6mg works best   Albuterol to QID PRN  Zofran to ODT PRN  Medications REMOVED:   Lisinopril 5mg - patient is not taking  Singulair - paient not taking, controlled with Breztri + albuterol PRN    The list below reflects the updated PTA list.   Prior to Admission Medications   Prescriptions Informant   ARIPiprazole (Abilify) 10 mg tablet [facility], Self   Sig: Take 1 tablet (10 mg) by mouth once daily.   acetaminophen (Tylenol) 325 mg tablet [facility], Self   Sig: Take 1 tablet (325 mg) by mouth 4 times a day as needed for mild pain (1 - 3).   albuterol 90 mcg/actuation inhaler [facility], Self      Sig: Inhale 2 puffs 4 times a day as needed for wheezing or shortness of breath.   budesonide-glycopyr-formoterol (BREZTRI) 160-9-4.8 mcg/actuation HFA aerosol inhaler [facility], Self      Sig: Inhale 2 puffs 2 times a day.   cholecalciferol (Vitamin D-3) 50 MCG (2000 UT) tablet [facility], Self      Sig: Take 1 tablet (50 mcg) by mouth once daily.   dexAMETHasone (Decadron) 4 mg tablet    Sig: Take 1 tablet (4 mg) by mouth 2 times a day for 8 days.   Patient not taking: Reported on 1/29/2025   diazePAM (Valium) 5 mg tablet [facility], Self      Sig: Take 1 tablet (5 mg) by mouth in the morning and take 1.5 tablets (7.5 mg) in the evening.   furosemide (Lasix) 40 mg tablet [facility], Self      Sig: Take 1 tablet (40 mg)  by mouth once daily. Edema   lidocaine (Lidoderm) 5 % patch [facility], Self      Sig: Place 3 patches on the skin once daily.   loperamide (Imodium) 2 mg capsule [facility], Self      Sig: Take 3 capsules (6 mg) by mouth once daily as needed for diarrhea.   memantine (Namenda) 5 mg tablet [facility], Self      Sig: Take 2 tablets (10 mg) by mouth 2 times a day.   omeprazole (PriLOSEC) 20 mg DR capsule [facility], Self      Sig: Take 1 capsule (20 mg) by mouth 2 times a day before meals.   ondansetron ODT (Zofran-ODT) 8 mg disintegrating tablet Self      Sig: Dissolve 1 tablet (8 mg) in the mouth 3 times a day as needed.   oxyCODONE (Roxicodone) 10 mg immediate release tablet [facility], Self      Sig: Take 1 tablet (10 mg) by mouth every 3 hours if needed for severe pain (7 - 10).   Patient taking differently: Take 1 tablet (10 mg) by mouth every 6 hours if needed for severe pain (7 - 10).   oxyCODONE ER (OxyCONTIN) 10 mg 12 hr tablet [facility], Self      Sig: Take 1 tablet (10 mg) by mouth once daily. Do not crush, chew, or split.   pantoprazole (ProtoNix) 40 mg EC tablet     Sig: Take 1 tablet (40 mg) by mouth once daily in the morning. Take before meals. Do not crush, chew, or split.   Patient not taking: Reported on 1/29/2025   pregabalin (Lyrica) 200 mg capsule [facility], Self      Sig: Take 1 capsule (200 mg) by mouth 2 times a day.   propranolol LA (Inderal LA) 120 mg 24 hr capsule [facility], Self      Sig: Take 1 capsule (120 mg) by mouth 2 times a day.   rosuvastatin (Crestor) 10 mg tablet [facility], Self      Sig: Take 1 tablet (10 mg) by mouth once daily.   sertraline (Zoloft) 100 mg tablet [facility], Self      Sig: Take 2 tablets (200 mg) by mouth once daily.   tadalafil (Cialis) 5 mg tablet [facility], Self      Sig: Take 1 tablet (5 mg) by mouth once daily.   tiZANidine (Zanaflex) 2 mg tablet [facility], Self      Sig: Take 1 tablet (2 mg) by mouth 2 times a day as needed for muscle spasms.      Facility-Administered Medications: None        The list below reflects  "the updated allergy list. Please review each documented allergy for additional clarification and justification.  Allergies  Reviewed by Radha Lam MD on 1/29/2025        Severity Reactions Comments    Bactrim [sulfamethoxazole-trimethoprim] Not Specified Nausea/vomiting     Buspirone Not Specified GI Upset     Penicillin Not Specified Unknown             Patient declines M2B at discharge.     Sources:   [facility]  Pharmacy dispense history  Patient interview Good historian  Chart Review  [facility] [phone]    Additional Comments:  See comments above table regarding patients home medication regimen      Lina Max PharmD  Transitions of Care Pharmacist  01/29/25     Secure Chat preferred   If no response call [phone] or [employer] \"Med Rec\"    "

## 2025-01-29 NOTE — H&P
"History Of Present Illness  Manuel Marrufo is a 59 y.o. male with metastatic melanoma, to brain and lung, (s/p left shoulder resection 2019), spinal stenosis (s/p L4-S1 lumbar decompression 2021), HTN, HLD, nonalcoholic fatty liver disease, obesity, FRANK on CPAP, PTSD, GERD, COPD, depression s/p vagal nerve stimulator (SA x2, 2007) presenting with headache in the setting of known brain metastases. [facility] - has done two cycles of dual immunotherapy with nivulumab and ipilumab. Last dose 1/10. Of note, was told steroids could not be administered with this immunotherapy. Also received palliative WBRT 12/14/24. Developed severe, throbbing headache (felt like his head was \"cracking open\"), intractable nausea with some vomiting, associated photophobia, and dizziness on 1/27 - had a virtual visit with his rad onc team through the [facility] the same day and was instructed to present to the hospital - presentation to [place] 1/27. On interview at [facility], reports ongoing throbbing HA, 9/10, following EMS ride. Also with a feeling of pressure and fullness, worse over the parietal and occipital lobes. Not reporting any dizziness or nausea, though was on scheduled Zofran. He reports subacute vision changes - over the last two months - no acute changes. Denies pain with EOM. He does feel the steroids started at OSH have been helpful. He denies any focal weakness. Does sometimes get \"weak in the knees\" and will have to sit down, but has not had true falls, and reports symptoms feel more like the joints slowly giving out. Feels he is almost always short of breath - does have COPD, but no acute changes to his dyspnea. No URI symptoms, no chest pain.  Was admitted to  in November with c/f hemorrhagic brain mets seen on imaging for NSGY consultation. Started on steroids, and obtained MRI brain with diffuse metastatic disease - ultimately, decided to [facility] brain [facility] " [facility]ology and Dr. Schaefer for primary oncology.    [place] Course 1/27-29:  HDS and afebrile on arrival.   Hemoglobin stable at 9.8, microcytic. Other cell lines WNL. CMP at baseline.   CT head - Multifocal irregular regions of low-attenuation parenchymal edema throughout both cerebral hemispheres as well as the left cerebellar hemispheric - known metastatic disease. Round, approximately 1 cm probable metastasis evident in the right parietal lobe posteriorly. MRI could be considered for further characterization as clinically indicated - may be new from prior MR in November. No acute intracranial bleed.  Started on decadron IV - uptitrated to now 8 mg q6. Also using PRN phenergan, and scheduled zofran. Pain otherwise managed with dilaudid, a dose of ketamine, and meclizine for dizziness.  Given 1.5L of fluid.     Past Medical History  He has a past medical history of Agoraphobia with panic attacks, Anxiety, Awareness under anesthesia (2021), COPD (chronic obstructive pulmonary disease) (Multi), Fatty liver disease, nonalcoholic, GERD (gastroesophageal reflux disease), HLD (hyperlipidemia), Melanoma (Multi), FRANK on CPAP, and PTSD (post-traumatic stress disorder).    Surgical History  He has a past surgical history that includes Back surgery; Malignant skin lesion excision; and Tonsillectomy.     Social History  He reports that he quit smoking about 8 months ago. His smoking use included cigarettes. He started smoking about 40 years ago. He has a 20 pack-year smoking history. He has never used smokeless tobacco. He reports that he does not drink alcohol and does not use drugs.    Family History  Family History   Problem Relation Name Age of Onset    Arthritis Mother Patrizia Marrufo     Alcohol abuse Father      Cirrhosis Father      Cervical cancer Sister Kaity Rebolledo     Hypertension Sister Kaity Rebolledo     Other (bladder cancer) Maternal Grandmother Coral Iban         Allergies  Bactrim  [sulfamethoxazole-trimethoprim], Buspirone, and Penicillin    Review of Systems  Negative except as noted above.     Physical Exam  Vitals reviewed.   Constitutional:       Appearance: He is not toxic-appearing.      Comments: Patient frustrated, appears uncomfortable   HENT:      Head: Normocephalic.      Mouth/Throat:      Mouth: Mucous membranes are moist.   Eyes:      Extraocular Movements: Extraocular movements intact.      Conjunctiva/sclera: Conjunctivae normal.      Pupils: Pupils are equal, round, and reactive to light.   Cardiovascular:      Rate and Rhythm: Normal rate and regular rhythm.      Pulses: Normal pulses.   Pulmonary:      Effort: Pulmonary effort is normal. No respiratory distress.      Breath sounds: No wheezing or rhonchi.      Comments: Subjective dyspnea, no distress on exam  Abdominal:      General: Abdomen is flat. Bowel sounds are normal.      Palpations: Abdomen is soft.   Musculoskeletal:      Right lower leg: No edema.      Left lower leg: No edema.   Skin:     General: Skin is warm.      Capillary Refill: Capillary refill takes less than 2 seconds.      Comments: Evaluated healing biopsy site on back - C/D/I, well-approximated with healthy crust   Neurological:      General: No focal deficit present.      Mental Status: He is oriented to person, place, and time. Mental status is at baseline.      Cranial Nerves: No cranial nerve deficit.      Comments: No noted CN deficits  UE and LE strength 5/5 BL       Last Recorded Vitals  /81 (BP Location: Right arm, Patient Position: Sitting)   Pulse 66   Temp 36.3 °C (97.3 °F) (Temporal)   Resp 18   Wt 125 kg (275 lb 5.7 oz)   SpO2 96%     Relevant Results  Reviewed.     Assessment/Plan   Manuel Marrufo is a 59 y.o. male with metastatic melanoma, to brain and lung, (s/p left shoulder resection 2019), spinal stenosis (s/p L4-S1 lumbar decompression 2021), HTN, HLD, MASH, FRANK on CPAP, PTSD, GERD, COPD, depression s/p vagal nerve  stimulator (SA x2, 2007) presenting as a transfer with signs and symptoms of increased ICP in the setting of known brain metastases. CT head at OSH with parenchymal edema and no bleed, possible new right parietal 1 cm mass. Headache improved with initiation of parenteral steroids. On arrival to [facility], is hemodynamically stable and at his neurological baseline, without focal deficits or acute cranial nerve abnormalities, though with ongoing headache. NSGY consulted on admission, with plans for MR brain - do note vagal nerve stimulator.    #HA with c/f increased ICP  #Metastatic melanoma, to brain and lung (s/p left shoulder resection 2019)  CT head - Multifocal irregular regions of low-attenuation parenchymal edema throughout both cerebral hemispheres as well as the left cerebellar hemispheric - known metastatic disease. Round, approximately 1 cm probable metastasis evident in the right parietal lobe posteriorly. MRI could be considered for further characterization as clinically indicated - may be new from prior MR in November. No acute intracranial bleed.  - NSGY c/s [ ]  - Consider [facility] rad onc for possible palliation [ ]  - Repeat MRI brain, note patient has VNS device and will require coordination  - Decadron 8 mg IV q6  - PRN Tylenol / Oxycodone 10 q6 / Dilaudid 0.4 q3  - Scheduled Zofran 4 mg q6 (QTc 437)    #HTN  #HLD  On 40 PO Lasix at home daily, unclear indication.  - Rosuvastatin 10 mg  - Lisinopril 5 mg     #Anxiety, PTSD, treatment-refractory depression  #S/p vagal nerve stimulator   - Propanol  BID (for anxiety per CPRS)  - Valium 10 mg AM and 5 mg PM  - Abilify 10 mg  - Zoloft 200 mg   - Lyrica 200 mg    #Microcytic anemia  - Iron studies    #COPD  - Continue home Breztri - Spiriva and Breo Ellipta   - PRN albuterol  - Singular 10 mg daily    #FRANK  - CPAP    DNR/DNI, NOK Brother - patient did not know number, offered Patrizia Marrufo (Parent)  [phone]   Lovenox BID PPX 2/2 BMI - hold pending any  OR.  Protonix 40 mg.  NPO pending any OR.    Radha Lam MD  Internal Medicine and Pediatrics, PGY-2  [place]

## 2025-01-29 NOTE — ED NOTES
[facility] Dr. Osullivan- waiting for bed @ 1640; no bed expected 1/28     Kailash Carrasquillo, RN  01/28/25 6718

## 2025-01-30 LAB
ALBUMIN SERPL BCP-MCNC: 4.1 G/DL (ref 3.4–5)
ANION GAP SERPL CALC-SCNC: 14 MMOL/L (ref 10–20)
BASOPHILS # BLD MANUAL: 0 X10*3/UL (ref 0–0.1)
BASOPHILS NFR BLD MANUAL: 0 %
BUN SERPL-MCNC: 29 MG/DL (ref 6–23)
CALCIUM SERPL-MCNC: 9.4 MG/DL (ref 8.6–10.6)
CHLORIDE SERPL-SCNC: 107 MMOL/L (ref 98–107)
CO2 SERPL-SCNC: 23 MMOL/L (ref 21–32)
CREAT SERPL-MCNC: 1.21 MG/DL (ref 0.5–1.3)
DACRYOCYTES BLD QL SMEAR: ABNORMAL
EGFRCR SERPLBLD CKD-EPI 2021: 69 ML/MIN/1.73M*2
EOSINOPHIL # BLD MANUAL: 0 X10*3/UL (ref 0–0.7)
EOSINOPHIL NFR BLD MANUAL: 0 %
ERYTHROCYTE [DISTWIDTH] IN BLOOD BY AUTOMATED COUNT: 21.5 % (ref 11.5–14.5)
GLUCOSE SERPL-MCNC: 99 MG/DL (ref 74–99)
HCT VFR BLD AUTO: 31.1 % (ref 41–52)
HGB BLD-MCNC: 9.1 G/DL (ref 13.5–17.5)
HYPOCHROMIA BLD QL SMEAR: ABNORMAL
IMM GRANULOCYTES # BLD AUTO: 0.24 X10*3/UL (ref 0–0.7)
IMM GRANULOCYTES NFR BLD AUTO: 2.1 % (ref 0–0.9)
LYMPHOCYTES # BLD MANUAL: 1.77 X10*3/UL (ref 1.2–4.8)
LYMPHOCYTES NFR BLD MANUAL: 15.7 %
MAGNESIUM SERPL-MCNC: 2.25 MG/DL (ref 1.6–2.4)
MCH RBC QN AUTO: 19.9 PG (ref 26–34)
MCHC RBC AUTO-ENTMCNC: 29.3 G/DL (ref 32–36)
MCV RBC AUTO: 68 FL (ref 80–100)
MONOCYTES # BLD MANUAL: 0.49 X10*3/UL (ref 0.1–1)
MONOCYTES NFR BLD MANUAL: 4.3 %
NEUTS SEG # BLD MANUAL: 9.04 X10*3/UL (ref 1.2–7)
NEUTS SEG NFR BLD MANUAL: 80 %
NRBC BLD-RTO: 0.2 /100 WBCS (ref 0–0)
OVALOCYTES BLD QL SMEAR: ABNORMAL
PHOSPHATE SERPL-MCNC: 3.8 MG/DL (ref 2.5–4.9)
PLATELET # BLD AUTO: 215 X10*3/UL (ref 150–450)
POTASSIUM SERPL-SCNC: 4 MMOL/L (ref 3.5–5.3)
RBC # BLD AUTO: 4.57 X10*6/UL (ref 4.5–5.9)
RBC MORPH BLD: ABNORMAL
SODIUM SERPL-SCNC: 140 MMOL/L (ref 136–145)
TOTAL CELLS COUNTED BLD: 115
WBC # BLD AUTO: 11.3 X10*3/UL (ref 4.4–11.3)

## 2025-01-30 PROCEDURE — 83735 ASSAY OF MAGNESIUM: CPT

## 2025-01-30 PROCEDURE — 2500000002 HC RX 250 W HCPCS SELF ADMINISTERED DRUGS (ALT 637 FOR MEDICARE OP, ALT 636 FOR OP/ED)

## 2025-01-30 PROCEDURE — 2500000001 HC RX 250 WO HCPCS SELF ADMINISTERED DRUGS (ALT 637 FOR MEDICARE OP)

## 2025-01-30 PROCEDURE — 36415 COLL VENOUS BLD VENIPUNCTURE: CPT

## 2025-01-30 PROCEDURE — 99233 SBSQ HOSP IP/OBS HIGH 50: CPT

## 2025-01-30 PROCEDURE — 80069 RENAL FUNCTION PANEL: CPT

## 2025-01-30 PROCEDURE — 94640 AIRWAY INHALATION TREATMENT: CPT

## 2025-01-30 PROCEDURE — 85007 BL SMEAR W/DIFF WBC COUNT: CPT

## 2025-01-30 PROCEDURE — 85027 COMPLETE CBC AUTOMATED: CPT

## 2025-01-30 PROCEDURE — 1170000001 HC PRIVATE ONCOLOGY ROOM DAILY

## 2025-01-30 PROCEDURE — 2500000004 HC RX 250 GENERAL PHARMACY W/ HCPCS (ALT 636 FOR OP/ED)

## 2025-01-30 RX ORDER — ONDANSETRON HYDROCHLORIDE 2 MG/ML
4 INJECTION, SOLUTION INTRAVENOUS EVERY 6 HOURS PRN
Status: DISCONTINUED | OUTPATIENT
Start: 2025-01-30 | End: 2025-01-31 | Stop reason: HOSPADM

## 2025-01-30 RX ORDER — HYDROMORPHONE HYDROCHLORIDE 1 MG/ML
0.5 INJECTION, SOLUTION INTRAMUSCULAR; INTRAVENOUS; SUBCUTANEOUS
Status: DISCONTINUED | OUTPATIENT
Start: 2025-01-30 | End: 2025-01-31 | Stop reason: HOSPADM

## 2025-01-30 RX ORDER — LIDOCAINE 560 MG/1
1 PATCH PERCUTANEOUS; TOPICAL; TRANSDERMAL DAILY
Status: DISCONTINUED | OUTPATIENT
Start: 2025-01-30 | End: 2025-01-31 | Stop reason: HOSPADM

## 2025-01-30 RX ORDER — ENOXAPARIN SODIUM 100 MG/ML
40 INJECTION SUBCUTANEOUS
Status: DISCONTINUED | OUTPATIENT
Start: 2025-01-31 | End: 2025-01-31 | Stop reason: HOSPADM

## 2025-01-30 RX ORDER — OXYCODONE HYDROCHLORIDE 10 MG/1
10 TABLET ORAL EVERY 4 HOURS PRN
Status: DISCONTINUED | OUTPATIENT
Start: 2025-01-30 | End: 2025-01-30

## 2025-01-30 RX ORDER — ACETAMINOPHEN 325 MG/1
975 TABLET ORAL 3 TIMES DAILY
Status: DISCONTINUED | OUTPATIENT
Start: 2025-01-30 | End: 2025-01-31 | Stop reason: HOSPADM

## 2025-01-30 RX ORDER — DEXAMETHASONE 4 MG/1
4 TABLET ORAL EVERY 6 HOURS SCHEDULED
Status: DISCONTINUED | OUTPATIENT
Start: 2025-01-30 | End: 2025-01-31 | Stop reason: HOSPADM

## 2025-01-30 RX ORDER — DIAZEPAM 10 MG/1
10 TABLET ORAL DAILY
Status: DISCONTINUED | OUTPATIENT
Start: 2025-01-30 | End: 2025-01-31 | Stop reason: HOSPADM

## 2025-01-30 RX ORDER — OXYCODONE HYDROCHLORIDE 10 MG/1
10 TABLET ORAL EVERY 4 HOURS PRN
Status: DISCONTINUED | OUTPATIENT
Start: 2025-01-30 | End: 2025-01-31 | Stop reason: HOSPADM

## 2025-01-30 RX ORDER — DIAZEPAM 5 MG/1
7.5 TABLET ORAL NIGHTLY
Status: DISCONTINUED | OUTPATIENT
Start: 2025-01-30 | End: 2025-01-31 | Stop reason: HOSPADM

## 2025-01-30 RX ADMIN — SERTRALINE 200 MG: 100 TABLET, FILM COATED ORAL at 08:13

## 2025-01-30 RX ADMIN — PREGABALIN 200 MG: 75 CAPSULE ORAL at 20:05

## 2025-01-30 RX ADMIN — ACETAMINOPHEN 975 MG: 325 TABLET ORAL at 08:13

## 2025-01-30 RX ADMIN — DEXAMETHASONE 4 MG: 4 TABLET ORAL at 08:13

## 2025-01-30 RX ADMIN — DEXAMETHASONE 4 MG: 4 TABLET ORAL at 16:26

## 2025-01-30 RX ADMIN — ACETAMINOPHEN 975 MG: 325 TABLET ORAL at 20:05

## 2025-01-30 RX ADMIN — ALBUTEROL SULFATE 2 PUFF: 90 AEROSOL, METERED RESPIRATORY (INHALATION) at 09:33

## 2025-01-30 RX ADMIN — DIAZEPAM 7.5 MG: 5 TABLET ORAL at 20:04

## 2025-01-30 RX ADMIN — OXYCODONE HYDROCHLORIDE 10 MG: 10 TABLET, FILM COATED, EXTENDED RELEASE ORAL at 20:05

## 2025-01-30 RX ADMIN — MONTELUKAST 10 MG: 10 TABLET, FILM COATED ORAL at 20:05

## 2025-01-30 RX ADMIN — FLUTICASONE FUROATE AND VILANTEROL TRIFENATATE 1 PUFF: 200; 25 POWDER RESPIRATORY (INHALATION) at 09:29

## 2025-01-30 RX ADMIN — PREGABALIN 200 MG: 75 CAPSULE ORAL at 08:12

## 2025-01-30 RX ADMIN — DEXAMETHASONE 4 MG: 4 TABLET ORAL at 20:05

## 2025-01-30 RX ADMIN — LOPERAMIDE HYDROCHLORIDE 6 MG: 2 CAPSULE ORAL at 08:13

## 2025-01-30 RX ADMIN — OXYCODONE HYDROCHLORIDE 10 MG: 10 TABLET, FILM COATED, EXTENDED RELEASE ORAL at 08:13

## 2025-01-30 RX ADMIN — ENOXAPARIN SODIUM 40 MG: 100 INJECTION SUBCUTANEOUS at 08:13

## 2025-01-30 RX ADMIN — ARIPIPRAZOLE 10 MG: 10 TABLET ORAL at 08:13

## 2025-01-30 RX ADMIN — DEXAMETHASONE SODIUM PHOSPHATE 4 MG: 4 INJECTION, SOLUTION INTRA-ARTICULAR; INTRALESIONAL; INTRAMUSCULAR; INTRAVENOUS; SOFT TISSUE at 03:38

## 2025-01-30 RX ADMIN — PROPRANOLOL HYDROCHLORIDE 120 MG: 120 CAPSULE, EXTENDED RELEASE ORAL at 08:13

## 2025-01-30 RX ADMIN — TIOTROPIUM BROMIDE INHALATION SPRAY 2 PUFF: 3.12 SPRAY, METERED RESPIRATORY (INHALATION) at 09:30

## 2025-01-30 RX ADMIN — Medication 2000 UNITS: at 08:13

## 2025-01-30 RX ADMIN — OXYCODONE HYDROCHLORIDE 10 MG: 10 TABLET ORAL at 11:41

## 2025-01-30 RX ADMIN — DIAZEPAM 10 MG: 10 TABLET ORAL at 08:13

## 2025-01-30 RX ADMIN — ROSUVASTATIN CALCIUM 10 MG: 10 TABLET, FILM COATED ORAL at 20:05

## 2025-01-30 RX ADMIN — ACETAMINOPHEN 975 MG: 325 TABLET ORAL at 16:26

## 2025-01-30 RX ADMIN — PROPRANOLOL HYDROCHLORIDE 120 MG: 120 CAPSULE, EXTENDED RELEASE ORAL at 20:05

## 2025-01-30 ASSESSMENT — PAIN - FUNCTIONAL ASSESSMENT: PAIN_FUNCTIONAL_ASSESSMENT: 0-10

## 2025-01-30 ASSESSMENT — PAIN SCALES - GENERAL
PAINLEVEL_OUTOF10: 0 - NO PAIN
PAINLEVEL_OUTOF10: 6
PAINLEVEL_OUTOF10: 7
PAINLEVEL_OUTOF10: 0 - NO PAIN

## 2025-01-30 ASSESSMENT — COGNITIVE AND FUNCTIONAL STATUS - GENERAL
CLIMB 3 TO 5 STEPS WITH RAILING: A LITTLE
DAILY ACTIVITIY SCORE: 24
MOBILITY SCORE: 22
WALKING IN HOSPITAL ROOM: A LITTLE

## 2025-01-30 ASSESSMENT — ACTIVITIES OF DAILY LIVING (ADL): LACK_OF_TRANSPORTATION: YES

## 2025-01-30 NOTE — PROGRESS NOTES
Spiritual Care Visit  Spiritual Care Request    Reason for Visit:  Routine Visit: Follow-up  Continue Visiting: Yes     Request Received From:  Referral From: Nurse    Focus of Care:  Visited With: Patient         Refer to :          Spiritual Care Assessment    Spiritual Assessment:                      Care Provided:  Intended Effects: Build relationship of care and support, Convey a calming presence, Helping someone feel comforted, Lessen someone's feelings of isolation  Interventions: Active listening, Ask guided questions, Discuss concerns    Sense of Community and or Bahai Affiliation:  Yazidism         Addressed Needs/Concerns and/or Ronni Through:          Outcome:  Outcome of Spiritual Care Visit: Dealing with ambiguity, Emotional release     Advance Directives:         Spiritual Care Annotation    Annotation:         Responding to EMR referral,  visited with the patient who is known to this  from prior admissions. Together we spent time processing the last few months, some of the patient's frustrations with the healthcare system, his family and children, and what the patient is hoping for at the moment.     Patient shared the news that he was told he might only have a year left to live. He said he was told he should enjoy this time as much as possible. Patient shared that he wants to travel as much as possible.      will continue to follow and provide support as requested. Please reach out with any needs/concerns.     Rev. Pedro Doss, Supportive Oncology

## 2025-01-30 NOTE — PROGRESS NOTES
Subjective   No headache, well controlled with pain medication. No seizure or behavioral changes.     Objective     Vitals:    01/30/25 1137   BP: 110/72   Pulse: 70   Resp: 18   Temp: 37.7 °C (99.9 °F)   SpO2: 97%      Physical exam:     General:  no pallor, clubbing, cyanosis, dehydration, rash  Neuro: alert and oriented fully, normal sensory / motor exam, no meningeal signs  CV:  RRR, S1 S2 MO. No friction rubs  Lungs: B/L equal air entry, normal breath sound, no crackles   Abd:  Soft, benign, non-tender, distended   Psych:  Appropriate affect, linear thought, insight present   Upper extremities: No edema  Lower extremities: Bilateral pitting edema    Labs:  Most recent labs:   Results from last 7 days   Lab Units 01/30/25  0720 01/29/25  0750 01/27/25  1109   WBC AUTO x10*3/uL 11.3 8.5 6.8   HEMOGLOBIN g/dL 9.1* 8.7* 9.8*   HEMATOCRIT % 31.1* 29.9* 33.4*   PLATELETS AUTO x10*3/uL 215 204 188     Results from last 7 days   Lab Units 01/30/25  0720 01/29/25  0750 01/27/25  1109   SODIUM mmol/L 140 139 139   POTASSIUM mmol/L 4.0 4.2 3.8   CHLORIDE mmol/L 107 106 105   CO2 mmol/L 23 23 27   ANION GAP mmol/L 14 14 11   BUN mg/dL 29* 20 17   CREATININE mg/dL 1.21 1.07 1.01   GLUCOSE mg/dL 99 106* 89   CALCIUM mg/dL 9.4 9.4 9.2   MAGNESIUM mg/dL 2.25 2.33  --    PHOSPHORUS mg/dL 3.8 3.4  --         1/27 CT head:     Multifocal irregular regions of low-attenuation parenchymal edema throughout both cerebral hemispheres as well as the left cerebellar hemispheric are again seen which are likely related to patient's known history of intracranial metastatic disease.      Round approximate 1 cm probable metastasis evident in the right parietal lobe posteriorly. MRI could be considered for further characterization as clinically indicated.      No acute intracranial bleed.  Assessment & Plan  Headache    Manuel Marrufo is a 59 y.o. 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 He initially [facility]  [facility] with a severe headache rated 9 out of 10, characterized as throbbing and located in the parieto-occipital region. The headache was not accompanied by nausea or vomiting but was associated with tinnitus, blurred vision, and photophobia. The pain responded to oxycodone and was positional, worsening with forward bending and improving when lying flat. He has experienced these headaches since July 2024, following a diagnosis of brain metastasis from previously diagnosed melanoma.    [facility]. [facility], a mass was later discovered during a lung cancer screening in the right lower lobe, leading to a PET scan that confirmed malignancy. An attempt at robotic surgery was aborted upon discovering metastatic implants in the intrathoracic area, with pathology confirming metastatic melanoma. He was admitted to [facility] in November 2024 due to concerns of hemorrhagic brain metastases identified on a CT scan after [facility] with [facility], resulting in his transfer to . He was started on steroids and underwent an MRI, which revealed diffuse metastatic disease. Consequently, a decision was made to [facility] the [facility], and he completed ten sessions of palliative WBRT in December 2024. In January, the patient has undergone two cycles of dual immunotherapy with nivolumab and ipilimumab, with the last dose administered on January 10, 2025. [facility], while Dr. Schaefer is seeing chemo/immunotherapy.     On examination, the patient exhibited no focal neurological deficits or meningeal signs. His current headache is similar in nature to previous episodes but more severe. Differential diagnoses include metastatic disease, venous sinus thrombosis, immune checkpoint inhibitor-induced autoimmune encephalitis, and leptomeningeal or pachymeningeal metastasis.     #Metastatic melanoma  -  Following supportive oncology recommendations for pain, nausea and vomiting  - Continue dexamethasone  - MRI scheduled for 1/30- could not complete as VNS could not be turned off   - Will schedule outpatient follow up with VNS NP to turn it off and then schedule an outpatient MRI   - Further plans depending on MRI results    Fluid: PRN   Electrolytes: PRN   Code status: DNR/DNI  NOK: Patrizia Marrufo (Parent) [phone]   DVT PPX: Lovenox BID  GI PPX: Protonix 40 mg.  Diet: Regular adult diet    Andrew Jean MD  PGY-1 Neurology

## 2025-01-30 NOTE — PROGRESS NOTES
"SUPPORTIVE AND PALLIATIVE ONCOLOGY PROGRESS NOTE    SERVICE DATE: 2025    SUBJECTIVE:  HISTORY OF PRESENT ILLNESS: Manuel Marrufo is a 59 y.o. 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 PMHx significant for spinal stenosis (s/p L4-S1 lumbar decompression in ), HTN, HLD, non-alcoholic fatty liver disease, COPD, obesity, FRANK on CPAP, depression s/p vagal nerve stimulator (SA x2, ), PTSD, and GERD. Admitted 2025 for further evaluation and management of headache pain with associated n/v, dizziness, and photophobia. Supportive and Palliative Oncology is following for pain and symptom management     Interval Events:  MRI brain scheduled for today, 25.    Pt's pain and anxiety well controlled on current regimen. States he only had 2 headaches overnight, which he says is good for him. Pt states he didn't sleep at all due to worrying about where he will continue to receive cancer care. He expresses apprehension regarding returning to [facility] because, \"they aren't going to do anything for me.\" When asked to elaborate his feelings regarding this, he states, \"I just don't want to talk about it more right now.\"    Summarized Recommendations (25):  Change oxycodone IR 10mg q4h PRN for moderate pain [hold for AMS/lethargy]  Consider starting oxycodone IR 20mg PO q4h PRN for severe pain [reflective of home dosing, hold for AMS/lethargy]--pt states he does not require often, but is helpful when his headache flares are especially bad  I have consulted Spiritual Care for support    Pain Assessment:  Location: Head, posterior neck, low back--radiates down BLE to feet  Duration: Constant  Characteristics:  Ratin/10, \"that's a good level for me.\"  Descriptors: Aching, sore, sharp, shooting  Aggravating: Bright lights, loud noises, movement "   Relieving: Analgesics--see EMR, positioning, and modifying activity  Interference with Function: A little    Opioid Requirements  Past 24h opioid requirements:  (1/29-1/30, 2658-9845)  oxycodone ER (OxyContin) 10mg x 2 = 20mg = 25 OME  oxycodone IR 10mg x 2 = 20mg = 25 OME  hydromorphone 0.4mg IV x 1 = 5 OME    Total 24h OME use: 55 OME    Note: OME calculations based on equianalgesic table below. Please note this table is based on best available evidence but conversions are still approximate. These are NOT opioid DOSES for individual patient use; this is equivalency information.  Drug Parenteral Enteral   Morphine 10 25   Oxycodone N/A 20   Hydromorphone 2 5   Fentanyl 0.15 N/A   Tramadol N/A 120   Citation: Pablo AVILES. Demystifying opioid conversion calculations: A guide for effective dosing, Second edition. [place]: American Society of Health-System Pharmacists, 2018.    Symptom Assessment:  Anxiety: a little--better controlled since home diazepam regimen resumed  Difficulty Sleeping: very much  Nausea: none  Constipation: none--last BM 1/29 PM per pt    Information obtained from: chart review, interview of patient, and discussion with primary team  ______________________________________________________________________   Objective     Lab Results   Component Value Date    WBC 8.5 01/29/2025    HGB 8.7 (L) 01/29/2025    HCT 29.9 (L) 01/29/2025    MCV 68 (L) 01/29/2025     01/29/2025      Lab Results   Component Value Date    GLUCOSE 106 (H) 01/29/2025    CALCIUM 9.4 01/29/2025     01/29/2025    K 4.2 01/29/2025    CO2 23 01/29/2025     01/29/2025    BUN 20 01/29/2025    CREATININE 1.07 01/29/2025     Lab Results   Component Value Date    ALT 37 01/27/2025    AST 39 01/27/2025    ALKPHOS 63 01/27/2025    BILITOT 0.3 01/27/2025     Estimated Creatinine Clearance: 96.9 mL/min (by C-G formula based on SCr of 1.07 mg/dL).     Scheduled medications   acetaminophen, 975 mg, oral,  TID  ARIPiprazole, 10 mg, oral, Daily  cholecalciferol, 2,000 Units, oral, Daily  dexAMETHasone, 4 mg, oral, q6h TING  diazePAM, 10 mg, oral, Daily   And  diazePAM, 7.5 mg, oral, Nightly  [START ON 1/31/2025] enoxaparin, 40 mg, subcutaneous, q24h TING  tiotropium, 2 puff, inhalation, Daily   And  fluticasone furoate-vilanteroL, 1 puff, inhalation, Daily  lidocaine, 1 patch, transdermal, Daily  loperamide, 6 mg, oral, Daily  montelukast, 10 mg, oral, Nightly  ondansetron, 4 mg, intravenous, q6h  oxyCODONE ER, 10 mg, oral, q12h TING  pantoprazole, 40 mg, oral, Daily before breakfast  pregabalin, 200 mg, oral, BID  propranolol LA, 120 mg, oral, BID  rosuvastatin, 10 mg, oral, Nightly  sertraline, 200 mg, oral, Daily    Continuous medications     PRN medications  albuterol, 2 puff, q4h PRN  HYDROmorphone, 0.5 mg, q3h PRN  ondansetron, 4 mg, q6h PRN  oxyCODONE, 10 mg, q4h PRN  oxyCODONE, 10 mg, q4h PRN    PHYSICAL EXAMINATION:  Vital Signs:   Vital signs reviewed  Vitals:    01/30/25 0739   BP: 130/84   Pulse: 58   Resp: 16   Temp: 36.3 °C (97.3 °F)   SpO2: 98%     Pain Score: 6    Physical Exam  Vitals reviewed.   Constitutional:       Comments: Alert, awake, ill appearing gentleman laying in bed. No signs of acute distress. Pleasant, cooperative, and participating in interview.  HENT:   Head:      Comments: Normocephalic, atraumatic.   Eyes:      Comments: Sclera clear, EOM intact, wearing glasses.    Pulmonary:      Comments: Symmetrical chest rise. Regular rate and depth of respirations. Room air.   Abdominal:      Comments: Abdomen obese, slightly distended, non tender, and soft.   Musculoskeletal:      Comments: Normal muscle strength and tone. RANDLE x4. Generalized non-pitting edema.   Skin:     Comments: No lesions, rash, or abrasions present on visible skin. Skin color appropriate for ethnicity.   Neurological:      Comments: A&Ox4, follows commands, no apparent sensory deficits.   Psychiatric:      Comments: Mild  anxiety and sadness, but behavior otherwise appropriate.     ASSESSMENT/PLAN:  Manuel Marrufo is a 59 y.o. 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 PMHx significant for spinal stenosis (s/p L4-S1 lumbar decompression in 2021), HTN, HLD, non-alcoholic fatty liver disease, COPD, obesity, FRANK on CPAP, depression s/p vagal nerve stimulator (SA x2, 2007), PTSD, and GERD. Admitted 1/29/2025 for further evaluation and management of headache pain with associated n/v, dizziness, and photophobia. Supportive and Palliative Oncology is following for pain and symptom management.      Neoplasm Related Pain  Headache, neck, and BLE/feet pain related to known malignancy with metastatic lung and brain involvement.   Type: Somatic, neuropathic  Pain control: Well controlled  Home regimen: oxycodone ER (OxyContin) 10mg q12h [per pt he had not yet started], oxycodone IR 10-20mg q6h PRN [using x3-4/day], lidocaine TD patch once daily, acetaminophen PRN [usually 3g/day]  Intolerances: Vicodin [rx: sweating, shaking]  Personalized pain goal: Mild  Total OME usage for the past 24 hours: 55 OME  Renal and hepatic function WNL.   Continue scheduled acetaminophen 975mg PO q8h   Continue lidocaine 4% TD patch once daily   dexamethasone 4mg PO q6h per primary  Continue scheduled oxycodone ER (OxyContin) 10mg PO q12h  Change oxycodone IR 10mg q4h PRN for moderate pain [hold for AMS/lethargy]  Consider starting oxycodone IR 20mg PO q4h PRN for severe pain [reflective of home dosing, hold for AMS/lethargy]--pt states he does not require often, but is helpful when his headache flares are especially bad  Continue hydromorphone 0.5mg IV q3h PRN for breakthrough pain [hold for AMS/lethargy]  Continue to monitor pain scores and administer PRN medications as appropriate  Continue/initiate  nonpharmacologic pain management strategies including ice/heat therapy, distraction techniques, deep breathing/relaxation techniques, calming music, and repositioning  Continue to monitor for signs of opioid efficacy (pain scores, improved functionality) and toxicity (pinpoint pupils, excess sedation/drowsiness/confusion, respiratory depression, etc.)     Nausea  At risk for nausea and vomiting related to opioid use. Currently denies.   Home regimen: PPI  EKG reviewed from 1/27/25, QTc 437.   PPI per primary   Continue ondansetron 4mg PO/IV q6h PRN for n/v, first line     Constipation  At risk for constipation related to opioids and reduced mobility, currently not constipated.  Has hx of diarrhea prone IBS per pt.   Usual bowel pattern: Once daily while on home loperamide  Home regimen: loperamide 6mg once daily [scheduled]  LBM: 1/29/25 PM per pt   Continue loperamide 6mg PO once daily   Goal to have BM without straining q48-72h, adjust regimen as needed  Encourage mobility as tolerated     Altered Mood  Hx of anxiety, PTSD, and depression s/p vagal nerve stimulator (SA x2, 2007).  Well controlled with home regimen.  Allergy to buspirone [rx: n/v]  Previously tried: alprazolam [rx: ineffective], clonazepam [rx: ineffective]  Home regimen: propanol LA 120mg BID (for anxiety per CPRS), diazepam 10mg AM / 7.5mg PM, aripiprazole 10mg once daily, sertraline 200mg once daily, pregabalin 200mg BID  Continue aripiprazole 10mg PO once daily  Continue diazepam 10mg PO AM / 7.5mg PO PM  Continue pregabalin 200mg PO BID  Continue propanol LA 120mg PO BID  Continue sertraline 200mg PO once daily   If need arises, consider consulting Psych if medications need adjusting given complexity of existing regimen and extensive psych hx  Spiritual Care consulted for support     Sleeping Difficulty  Impaired sleep related to pain, anxiety, and hospital environment.  Hx of chronic insomnia per pt.   Home regimen: None, see pain and anxiety  home meds  See pain and anxiety recs     Medical Decision Making/Goals of Care/Advance Care Planning:  (1/29/25) Patient's current clinical condition, including diagnosis, prognosis, and management plan, and goals of care were discussed.   Life limiting disease: Metastatic malignancy  Family: Supportive, brother, mother, sister--although pt states sometimes their supportiveness can fluctuate  Performance status: Moderate limitations due to pain.   Joys/meaning/strength: [place], family   Understanding of health: Demonstrates good understanding of disease process, understands updated recommendations concerning his pain and symptom regimen.   Information: Appreciates full disclosure and timely updates as possible  Medical update: Per primary team   Goals: Pain and symptom control, continued cancer tx  Worries and fears now and future: Death, worsening symptoms   Code status discussion: Confirmed DNR-DNI code status with pt. He endorses that he would be OK with ICU care escalation if needed.      Advance Directives  Existence of Advance Directives: Yes, but NOT on file in medical record  Decision maker: HCPOA is pt's brotherMason  Code Status: DNR-DNI     Disposition:  Please start the process of having prior authorization with meds to beds deliver medications to patient prior to discharge via [facility]. Prescriptions will need to be sent 48-72 hours prior to discharge so that a prior authorization can be completed.      Discharge date pending resolution of acute hospital issues.   I will reach out to [facility] closer to discharge.       Supportive and Palliative Oncology encounter:  Spoke with patient at bedside.  Emotional support provided  Coordination of care: medication and symptom re-evaluation    Signature and billing:  Medical complexity was high level due to due to complexity of problems, extensive data review, and high risk of management/treatment.    I spent 50 minutes in the  care of this patient which included chart review, interviewing patient/family, discussion with primary team, coordination of care, and documentation.    DATA   Diagnostic tests and information reviewed for today's visit:  Conversation with primary team, Most recent labs, Most recent EKG, Medications     Some elements copied from my note on 1/29/25, the elements have been updated and all reflect current decision making from today, 1/30/2025.    Plan of Care discussed with: Primary team, pt    Thank you for asking Supportive and Palliative Oncology to assist with care of this patient.  Recommendations will be communicated back to the consulting service by way of shared electronic medical record/secure chat/email or face-to-face.   We will continue to follow.  Please contact us for additional questions or concerns.    SIGNATURE: ANNELISE Brito-BRAD  PAGER/CONTACT:  Contact information:  Supportive and Palliative Oncology  Monday-Friday 8 AM-5 PM  Epic Secure chat or pager [phone].  After hours and weekends:  pager [phone]

## 2025-01-30 NOTE — CARE PLAN
The patient's goals for the shift include      The clinical goals for the shift include pt will remain safe and free from injury throughout shift      Problem: Fall/Injury  Goal: Not fall by end of shift  Outcome: Progressing  Goal: Be free from injury by end of the shift  Outcome: Progressing  Goal: Verbalize understanding of personal risk factors for fall in the hospital  Outcome: Progressing  Goal: Verbalize understanding of risk factor reduction measures to prevent injury from fall in the home  Outcome: Progressing  Goal: Use assistive devices by end of the shift  Outcome: Progressing  Goal: Pace activities to prevent fatigue by end of the shift  Outcome: Progressing     Problem: Pain  Goal: Takes deep breaths with improved pain control throughout the shift  Outcome: Progressing  Goal: Turns in bed with improved pain control throughout the shift  Outcome: Progressing  Goal: Walks with improved pain control throughout the shift  Outcome: Progressing  Goal: Performs ADL's with improved pain control throughout shift  Outcome: Progressing  Goal: Participates in PT with improved pain control throughout the shift  Outcome: Progressing  Goal: Free from opioid side effects throughout the shift  Outcome: Progressing  Goal: Free from acute confusion related to pain meds throughout the shift  Outcome: Progressing

## 2025-01-30 NOTE — CARE PLAN
Problem: Fall/Injury  Goal: Not fall by end of shift  1/30/2025 0619 by Melanie Rosenberg RN  Outcome: Progressing  1/30/2025 0617 by Melanie Rosenberg RN  Outcome: Progressing  Goal: Be free from injury by end of the shift  1/30/2025 0619 by Melanie Rosenberg RN  Outcome: Progressing  1/30/2025 0617 by Melanie Rosenberg RN  Outcome: Progressing  Goal: Verbalize understanding of personal risk factors for fall in the hospital  1/30/2025 0619 by Melanie Rosenberg, SAL  Outcome: Progressing  1/30/2025 0617 by Melanie Rosenberg RN  Outcome: Progressing  Goal: Verbalize understanding of risk factor reduction measures to prevent injury from fall in the home  1/30/2025 0619 by Melanie Rosenberg, SAL  Outcome: Progressing  1/30/2025 0617 by Melanie Rosenberg RN  Outcome: Progressing  Goal: Use assistive devices by end of the shift  1/30/2025 0619 by Melanie Rosenberg, RN  Outcome: Progressing  1/30/2025 0617 by Melanie Rosenberg RN  Outcome: Progressing  Goal: Pace activities to prevent fatigue by end of the shift  1/30/2025 0619 by Melanie Rosenberg, SAL  Outcome: Progressing  1/30/2025 0617 by Melanie Rosenberg RN  Outcome: Progressing     Problem: Pain  Goal: Takes deep breaths with improved pain control throughout the shift  1/30/2025 0619 by Melanie Rosenberg, SAL  Outcome: Progressing  1/30/2025 0617 by Melanie Rosenberg RN  Outcome: Progressing  Goal: Turns in bed with improved pain control throughout the shift  1/30/2025 0619 by Melanie Rosenberg RN  Outcome: Progressing  1/30/2025 0617 by Melanie Rosenberg RN  Outcome: Progressing  Goal: Walks with improved pain control throughout the shift  1/30/2025 0619 by Melanie Rosenberg, RN  Outcome: Progressing  1/30/2025 0617 by Melanie Rosenberg RN  Outcome: Progressing  Goal: Performs ADL's with improved pain control throughout shift  1/30/2025 0619 by Melanie Rosenberg, RN  Outcome: Progressing  1/30/2025 0617 by Melanie  MEENA Rosenberg RN  Outcome: Progressing  Goal: Participates in PT with improved pain control throughout the shift  1/30/2025 0619 by Melanie Rosenberg RN  Outcome: Progressing  1/30/2025 0617 by Melanie Rosenberg RN  Outcome: Progressing  Goal: Free from opioid side effects throughout the shift  1/30/2025 0619 by Melanie Rosenberg RN  Outcome: Progressing  1/30/2025 0617 by Melanie Rosenberg RN  Outcome: Progressing  Goal: Free from acute confusion related to pain meds throughout the shift  1/30/2025 0619 by Melanie Rosenberg RN  Outcome: Progressing  1/30/2025 0617 by Melanie Rosenberg RN  Outcome: Progressing   The patient's goals for the shift include      The clinical goals for the shift include Pt will remain safe and free from injury throughout shift on 1/29 to 1/30/25 at 0700    Over the shift, the pt was treated for headache pain of 10/10. After pain medication, pt stated relief and was comfortable for rest of shift. PT remained safe and free from injury is HDS and VSS

## 2025-01-30 NOTE — CARE PLAN
The patient's goals for the shift include        Problem: Fall/Injury  Goal: Not fall by end of shift  Outcome: Progressing     Problem: Pain  Goal: Takes deep breaths with improved pain control throughout the shift  Outcome: Progressing     Problem: Pain  Goal: Performs ADL's with improved pain control throughout shift  Outcome: Progressing     Problem: Pain  Goal: Walks with improved pain control throughout the shift  Outcome: Progressing     Problem: Pain  Goal: Free from opioid side effects throughout the shift  Outcome: Progressing

## 2025-01-30 NOTE — PROGRESS NOTES
01/30/25 1200   Discharge Planning   Living Arrangements Alone   Support Systems Parent;Family members   Type of Residence Private residence   Do you have animals or pets at home? Yes   Type of Animals or Pets cat   Who is requesting discharge planning? Provider   Home or Post Acute Services In home services   Type of Home Care Services Home health aide;Home nursing visits   Expected Discharge Disposition Home   Does the patient need discharge transport arranged? No   Financial Resource Strain   How hard is it for you to pay for the very basics like food, housing, medical care, and heating? Not hard   Housing Stability   In the last 12 months, was there a time when you were not able to pay the mortgage or rent on time? N   In the past 12 months, how many times have you moved where you were living? 0   At any time in the past 12 months, were you homeless or living in a shelter (including now)? N   Transportation Needs   In the past 12 months, has lack of transportation kept you from medical appointments or from getting medications? yes   In the past 12 months, has lack of transportation kept you from meetings, work, or from getting things needed for daily living? Yes     01/30/2025: CARMELITA spoke with patient on 01/29/2025 that states that he is active with [facility] for skilled nursing and home health aides. [facility]EL[facility] outreached [facility] ([phone] ext [phone]) regarding return to care. No answer so CARMELITA left a VM message for a return phone call. Kendal MAE

## 2025-01-31 ENCOUNTER — PHARMACY VISIT (OUTPATIENT)
Dept: PHARMACY | Facility: CLINIC | Age: 60
End: 2025-01-31
Payer: MEDICARE

## 2025-01-31 ENCOUNTER — APPOINTMENT (OUTPATIENT)
Dept: RADIOLOGY | Facility: HOSPITAL | Age: 60
End: 2025-01-31
Payer: OTHER GOVERNMENT

## 2025-01-31 VITALS
HEART RATE: 76 BPM | HEIGHT: 68 IN | DIASTOLIC BLOOD PRESSURE: 87 MMHG | TEMPERATURE: 100.4 F | BODY MASS INDEX: 41.73 KG/M2 | OXYGEN SATURATION: 94 % | SYSTOLIC BLOOD PRESSURE: 146 MMHG | RESPIRATION RATE: 18 BRPM | WEIGHT: 275.35 LBS

## 2025-01-31 LAB
ALBUMIN SERPL BCP-MCNC: 3.9 G/DL (ref 3.4–5)
ANION GAP SERPL CALC-SCNC: 13 MMOL/L (ref 10–20)
BASOPHILS # BLD MANUAL: 0 X10*3/UL (ref 0–0.1)
BASOPHILS NFR BLD MANUAL: 0 %
BUN SERPL-MCNC: 28 MG/DL (ref 6–23)
BURR CELLS BLD QL SMEAR: ABNORMAL
CALCIUM SERPL-MCNC: 9 MG/DL (ref 8.6–10.6)
CHLORIDE SERPL-SCNC: 107 MMOL/L (ref 98–107)
CO2 SERPL-SCNC: 22 MMOL/L (ref 21–32)
CREAT SERPL-MCNC: 0.97 MG/DL (ref 0.5–1.3)
DACRYOCYTES BLD QL SMEAR: ABNORMAL
EGFRCR SERPLBLD CKD-EPI 2021: 90 ML/MIN/1.73M*2
EOSINOPHIL # BLD MANUAL: 0 X10*3/UL (ref 0–0.7)
EOSINOPHIL NFR BLD MANUAL: 0 %
ERYTHROCYTE [DISTWIDTH] IN BLOOD BY AUTOMATED COUNT: 21.8 % (ref 11.5–14.5)
GLUCOSE SERPL-MCNC: 93 MG/DL (ref 74–99)
HCT VFR BLD AUTO: 31.7 % (ref 41–52)
HGB BLD-MCNC: 8.8 G/DL (ref 13.5–17.5)
IMM GRANULOCYTES # BLD AUTO: 0.26 X10*3/UL (ref 0–0.7)
IMM GRANULOCYTES NFR BLD AUTO: 2.2 % (ref 0–0.9)
LYMPHOCYTES # BLD MANUAL: 0.4 X10*3/UL (ref 1.2–4.8)
LYMPHOCYTES NFR BLD MANUAL: 3.4 %
MAGNESIUM SERPL-MCNC: 2.34 MG/DL (ref 1.6–2.4)
MCH RBC QN AUTO: 19.6 PG (ref 26–34)
MCHC RBC AUTO-ENTMCNC: 27.8 G/DL (ref 32–36)
MCV RBC AUTO: 70 FL (ref 80–100)
METAMYELOCYTES # BLD MANUAL: 0.09 X10*3/UL
METAMYELOCYTES NFR BLD MANUAL: 0.8 %
MONOCYTES # BLD MANUAL: 0.6 X10*3/UL (ref 0.1–1)
MONOCYTES NFR BLD MANUAL: 5.1 %
MYELOCYTES # BLD MANUAL: 0.2 X10*3/UL
MYELOCYTES NFR BLD MANUAL: 1.7 %
NEUTS SEG # BLD MANUAL: 10.5 X10*3/UL (ref 1.2–7)
NEUTS SEG NFR BLD MANUAL: 89 %
NRBC BLD-RTO: 0.2 /100 WBCS (ref 0–0)
OVALOCYTES BLD QL SMEAR: ABNORMAL
PHOSPHATE SERPL-MCNC: 3.8 MG/DL (ref 2.5–4.9)
PLATELET # BLD AUTO: 205 X10*3/UL (ref 150–450)
POTASSIUM SERPL-SCNC: 4.2 MMOL/L (ref 3.5–5.3)
RBC # BLD AUTO: 4.5 X10*6/UL (ref 4.5–5.9)
RBC MORPH BLD: ABNORMAL
SCHISTOCYTES BLD QL SMEAR: ABNORMAL
SODIUM SERPL-SCNC: 138 MMOL/L (ref 136–145)
TOTAL CELLS COUNTED BLD: 118
WBC # BLD AUTO: 11.8 X10*3/UL (ref 4.4–11.3)

## 2025-01-31 PROCEDURE — 36415 COLL VENOUS BLD VENIPUNCTURE: CPT

## 2025-01-31 PROCEDURE — 2500000001 HC RX 250 WO HCPCS SELF ADMINISTERED DRUGS (ALT 637 FOR MEDICARE OP)

## 2025-01-31 PROCEDURE — RXMED WILLOW AMBULATORY MEDICATION CHARGE

## 2025-01-31 PROCEDURE — 71045 X-RAY EXAM CHEST 1 VIEW: CPT

## 2025-01-31 PROCEDURE — 99239 HOSP IP/OBS DSCHRG MGMT >30: CPT

## 2025-01-31 PROCEDURE — 83735 ASSAY OF MAGNESIUM: CPT

## 2025-01-31 PROCEDURE — 85007 BL SMEAR W/DIFF WBC COUNT: CPT

## 2025-01-31 PROCEDURE — 2500000002 HC RX 250 W HCPCS SELF ADMINISTERED DRUGS (ALT 637 FOR MEDICARE OP, ALT 636 FOR OP/ED)

## 2025-01-31 PROCEDURE — 85027 COMPLETE CBC AUTOMATED: CPT

## 2025-01-31 PROCEDURE — 99233 SBSQ HOSP IP/OBS HIGH 50: CPT

## 2025-01-31 PROCEDURE — 2500000004 HC RX 250 GENERAL PHARMACY W/ HCPCS (ALT 636 FOR OP/ED)

## 2025-01-31 PROCEDURE — 80069 RENAL FUNCTION PANEL: CPT

## 2025-01-31 RX ORDER — GUAIFENESIN/DEXTROMETHORPHAN 100-10MG/5
5 SYRUP ORAL EVERY 4 HOURS PRN
Status: DISCONTINUED | OUTPATIENT
Start: 2025-01-31 | End: 2025-01-31 | Stop reason: HOSPADM

## 2025-01-31 RX ORDER — DEXAMETHASONE 4 MG/1
4 TABLET ORAL EVERY 6 HOURS SCHEDULED
Qty: 60 TABLET | Refills: 0 | Status: CANCELLED | OUTPATIENT
Start: 2025-01-31 | End: 2025-03-02

## 2025-01-31 RX ORDER — PANTOPRAZOLE SODIUM 40 MG/1
40 TABLET, DELAYED RELEASE ORAL
Qty: 30 TABLET | Refills: 0 | Status: SHIPPED | OUTPATIENT
Start: 2025-02-01

## 2025-01-31 RX ORDER — DAPSONE 100 MG/1
100 TABLET ORAL DAILY
Qty: 30 TABLET | Refills: 0 | Status: SHIPPED | OUTPATIENT
Start: 2025-01-31

## 2025-01-31 RX ORDER — DEXAMETHASONE 4 MG/1
4 TABLET ORAL EVERY 6 HOURS SCHEDULED
Qty: 120 TABLET | Refills: 0 | Status: SHIPPED | OUTPATIENT
Start: 2025-01-31 | End: 2025-03-02

## 2025-01-31 RX ADMIN — PREGABALIN 200 MG: 75 CAPSULE ORAL at 09:06

## 2025-01-31 RX ADMIN — DEXAMETHASONE 4 MG: 4 TABLET ORAL at 09:06

## 2025-01-31 RX ADMIN — LOPERAMIDE HYDROCHLORIDE 6 MG: 2 CAPSULE ORAL at 09:05

## 2025-01-31 RX ADMIN — PROPRANOLOL HYDROCHLORIDE 120 MG: 120 CAPSULE, EXTENDED RELEASE ORAL at 09:05

## 2025-01-31 RX ADMIN — DEXAMETHASONE 4 MG: 4 TABLET ORAL at 02:52

## 2025-01-31 RX ADMIN — ARIPIPRAZOLE 10 MG: 10 TABLET ORAL at 09:06

## 2025-01-31 RX ADMIN — DIAZEPAM 10 MG: 10 TABLET ORAL at 09:06

## 2025-01-31 RX ADMIN — SERTRALINE 200 MG: 100 TABLET, FILM COATED ORAL at 09:06

## 2025-01-31 RX ADMIN — OXYCODONE HYDROCHLORIDE 10 MG: 10 TABLET ORAL at 03:04

## 2025-01-31 RX ADMIN — ACETAMINOPHEN 975 MG: 325 TABLET ORAL at 09:06

## 2025-01-31 RX ADMIN — Medication 2000 UNITS: at 09:06

## 2025-01-31 RX ADMIN — OXYCODONE HYDROCHLORIDE 10 MG: 10 TABLET, FILM COATED, EXTENDED RELEASE ORAL at 09:06

## 2025-01-31 ASSESSMENT — PAIN - FUNCTIONAL ASSESSMENT
PAIN_FUNCTIONAL_ASSESSMENT: 0-10

## 2025-01-31 ASSESSMENT — PAIN SCALES - GENERAL
PAINLEVEL_OUTOF10: 7
PAINLEVEL_OUTOF10: 4
PAINLEVEL_OUTOF10: 7

## 2025-01-31 ASSESSMENT — ACTIVITIES OF DAILY LIVING (ADL): LACK_OF_TRANSPORTATION: YES

## 2025-01-31 NOTE — CARE PLAN
The patient's goals for the shift include pain control    The clinical goals for the shift include Patient will remain VSS and HDS throughout shift until 0700 on 1/31/2025.      Problem: Fall/Injury  Goal: Not fall by end of shift  Outcome: Progressing  Goal: Be free from injury by end of the shift  Outcome: Progressing  Goal: Verbalize understanding of personal risk factors for fall in the hospital  Outcome: Progressing  Goal: Verbalize understanding of risk factor reduction measures to prevent injury from fall in the home  Outcome: Progressing  Goal: Use assistive devices by end of the shift  Outcome: Progressing  Goal: Pace activities to prevent fatigue by end of the shift  Outcome: Progressing     Problem: Pain  Goal: Takes deep breaths with improved pain control throughout the shift  Outcome: Progressing  Goal: Turns in bed with improved pain control throughout the shift  Outcome: Progressing  Goal: Walks with improved pain control throughout the shift  Outcome: Progressing  Goal: Performs ADL's with improved pain control throughout shift  Outcome: Progressing  Goal: Participates in PT with improved pain control throughout the shift  Outcome: Progressing  Goal: Free from opioid side effects throughout the shift  Outcome: Progressing  Goal: Free from acute confusion related to pain meds throughout the shift  Outcome: Progressing   Patient remains HDS throughout shift. Patient had elevated BP with other VSS. Provider aware of all blood pressure readings and will add prn medications if SBP >180. Patient received one dose of prn oxycodone for headache. Patient remains safe and free from falls. Patient states that he hopes to discharge home today with planned outpatient MRI scheduled.

## 2025-01-31 NOTE — PROGRESS NOTES
SUPPORTIVE AND PALLIATIVE ONCOLOGY PROGRESS NOTE    SERVICE DATE: 1/31/2025    SUBJECTIVE:  HISTORY OF PRESENT ILLNESS: Manuel Marrufo is a 59 y.o. 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 PMHx significant for spinal stenosis (s/p L4-S1 lumbar decompression in 2021), HTN, HLD, non-alcoholic fatty liver disease, COPD, obesity, FRANK on CPAP, depression s/p vagal nerve stimulator (SA x2, 2007), PTSD, and GERD. Admitted 1/29/2025 for further evaluation and management of headache pain with associated n/v, dizziness, and photophobia. Supportive and Palliative Oncology is following for pain and symptom management     Interval Events:  Pt unable to obtain inpatient MRI brain 2/2 VNS. Plans to schedule outpatient appt with VNS NP to turn off. After which, pt may receive outpatient MRI brain. Further Oncologic plans pending MRI results.    Per discussion with pt, primary team stating tentative discharge plan for today (1/31/25) pending CXR evaluation.     Spoke with pt's outpatient Palliative Care provider, [facility] Reviewed pt's current inpatient pain/symptom regimen as well as current inpatient Medical Oncology POC. Informed her of tentative discharge plans for today. Her team will schedule outpatient Palliative follow-up pending [facility].     Summarized Recommendations (1/31/25):  Please discontinue hydromorphone IV PRN in anticipation for discharge.    Discharge Recommendations:   Updates provided to pt's outpatient Palliative Care provider, Dr. Crisostomo. Her team with coordinate outpatient Palliative Care follow up pending [facility].  acetaminophen 975mg PO q8h PRN for mild pain  lidocaine 4% TD patch once daily   dexamethasone 4mg PO q6h per primary--would not recommend tapering until pt meets with  "[facility]  scheduled oxycodone ER (OxyContin) 10mg PO q12h  oxycodone IR 10mg PO q4h PRN for moderate to severe pain  ondansetron 4mg PO q6h PRN for n/v, first line  loperamide 6mg PO once daily   aripiprazole 10mg PO once daily  diazepam 10mg PO AM / 7.5mg PO PM  pregabalin 200mg PO BID  propanol LA 120mg PO BID  sertraline 200mg PO once daily     Pain Assessment:  Location: Head, posterior neck, low back--radiates down BLE to feet  Duration: Constant  Characteristics:  Ratin/10, \"it's still vaguely there\"  Descriptors: Aching, sore, sharp, shooting  Aggravating: Bright lights, loud noises, movement   Relieving: Analgesics--see EMR, positioning, and modifying activity  Interference with Function: A little    Opioid Requirements  Past 24h opioid requirements:  (-, 4960-7115)  oxycodone ER (OxyContin) 10mg x 2 = 20mg = 25 OME  oxycodone IR 10mg x 2 = 20mg = 25 OME    Total 24h OME use: 50 OME    Note: OME calculations based on equianalgesic table below. Please note this table is based on best available evidence but conversions are still approximate. These are NOT opioid DOSES for individual patient use; this is equivalency information.  Drug Parenteral Enteral   Morphine 10 25   Oxycodone N/A 20   Hydromorphone 2 5   Fentanyl 0.15 N/A   Tramadol N/A 120   Citation: Pablo AVILES. Demystifying opioid conversion calculations: A guide for effective dosing, Second edition. [place]avy: American Society of Health-System Pharmacists, 2018.    Symptom Assessment:  Nausea: none  Constipation: none  Anxiety: a little--though well controlled  Difficulty Sleeping: somewhat    Information obtained from: chart review, interview of patient, and discussion with primary team  ______________________________________________________________________   Objective     Lab Results   Component Value Date    WBC 11.8 (H) 2025    HGB 8.8 (L) 2025    HCT 31.7 (L) 2025    MCV 70 (L) 2025    PLT " 205 01/31/2025      Lab Results   Component Value Date    GLUCOSE 93 01/31/2025    CALCIUM 9.0 01/31/2025     01/31/2025    K 4.2 01/31/2025    CO2 22 01/31/2025     01/31/2025    BUN 28 (H) 01/31/2025    CREATININE 0.97 01/31/2025     Lab Results   Component Value Date    ALT 37 01/27/2025    AST 39 01/27/2025    ALKPHOS 63 01/27/2025    BILITOT 0.3 01/27/2025     Estimated Creatinine Clearance: 106.8 mL/min (by C-G formula based on SCr of 0.97 mg/dL).     Scheduled medications   acetaminophen, 975 mg, oral, TID  ARIPiprazole, 10 mg, oral, Daily  cholecalciferol, 2,000 Units, oral, Daily  dexAMETHasone, 4 mg, oral, q6h TING  diazePAM, 10 mg, oral, Daily   And  diazePAM, 7.5 mg, oral, Nightly  enoxaparin, 40 mg, subcutaneous, q24h TING  tiotropium, 2 puff, inhalation, Daily   And  fluticasone furoate-vilanteroL, 1 puff, inhalation, Daily  lidocaine, 1 patch, transdermal, Daily  loperamide, 6 mg, oral, Daily  montelukast, 10 mg, oral, Nightly  ondansetron, 4 mg, intravenous, q6h  oxyCODONE ER, 10 mg, oral, q12h TING  pantoprazole, 40 mg, oral, Daily before breakfast  pregabalin, 200 mg, oral, BID  propranolol LA, 120 mg, oral, BID  rosuvastatin, 10 mg, oral, Nightly  sertraline, 200 mg, oral, Daily    Continuous medications     PRN medications  albuterol, 2 puff, q4h PRN  dextromethorphan-guaifenesin, 5 mL, q4h PRN  HYDROmorphone, 0.5 mg, q3h PRN  ondansetron, 4 mg, q6h PRN  oxyCODONE, 10 mg, q4h PRN    PHYSICAL EXAMINATION:  Vital Signs:   Vital signs reviewed  Vitals:    01/31/25 0805   BP: 120/73   Pulse: 62   Resp: 16   Temp: 36.1 °C (97 °F)   SpO2: 95%     Pain Score: 7    Physical Exam  Vitals reviewed.   Constitutional:       Comments: Alert, awake, ill appearing gentleman laying in bed. No signs of acute distress. Pleasant, cooperative, and participating in interview.  HENT:   Head:      Comments: Normocephalic, atraumatic.   Eyes:      Comments: Sclera clear, EOM intact, wearing glasses.     Pulmonary:      Comments: Symmetrical chest rise. Regular rate and depth of respirations. Room air.   Abdominal:      Comments: Abdomen obese, slightly distended, non tender, and soft.   Musculoskeletal:      Comments: Normal muscle strength and tone. RANDLE x4. Generalized non-pitting edema.   Skin:     Comments: No lesions, rash, or abrasions present on visible skin. Skin color appropriate for ethnicity.   Neurological:      Comments: A&Ox4, follows commands, no apparent sensory deficits.   Psychiatric:      Comments: Mild anxiety and sadness, but behavior otherwise appropriate.     ASSESSMENT/PLAN:  Manuel Marrufo is a 59 y.o. 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 PMHx significant for spinal stenosis (s/p L4-S1 lumbar decompression in 2021), HTN, HLD, non-alcoholic fatty liver disease, COPD, obesity, FRANK on CPAP, depression s/p vagal nerve stimulator (SA x2, 2007), PTSD, and GERD. Admitted 1/29/2025 for further evaluation and management of headache pain with associated n/v, dizziness, and photophobia. Supportive and Palliative Oncology is following for pain and symptom management.      Neoplasm Related Pain  Headache, neck, and BLE/feet pain related to known malignancy with metastatic lung and brain involvement.   Type: Somatic, neuropathic  Pain control: Well controlled  Home regimen: oxycodone ER (OxyContin) 10mg q12h [per pt he had not yet started], oxycodone IR 10-20mg q6h PRN [using x3-4/day], lidocaine TD patch once daily, acetaminophen PRN [usually 3g/day]  Intolerances: Vicodin [rx: sweating, shaking]  Personalized pain goal: Mild  Total OME usage for the past 24 hours: 50 OME  Renal and hepatic function WNL.   Continue scheduled acetaminophen 975mg PO q8h   Continue lidocaine 4% TD patch once daily   dexamethasone 4mg PO q6h per  primary  Continue scheduled oxycodone ER (OxyContin) 10mg PO q12h  Continue oxycodone IR 10mg PO q4h PRN for moderate to severe pain  Continue hydromorphone 0.5mg IV q3h PRN for breakthrough pain [hold for AMS/lethargy]--please discontinue in anticipation for discharge  Continue to monitor pain scores and administer PRN medications as appropriate  Continue/initiate nonpharmacologic pain management strategies including ice/heat therapy, distraction techniques, deep breathing/relaxation techniques, calming music, and repositioning  Continue to monitor for signs of opioid efficacy (pain scores, improved functionality) and toxicity (pinpoint pupils, excess sedation/drowsiness/confusion, respiratory depression, etc.)     Nausea  At risk for nausea and vomiting related to opioid use. Currently denies.   Home regimen: PPI  EKG reviewed from 1/27/25, QTc 437.   PPI per primary   Continue ondansetron 4mg PO/IV q6h PRN for n/v, first line     Constipation  At risk for constipation related to opioids and reduced mobility, currently not constipated.  Has hx of diarrhea prone IBS per pt.   Usual bowel pattern: Once daily while on home loperamide  Home regimen: loperamide 6mg once daily [scheduled]  LBM: 1/29/25 PM per pt   Continue loperamide 6mg PO once daily   Goal to have BM without straining q48-72h, adjust regimen as needed  Encourage mobility as tolerated     Altered Mood  Hx of anxiety, PTSD, and depression s/p vagal nerve stimulator (SA x2, 2007).  Well controlled with home regimen.  Allergy to buspirone [rx: n/v]  Previously tried: alprazolam [rx: ineffective], clonazepam [rx: ineffective]  Home regimen: propanol LA 120mg BID (for anxiety per CPRS), diazepam 10mg AM / 7.5mg PM, aripiprazole 10mg once daily, sertraline 200mg once daily, pregabalin 200mg BID, [facility]  Continue aripiprazole 10mg PO once daily  Continue diazepam 10mg PO AM / 7.5mg PO PM  Continue pregabalin 200mg PO BID  Continue propanol LA  120mg PO BID  Continue sertraline 200mg PO once daily   If need arises, consider consulting Psych if medications need adjusting given complexity of existing regimen and extensive psych hx  Spiritual Care consulted for support     Sleeping Difficulty  Impaired sleep related to pain, anxiety, and hospital environment.  Hx of chronic insomnia per pt.   Home regimen: None, see pain and anxiety home meds  See pain and anxiety recs     Medical Decision Making/Goals of Care/Advance Care Planning:  (1/29/25) Patient's current clinical condition, including diagnosis, prognosis, and management plan, and goals of care were discussed.   Life limiting disease: Metastatic malignancy  Family: Supportive, brother, mother, sister--although pt states sometimes their supportiveness can fluctuate  Performance status: Moderate limitations due to pain.   Joys/meaning/strength: [place], family   Understanding of health: Demonstrates good understanding of disease process, understands updated recommendations concerning his pain and symptom regimen.   Information: Appreciates full disclosure and timely updates as possible  Medical update: Per primary team   Goals: Pain and symptom control, continued cancer tx  Worries and fears now and future: Death, worsening symptoms   Code status discussion: Confirmed DNR-DNI code status with pt. He endorses that he would be OK with ICU care escalation if needed.      Advance Directives  Existence of Advance Directives: Yes, but NOT on file in medical record  Decision maker: HCPOA is pt's brotherMason  Code Status: DNR-DNI     Disposition:  Please start the process of having prior authorization with meds to beds deliver medications to patient prior to discharge via [facility] pharmacy. Prescriptions will need to be sent 48-72 hours prior to discharge so that a prior authorization can be completed.      Discharge date pending resolution of acute hospital issues.   (1/31/25) Spoke with pt's  outpatient Palliative Care provider, [facility]. Reviewed pt's current inpatient pain/symptom regimen as well as current inpatient Medical Oncology POC. Informed her of tentative discharge plans for today. Her team will schedule outpatient Palliative follow-up pending [facility].       Supportive and Palliative Oncology encounter:  Spoke with patient at bedside.  Emotional support provided  Coordination of care: medication and symptom re-evaluation, discharge recs and coordination    Signature and billing:  Medical complexity was high level due to due to complexity of problems, extensive data review, and high risk of management/treatment.    I spent 50 minutes in the care of this patient which included chart review, interviewing patient/family, discussion with primary team, coordination of care, and documentation.    DATA   Diagnostic tests and information reviewed for today's visit:  Conversation with primary team, Conversation with Dr. Crisostomo from [facility], Most recent labs, Most recent EKG, Medications     Some elements copied from my note on 1/30/25, the elements have been updated and all reflect current decision making from today, 1/31/2025.    Plan of Care discussed with: Primary team, pt     Thank you for asking Supportive and Palliative Oncology to assist with care of this patient.  Recommendations will be communicated back to the consulting service by way of shared electronic medical record/secure chat/email or face-to-face.   We will continue to follow.  Please contact us for additional questions or concerns.    SIGNATURE: ROSSI Brito  PAGER/CONTACT:  Contact information:  Supportive and Palliative Oncology  Monday-Friday 8 AM-5 PM  Epic Secure chat or pager [phone].  After hours and weekends:  pager [phone]

## 2025-01-31 NOTE — NURSING NOTE
Patient discharged home, IV was removed- his meds to beds was delivered AVS instructions were reviewed with him and his sister

## 2025-01-31 NOTE — PROGRESS NOTES
01/31/25 1400   Discharge Planning   Living Arrangements Alone   Support Systems Parent;Family members   Type of Residence Private residence   Do you have animals or pets at home? Yes   Type of Animals or Pets cat   Who is requesting discharge planning? Provider   Home or Post Acute Services In home services   Type of Home Care Services Home health aide;Home nursing visits   Expected Discharge Disposition Home   Financial Resource Strain   How hard is it for you to pay for the very basics like food, housing, medical care, and heating? Not hard   Housing Stability   In the last 12 months, was there a time when you were not able to pay the mortgage or rent on time? N   In the past 12 months, how many times have you moved where you were living? 0   At any time in the past 12 months, were you homeless or living in a shelter (including now)? N   Transportation Needs   In the past 12 months, has lack of transportation kept you from medical appointments or from getting medications? yes   In the past 12 months, has lack of transportation kept you from meetings, work, or from getting things needed for daily living? Yes     01/31/2025: [facility] and HHA [facility]. CARMELITA spoke with ESPERANZA Hunt([phone]  [phone]: [phone]) at the [facility] regarding [facility]. No clinical documentation needs to be sent to the [facility]. Patient just needs to follow up with the [facility] once he is home. Bedside nurse Hope updated on current plan. Shannon MAE

## 2025-01-31 NOTE — DISCHARGE INSTRUCTIONS
Dear Mr. Marrufo,     It was a pleasure taking care of you here at [facility]. You came here with severe headache, we treated the pain and planned for MRI. However, your Vagal Nerve stimulator could not be turned off for MRI. You have been scheduled follow up with VNS nurse to turn it off and also outpatient MRI. Your further treatment depends on MRI findings. You have to reach out to Dr[facility] Dias [facility] to [facility]. They are also made aware of your new medications including steroid to reduce brain inflammation. You also should visit outpatient Palliative Care provider, Dr. Crisostomo, from the [facility]. However you are also open to switch your care to  as appropriate.  Please take your medications as prescribed.     Your sincerely,   [facility] [address] [facility]

## 2025-02-01 NOTE — HOSPITAL COURSE
Manuel Marrufo is a 59 y.o. 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 [facility] with a severe headache rated 9 out of 10, characterized as throbbing and located in the parieto-occipital region. The headache was not accompanied by nausea or vomiting but was associated with tinnitus, blurred vision, and photophobia. The pain responded to oxycodone and was positional, worsening with forward bending and improving when lying flat. He has experienced these headaches since July 2024, following a diagnosis of brain metastasis from previously diagnosed melanoma.     [facility], a mass was later discovered during a lung cancer screening in the right lower lobe, leading to a PET scan that confirmed malignancy. An attempt at robotic surgery was aborted upon discovering metastatic implants in the intrathoracic area, with pathology confirming metastatic melanoma. He was admitted to [facility] in November 2024 due to concerns of hemorrhagic brain metastases identified on a CT scan after [facility] with [facility], resulting in his transfer to . He was started on steroids and underwent an MRI, which revealed diffuse metastatic disease. Consequently, a decision was made to pursue WBRT through the [facility], and he completed ten sessions of palliative WBRT in December 2024. In January, the patient has undergone two cycles of dual immunotherapy with nivolumab and ipilimumab, with the last dose administered on January 10, 2025. [facility] is [facility], while Dr. Schaefer is seeing chemo/immunotherapy.      On examination, the patient exhibited no focal neurological deficits or meningeal signs. His current headache is similar in nature to previous episodes but more severe. He was treated with pain medications and steroid which he responded well. MRI could not be done at  this admission as there was no availability of VNS nurse. An outpatient follow up with VNS nurse has been scheduled to turn off the device. He will get outpatient MRI subsequently. Otherwise his hospital stay was unremarkable.

## 2025-02-01 NOTE — DISCHARGE SUMMARY
Discharge Diagnosis  Headache  Metastatic melanoma    Issues Requiring Follow-Up  - Patient need to follow up with [facility]/immunotherapy and steroid taper.   - Patient also need to follow up with radiation oncologist (Dr. Dias) at [facility]  - He also need follow up with palliative care team (Dr. Crisostomo) at [facility] for pain management.   - All his directly involved providers have been emailed by current inpatient team - inpatient management and outpatient plans have been communicated.     Discharge Meds     Medication List      START taking these medications     dapsone 100 mg tablet; Take 1 tablet (100 mg) by mouth once daily.   dexAMETHasone 4 mg tablet; Commonly known as: Decadron; Take 1 tablet (4   mg) by mouth every 6 hours.     CONTINUE taking these medications     acetaminophen 325 mg tablet; Commonly known as: Tylenol   albuterol 90 mcg/actuation inhaler   ARIPiprazole 10 mg tablet; Commonly known as: Abilify   budesonide-glycopyr-formoterol 160-9-4.8 mcg/actuation HFA aerosol   inhaler; Commonly known as: BREZTRI   cholecalciferol 50 MCG (2000 UT) tablet; Commonly known as: Vitamin D-3   diazePAM 5 mg tablet; Commonly known as: Valium   furosemide 40 mg tablet; Commonly known as: Lasix   loperamide 2 mg capsule; Commonly known as: Imodium   memantine 5 mg tablet; Commonly known as: Namenda   omeprazole 20 mg DR capsule; Commonly known as: PriLOSEC   ondansetron ODT 8 mg disintegrating tablet; Commonly known as:   Zofran-ODT   * oxyCODONE ER 10 mg 12 hr tablet; Commonly known as: OxyCONTIN   * oxyCODONE 10 mg immediate release tablet; Commonly known as:   Roxicodone; Take 1 tablet (10 mg) by mouth every 3 hours if needed for   severe pain (7 - 10).   pantoprazole 40 mg EC tablet; Commonly known as: ProtoNix; Take 1 tablet   (40 mg) by mouth once daily in the morning. Take before meals. Do not   crush, chew, or split.; Start taking on: February 1, 2025    pregabalin 200 mg capsule; Commonly known as: Lyrica   propranolol  mg 24 hr capsule; Commonly known as: Inderal LA   rosuvastatin 10 mg tablet; Commonly known as: Crestor   sertraline 100 mg tablet; Commonly known as: Zoloft   tadalafil 5 mg tablet; Commonly known as: Cialis   tiZANidine 2 mg tablet; Commonly known as: Zanaflex  * This list has 2 medication(s) that are the same as other medications   prescribed for you. Read the directions carefully, and ask your doctor or   other care provider to review them with you.     STOP taking these medications     lidocaine 5 % patch; Commonly known as: Lidoderm       Test Results Pending At Discharge  Pending Labs       No current pending labs.            Hospital Course  Manuel Marrufo is a 59 y.o. 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 [facility] with a severe headache rated 9 out of 10, characterized as throbbing and located in the parieto-occipital region. The headache was not accompanied by nausea or vomiting but was associated with tinnitus, blurred vision, and photophobia. The pain responded to oxycodone and was positional, worsening with forward bending and improving when lying flat. He has experienced these headaches since July 2024, following a diagnosis of brain metastasis from previously diagnosed melanoma.     [facility]. [facility], a mass was later discovered during a lung cancer screening in the right lower lobe, leading to a PET scan that confirmed malignancy. An attempt at robotic surgery was aborted upon discovering metastatic implants in the intrathoracic area, with pathology confirming metastatic melanoma. He was admitted to [facility] in November 2024 due to concerns of hemorrhagic brain metastases identified on a CT scan after [facility] a [facility], resulting in his transfer to . He was started  on steroids and underwent an MRI, which revealed diffuse metastatic disease. Consequently, a decision was made to [facility] through the [facility], and he completed ten sessions of palliative WBRT in December 2024. In January, the patient has undergone two cycles of dual immunotherapy with nivolumab and ipilimumab, with the last dose administered on January 10, 2025. [facility], while Dr. Schaefer is seeing chemo/immunotherapy.      On examination, the patient exhibited no focal neurological deficits or meningeal signs. His current headache is similar in nature to previous episodes but more severe. He was treated with pain medications and steroid which he responded well. MRI could not be done at this admission as there was no availability of VNS nurse. An outpatient follow up with VNS nurse has been scheduled to turn off the device. He will get outpatient MRI subsequently. Otherwise his hospital stay was unremarkable.     Pertinent Physical Exam At Time of Discharge  General:  no pallor, clubbing, cyanosis, dehydration, rash  Neuro: alert and oriented fully, normal sensory / motor exam, no meningeal signs  CV:  RRR, S1 S2 MO. No friction rubs  Lungs: B/L equal air entry, normal breath sound, no crackles   Abd:  Soft, benign, non-tender, distended   Psych:  Appropriate affect, linear thought, insight present   Upper extremities: No edema  Lower extremities: Bilateral pitting edema    Labs from current admission:      Results from last 7 days   Lab Units 01/31/25  0704 01/30/25  0720 01/29/25  0750   WBC AUTO x10*3/uL 11.8* 11.3 8.5   HEMOGLOBIN g/dL 8.8* 9.1* 8.7*   HEMATOCRIT % 31.7* 31.1* 29.9*   PLATELETS AUTO x10*3/uL 205 215 204     Results from last 7 days   Lab Units 01/31/25  0704 01/30/25  0720 01/29/25  0750   SODIUM mmol/L 138 140 139   POTASSIUM mmol/L 4.2 4.0 4.2   CHLORIDE mmol/L 107 107 106   CO2 mmol/L 22 23 23   ANION GAP mmol/L 13 14 14   BUN mg/dL 28* 29* 20    CREATININE mg/dL 0.97 1.21 1.07   GLUCOSE mg/dL 93 99 106*   CALCIUM mg/dL 9.0 9.4 9.4   MAGNESIUM mg/dL 2.34 2.25 2.33   PHOSPHORUS mg/dL 3.8 3.8 3.4      Results from last 7 days   Lab Units 01/27/25  1109   ALT U/L 37   AST U/L 39   ALK PHOS U/L 63      CXR done at time of discharge: 1/31   - Minimal bibasilar atelectasis      A detailed discharge instruction have been provided to the patient regarding his medications and follow ups.     Andrew Jean MD  PGY-1 Neurology

## 2025-02-06 ENCOUNTER — TELEPHONE (OUTPATIENT)
Dept: INTERNAL MEDICINE | Facility: HOSPITAL | Age: 60
End: 2025-02-06
Payer: MEDICARE

## 2025-02-06 NOTE — DOCUMENTATION CLARIFICATION NOTE
"    PATIENT:               HENRY CARRILLO  ACCT #:                  [id number]  MRN:                       [id number]  :                       1965  ADMIT DATE:       2025 2:06 AM  DISCH DATE:        2025 2:05 PM  RESPONDING PROVIDER #:        [id number]          PROVIDER RESPONSE TEXT:    Cerebral Edema    CDI QUERY TEXT:    Clarification        Instruction:    Based on your assessment of the patient and the clinical information, please provide the requested documentation by clicking on the appropriate radio button and enter any additional information if prompted.    Question: Please further clarify if there is a diagnosis related to the clinical information    When answering this query, please exercise your independent professional judgment. The fact that a question is being asked, does not imply that any particular answer is desired or expected.    The patient's clinical indicators include:  Clinical Information: 58 yo M with metastatic melanoma to brain presents with severe headache.    Clinical Indicators:   Neurosurgery Consult:  \"25 Ct Head wo IV Contrast  - Impression -  Multifocal irregular regions of low-attenuation parenchymal edema  throughout both cerebral hemispheres as well as the left cerebellar  hemispheric are again seen which are likely related to patient's  known history of intracranial metastatic disease.    -CTH enumerous hemorrhagic supra and infra tentorial small mets w associated edema  -Agree with dexamethasone\"      Treatment:  -Dexamethasone 8mg IV q8   -Dexamethasone 4mg IV q6 -  -Dexamethasone 4mg PO q6   -Neurosurgery consult    Risk Factors:  -brain mets  -Per CT Head  \"low-attenuation parenchymal edema  throughout both cerebral hemispheres\"  Options provided:  -- Cerebral Edema  -- Other - I will add my own diagnosis  -- Refer to Clinical Documentation Reviewer    Query created by: Jackie Santos on 2025 1:43 PM      Electronically signed by:  " OMAIRA SORIANO MD 2/6/2025 4:52 PM

## 2025-02-23 LAB
ATRIAL RATE: 68 BPM
P AXIS: 38 DEGREES
PR INTERVAL: 157 MS
Q ONSET: 254 MS
QRS COUNT: 11 BEATS
QRS DURATION: 98 MS
QT INTERVAL: 408 MS
QTC CALCULATION(BAZETT): 437 MS
QTC FREDERICIA: 427 MS
R AXIS: 1 DEGREES
T AXIS: 20 DEGREES
T OFFSET: 458 MS
VENTRICULAR RATE: 69 BPM

## 2025-02-24 ENCOUNTER — TELEPHONE (OUTPATIENT)
Dept: ADMISSION | Facility: HOSPITAL | Age: 60
End: 2025-02-24
Payer: MEDICARE

## 2025-02-24 ENCOUNTER — PATIENT OUTREACH (OUTPATIENT)
Dept: HEMATOLOGY/ONCOLOGY | Facility: HOSPITAL | Age: 60
End: 2025-02-24
Payer: MEDICARE

## 2025-02-24 NOTE — PROGRESS NOTES
Called and spoke with Manuel about setting up a NPV. He is scheduled with Dr. Medardo Vanegas Neuro-oncologist at [facility] on 3/3 at 1pm. I explained where to go for our office; straight through the double doors, take elevators up to the 1st floor, check in at Desk B. I gave him our office number [phone] opt 5., opt 2. If he has any other concerns or questions pop up before then. He does not have any concerns about getting to the appointment. He is a bit concerned about cost. I will reach out to our SW to see if she can see him at the appointment or reach out to him about his financial concerns and if appropriate a financial counselor.     Sri MANDEL RN

## 2025-02-24 NOTE — TELEPHONE ENCOUNTER
Patient called the office for a second opinion he states he has brain cancer. He wanted to know if we took [employer] insurance - checked with scheduling and we do take the insurance. When trying to connect back with the patient the patient was not on the other line - call back attempted and VM left informing him we do take that insurance.

## 2025-02-24 NOTE — TELEPHONE ENCOUNTER
Patient called the office back and states he would like to seek a second opinion. Message sent to the Neuro Onc team. [facility].

## 2025-03-03 ENCOUNTER — OFFICE VISIT (OUTPATIENT)
Dept: HEMATOLOGY/ONCOLOGY | Facility: HOSPITAL | Age: 60
End: 2025-03-03
Payer: MEDICARE

## 2025-03-03 ENCOUNTER — HOSPITAL ENCOUNTER (INPATIENT)
Facility: HOSPITAL | Age: 60
LOS: 3 days | Discharge: HOME | End: 2025-03-06
Attending: EMERGENCY MEDICINE | Admitting: STUDENT IN AN ORGANIZED HEALTH CARE EDUCATION/TRAINING PROGRAM
Payer: MEDICARE

## 2025-03-03 ENCOUNTER — APPOINTMENT (OUTPATIENT)
Dept: RADIOLOGY | Facility: HOSPITAL | Age: 60
End: 2025-03-03
Payer: MEDICARE

## 2025-03-03 VITALS
TEMPERATURE: 101.8 F | DIASTOLIC BLOOD PRESSURE: 64 MMHG | HEART RATE: 89 BPM | RESPIRATION RATE: 22 BRPM | SYSTOLIC BLOOD PRESSURE: 136 MMHG | OXYGEN SATURATION: 93 %

## 2025-03-03 DIAGNOSIS — Z79.52 LONG TERM SYSTEMIC STEROID USER: ICD-10-CM

## 2025-03-03 DIAGNOSIS — R50.9 FEVER, UNSPECIFIED FEVER CAUSE: Primary | ICD-10-CM

## 2025-03-03 DIAGNOSIS — M54.50 CHRONIC LOW BACK PAIN, UNSPECIFIED BACK PAIN LATERALITY, UNSPECIFIED WHETHER SCIATICA PRESENT: ICD-10-CM

## 2025-03-03 DIAGNOSIS — E61.1 IRON DEFICIENCY: ICD-10-CM

## 2025-03-03 DIAGNOSIS — C79.31 MALIGNANT MELANOMA METASTATIC TO BRAIN (MULTI): ICD-10-CM

## 2025-03-03 DIAGNOSIS — G89.29 CHRONIC LOW BACK PAIN, UNSPECIFIED BACK PAIN LATERALITY, UNSPECIFIED WHETHER SCIATICA PRESENT: ICD-10-CM

## 2025-03-03 DIAGNOSIS — F41.1 GENERALIZED ANXIETY DISORDER: ICD-10-CM

## 2025-03-03 DIAGNOSIS — F43.10 PTSD (POST-TRAUMATIC STRESS DISORDER): ICD-10-CM

## 2025-03-03 DIAGNOSIS — J44.9 CHRONIC OBSTRUCTIVE PULMONARY DISEASE, UNSPECIFIED COPD TYPE (MULTI): ICD-10-CM

## 2025-03-03 DIAGNOSIS — R05.9 COUGH, UNSPECIFIED TYPE: ICD-10-CM

## 2025-03-03 DIAGNOSIS — E27.40 ADRENAL INSUFFICIENCY (MULTI): ICD-10-CM

## 2025-03-03 DIAGNOSIS — K58.0 IRRITABLE BOWEL SYNDROME WITH DIARRHEA: ICD-10-CM

## 2025-03-03 LAB
ALBUMIN SERPL BCP-MCNC: 3.9 G/DL (ref 3.4–5)
ALP SERPL-CCNC: 84 U/L (ref 33–120)
ALT SERPL W P-5'-P-CCNC: 74 U/L (ref 10–52)
ANION GAP SERPL CALC-SCNC: 13 MMOL/L (ref 10–20)
APPEARANCE UR: CLEAR
AST SERPL W P-5'-P-CCNC: 62 U/L (ref 9–39)
BASOPHILS # BLD MANUAL: 0.12 X10*3/UL (ref 0–0.1)
BASOPHILS NFR BLD MANUAL: 0.9 %
BILIRUB SERPL-MCNC: 0.4 MG/DL (ref 0–1.2)
BILIRUB UR STRIP.AUTO-MCNC: NEGATIVE MG/DL
BUN SERPL-MCNC: 28 MG/DL (ref 6–23)
CALCIUM SERPL-MCNC: 8.9 MG/DL (ref 8.6–10.6)
CHLORIDE SERPL-SCNC: 94 MMOL/L (ref 98–107)
CO2 SERPL-SCNC: 32 MMOL/L (ref 21–32)
COLOR UR: ABNORMAL
CREAT SERPL-MCNC: 1.38 MG/DL (ref 0.5–1.3)
EGFRCR SERPLBLD CKD-EPI 2021: 59 ML/MIN/1.73M*2
EOSINOPHIL # BLD MANUAL: 0.34 X10*3/UL (ref 0–0.7)
EOSINOPHIL NFR BLD MANUAL: 2.6 %
ERYTHROCYTE [DISTWIDTH] IN BLOOD BY AUTOMATED COUNT: 21.2 % (ref 11.5–14.5)
FLUAV RNA RESP QL NAA+PROBE: NOT DETECTED
FLUBV RNA RESP QL NAA+PROBE: NOT DETECTED
GLUCOSE SERPL-MCNC: 86 MG/DL (ref 74–99)
GLUCOSE UR STRIP.AUTO-MCNC: NORMAL MG/DL
HCT VFR BLD AUTO: 33.2 % (ref 41–52)
HGB BLD-MCNC: 10 G/DL (ref 13.5–17.5)
HYPOCHROMIA BLD QL SMEAR: ABNORMAL
IMM GRANULOCYTES # BLD AUTO: 1.02 X10*3/UL (ref 0–0.7)
IMM GRANULOCYTES NFR BLD AUTO: 7.7 % (ref 0–0.9)
KETONES UR STRIP.AUTO-MCNC: NEGATIVE MG/DL
LACTATE SERPL-SCNC: 1.8 MMOL/L (ref 0.4–2)
LEUKOCYTE ESTERASE UR QL STRIP.AUTO: NEGATIVE
LIPASE SERPL-CCNC: 45 U/L (ref 9–82)
LYMPHOCYTES # BLD MANUAL: 2.3 X10*3/UL (ref 1.2–4.8)
LYMPHOCYTES NFR BLD MANUAL: 17.4 %
MCH RBC QN AUTO: 20.4 PG (ref 26–34)
MCHC RBC AUTO-ENTMCNC: 30.1 G/DL (ref 32–36)
MCV RBC AUTO: 68 FL (ref 80–100)
METAMYELOCYTES # BLD MANUAL: 0.22 X10*3/UL
METAMYELOCYTES NFR BLD MANUAL: 1.7 %
MONOCYTES # BLD MANUAL: 0.34 X10*3/UL (ref 0.1–1)
MONOCYTES NFR BLD MANUAL: 2.6 %
MYELOCYTES # BLD MANUAL: 0.34 X10*3/UL
MYELOCYTES NFR BLD MANUAL: 2.6 %
NEUTROPHILS # BLD MANUAL: 9.53 X10*3/UL (ref 1.2–7.7)
NEUTS BAND # BLD MANUAL: 0.12 X10*3/UL (ref 0–0.7)
NEUTS BAND NFR BLD MANUAL: 0.9 %
NEUTS SEG # BLD MANUAL: 9.41 X10*3/UL (ref 1.2–7)
NEUTS SEG NFR BLD MANUAL: 71.3 %
NITRITE UR QL STRIP.AUTO: NEGATIVE
NRBC BLD-RTO: 0.3 /100 WBCS (ref 0–0)
PH UR STRIP.AUTO: 7 [PH]
PLATELET # BLD AUTO: 186 X10*3/UL (ref 150–450)
POLYCHROMASIA BLD QL SMEAR: ABNORMAL
POTASSIUM SERPL-SCNC: 3.9 MMOL/L (ref 3.5–5.3)
PROT SERPL-MCNC: 6.7 G/DL (ref 6.4–8.2)
PROT UR STRIP.AUTO-MCNC: ABNORMAL MG/DL
RBC # BLD AUTO: 4.9 X10*6/UL (ref 4.5–5.9)
RBC # UR STRIP.AUTO: NEGATIVE MG/DL
RBC #/AREA URNS AUTO: NORMAL /HPF
RBC MORPH BLD: ABNORMAL
RSV RNA RESP QL NAA+PROBE: NOT DETECTED
SARS-COV-2 RNA RESP QL NAA+PROBE: NOT DETECTED
SODIUM SERPL-SCNC: 135 MMOL/L (ref 136–145)
SP GR UR STRIP.AUTO: >1.05
STOMATOCYTES BLD QL SMEAR: ABNORMAL
TOTAL CELLS COUNTED BLD: 115
UROBILINOGEN UR STRIP.AUTO-MCNC: NORMAL MG/DL
WBC # BLD AUTO: 13.2 X10*3/UL (ref 4.4–11.3)
WBC #/AREA URNS AUTO: NORMAL /HPF

## 2025-03-03 PROCEDURE — 70450 CT HEAD/BRAIN W/O DYE: CPT | Performed by: RADIOLOGY

## 2025-03-03 PROCEDURE — 99285 EMERGENCY DEPT VISIT HI MDM: CPT | Performed by: EMERGENCY MEDICINE

## 2025-03-03 PROCEDURE — 85027 COMPLETE CBC AUTOMATED: CPT | Performed by: EMERGENCY MEDICINE

## 2025-03-03 PROCEDURE — 87040 BLOOD CULTURE FOR BACTERIA: CPT | Performed by: EMERGENCY MEDICINE

## 2025-03-03 PROCEDURE — 2500000001 HC RX 250 WO HCPCS SELF ADMINISTERED DRUGS (ALT 637 FOR MEDICARE OP)

## 2025-03-03 PROCEDURE — 71260 CT THORAX DX C+: CPT | Performed by: RADIOLOGY

## 2025-03-03 PROCEDURE — 36415 COLL VENOUS BLD VENIPUNCTURE: CPT | Performed by: EMERGENCY MEDICINE

## 2025-03-03 PROCEDURE — 80053 COMPREHEN METABOLIC PANEL: CPT | Performed by: EMERGENCY MEDICINE

## 2025-03-03 PROCEDURE — 83690 ASSAY OF LIPASE: CPT

## 2025-03-03 PROCEDURE — 2500000004 HC RX 250 GENERAL PHARMACY W/ HCPCS (ALT 636 FOR OP/ED)

## 2025-03-03 PROCEDURE — 96365 THER/PROPH/DIAG IV INF INIT: CPT

## 2025-03-03 PROCEDURE — 87081 CULTURE SCREEN ONLY: CPT

## 2025-03-03 PROCEDURE — 87637 SARSCOV2&INF A&B&RSV AMP PRB: CPT | Performed by: EMERGENCY MEDICINE

## 2025-03-03 PROCEDURE — 2500000002 HC RX 250 W HCPCS SELF ADMINISTERED DRUGS (ALT 637 FOR MEDICARE OP, ALT 636 FOR OP/ED)

## 2025-03-03 PROCEDURE — 85007 BL SMEAR W/DIFF WBC COUNT: CPT | Performed by: EMERGENCY MEDICINE

## 2025-03-03 PROCEDURE — 99215 OFFICE O/P EST HI 40 MIN: CPT | Performed by: PSYCHIATRY & NEUROLOGY

## 2025-03-03 PROCEDURE — 83605 ASSAY OF LACTIC ACID: CPT | Performed by: EMERGENCY MEDICINE

## 2025-03-03 PROCEDURE — 1170000001 HC PRIVATE ONCOLOGY ROOM DAILY

## 2025-03-03 PROCEDURE — 2550000001 HC RX 255 CONTRASTS: Performed by: EMERGENCY MEDICINE

## 2025-03-03 PROCEDURE — 72129 CT CHEST SPINE W/DYE: CPT | Mod: RCN | Performed by: RADIOLOGY

## 2025-03-03 PROCEDURE — 72132 CT LUMBAR SPINE W/DYE: CPT | Mod: RCN | Performed by: RADIOLOGY

## 2025-03-03 PROCEDURE — 72129 CT CHEST SPINE W/DYE: CPT | Mod: RCN

## 2025-03-03 PROCEDURE — 72132 CT LUMBAR SPINE W/DYE: CPT

## 2025-03-03 PROCEDURE — 70450 CT HEAD/BRAIN W/O DYE: CPT

## 2025-03-03 PROCEDURE — 71046 X-RAY EXAM CHEST 2 VIEWS: CPT | Performed by: RADIOLOGY

## 2025-03-03 PROCEDURE — 96361 HYDRATE IV INFUSION ADD-ON: CPT

## 2025-03-03 PROCEDURE — 71046 X-RAY EXAM CHEST 2 VIEWS: CPT

## 2025-03-03 PROCEDURE — 99285 EMERGENCY DEPT VISIT HI MDM: CPT | Mod: 25 | Performed by: EMERGENCY MEDICINE

## 2025-03-03 PROCEDURE — 71260 CT THORAX DX C+: CPT

## 2025-03-03 PROCEDURE — 81001 URINALYSIS AUTO W/SCOPE: CPT | Performed by: EMERGENCY MEDICINE

## 2025-03-03 RX ORDER — VANCOMYCIN 2 GRAM/500 ML IN 0.9 % SODIUM CHLORIDE INTRAVENOUS
2000 ONCE
Status: DISCONTINUED | OUTPATIENT
Start: 2025-03-03 | End: 2025-03-03

## 2025-03-03 RX ORDER — MEMANTINE HYDROCHLORIDE 10 MG/1
10 TABLET ORAL 2 TIMES DAILY
Status: DISCONTINUED | OUTPATIENT
Start: 2025-03-03 | End: 2025-03-06 | Stop reason: HOSPADM

## 2025-03-03 RX ORDER — HEPARIN SODIUM 5000 [USP'U]/ML
7500 INJECTION, SOLUTION INTRAVENOUS; SUBCUTANEOUS EVERY 8 HOURS
Status: DISCONTINUED | OUTPATIENT
Start: 2025-03-03 | End: 2025-03-06 | Stop reason: HOSPADM

## 2025-03-03 RX ORDER — ARIPIPRAZOLE 5 MG/1
10 TABLET ORAL DAILY
Status: DISCONTINUED | OUTPATIENT
Start: 2025-03-04 | End: 2025-03-06 | Stop reason: HOSPADM

## 2025-03-03 RX ORDER — FUROSEMIDE 40 MG/1
40 TABLET ORAL DAILY
Status: DISCONTINUED | OUTPATIENT
Start: 2025-03-03 | End: 2025-03-06 | Stop reason: HOSPADM

## 2025-03-03 RX ORDER — VANCOMYCIN HYDROCHLORIDE 1 G/200ML
1000 INJECTION, SOLUTION INTRAVENOUS ONCE
Status: COMPLETED | OUTPATIENT
Start: 2025-03-03 | End: 2025-03-03

## 2025-03-03 RX ORDER — DIAZEPAM 2 MG/1
2 TABLET ORAL
Status: DISCONTINUED | OUTPATIENT
Start: 2025-03-04 | End: 2025-03-05

## 2025-03-03 RX ORDER — PANTOPRAZOLE SODIUM 40 MG/1
40 TABLET, DELAYED RELEASE ORAL
Status: DISCONTINUED | OUTPATIENT
Start: 2025-03-04 | End: 2025-03-06 | Stop reason: HOSPADM

## 2025-03-03 RX ORDER — ACETAMINOPHEN 325 MG/1
975 TABLET ORAL ONCE
Status: COMPLETED | OUTPATIENT
Start: 2025-03-03 | End: 2025-03-03

## 2025-03-03 RX ORDER — CEFEPIME 1 G/50ML
2 INJECTION, SOLUTION INTRAVENOUS EVERY 8 HOURS
Status: DISCONTINUED | OUTPATIENT
Start: 2025-03-04 | End: 2025-03-05

## 2025-03-03 RX ORDER — DAPSONE 100 MG/1
100 TABLET ORAL DAILY
Status: DISCONTINUED | OUTPATIENT
Start: 2025-03-04 | End: 2025-03-06 | Stop reason: HOSPADM

## 2025-03-03 RX ORDER — OXYCODONE HCL 10 MG/1
10 TABLET, FILM COATED, EXTENDED RELEASE ORAL EVERY 24 HOURS
Status: DISCONTINUED | OUTPATIENT
Start: 2025-03-03 | End: 2025-03-05

## 2025-03-03 RX ORDER — OXYCODONE HYDROCHLORIDE 10 MG/1
10 TABLET ORAL
Status: DISCONTINUED | OUTPATIENT
Start: 2025-03-03 | End: 2025-03-06 | Stop reason: HOSPADM

## 2025-03-03 RX ORDER — IPRATROPIUM BROMIDE AND ALBUTEROL SULFATE 2.5; .5 MG/3ML; MG/3ML
3 SOLUTION RESPIRATORY (INHALATION)
Status: DISCONTINUED | OUTPATIENT
Start: 2025-03-03 | End: 2025-03-05

## 2025-03-03 RX ORDER — DEXAMETHASONE 2 MG/1
4 TABLET ORAL EVERY 6 HOURS SCHEDULED
Status: DISCONTINUED | OUTPATIENT
Start: 2025-03-03 | End: 2025-03-03

## 2025-03-03 RX ORDER — ALBUTEROL SULFATE 90 UG/1
2 INHALANT RESPIRATORY (INHALATION) 4 TIMES DAILY PRN
Status: DISCONTINUED | OUTPATIENT
Start: 2025-03-03 | End: 2025-03-03

## 2025-03-03 RX ORDER — METRONIDAZOLE 500 MG/100ML
500 INJECTION, SOLUTION INTRAVENOUS EVERY 8 HOURS
Status: DISCONTINUED | OUTPATIENT
Start: 2025-03-03 | End: 2025-03-05

## 2025-03-03 RX ORDER — VANCOMYCIN HYDROCHLORIDE 1 G/20ML
INJECTION, POWDER, LYOPHILIZED, FOR SOLUTION INTRAVENOUS DAILY PRN
Status: DISCONTINUED | OUTPATIENT
Start: 2025-03-03 | End: 2025-03-05

## 2025-03-03 RX ORDER — DEXAMETHASONE 4 MG/1
4 TABLET ORAL EVERY 12 HOURS SCHEDULED
Status: DISCONTINUED | OUTPATIENT
Start: 2025-03-04 | End: 2025-03-06 | Stop reason: HOSPADM

## 2025-03-03 RX ORDER — CEFEPIME 1 G/50ML
2 INJECTION, SOLUTION INTRAVENOUS ONCE
Status: COMPLETED | OUTPATIENT
Start: 2025-03-03 | End: 2025-03-03

## 2025-03-03 RX ORDER — FLUTICASONE FUROATE AND VILANTEROL 200; 25 UG/1; UG/1
1 POWDER RESPIRATORY (INHALATION)
Status: DISCONTINUED | OUTPATIENT
Start: 2025-03-04 | End: 2025-03-06 | Stop reason: HOSPADM

## 2025-03-03 RX ORDER — ONDANSETRON 8 MG/1
8 TABLET, ORALLY DISINTEGRATING ORAL 3 TIMES DAILY PRN
Status: DISCONTINUED | OUTPATIENT
Start: 2025-03-03 | End: 2025-03-06 | Stop reason: HOSPADM

## 2025-03-03 RX ORDER — SERTRALINE HYDROCHLORIDE 100 MG/1
200 TABLET, FILM COATED ORAL DAILY
Status: DISCONTINUED | OUTPATIENT
Start: 2025-03-03 | End: 2025-03-06 | Stop reason: HOSPADM

## 2025-03-03 RX ORDER — POLYETHYLENE GLYCOL 3350 17 G/17G
17 POWDER, FOR SOLUTION ORAL DAILY PRN
Status: DISCONTINUED | OUTPATIENT
Start: 2025-03-03 | End: 2025-03-06 | Stop reason: HOSPADM

## 2025-03-03 RX ORDER — CHOLECALCIFEROL (VITAMIN D3) 25 MCG
2000 TABLET ORAL DAILY
Status: DISCONTINUED | OUTPATIENT
Start: 2025-03-04 | End: 2025-03-06 | Stop reason: HOSPADM

## 2025-03-03 RX ORDER — ROSUVASTATIN CALCIUM 10 MG/1
10 TABLET, COATED ORAL DAILY
Status: DISCONTINUED | OUTPATIENT
Start: 2025-03-03 | End: 2025-03-06 | Stop reason: HOSPADM

## 2025-03-03 RX ADMIN — HEPARIN SODIUM 7500 UNITS: 5000 INJECTION, SOLUTION INTRAVENOUS; SUBCUTANEOUS at 22:15

## 2025-03-03 RX ADMIN — SODIUM CHLORIDE, POTASSIUM CHLORIDE, SODIUM LACTATE AND CALCIUM CHLORIDE 500 ML: 600; 310; 30; 20 INJECTION, SOLUTION INTRAVENOUS at 17:10

## 2025-03-03 RX ADMIN — VANCOMYCIN HYDROCHLORIDE 1000 MG: 1 INJECTION, SOLUTION INTRAVENOUS at 18:52

## 2025-03-03 RX ADMIN — MEMANTINE 10 MG: 10 TABLET ORAL at 22:14

## 2025-03-03 RX ADMIN — PREGABALIN 200 MG: 25 CAPSULE ORAL at 21:20

## 2025-03-03 RX ADMIN — CEFEPIME 2 G: 1 INJECTION, SOLUTION INTRAVENOUS at 20:25

## 2025-03-03 RX ADMIN — OXYCODONE HYDROCHLORIDE 10 MG: 10 TABLET ORAL at 21:20

## 2025-03-03 RX ADMIN — IOHEXOL 500 ML: 350 INJECTION, SOLUTION INTRAVENOUS at 18:26

## 2025-03-03 RX ADMIN — ACETAMINOPHEN 975 MG: 325 TABLET ORAL at 17:10

## 2025-03-03 RX ADMIN — OXYCODONE HYDROCHLORIDE 10 MG: 10 TABLET, FILM COATED, EXTENDED RELEASE ORAL at 21:20

## 2025-03-03 RX ADMIN — METRONIDAZOLE 500 MG: 5 INJECTION, SOLUTION INTRAVENOUS at 22:15

## 2025-03-03 RX ADMIN — VANCOMYCIN HYDROCHLORIDE 1000 MG: 1 INJECTION, SOLUTION INTRAVENOUS at 17:25

## 2025-03-03 RX ADMIN — SERTRALINE HYDROCHLORIDE 200 MG: 100 TABLET ORAL at 21:20

## 2025-03-03 SDOH — ECONOMIC STABILITY: HOUSING INSECURITY: IN THE LAST 12 MONTHS, WAS THERE A TIME WHEN YOU WERE NOT ABLE TO PAY THE MORTGAGE OR RENT ON TIME?: PATIENT DECLINED

## 2025-03-03 SDOH — SOCIAL STABILITY: SOCIAL INSECURITY
WITHIN THE LAST YEAR, HAVE YOU BEEN HUMILIATED OR EMOTIONALLY ABUSED IN OTHER WAYS BY YOUR PARTNER OR EX-PARTNER?: PATIENT DECLINED

## 2025-03-03 SDOH — HEALTH STABILITY: MENTAL HEALTH: HAVE YOU WISHED YOU WERE DEAD OR WISHED YOU COULD GO TO SLEEP AND NOT WAKE UP?: NO

## 2025-03-03 SDOH — ECONOMIC STABILITY: FOOD INSECURITY: WITHIN THE PAST 12 MONTHS, THE FOOD YOU BOUGHT JUST DIDN'T LAST AND YOU DIDN'T HAVE MONEY TO GET MORE.: PATIENT DECLINED

## 2025-03-03 SDOH — SOCIAL STABILITY: SOCIAL INSECURITY: DOES ANYONE TRY TO KEEP YOU FROM HAVING/CONTACTING OTHER FRIENDS OR DOING THINGS OUTSIDE YOUR HOME?: NO

## 2025-03-03 SDOH — SOCIAL STABILITY: SOCIAL INSECURITY: HAS ANYONE EVER THREATENED TO HURT YOUR FAMILY OR YOUR PETS?: NO

## 2025-03-03 SDOH — ECONOMIC STABILITY: TRANSPORTATION INSECURITY
IN THE PAST 12 MONTHS, HAS LACK OF TRANSPORTATION KEPT YOU FROM MEDICAL APPOINTMENTS OR FROM GETTING MEDICATIONS?: PATIENT DECLINED

## 2025-03-03 SDOH — SOCIAL STABILITY: SOCIAL INSECURITY
WITHIN THE LAST YEAR, HAVE YOU BEEN RAPED OR FORCED TO HAVE ANY KIND OF SEXUAL ACTIVITY BY YOUR PARTNER OR EX-PARTNER?: PATIENT DECLINED

## 2025-03-03 SDOH — SOCIAL STABILITY: SOCIAL INSECURITY: ABUSE: ADULT

## 2025-03-03 SDOH — SOCIAL STABILITY: SOCIAL INSECURITY: ARE YOU OR HAVE YOU BEEN THREATENED OR ABUSED PHYSICALLY, EMOTIONALLY, OR SEXUALLY BY ANYONE?: NO

## 2025-03-03 SDOH — SOCIAL STABILITY: SOCIAL INSECURITY: HAVE YOU HAD THOUGHTS OF HARMING ANYONE ELSE?: NO

## 2025-03-03 SDOH — SOCIAL STABILITY: SOCIAL INSECURITY: DO YOU FEEL ANYONE HAS EXPLOITED OR TAKEN ADVANTAGE OF YOU FINANCIALLY OR OF YOUR PERSONAL PROPERTY?: NO

## 2025-03-03 SDOH — ECONOMIC STABILITY: HOUSING INSECURITY: AT ANY TIME IN THE PAST 12 MONTHS, WERE YOU HOMELESS OR LIVING IN A SHELTER (INCLUDING NOW)?: PATIENT DECLINED

## 2025-03-03 SDOH — ECONOMIC STABILITY: FOOD INSECURITY
WITHIN THE PAST 12 MONTHS, YOU WORRIED THAT YOUR FOOD WOULD RUN OUT BEFORE YOU GOT THE MONEY TO BUY MORE.: PATIENT DECLINED

## 2025-03-03 SDOH — ECONOMIC STABILITY: HOUSING INSECURITY: IN THE PAST 12 MONTHS, HOW MANY TIMES HAVE YOU MOVED WHERE YOU WERE LIVING?: 1

## 2025-03-03 SDOH — SOCIAL STABILITY: SOCIAL INSECURITY: WERE YOU ABLE TO COMPLETE ALL THE BEHAVIORAL HEALTH SCREENINGS?: YES

## 2025-03-03 SDOH — HEALTH STABILITY: MENTAL HEALTH: SUICIDE ASSESSMENT: ADULT (C-SSRS)

## 2025-03-03 SDOH — ECONOMIC STABILITY: INCOME INSECURITY
IN THE PAST 12 MONTHS HAS THE ELECTRIC, GAS, OIL, OR WATER COMPANY THREATENED TO SHUT OFF SERVICES IN YOUR HOME?: PATIENT DECLINED

## 2025-03-03 SDOH — SOCIAL STABILITY: SOCIAL INSECURITY: DO YOU FEEL UNSAFE GOING BACK TO THE PLACE WHERE YOU ARE LIVING?: NO

## 2025-03-03 SDOH — HEALTH STABILITY: MENTAL HEALTH: HAVE YOU EVER DONE ANYTHING, STARTED TO DO ANYTHING, OR PREPARED TO DO ANYTHING TO END YOUR LIFE?: NO

## 2025-03-03 SDOH — HEALTH STABILITY: MENTAL HEALTH: HAVE YOU ACTUALLY HAD ANY THOUGHTS OF KILLING YOURSELF?: NO

## 2025-03-03 SDOH — SOCIAL STABILITY: SOCIAL INSECURITY
WITHIN THE LAST YEAR, HAVE YOU BEEN KICKED, HIT, SLAPPED, OR OTHERWISE PHYSICALLY HURT BY YOUR PARTNER OR EX-PARTNER?: PATIENT DECLINED

## 2025-03-03 SDOH — SOCIAL STABILITY: SOCIAL INSECURITY: WITHIN THE LAST YEAR, HAVE YOU BEEN AFRAID OF YOUR PARTNER OR EX-PARTNER?: PATIENT DECLINED

## 2025-03-03 SDOH — SOCIAL STABILITY: SOCIAL INSECURITY: ARE THERE ANY APPARENT SIGNS OF INJURIES/BEHAVIORS THAT COULD BE RELATED TO ABUSE/NEGLECT?: NO

## 2025-03-03 SDOH — HEALTH STABILITY: MENTAL HEALTH: BEHAVIORAL HEALTH(WDL): WITHIN DEFINED LIMITS

## 2025-03-03 SDOH — ECONOMIC STABILITY: FOOD INSECURITY: HOW HARD IS IT FOR YOU TO PAY FOR THE VERY BASICS LIKE FOOD, HOUSING, MEDICAL CARE, AND HEATING?: PATIENT DECLINED

## 2025-03-03 ASSESSMENT — PATIENT HEALTH QUESTIONNAIRE - PHQ9
SUM OF ALL RESPONSES TO PHQ9 QUESTIONS 1 & 2: 0
2. FEELING DOWN, DEPRESSED OR HOPELESS: NOT AT ALL
1. LITTLE INTEREST OR PLEASURE IN DOING THINGS: NOT AT ALL

## 2025-03-03 ASSESSMENT — COLUMBIA-SUICIDE SEVERITY RATING SCALE - C-SSRS
2. HAVE YOU ACTUALLY HAD ANY THOUGHTS OF KILLING YOURSELF?: NO
1. IN THE PAST MONTH, HAVE YOU WISHED YOU WERE DEAD OR WISHED YOU COULD GO TO SLEEP AND NOT WAKE UP?: NO
6. HAVE YOU EVER DONE ANYTHING, STARTED TO DO ANYTHING, OR PREPARED TO DO ANYTHING TO END YOUR LIFE?: NO
6. HAVE YOU EVER DONE ANYTHING, STARTED TO DO ANYTHING, OR PREPARED TO DO ANYTHING TO END YOUR LIFE?: NO
1. IN THE PAST MONTH, HAVE YOU WISHED YOU WERE DEAD OR WISHED YOU COULD GO TO SLEEP AND NOT WAKE UP?: NO
2. HAVE YOU ACTUALLY HAD ANY THOUGHTS OF KILLING YOURSELF?: NO

## 2025-03-03 ASSESSMENT — ACTIVITIES OF DAILY LIVING (ADL)
ADEQUATE_TO_COMPLETE_ADL: YES
WALKS IN HOME: INDEPENDENT
LACK_OF_TRANSPORTATION: PATIENT DECLINED
HEARING - LEFT EAR: FUNCTIONAL
GROOMING: INDEPENDENT
HEARING - RIGHT EAR: FUNCTIONAL
JUDGMENT_ADEQUATE_SAFELY_COMPLETE_DAILY_ACTIVITIES: YES
DRESSING YOURSELF: INDEPENDENT
FEEDING YOURSELF: INDEPENDENT
BATHING: INDEPENDENT
PATIENT'S MEMORY ADEQUATE TO SAFELY COMPLETE DAILY ACTIVITIES?: YES
LACK_OF_TRANSPORTATION: PATIENT DECLINED
TOILETING: INDEPENDENT

## 2025-03-03 ASSESSMENT — PAIN SCALES - GENERAL
PAINLEVEL_OUTOF10: 7
PAINLEVEL_OUTOF10: 8
PAINLEVEL_OUTOF10: 6

## 2025-03-03 ASSESSMENT — COGNITIVE AND FUNCTIONAL STATUS - GENERAL
HELP NEEDED FOR BATHING: A LITTLE
PATIENT BASELINE BEDBOUND: NO
WALKING IN HOSPITAL ROOM: A LITTLE
TOILETING: A LITTLE
DRESSING REGULAR UPPER BODY CLOTHING: A LITTLE
TURNING FROM BACK TO SIDE WHILE IN FLAT BAD: A LITTLE
CLIMB 3 TO 5 STEPS WITH RAILING: A LITTLE
PERSONAL GROOMING: A LITTLE
DAILY ACTIVITIY SCORE: 18
MOVING TO AND FROM BED TO CHAIR: A LITTLE
STANDING UP FROM CHAIR USING ARMS: A LITTLE
DRESSING REGULAR LOWER BODY CLOTHING: A LITTLE
MOBILITY SCORE: 18
EATING MEALS: A LITTLE
MOVING FROM LYING ON BACK TO SITTING ON SIDE OF FLAT BED WITH BEDRAILS: A LITTLE

## 2025-03-03 ASSESSMENT — LIFESTYLE VARIABLES
HOW MANY STANDARD DRINKS CONTAINING ALCOHOL DO YOU HAVE ON A TYPICAL DAY: PATIENT DOES NOT DRINK
HOW OFTEN DO YOU HAVE 6 OR MORE DRINKS ON ONE OCCASION: NEVER
AUDIT-C TOTAL SCORE: 0
SKIP TO QUESTIONS 9-10: 1
AUDIT-C TOTAL SCORE: 0
HOW OFTEN DO YOU HAVE A DRINK CONTAINING ALCOHOL: NEVER

## 2025-03-03 ASSESSMENT — PAIN - FUNCTIONAL ASSESSMENT
PAIN_FUNCTIONAL_ASSESSMENT: 0-10
PAIN_FUNCTIONAL_ASSESSMENT: 0-10

## 2025-03-03 NOTE — ED TRIAGE NOTES
Patient has hx of melanoma with metastases to brain/lungs and spine that recently started on immunotherapy - last dose x1 weeks ago. Patient was at hem/onc appointment and had an elevated temp at appointment - they stated that it was 102f at appointment. Patient is also having chronic lower back pain. Patient endorses having a non productive  cough / chills that also started today. Patient did not take any tylenol or ibuprofen today

## 2025-03-03 NOTE — PROGRESS NOTES
Patient ID: Manuel Marrufo is a 59 y.o. male.  Referring Physician: No referring provider defined for this encounter.  Primary Care Provider: No Assigned PCP Generic Provider, MD Villegas    Manuel Marrufo is a 59 y.o. 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 [facility] severe headache rated 9 out of 10, characterized as throbbing and located in the parieto-occipital region. The headache was not accompanied by nausea or vomiting but was associated with tinnitus, blurred vision, and photophobia. The pain responded to oxycodone and was positional, worsening with forward bending and improving when lying flat. He has experienced these headaches since July 2024, following a diagnosis of brain metastasis from previously diagnosed melanoma.     The [facility]. [facility], a mass was later discovered during a lung cancer screening in the right lower lobe, leading to a PET scan that confirmed malignancy. An attempt at robotic surgery was aborted upon discovering metastatic implants in the intrathoracic area, with pathology confirming metastatic melanoma. He was admitted to [facility] in November 2024 due to concerns of hemorrhagic brain metastases identified on a CT scan after [facility] a [facility], resulting in his transfer to . He was started on steroids and underwent an MRI, which revealed diffuse metastatic disease. Consequently, a decision was made to pursue [facility]RT [facility] the [facility], and he completed ten sessions of palliative WBRT in December 2024. In January, the patient has undergone two cycles of dual immunotherapy with nivolumab and ipilimumab, with the last dose administered on January 10, 2025. [facility], while Dr. Schaefer is seeing chemo/immunotherapy.     Past Medical History  He has a past medical history of Agoraphobia with  panic attacks, Anxiety, Awareness under anesthesia (2021), COPD (chronic obstructive pulmonary disease) (Multi), Fatty liver disease, nonalcoholic, GERD (gastroesophageal reflux disease), HLD (hyperlipidemia), Melanoma (Multi), FRANK on CPAP, and PTSD (post-traumatic stress disorder).     Surgical History  He has a past surgical history that includes Back surgery; Malignant skin lesion excision; and Tonsillectomy.    INTERVAL HISTORY (3/03/2025): He has melanoma with recent metastases to the lung and brain. He had > 25 brain mets diagnosed in December 2024 and has undergone a course of whole brain [facility] fractions [facility]. Since then he has felt about the same or worse -- no improvement yet. He has also started a course of systemic chemotherapy with Ipilimumab & Nivolumab q 3 weeks at the [facility]. He complains of poor follow-up with the [facility]c Team at the [facility] since treatment began. He notes frequent and severe headaches, neck pain, diffuse body swelling, poor gait and balance, and being unable to stand without assistance. He has not been having any seizures. He has not been doing any PT thus far in 2025. He remains on Decadron 4 mg bid and remains off of an AED. He has not yet had a post-RT follow-up brain MRI at [facility] or at the [facility]. He complains of SOB and feeling dizzy today.     The ROS is as per documentation in the HPI.     Objective   BSA: There is no height or weight on file to calculate BSA.  There were no vitals taken for this visit.    Family History   Problem Relation Name Age of Onset    Arthritis Mother Patrizia Marrufo     Alcohol abuse Father      Cirrhosis Father      Cervical cancer Sister Kaity Rebolledo     Hypertension Sister Kaity Rebolledo     Other (bladder cancer) Maternal Grandmother Coral Iban      Oncology History    No history exists.       Manuel Marrufo  reports that he quit smoking about 10 months ago. His smoking use included cigarettes. He started smoking about 40 years ago. He has a 20 pack-year  smoking history. He has never used smokeless tobacco.  He  reports no history of alcohol use.  He  reports no history of drug use.    Physical Exam  Constitutional:       Appearance: He is normal weight.   HENT:      Head: Normocephalic.   Eyes:      Extraocular Movements: Extraocular movements intact.      Pupils: Pupils are equal, round, and reactive to light.   Musculoskeletal:         General: Swelling present. Normal range of motion.      Cervical back: Normal range of motion.      Right lower leg: Edema present.      Left lower leg: Edema present.   Neurological:      Mental Status: He is alert and oriented to person, place, and time.      Cranial Nerves: Cranial nerves 2-12 are intact.      Sensory: Sensation is intact.      Motor: Weakness present.      Coordination: Coordination is intact.      Gait: Gait abnormal and tandem walk abnormal.      Deep Tendon Reflexes: Babinski sign absent on the right side. Babinski sign absent on the left side.      Reflex Scores:       Tricep reflexes are 1+ on the right side and 1+ on the left side.       Bicep reflexes are 1+ on the right side and 1+ on the left side.       Brachioradialis reflexes are 1+ on the right side and 1+ on the left side.       Patellar reflexes are 1+ on the right side and 1+ on the left side.       Achilles reflexes are 1+ on the right side and 1+ on the left side.     Comments: Intact STM and cognition, fluent speech, CN's II-XII intact, strength UE 5/5, 3-4/5 LE, unable to ambulate, poor balance, sensation grossly intact.    Psychiatric:         Attention and Perception: Attention and perception normal.         Mood and Affect: Mood is anxious and depressed.         Speech: Speech normal.         Behavior: Behavior normal. Behavior is cooperative.         Thought Content: Thought content normal.         Cognition and Memory: Cognition and memory normal.         Judgment: Judgment normal.       Performance Status:  Symptomatic; in bed <50% of  the day      Lab Results   Component Value Date    WBC 11.8 (H) 01/31/2025    HGB 8.8 (L) 01/31/2025    HCT 31.7 (L) 01/31/2025    MCV 70 (L) 01/31/2025     01/31/2025       The recent brain MRI scans were reviewed with the patient and family:      === 11/24/24 ===    MR BRAIN W AND WO CONTRAST    - Impression -  1. Diffuse, innumerable infratentorial and supratentorial  intraparenchymal avidly enhancing and mildly diffusion restricting  lesions consistent with metastatic disease. No midline shift or  herniation.  2. Additional findings as described above.    I personally reviewed the images/study and I agree with the findings  as stated by Resident Ki Toribio MD.    MACRO:  None    Signed by: Mary Daniel 11/26/2024 6:41 PM  Dictation workstation:   [id number]      Assessment/Plan      -Manuel has melanoma with metastases to the brain (>25), lungs, and spine who has [facility]Nivo [facility].     -He has undergone a course of [facility] x [facility] fractions [facility] in [facility]24, but has yet to get a follow-up post-RT brain MRI scan to review treatment response.     -We will order a new post-RT brain MRI for the next few weeks, here at [facility].     -Depending on the response noted on the new brain MRI, he might need further therapy for the brain metastases (e.g., SBRT or Gamma Knife).     -We will also order a course of PT to improve LE strength, gait, and balance.     -He is to remain on the Decadron 4 mg bid for now; if the new MRI shows improvement we will begin a gentle taper of the steroids.     -We will set him up with a referral to [facility] for follow-up of the melanoma and treatment plan, since the [facility] has not been very responsive lately.     -We will have him return to this clinic in 3-4 weeks for further evaluation, and to review the new brain MRI.     -Due to the symptoms of SOB and dizziness, he will be sent to the ER for an evaluation.     -I spent  > 60 minutes in face to face consultation to review and discuss the above; 50% of which or more was dedicated to counseling.     Medardo Vanegas MD

## 2025-03-03 NOTE — PATIENT INSTRUCTIONS
Your next appointment will be a virtual in 3.5 weeks.   You are to have your MRI completed prior.    Please call us with any questions or concerns at [phone] opt. 5, opt. 2  For scheduling concerns please call [phone] option 1

## 2025-03-03 NOTE — CONSULTS
"Vancomycin Dosing by Pharmacy- INITIAL    Manuel LOBO Marrufo is a 59 y.o. year old male who Pharmacy has been consulted for vancomycin dosing for CNS/meningoencephalitis. Based on the patient's indication and renal status this patient will be dosed based on a goal trough/random level of 15-20.     Renal function is currently declining. Baseline Scr appears to be 0.9-1.1.     Visit Vitals  /65   Pulse 86   Temp 36.8 °C (98.2 °F) (Oral)   Resp (!) 22   Ht 1.727 m (5' 8\")   Wt 125 kg (275 lb)   SpO2 94%   BMI 41.81 kg/m²   Smoking Status Former   BSA 2.45 m²      Results from last 7 days   Lab Units 03/03/25  1506   BUN mg/dL 28*   CREATININE mg/dL 1.38*   WBC AUTO x10*3/uL 13.2*      Blood Culture   Date/Time Value Ref Range Status   03/03/2025 03:06 PM Loaded on Instrument - Culture in progress  Preliminary   03/03/2025 03:06 PM Loaded on Instrument - Culture in progress  Preliminary         Assessment/Plan     Patient will be given a loading dose of 2000 mg.  Will dose vancomycin by level.  Follow-up level will be ordered on 3/4/2025 at 1700 unless clinically indicated sooner.  Will continue to monitor renal function daily while on vancomycin and order serum creatinine at least every 48 hours if not already ordered.  Follow for continued vancomycin needs, clinical response, and signs/symptoms of toxicity.       Sri Leonard     I agree with the documentation done by the student/resident. Any changes are highlighted in red.     Katirn Coronel, PharmD, BCCCP  [facility] Emergency Medicine Pharmacist       "

## 2025-03-03 NOTE — ED PROVIDER NOTES
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  Patient states he was sent in from his oncology appointment earlier today because he had a fever of 102 °F there.  He denies any fevers at home, abdominal pain, nausea, vomiting.  He states he has had a cough with clear sputum production for the past few days.  He states he has right-sided chest pain which is where his cancer is.  He notes occipital headache as well similar to his baseline headaches denies any vision changes, dizziness, weakness, numbness, tingling.  He states he has a vagal nerve stimulator that is turned off and denies any recent procedures, surgeries.  He denies any new rashes, indwelling lines or catheters.    I reviewed discharge summary from January 31, 2025 when patient was admitted for headache and patient was scheduled for follow-up with VNS nurse.  Patient was scheduled for an outpatient MRI at the time.  [facility]  Patient was seen by oncology earlier today March 3, 2025 the patient was given referral to [facility] medical oncology for follow-up for melanoma and treatment plan.  Patient was sent to the ER for shortness of breath and dizziness.  Patient was ordered Decadron 4 mg twice daily per oncologist.    External Records Reviewed:  I reviewed prior ED visits, Care Everywhere, discharge summaries and outpatient records as appropriate.   ???????????????????????????????????????????????????????????????  Triage Vitals:  T 36.8 °C (98.2 °F)  HR 92  /70  RR 16  O2 94 % None (Room air)    Physical Exam  Vitals and nursing note reviewed.   Constitutional:       General: He is not in acute distress.     Appearance: Normal appearance.   HENT:      Head: Normocephalic and atraumatic.   Eyes:       Conjunctiva/sclera: Conjunctivae normal.   Cardiovascular:      Rate and Rhythm: Normal rate and regular rhythm.   Pulmonary:      Effort: Pulmonary effort is normal. No respiratory distress.      Breath sounds: Normal breath sounds.      Comments: Minimally tachypneic, no appreciable rhonchi, rales, wheezes, no palpable abdominal masses  Abdominal:      General: Abdomen is flat.      Palpations: Abdomen is soft.      Comments: Abdomen is soft but distended, nontender to palpation, abrasion to left lower quadrant of the abdomen that appears chronic   Musculoskeletal:         General: Normal range of motion.      Cervical back: Normal range of motion and neck supple. No rigidity.      Comments: Trace bilateral pedal edema, bilateral palpable DP pulses, able to lift both legs off of the bed, no pronator drift in upper extremities, 5 out of 5  strength bilaterally, able to wiggle toes in bilateral feet, midline T and L-spine tenderness to palpation   Skin:     General: Skin is warm and dry.   Neurological:      General: No focal deficit present.      Mental Status: He is alert and oriented to person, place, and time. Mental status is at baseline.   Psychiatric:         Mood and Affect: Mood normal.         Behavior: Behavior normal.        ???????????????????????????????????????????????????????????????  ED Course/Treatment/Medical Decision Making  MDM:  Patient is a 59-year-old male who presents with fever.  Patient does have a history of malignancy.  He does not appear septic however given his history blood cultures were obtained and sepsis workup initiated.  Will defer antibiotics at this time.  Patient's last chemotherapy session was approximately 2 weeks ago.  Will evaluate for neutropenic fever.  Patient is afebrile here however I will give Tylenol.  Patient has no focal neurologic deficits on examination.  I will obtain chest x-ray.  I discussed plan for admission given patient's history of immunocompromised  state and endorsement of fever and he was agreeable.  Patient has no obvious new rashes, open wounds.  The patient has no obvious strength, sensory deficits, step-offs, patient did have midline C and T-spine tenderness to palpation therefore I will obtain CT imaging to evaluate for underlying osteomyelitis, discitis, spinal epidural abscess. I did obtain CTH as well however no obvious nuchal rigidity, he does not appear meningitic or encephalopathic however given history of metastatic brain cancer, headache will empirically cover with vancomycin and cefepime.  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      ED Course:  ED Course as of 03/03/25 1959   Mon Mar 03, 2025   1606 I reviewed nursing initiated protocol, creatinine is 1.38 increased from baseline approximately 1.05, AST 62, ALT 74, WBC count is elevated at 13.2, hemoglobin is 10, platelets 186, baseline hemoglobin is approximately 8.8, viral swabs are negative, lactate 1.8.  Will give Tylenol and cautious IV fluids with 500 cc LR at this time [SA]   1700 XR chest 2 views  IMPRESSION:  No evidence of acute intrathoracic abnormality.   [SA]   1702 Patient had temp of 101.8F at appointment 4 hours ago per vitals review [SA]   1951 CT reviewed:  IMPRESSION:  No fracture or destructive osseous process.      8 mm sclerotic focus posterior T10 vertebral body. Both malignant  etiology such as metastatic disease and benign etiology such as  hemangioma are considerations. This could be further evaluated with a  PET-CT or bone scan.      No active paraspinous inflammation identified.      22 mm left adrenal nodule. This is indeterminate and could be further  evaluated with PET-CT or MRI.   [SA]   1951 CTH reviewed:  IMPRESSION:  1. No acute intracranial hemorrhage or midline shift.  2. Numerous areas of hypoattenuating parenchyma in the bilateral  cerebral  hemispheres consistent with known metastatic disease to the  brain. A 1 cm lesion in the right parietal lobe is unchanged from the  prior exam. Contrast-enhanced MRI can be considered for further  evaluation of metastatic lesions.   [SA]   1951 Discussed with admission coordinators for admission [SA]      ED Course User Index  [SA] Bety Blair DO         Diagnoses as of 03/03/25 1959   Fever, unspecified fever cause         EKG Interpretation:  See ED Course/Below:    Independent Interpretation of Studies:  I independently interpreted labs/imaging as stated in ED Course or below.    Differential diagnoses considered include but are not limited to: See MDM/Below:    Social Determinants Limiting Care:  None identified The following actions were taken to address these social determinants:      Discussion of Management with Other Providers: See MDM/Below:    Disposition:  Admitted    MAXIMILIAN Quiroz, PGY-3    I reviewed the case with the attending ED physician. The attending ED physician agrees with the plan. Patient and/or patient´s representative was counseled regarding labs, imaging, likely diagnosis, and plan. All questions were answered.    Disclaimer: This note was dictated by speech recognition.  Attempt at proofreading was made to minimize errors.  Errors in transcription may be present.  Please call if questions.    Procedures ? SmartLinks last updated 3/3/2025 7:59 PM        Bety Blair DO  Resident  03/03/25 1959

## 2025-03-04 ENCOUNTER — APPOINTMENT (OUTPATIENT)
Dept: HEMATOLOGY/ONCOLOGY | Facility: HOSPITAL | Age: 60
End: 2025-03-04
Payer: MEDICARE

## 2025-03-04 LAB
ALBUMIN SERPL BCP-MCNC: 3.4 G/DL (ref 3.4–5)
ALP SERPL-CCNC: 79 U/L (ref 33–120)
ALT SERPL W P-5'-P-CCNC: 66 U/L (ref 10–52)
ANION GAP SERPL CALC-SCNC: 14 MMOL/L (ref 10–20)
AST SERPL W P-5'-P-CCNC: 56 U/L (ref 9–39)
ATRIAL RATE: 94 BPM
BASOPHILS # BLD MANUAL: 0 X10*3/UL (ref 0–0.1)
BASOPHILS NFR BLD MANUAL: 0 %
BILIRUB SERPL-MCNC: 0.6 MG/DL (ref 0–1.2)
BUN SERPL-MCNC: 20 MG/DL (ref 6–23)
CALCIUM SERPL-MCNC: 8.8 MG/DL (ref 8.6–10.6)
CHLORIDE SERPL-SCNC: 97 MMOL/L (ref 98–107)
CO2 SERPL-SCNC: 29 MMOL/L (ref 21–32)
CREAT SERPL-MCNC: 1.36 MG/DL (ref 0.5–1.3)
EGFRCR SERPLBLD CKD-EPI 2021: 60 ML/MIN/1.73M*2
EOSINOPHIL # BLD MANUAL: 0.62 X10*3/UL (ref 0–0.7)
EOSINOPHIL NFR BLD MANUAL: 6 %
ERYTHROCYTE [DISTWIDTH] IN BLOOD BY AUTOMATED COUNT: 21.6 % (ref 11.5–14.5)
GLUCOSE SERPL-MCNC: 84 MG/DL (ref 74–99)
HCT VFR BLD AUTO: 30.9 % (ref 41–52)
HGB BLD-MCNC: 9 G/DL (ref 13.5–17.5)
HOLD SPECIMEN: NORMAL
IMM GRANULOCYTES # BLD AUTO: 0.64 X10*3/UL (ref 0–0.7)
IMM GRANULOCYTES NFR BLD AUTO: 6.1 % (ref 0–0.9)
LYMPHOCYTES # BLD MANUAL: 1.25 X10*3/UL (ref 1.2–4.8)
LYMPHOCYTES NFR BLD MANUAL: 12 %
MAGNESIUM SERPL-MCNC: 2.13 MG/DL (ref 1.6–2.4)
MCH RBC QN AUTO: 20.3 PG (ref 26–34)
MCHC RBC AUTO-ENTMCNC: 29.1 G/DL (ref 32–36)
MCV RBC AUTO: 70 FL (ref 80–100)
METAMYELOCYTES # BLD MANUAL: 0.1 X10*3/UL
METAMYELOCYTES NFR BLD MANUAL: 1 %
MONOCYTES # BLD MANUAL: 0.21 X10*3/UL (ref 0.1–1)
MONOCYTES NFR BLD MANUAL: 2 %
MYELOCYTES # BLD MANUAL: 0.1 X10*3/UL
MYELOCYTES NFR BLD MANUAL: 1 %
NEUTROPHILS # BLD MANUAL: 8.11 X10*3/UL (ref 1.2–7.7)
NEUTS BAND # BLD MANUAL: 0.1 X10*3/UL (ref 0–0.7)
NEUTS BAND NFR BLD MANUAL: 1 %
NEUTS SEG # BLD MANUAL: 8.01 X10*3/UL (ref 1.2–7)
NEUTS SEG NFR BLD MANUAL: 77 %
NRBC BLD-RTO: 0 /100 WBCS (ref 0–0)
OVALOCYTES BLD QL SMEAR: ABNORMAL
P AXIS: 26 DEGREES
P OFFSET: 207 MS
P ONSET: 150 MS
PLATELET # BLD AUTO: ABNORMAL 10*3/UL
POLYCHROMASIA BLD QL SMEAR: ABNORMAL
POTASSIUM SERPL-SCNC: 3.7 MMOL/L (ref 3.5–5.3)
PR INTERVAL: 144 MS
PROCALCITONIN SERPL-MCNC: 0.73 NG/ML
PROT SERPL-MCNC: 6.3 G/DL (ref 6.4–8.2)
Q ONSET: 222 MS
QRS COUNT: 15 BEATS
QRS DURATION: 88 MS
QT INTERVAL: 380 MS
QTC CALCULATION(BAZETT): 475 MS
QTC FREDERICIA: 441 MS
R AXIS: 21 DEGREES
RBC # BLD AUTO: 4.43 X10*6/UL (ref 4.5–5.9)
RBC MORPH BLD: ABNORMAL
SCHISTOCYTES BLD QL SMEAR: ABNORMAL
SODIUM SERPL-SCNC: 136 MMOL/L (ref 136–145)
T AXIS: 56 DEGREES
T OFFSET: 412 MS
TOTAL CELLS COUNTED BLD: 100
VANCOMYCIN TROUGH SERPL-MCNC: 4.9 UG/ML (ref 5–20)
VENTRICULAR RATE: 94 BPM
WBC # BLD AUTO: 10.4 X10*3/UL (ref 4.4–11.3)

## 2025-03-04 PROCEDURE — 2500000004 HC RX 250 GENERAL PHARMACY W/ HCPCS (ALT 636 FOR OP/ED)

## 2025-03-04 PROCEDURE — 84145 PROCALCITONIN (PCT): CPT

## 2025-03-04 PROCEDURE — 94640 AIRWAY INHALATION TREATMENT: CPT

## 2025-03-04 PROCEDURE — 85007 BL SMEAR W/DIFF WBC COUNT: CPT

## 2025-03-04 PROCEDURE — 99223 1ST HOSP IP/OBS HIGH 75: CPT

## 2025-03-04 PROCEDURE — 1170000001 HC PRIVATE ONCOLOGY ROOM DAILY

## 2025-03-04 PROCEDURE — 2500000005 HC RX 250 GENERAL PHARMACY W/O HCPCS: Performed by: STUDENT IN AN ORGANIZED HEALTH CARE EDUCATION/TRAINING PROGRAM

## 2025-03-04 PROCEDURE — 93005 ELECTROCARDIOGRAM TRACING: CPT

## 2025-03-04 PROCEDURE — 2500000001 HC RX 250 WO HCPCS SELF ADMINISTERED DRUGS (ALT 637 FOR MEDICARE OP)

## 2025-03-04 PROCEDURE — 36415 COLL VENOUS BLD VENIPUNCTURE: CPT

## 2025-03-04 PROCEDURE — 85027 COMPLETE CBC AUTOMATED: CPT

## 2025-03-04 PROCEDURE — 80053 COMPREHEN METABOLIC PANEL: CPT

## 2025-03-04 PROCEDURE — 80202 ASSAY OF VANCOMYCIN: CPT | Performed by: EMERGENCY MEDICINE

## 2025-03-04 PROCEDURE — 83735 ASSAY OF MAGNESIUM: CPT

## 2025-03-04 PROCEDURE — 93010 ELECTROCARDIOGRAM REPORT: CPT | Performed by: INTERNAL MEDICINE

## 2025-03-04 PROCEDURE — 2500000002 HC RX 250 W HCPCS SELF ADMINISTERED DRUGS (ALT 637 FOR MEDICARE OP, ALT 636 FOR OP/ED)

## 2025-03-04 RX ORDER — FERROUS SULFATE 325(65) MG
65 TABLET ORAL
Status: DISCONTINUED | OUTPATIENT
Start: 2025-03-04 | End: 2025-03-06 | Stop reason: HOSPADM

## 2025-03-04 RX ORDER — VANCOMYCIN 2 GRAM/500 ML IN 0.9 % SODIUM CHLORIDE INTRAVENOUS
2000 ONCE
Status: COMPLETED | OUTPATIENT
Start: 2025-03-04 | End: 2025-03-04

## 2025-03-04 RX ADMIN — OXYCODONE HYDROCHLORIDE 10 MG: 10 TABLET ORAL at 17:21

## 2025-03-04 RX ADMIN — Medication 3 L/MIN: at 16:03

## 2025-03-04 RX ADMIN — CEFEPIME 2 G: 1 INJECTION, SOLUTION INTRAVENOUS at 04:05

## 2025-03-04 RX ADMIN — SERTRALINE HYDROCHLORIDE 200 MG: 100 TABLET ORAL at 08:48

## 2025-03-04 RX ADMIN — MEMANTINE 10 MG: 10 TABLET ORAL at 20:16

## 2025-03-04 RX ADMIN — HEPARIN SODIUM 7500 UNITS: 5000 INJECTION, SOLUTION INTRAVENOUS; SUBCUTANEOUS at 05:15

## 2025-03-04 RX ADMIN — IPRATROPIUM BROMIDE AND ALBUTEROL SULFATE 3 ML: .5; 3 SOLUTION RESPIRATORY (INHALATION) at 16:03

## 2025-03-04 RX ADMIN — DIAZEPAM 2 MG: 2 TABLET ORAL at 08:48

## 2025-03-04 RX ADMIN — PANTOPRAZOLE SODIUM 40 MG: 40 TABLET, DELAYED RELEASE ORAL at 08:48

## 2025-03-04 RX ADMIN — DEXAMETHASONE 4 MG: 4 TABLET ORAL at 20:16

## 2025-03-04 RX ADMIN — DEXAMETHASONE 4 MG: 4 TABLET ORAL at 08:48

## 2025-03-04 RX ADMIN — CEFEPIME 2 G: 1 INJECTION, SOLUTION INTRAVENOUS at 22:20

## 2025-03-04 RX ADMIN — ARIPIPRAZOLE 10 MG: 5 TABLET ORAL at 08:48

## 2025-03-04 RX ADMIN — HEPARIN SODIUM 7500 UNITS: 5000 INJECTION, SOLUTION INTRAVENOUS; SUBCUTANEOUS at 22:20

## 2025-03-04 RX ADMIN — DAPSONE 100 MG: 100 TABLET ORAL at 08:48

## 2025-03-04 RX ADMIN — METRONIDAZOLE 500 MG: 5 INJECTION, SOLUTION INTRAVENOUS at 13:39

## 2025-03-04 RX ADMIN — Medication 2000 MG: at 20:16

## 2025-03-04 RX ADMIN — OXYCODONE HYDROCHLORIDE 10 MG: 10 TABLET ORAL at 23:47

## 2025-03-04 RX ADMIN — HEPARIN SODIUM 7500 UNITS: 5000 INJECTION, SOLUTION INTRAVENOUS; SUBCUTANEOUS at 13:39

## 2025-03-04 RX ADMIN — METRONIDAZOLE 500 MG: 5 INJECTION, SOLUTION INTRAVENOUS at 05:15

## 2025-03-04 RX ADMIN — FERROUS SULFATE TAB 325 MG (65 MG ELEMENTAL FE) 325 MG: 325 (65 FE) TAB at 08:48

## 2025-03-04 RX ADMIN — PREGABALIN 200 MG: 25 CAPSULE ORAL at 08:48

## 2025-03-04 RX ADMIN — OXYCODONE HYDROCHLORIDE 10 MG: 10 TABLET, FILM COATED, EXTENDED RELEASE ORAL at 20:16

## 2025-03-04 RX ADMIN — ONDANSETRON 8 MG: 8 TABLET, ORALLY DISINTEGRATING ORAL at 08:39

## 2025-03-04 RX ADMIN — PREGABALIN 200 MG: 25 CAPSULE ORAL at 20:16

## 2025-03-04 RX ADMIN — MEMANTINE 10 MG: 10 TABLET ORAL at 08:48

## 2025-03-04 RX ADMIN — IPRATROPIUM BROMIDE AND ALBUTEROL SULFATE 3 ML: .5; 3 SOLUTION RESPIRATORY (INHALATION) at 09:45

## 2025-03-04 RX ADMIN — CEFEPIME 2 G: 1 INJECTION, SOLUTION INTRAVENOUS at 12:11

## 2025-03-04 RX ADMIN — METRONIDAZOLE 500 MG: 5 INJECTION, SOLUTION INTRAVENOUS at 23:23

## 2025-03-04 RX ADMIN — CHOLECALCIFEROL TAB 25 MCG (1000 UNIT) 2000 UNITS: 25 TAB at 08:48

## 2025-03-04 ASSESSMENT — COGNITIVE AND FUNCTIONAL STATUS - GENERAL
DRESSING REGULAR LOWER BODY CLOTHING: A LITTLE
HELP NEEDED FOR BATHING: A LITTLE
DRESSING REGULAR UPPER BODY CLOTHING: A LITTLE
STANDING UP FROM CHAIR USING ARMS: A LITTLE
PERSONAL GROOMING: A LITTLE
TURNING FROM BACK TO SIDE WHILE IN FLAT BAD: A LITTLE
TURNING FROM BACK TO SIDE WHILE IN FLAT BAD: A LITTLE
CLIMB 3 TO 5 STEPS WITH RAILING: A LOT
TOILETING: A LITTLE
CLIMB 3 TO 5 STEPS WITH RAILING: A LOT
MOVING TO AND FROM BED TO CHAIR: A LITTLE
MOVING FROM LYING ON BACK TO SITTING ON SIDE OF FLAT BED WITH BEDRAILS: A LITTLE
MOVING TO AND FROM BED TO CHAIR: A LITTLE
DRESSING REGULAR UPPER BODY CLOTHING: A LITTLE
MOBILITY SCORE: 16
WALKING IN HOSPITAL ROOM: A LOT
EATING MEALS: A LITTLE
STANDING UP FROM CHAIR USING ARMS: A LITTLE
PERSONAL GROOMING: A LITTLE
DRESSING REGULAR LOWER BODY CLOTHING: A LITTLE
DAILY ACTIVITIY SCORE: 18
WALKING IN HOSPITAL ROOM: A LOT
HELP NEEDED FOR BATHING: A LITTLE
EATING MEALS: A LITTLE
DAILY ACTIVITIY SCORE: 18
TOILETING: A LITTLE
MOVING FROM LYING ON BACK TO SITTING ON SIDE OF FLAT BED WITH BEDRAILS: A LITTLE
MOBILITY SCORE: 16

## 2025-03-04 ASSESSMENT — PAIN SCALES - GENERAL
PAINLEVEL_OUTOF10: 7
PAINLEVEL_OUTOF10: 0 - NO PAIN
PAINLEVEL_OUTOF10: 8
PAINLEVEL_OUTOF10: 7
PAINLEVEL_OUTOF10: 7

## 2025-03-04 ASSESSMENT — PAIN - FUNCTIONAL ASSESSMENT
PAIN_FUNCTIONAL_ASSESSMENT: 0-10

## 2025-03-04 NOTE — PROGRESS NOTES
Pharmacy Medication History Review    Manuel Marrufo is a 59 y.o. male admitted for Fever, unspecified fever cause. Pharmacy reviewed the patient's [id number] medications and allergies for accuracy.    Medications ADDED:  None  Medications CHANGED:  Decadron  Valium  Oxycontin ER  Medications REMOVED:   Zanaflex     The list below reflects the updated PTA list.   Prior to Admission Medications   Prescriptions Last Dose Informant   ARIPiprazole (Abilify) 10 mg tablet Not Taking Other, Self   Sig: Take 1 tablet (10 mg) by mouth once daily.   Patient not taking: Reported on 3/4/2025  Patient reported he stopped taking because it made him sick (GI upset)    acetaminophen (Tylenol) 325 mg tablet  Other, Self   Sig: Take 1 tablet (325 mg) by mouth 4 times a day as needed for mild pain (1 - 3).   albuterol 90 mcg/actuation inhaler  Other, Self   Sig: Inhale 2 puffs 4 times a day as needed for wheezing or shortness of breath.   budesonide-glycopyr-formoterol (BREZTRI) 160-9-4.8 mcg/actuation HFA aerosol inhaler  Other, Self   Sig: Inhale 2 puffs 2 times a day.   cholecalciferol (Vitamin D-3) 50 MCG (2000 UT) tablet  Other, Self   Sig: Take 1 tablet (50 mcg) by mouth once daily.   dapsone 100 mg tablet  Self, Other   Sig: Take 1 tablet (100 mg) by mouth once daily.   dexAMETHasone (Decadron) 4 mg tablet  Self, Other   Sig: Take 1 tablet (4 mg) by mouth every 6 hours.   Patient taking differently: Take 1 tablet (4 mg) by mouth 2 times a day.   diazePAM (Valium) 5 mg tablet  Other, Self   Sig: Take 1-1.5 tablets (5-7.5 mg) by mouth see administration instructions. Take 1.5 tablets (7.5 mg) by mouth in the morning and take 1 tablet (5 mg) in the evening   furosemide (Lasix) 40 mg tablet  Other, Self   Sig: Take 1 tablet (40 mg) by mouth once daily. Edema   loperamide (Imodium) 2 mg capsule  Other, Self   Sig: Take 3 capsules (6 mg) by mouth once daily as needed for diarrhea.   memantine (Namenda) 5 mg tablet  Other, Self    Sig: Take 2 tablets (10 mg) by mouth 2 times a day.   omeprazole (PriLOSEC) 20 mg DR capsule  Other, Self   Sig: Take 1 capsule (20 mg) by mouth 2 times a day before meals.  Patient has omeprazole and pantoprazole on medication list. Protonix last dispensed 1/31/2025 for 30-day supply at [facility]. Omeprazole last dispensed 8/9/2024 for 90-day supply per [facility] summarization of [facility] note    ondansetron ODT (Zofran-ODT) 8 mg disintegrating tablet  Other, Self   Sig: Dissolve 1 tablet (8 mg) in the mouth 3 times a day as needed for nausea.   oxyCODONE (Roxicodone) 10 mg immediate release tablet  Other, Self   Sig: Take 1 tablet (10 mg) by mouth every 3 hours if needed for severe pain (7 - 10).   oxyCODONE ER (OxyCONTIN) 10 mg 12 hr tablet  Other, Self   Sig: Take 1 tablet (10 mg) by mouth 2 times a day. Do not crush, chew, or split.   pantoprazole (ProtoNix) 40 mg EC tablet  Self, Other   Sig: Take 1 tablet (40 mg) by mouth once daily in the morning. Take before meals. Do not crush, chew, or split.  Patient has omeprazole and pantoprazole on medication list. Protonix last dispensed 1/31/2025 for 30-day supply at [facility]. Omeprazole last dispensed 8/9/2024 for 90-day supply per [facility] summarization of [facility] note    pregabalin (Lyrica) 200 mg capsule  Other, Self   Sig: Take 1 capsule (200 mg) by mouth 2 times a day.   propranolol LA (Inderal LA) 120 mg 24 hr capsule  Other, Self   Sig: Take 1 capsule (120 mg) by mouth 2 times a day.   rosuvastatin (Crestor) 10 mg tablet  Other, Self   Sig: Take 1 tablet (10 mg) by mouth once daily.   sertraline (Zoloft) 100 mg tablet  Other, Self   Sig: Take 2 tablets (200 mg) by mouth once daily.   tadalafil (Cialis) 5 mg tablet  Other, Self   Sig: Take 1 tablet (5 mg) by mouth once daily.      Facility-Administered Medications: None        The list below reflects the updated allergy list. Please review each documented allergy for additional clarification and  "justification.  Allergies  Reviewed by Inder Goldsmith PharmD on 3/4/2025        Severity Reactions Comments    Penicillin High Anaphylaxis     Bactrim [sulfamethoxazole-trimethoprim] Not Specified Nausea/vomiting     Buspirone Not Specified GI Upset             Patient accepts M2B at discharge.     Sources:   [facility]  Pharmacy dispense history  Patient interview Moderate historian  Chart Review  Care Everywhere  1/31/2025 [facility] oncology discharge summary  1/29/2025  pharmacy med rec note  3/3/2025 [facility] oncology office visit  [facility]    Additional Comments:  The patient was able to verify PTA med list when prompted with drug names, doses and directions. He was not able to recall when medications were last dispensed  Patient has 2 PPIs, omeprazole and pantoprazole, on the PTA med list (see comments above). Dr. Jay Ramirez, oncology resident, was informed regarding the two PPIs on the PTA med list and asked to determine which PPI or neither to continue during admission and on discharge, if applicable      Inder Goldsmith PharmD  Transitions of Care Pharmacist  03/04/25     Secure Chat preferred   If no response call [id number] or Vocera \"Med Rec\"    "

## 2025-03-04 NOTE — SIGNIFICANT EVENT
Rapid Response Nurse Note:  [x] RADAR alert/Score 7    Pager time: [phone]  Arrival time: 718  Event end time: 724  Location: [facility] 4030  [] Phone triage     Rapid response initiated by:  [] Rapid Response RN [] Family [] Nursing Supervisor [] Physician   [x] RADAR auto-page [] Sepsis auto-page [] RN [] RT   [] NP/PA [] Other:     Primary reason for call:   [] BAT [] New CPAP/BiPAP [] Bleeding [] Change in mental status   [] Chest pain [] Code blue [] FiO2 >/= 50% [] HR </= 40 bpm   [] HR >/= 130 bpm [] Hyperglycemia [] Hypoglycemia [x] RADAR    [] RR </= 8 bpm [] RR >/= 30 bpm [] SBP </= 90 mmHg [] SpO2 < 90%   [] Seizure [] Sepsis [] Staff concern:     Initial VS and/or RADAR VS:  radar vitals below in bold    Vitals:    25 0412 25 0716 25 0725 07   BP: 101/67 93/66 108/72    BP Location:  Right arm     Patient Position:  Lying     Pulse: 95 90     Resp: 18 18     Temp: 37.3 °C (99.1 °F) 37.1 °C (98.8 °F)     TempSrc: Temporal Temporal     SpO2: 92% 91%  94%   Weight:       Height:            Interventions:  [x] None [] ABG [] Assist w/ICU transfer [] BAT paged    [] Bag mask [] Blood [] Cardioversion [] Code Blue   [] Code blue for intubation [] Code status changed [] Chest x-ray [] EKG   [] IV fluid/bolus [] KUB x-ray [] Labs/cultures [] Medication   [] Nebulizer treatment [] NIPPV (CPAP/BiPAP) [] Oxygen [] Oral airway   [] Peripheral IV [] Palliative care consult [] CT/MRI [] Sepsis protocol    [] Suctioned [] Other:       Outcome:  [] Coded and  [] Code blue for intubation [] Coded and transferred to ICU []  on division   [x] Remained on division (no change) [] Remained on division + additional monitoring [] Remained in ED [] Transferred to ED   [] Transferred to ICU [] Transferred to inpatient status [] Transferred for interventions (procedure) [] Transferred to ICU stepdown    [] Transferred to surgery [] Transferred to telemetry [] Sepsis protocol [] STEMI protocol    [] Stroke protocol [x] Bedside nurse instructed to page rapid response for any concerns or acute change in condition/VS     Additional Comments: Spoke to RN regarding vital signs.  Per Nurses Aid, pt's nasal cannula was not in nose appropriately.  Nasal cannula readjusted.  Pox rechecked.  Pt complains of shortness of breath.  Baseline according to pt.  No worse than usual.  No concerns from RN at this time.

## 2025-03-04 NOTE — PROGRESS NOTES
"  Daily Progress Note    Manuel Marrufo is a 59 y.o. male on day 1 of admission presenting with Fever, unspecified fever cause.    Subjective   No acute events overnight. On evaluation this morning, pt was sitting in bed, appeared uncomfortable. Reported ongoing headache, which he states is chronic. [facility]. No fevers, chills, nausea, vomiting overnight.       10 point ROS performed and negative unless stated above.          Objective   Weight: 125 kg (275 lb) (03/03/25 1420)    Daily Weight  03/03/25 : 129 kg (283 lb 12.8 oz)      Last Recorded Vitals  Heart Rate:  [77-95]   Temp:  [36 °C (96.8 °F)-39 °C (102.2 °F)]   Resp:  [16-22]   BP: ()/(48-85)   Height:  [172.7 cm (5' 7.99\")-172.7 cm (5' 8\")]   Weight:  [125 kg (275 lb)-129 kg (283 lb 12.8 oz)]   SpO2:  [91 %-98 %]      Physical Exam  Constitutional: NAD, obese, pleasant  HEENT: EOMI, no scleral icterus, PERRL, dry mucous membranes  CV: distant heart sounds, no m/r/g, right sided pain with plapation  Pulm: nonlabored, slight expiratory wheezes, on 2L NC for comfort  GI: Soft, mildly distended, slight TTP in epigastrium  Extremities: 1-2+ edema to knees  Skin: warm  Neuro: grossly alert and oriented, strength and sensation 5/5 in bl UE and LE, CN 3-12 intact, some tenderness over neck predominantly paraspinal, notes pain is worse with chin to chest, no pain with extension  Psych: Mood and affect appropriate to situation       Intake/Output last 3 Shifts:  I/O last 3 completed shifts:  In: 1045.8 (8.1 mL/kg) [IV Piggyback:1045.8]  Out: - (0 mL/kg)   Weight: 128.7 kg     Medications  Scheduled medications  ARIPiprazole, 10 mg, oral, Daily  cefepime, 2 g, intravenous, q8h  cholecalciferol, 2,000 Units, oral, Daily  dapsone, 100 mg, oral, Daily  dexAMETHasone, 4 mg, oral, q12h TING  diazePAM, 2 mg, oral, Daily with breakfast  ferrous sulfate, 65 mg of iron, oral, Daily with breakfast  tiotropium, 2 puff, inhalation, Daily   " And  fluticasone furoate-vilanteroL, 1 puff, inhalation, Daily  [Held by provider] furosemide, 40 mg, oral, Daily  heparin (porcine), 7,500 Units, subcutaneous, q8h  ipratropium-albuteroL, 3 mL, nebulization, q6h  memantine, 10 mg, oral, BID  metroNIDAZOLE, 500 mg, intravenous, q8h  oxyCODONE ER, 10 mg, oral, q24h  pantoprazole, 40 mg, oral, Daily before breakfast  pregabalin, 200 mg, oral, BID  [Held by provider] rosuvastatin, 10 mg, oral, Daily  sertraline, 200 mg, oral, Daily      Continuous medications     PRN medications  PRN medications: ondansetron ODT, oxyCODONE, polyethylene glycol, vancomycin       Relevant Results    Labs  Results from last 7 days   Lab Units 03/03/25  1506   SODIUM mmol/L 135*   POTASSIUM mmol/L 3.9   CHLORIDE mmol/L 94*   CO2 mmol/L 32   BUN mg/dL 28*   CREATININE mg/dL 1.38*   CALCIUM mg/dL 8.9      Results from last 7 days   Lab Units 03/03/25  1506   HEMOGLOBIN g/dL 10.0*   WBC AUTO x10*3/uL 13.2*   PLATELETS AUTO x10*3/uL 186   HEMATOCRIT % 33.2*     Results from last 7 days   Lab Units 03/03/25  1506   ALK PHOS U/L 84   BILIRUBIN TOTAL mg/dL 0.4   PROTEIN TOTAL g/dL 6.7   ALT U/L 74*   AST U/L 62*          Lab Results   Component Value Date    LACTATE 1.8 03/03/2025       Results from last 7 days   Lab Units 03/03/25  1506   GLUCOSE mg/dL 86                Assessment/Plan   Assessment & Plan  Fever, unspecified fever cause      59 y.o. male PMHx metastatic melanoma, to brain and lung (s/p two cycles of dual immunotherapy with nivulumab and ipilumab, last dose 1/10 and palliative WBRT 12/14/24), (s/p left shoulder resection 2019), spinal stenosis (s/p L4-S1 lumbar decompression 2021), HTN, HLD, nonalcoholic fatty liver disease, obesity, FRANK on CPAP, PTSD, GERD, COPD, depression s/p vagal nerve stimulator (SA x2, 2007) presented to the ED from onc appt for fever of 101.8. Patient endorsing SOB, cough and pleuritic chest pain with CT patchy GGOs in DINO concerning for PNA. [facility], but unclear if PNA was previously under treated. Will treat broadly with vanc/cefepime/flagyl given recent hospitalizations and pcn allergy. Low concern at this time for meningitis as his HA is at baseline, cervical neck pain is chronic and unchanged, and neurologic exam is benign.      #Fever, hypotension  #Concern for PNA vs ICI pneumonitis  :: wbc 13.2, RSV/flu/COVID PCR -ve,  lactate 0.8,  :: CT chest w/ patchy ground-glass opacities in the left upper lobe may represent early infection or treatment related pneumonitis.  :: s/p 1L LR    -procal, MRSA nares, sputum culture pending  -follow up blood cultures, UA  -duonebs q6 scheduled  -continue broad spectrum abx with vanc/cefepime/flagyl, will de-escalate as able  -hold lasix and propanolol iso hypotension  -continuous pulse ox     #PAUL  #HX of HTN  :: Cr 1.38 from bl ~1  -reports good PO intake, suspect s/t starting lasix and hypotension iso infection  -hold lasix 40 mg PO and propanolol  -fluids as above     #Metastatic melanoma to brain and lung (s/p two cycles of dual immunotherapy with nivulumab and ipilumab, last dose 1/10 and palliative WBRT 12/14/24)  :: Seen by Dr. Vanegas on 3/3. Currently on decadron 4mg BID    -continue decadron  -on dapsone for PJP ppx    #Anxiety, PTSD, treatment-refractory depression  #S/p vagal nerve stimulator   -continue home abilify 10mg, valium 5mg qAM and 7.5 mg qPM, zoloft 100mg, and lyrica 200 mg  -hold propanolol     #COPD  -Continue home Breztri - Spiriva and Breo Ellipta   -Singular 10 mg daily  -duonebs q6h      #FRANK  - RT consult placed for CPAP     #MIGEL  :: hgb on admission 10, bl ~8-9  -start PO ferrous sulfate     F: PRN  E: PRN  N: Regular diet   DVT ppx: SQH d/t PAUL    Code: DNR/DNI (confirmed on admission)  NOK: brother Mason [phone]    Jay Ramirez MD  Internal Medicine PGY-1

## 2025-03-04 NOTE — H&P
"History Of Present Illness  Manuel Marrufo is a 59 y.o. male PMHx metastatic melanoma, to brain and lung (s/p two cycles of dual immunotherapy with nivulumab and ipilumab, last dose 1/10 and palliative WBRT 12/14/24), (s/p left shoulder resection 2019), spinal stenosis (s/p L4-S1 lumbar decompression 2021), HTN, HLD, nonalcoholic fatty liver disease, obesity, FRANK on CPAP, PTSD, GERD, COPD, depression s/p vagal nerve stimulator (SA x2, 2007) presented to the ED from onc appt for fever of 101.8.     Had an appt with Dr. Vanegas in oncology today to establish care and found to be febrile to 101.8, so sent to ED. Per patient, he has not experienced any fevers or chills at home. States that [facility] and was treated with bl PNA. His SOB has   been persistent and worsened since then. Also endorsing continued cough since then which is productive of clear sputum and right-sided pleuritic chest pain that is tender to palpation. He denies orthopnea, but endorses LE swelling for a couple weeks for which he was started on lasix 40 mg daily a week and a half ago. His HA is at baseline and he endorses cervical neck pain for the past year that is also at baseline. States he becomes lightheaded and dizzy when he changes positions and his body will \"give out\" causing him to fall. States he has been eating and drinking normally. Denies abdominal pain, N/V, diarrhea, constipation, urinary sxs (aside from frequency since starting lasix).     Admitted 1/27-1/31 for acute worsening of chronic headaches. He has experienced these headaches since July 2024, following a diagnosis of brain metastasis from previously diagnosed melanoma. On examination, the patient exhibited no focal neurological deficits or meningeal signs. He was treated with pain medications and steroids which he responded well. MRI could not be done as there was no availability of VNS nurse. Outpatient MRI with VNS scheduled and patient " discharged.    Onc Hx per notes:   The patient [facility] but [facility]up care. [facility] the [facility], a mass was later discovered during a lung cancer screening in the right lower lobe, leading to a PET scan that confirmed malignancy. An attempt at robotic surgery was aborted upon discovering metastatic implants in the intrathoracic area, with pathology confirming metastatic melanoma. He was admitted to [facility] in November 2024 due to concerns of hemorrhagic brain metastases identified on a CT scan after presenting to [facility] with a headache, resulting in his transfer to . He was started on steroids and underwent an MRI, which revealed diffuse metastatic disease. Consequently, a decision was made to pursue WBRT through the [facility], and he completed ten sessions of palliative WBRT in December 2024. In January, the patient has undergone two cycles of dual immunotherapy with nivolumab and ipilimumab, with the last dose administered on January 10, 2025. Dr[facility] is over[facility], while Dr. Schaefer is seeing chemo/immunotherapy. He has not yet had a post-RT follow-up brain MRI at [facility] or at the [facility].      ED Course:  Vitals: T 98.2, HR 92, RR 16, /70, SpO2 94 on RA    Labs:  Cbc: wbc 13.2 w/ left shift (from 11.8 on 1/31), hgb 10, plt 186  Bmp: Na 135, K 3.9, CL 94, HCO3 32, Cr 1.38, Ca 8.9  Lfts: ALP 84, AST 62, ALT 74, t bili 0.4, albumin 3.9  Lactate 0.8  RSV/flu/COVID PCR -ve   UA ordered  Bcx's collected    CXR no acute intrathoracic abnormality.     CTH   IMPRESSION:  1. No acute intracranial hemorrhage or midline shift.  2. Numerous areas of hypoattenuating parenchyma in the bilateral  cerebral hemispheres consistent with known metastatic disease to the  brain. A 1 cm lesion in the right parietal lobe is unchanged from the  prior exam. Contrast-enhanced MRI can be considered for further  evaluation of metastatic lesions.    CT T/L spine  IMPRESSION:  No  fracture or destructive osseous process.      8 mm sclerotic focus posterior T10 vertebral body. Both malignant  etiology such as metastatic disease and benign etiology such as  hemangioma are considerations. This could be further evaluated with a  PET-CT or bone scan.      No active paraspinous inflammation identified.      22 mm left adrenal nodule. This is indeterminate and could be further  evaluated with PET-CT or MRI.    CT chest  IMPRESSION:  1.  Patchy ground-glass opacities in the left upper lobe may  represent early infection or treatment related pneumonitis.  2. Interval decrease in size of the right lower lobe pulmonary mass,  now measuring 1.5 X 1.1 X 2.0 cm, and previously 3.5 X 3.2 cm in the  axial plane. Innumerable subcentimeter pulmonary nodules in the  bilateral lungs are also decreased in size.  3. Enhancing soft tissue thickening along the right mediastinal  border right major fissure, and posterior pleura is concerning for  metastatic pleural thickening.  4. New/enlarged left adrenal nodule concerning for metastasis.    Interventions: 1L LR, tylenol, started on vancomycin and cefepime     Past Medical History  He has a past medical history of Agoraphobia with panic attacks, Anxiety, Awareness under anesthesia (2021), COPD (chronic obstructive pulmonary disease) (Multi), Fatty liver disease, nonalcoholic, GERD (gastroesophageal reflux disease), HLD (hyperlipidemia), Melanoma (Multi), FRANK on CPAP, and PTSD (post-traumatic stress disorder).    Surgical History  He has a past surgical history that includes Back surgery; Malignant skin lesion excision; and Tonsillectomy.     Social History  He reports that he quit smoking about 10 months ago. His smoking use included cigarettes. He started smoking about 40 years ago. He has a 20 pack-year smoking history. He has never used smokeless tobacco. He reports that he does not drink alcohol and does not use drugs.    Family History  Family History   Problem  Relation Name Age of Onset    Arthritis Mother Patrizia Marrufo     Alcohol abuse Father      Cirrhosis Father      Cervical cancer Sister Kaity Rebolledo     Hypertension Sister Kaity Rebolledo     Other (bladder cancer) Maternal Grandmother Coral Ferrera         Allergies  Bactrim [sulfamethoxazole-trimethoprim], Buspirone, and Penicillin    Review of Systems  Pertinent ROS per HPI     Physical Exam   Constitutional: NAD, obese, pleasant  HEENT: EOMI, no scleral icterus, PERRL, dry mucous membranes  CV: distant heart sounds, no m/r/g, right sided pain with plapation  Pulm: nonlabored, slight expiratory wheezes, on 2L NC for comfort  GI: Soft, mildly distended, slight TTP in epigastrium  Extremities: 1-2+ edema to knees  Skin: warm  Neuro: grossly alert and oriented, strength and sensation 5/5 in bl UE and LE, CN 3-12 intact, some tenderness over neck predominantly paraspinal, notes pain is worse with chin to chest, no pain with extension  Psych: Mood and affect appropriate to situation      Last Recorded Vitals  /61   Pulse 79   Temp 36.8 °C (98.2 °F) (Oral)   Resp 20   Wt 125 kg (275 lb)   SpO2 96%     Relevant Results  Results for orders placed or performed during the hospital encounter of 03/03/25 (from the past 24 hours)   Sars-CoV-2 and Influenza A/B PCR   Result Value Ref Range    Flu A Result Not Detected Not Detected    Flu B Result Not Detected Not Detected    Coronavirus 2019, PCR Not Detected Not Detected   RSV PCR   Result Value Ref Range    RSV PCR Not Detected Not Detected   CBC with Differential   Result Value Ref Range    WBC 13.2 (H) 4.4 - 11.3 x10*3/uL    nRBC 0.3 (H) 0.0 - 0.0 /100 WBCs    RBC 4.90 4.50 - 5.90 x10*6/uL    Hemoglobin 10.0 (L) 13.5 - 17.5 g/dL    Hematocrit 33.2 (L) 41.0 - 52.0 %    MCV 68 (L) 80 - 100 fL    MCH 20.4 (L) 26.0 - 34.0 pg    MCHC 30.1 (L) 32.0 - 36.0 g/dL    RDW 21.2 (H) 11.5 - 14.5 %    Platelets 186 150 - 450 x10*3/uL    Immature Granulocytes %, Automated 7.7 (H)  0.0 - 0.9 %    Immature Granulocytes Absolute, Automated 1.02 (H) 0.00 - 0.70 x10*3/uL   Comprehensive Metabolic Panel   Result Value Ref Range    Glucose 86 74 - 99 mg/dL    Sodium 135 (L) 136 - 145 mmol/L    Potassium 3.9 3.5 - 5.3 mmol/L    Chloride 94 (L) 98 - 107 mmol/L    Bicarbonate 32 21 - 32 mmol/L    Anion Gap 13 10 - 20 mmol/L    Urea Nitrogen 28 (H) 6 - 23 mg/dL    Creatinine 1.38 (H) 0.50 - 1.30 mg/dL    eGFR 59 (L) >60 mL/min/1.73m*2    Calcium 8.9 8.6 - 10.6 mg/dL    Albumin 3.9 3.4 - 5.0 g/dL    Alkaline Phosphatase 84 33 - 120 U/L    Total Protein 6.7 6.4 - 8.2 g/dL    AST 62 (H) 9 - 39 U/L    Bilirubin, Total 0.4 0.0 - 1.2 mg/dL    ALT 74 (H) 10 - 52 U/L   Lactate   Result Value Ref Range    Lactate 1.8 0.4 - 2.0 mmol/L   Blood Culture    Specimen: Peripheral Venipuncture; Blood culture   Result Value Ref Range    Blood Culture Loaded on Instrument - Culture in progress    Blood Culture    Specimen: Peripheral Venipuncture; Blood culture   Result Value Ref Range    Blood Culture Loaded on Instrument - Culture in progress    Manual Differential   Result Value Ref Range    Neutrophils %, Manual 71.3 40.0 - 80.0 %    Bands %, Manual 0.9 0.0 - 5.0 %    Lymphocytes %, Manual 17.4 13.0 - 44.0 %    Monocytes %, Manual 2.6 2.0 - 10.0 %    Eosinophils %, Manual 2.6 0.0 - 6.0 %    Basophils %, Manual 0.9 0.0 - 2.0 %    Metamyelocytes %, Manual 1.7 0.0 - 0.0 %    Myelocytes %, Manual 2.6 0.0 - 0.0 %    Seg Neutrophils Absolute, Manual 9.41 (H) 1.20 - 7.00 x10*3/uL    Bands Absolute, Manual 0.12 0.00 - 0.70 x10*3/uL    Lymphocytes Absolute, Manual 2.30 1.20 - 4.80 x10*3/uL    Monocytes Absolute, Manual 0.34 0.10 - 1.00 x10*3/uL    Eosinophils Absolute, Manual 0.34 0.00 - 0.70 x10*3/uL    Basophils Absolute, Manual 0.12 (H) 0.00 - 0.10 x10*3/uL    Metamyelocytes Absolute, Manual 0.22 0.00 - 0.00 x10*3/uL    Myelocytes Absolute, Manual 0.34 0.00 - 0.00 x10*3/uL    Total Cells Counted 115     Neutrophils  Absolute, Manual 9.53 (H) 1.20 - 7.70 x10*3/uL    RBC Morphology See Below     Polychromasia Mild     Hypochromia Mild     Stomatocytes Few          Assessment/Plan   Assessment & Plan  Fever, unspecified fever cause    59 y.o. male PMHx metastatic melanoma, to brain and lung (s/p two cycles of dual immunotherapy with nivulumab and ipilumab, last dose 1/10 and palliative WBRT 12/14/24), (s/p left shoulder resection 2019), spinal stenosis (s/p L4-S1 lumbar decompression 2021), HTN, HLD, nonalcoholic fatty liver disease, obesity, FRANK on CPAP, PTSD, GERD, COPD, depression s/p vagal nerve stimulator (SA x2, 2007) presented to the ED from onc appt for fever of 101.8. Patient endorsing SOB, cough and pleuritic chest pain with CT patchy GGOs in DINO concerning for PNA. [facility] to [facility], but unclear if PNA was previously under treated. Will treat broadly with vanc/cefepime/flagyl given recent hospitalizations and pcn allergy. Low concern at this time for meningitis as his HA is at baseline, cervical neck pain is chronic and unchanged, and neurologic exam is benign.     #Fever, hypotension (meets 3/4 SIRS criteria)  #Concern for PNA  :: wbc 13.2, RSV/flu/COVID PCR -ve,  lactate 0.8,  :: CT chest w/ patchy ground-glass opacities in the left upper lobe may  represent early infection or treatment related pneumonitis.  -s/p 1L LR  -will get procal, MRSA nares, sputum culture  -f/up blood cultures, UA  -get [facility] from [facility]  -duonebs q6 scheduled  -continue broad spectrum abx with vanc/cefepime/flagyl, de-escalate as able  -asked for last TTE to be sent from [facility], if no CHF, will bolus additional liter  -hold lasix and propanolol  -continuous pulse ox    #PAUL  #HX of HTN  :: Cr 1.38 from bl ~1  -reports good PO intake, suspect s/t starting lasix and hypotension iso infection  -hold lasix 40 mg PO and propanolol  -fluids as above     #Metastatic melanoma to brain and lung (s/p two cycles of dual immunotherapy  with nivulumab and ipilumab, last dose 1/10 and palliative WBRT 12/14/24)  :: Seen by Dr. Vanegas on 3/3. Currently on decadron 4mg BID  -continue decadron  -consider completing post-RT MRI while inpatient, needs  coordinate with VNS   -on dapsone for PJP ppx    #Anxiety, PTSD, treatment-refractory depression  #S/p vagal nerve stimulator   -continue home abilify 10mg, valium 5mg qAM and 7.5 mg qPM, zoloft 100mg, and lyrica 200 mg  -hold propanolol    #COPD  -Continue home Breztri - Spiriva and Breo Ellipta   -Singular 10 mg daily  -duonebs q6h      #FRANK  - RT consult placed for CPAP    #MIGEL  :: hgb on admission 10, bl ~8-9  -start PO ferrous sulfate    DVT ppx: SQH d/t PAUL  Code: DNR/DNI (confirmed on admission)  NOK: brother Mason [phone]    Patient to be staffed in AM with attending.    Mildred Martell MD  IM, PGY2

## 2025-03-04 NOTE — CARE PLAN
The patient's goals for the shift include      The clinical goals for the shift include pt will remain injury free    Problem: Fall/Injury  Goal: Not fall by end of shift  Outcome: Progressing  Goal: Be free from injury by end of the shift  Outcome: Progressing  Goal: Verbalize understanding of personal risk factors for fall in the hospital  Outcome: Progressing  Goal: Verbalize understanding of risk factor reduction measures to prevent injury from fall in the home  Outcome: Progressing  Goal: Use assistive devices by end of the shift  Outcome: Progressing  Goal: Pace activities to prevent fatigue by end of the shift  Outcome: Progressing     Problem: Pain - Adult  Goal: Verbalizes/displays adequate comfort level or baseline comfort level  Outcome: Progressing     Problem: Safety - Adult  Goal: Free from fall injury  Outcome: Progressing     Problem: Discharge Planning  Goal: Discharge to home or other facility with appropriate resources  Outcome: Progressing     Problem: Chronic Conditions and Co-morbidities  Goal: Patient's chronic conditions and co-morbidity symptoms are monitored and maintained or improved  Outcome: Progressing     Problem: Nutrition  Goal: Nutrient intake appropriate for maintaining nutritional needs  Outcome: Progressing     Problem: Pain  Goal: Takes deep breaths with improved pain control throughout the shift  Outcome: Progressing  Goal: Turns in bed with improved pain control throughout the shift  Outcome: Progressing  Goal: Walks with improved pain control throughout the shift  Outcome: Progressing  Goal: Performs ADL's with improved pain control throughout shift  Outcome: Progressing  Goal: Participates in PT with improved pain control throughout the shift  Outcome: Progressing  Goal: Free from opioid side effects throughout the shift  Outcome: Progressing  Goal: Free from acute confusion related to pain meds throughout the shift  Outcome: Progressing

## 2025-03-04 NOTE — CARE PLAN
The patient's goals for the shift include      The clinical goals for the shift include pt will remain HDS and VSS throughout shift end on 3/4/25 @1900      Problem: Fall/Injury  Goal: Not fall by end of shift  Outcome: Progressing  Goal: Be free from injury by end of the shift  Outcome: Progressing  Goal: Verbalize understanding of personal risk factors for fall in the hospital  Outcome: Progressing  Goal: Verbalize understanding of risk factor reduction measures to prevent injury from fall in the home  Outcome: Progressing  Goal: Use assistive devices by end of the shift  Outcome: Progressing  Goal: Pace activities to prevent fatigue by end of the shift  Outcome: Progressing     Problem: Pain - Adult  Goal: Verbalizes/displays adequate comfort level or baseline comfort level  Outcome: Progressing     Problem: Safety - Adult  Goal: Free from fall injury  Outcome: Progressing     Problem: Discharge Planning  Goal: Discharge to home or other facility with appropriate resources  Outcome: Progressing     Problem: Chronic Conditions and Co-morbidities  Goal: Patient's chronic conditions and co-morbidity symptoms are monitored and maintained or improved  Outcome: Progressing     Problem: Nutrition  Goal: Nutrient intake appropriate for maintaining nutritional needs  Outcome: Progressing     Problem: Pain  Goal: Takes deep breaths with improved pain control throughout the shift  Outcome: Progressing  Goal: Turns in bed with improved pain control throughout the shift  Outcome: Progressing  Goal: Walks with improved pain control throughout the shift  Outcome: Progressing  Goal: Performs ADL's with improved pain control throughout shift  Outcome: Progressing  Goal: Participates in PT with improved pain control throughout the shift  Outcome: Progressing  Goal: Free from opioid side effects throughout the shift  Outcome: Progressing  Goal: Free from acute confusion related to pain meds throughout the shift  Outcome:  Progressing     Problem: Skin  Goal: Participates in plan/prevention/treatment measures  Outcome: Progressing  Goal: Prevent/manage excess moisture  Outcome: Progressing  Goal: Prevent/minimize sheer/friction injuries  Outcome: Progressing  Goal: Promote/optimize nutrition  Outcome: Progressing

## 2025-03-04 NOTE — H&P
History Of Present Illness  Manuel Marrufo is a 59 y.o. male with a history of melanoma with metastases to brain, lungs, and spine presenting with fever. Patient states that he was sent to the ED from his oncology appointment with Dr. Vanegas earlier today after having a fever of 101.8 F there. Patient is currently receiving immunotherapy (Ipi/Nivo) and steroids (dexamethasone).     He reports that he has had a cough with right sided chest pain and shortness of breath for the last 3 weeks, for which [facility] for treatment of suspected pneumonia. He states that the cough and pain are worse tonight and that he began producing clear sputum. He states that his shortness of breath is worsened with any kind of physical exertion.    He also reports an occipital headache, similar to his baseline headaches, and he has been having increased lightheadedness today. He states that he has had chronic weakness over his whole body but denies any focal weakness in his upper or lower extremities. He has had a history of neck pain that has progressively been worsening, causing swelling in the back. He also has had vision changes over the past few months, but denies any dizziness, numbness, or tingling. He denies any fevers at home, abdominal pain, nausea/vomiting, or diarrhea.    Previous hospital admissions:    Unable to obtain documentation for recent admission to [facility] at this time.     on 01/27/25-1/31/25: Was admitted after presenting with a severe parieto-occipital headache. Patient did not exhibit any focal neurological defects or meningeal signs, and his headache was similar in nature to his previous headaches that he had been having since July 2024, but more sever. Treated with pain medications and steroids and responded well. MRI was unable to be obtained during this stay since VNS nurse was unable to be contacted, plan was made to schedule outpatient visit to have VNS turned  off and order MRI.    Onc Hx (per previous admission notes):  [facility] but [facility]up care. [facility] the [facility], a mass was later discovered during a lung cancer screening in the right lower lobe, leading to a PET scan that confirmed malignancy. An attempt at robotic surgery was aborted upon discovering metastatic implants in the intrathoracic area, with pathology confirming metastatic melanoma. He was admitted to [facility] in November 2024 due to concerns of hemorrhagic brain metastases identified on a CT scan after [facility] with a [facility], resulting in his transfer to . He was started on steroids and underwent an MRI, which revealed diffuse metastatic disease. Consequently, a decision was made to pursue [facility]RT through the [facility], and he completed ten sessions of palliative WBRT in December 2024. In January, the patient has undergone two cycles of dual immunotherapy with nivolumab and ipilimumab, with the last dose administered on January 10, 2025. [facility], while Dr. Schaefer is seeing chemo/immunotherapy. Saw Dr. Vanegas today, planning to order post-RT MRI to be done in the next few weeks, next steps will results of MRI, and set up with referral to [facility] medical oncology for follow-up on treatment plan.       ED Course:  Triage vitals: T 36.8 °C (98.2 °F)  HR 92  /70  RR 16  O2 94 % on Room air    Physical exam remarkable for:  Const: NAD  Cardio: RRR  Pulm:  Minimally tachypneic, no appreciable rhonchi, rales, wheezes, no palpable abdominal masses   Abd: Abdomen is soft but distended, nontender to palpation, abrasion to left lower quadrant of the abdomen that appears chronic   MSK: Trace bilateral pedal edema, bilateral palpable DP pulses, able to lift both legs off of the bed, no pronator drift in upper extremities, 5 out of 5  strength bilaterally, able to wiggle toes in bilateral feet, midline T and L-spine tenderness to  palpation   Neuro: no focal deficit, pt A&O*3    Labs:  Creatinine elevated at 1.38 (baseline between .97 and 1.07), AST 62, ALT 74, WBC elevated at 13.2, hemoglobin is 10.0, baseline Hb between 8.8 and 9.8, platelets 186, viral swabs negative, lactate 1.8.    Imaging:   CXR did not demonstrate any evidence of acute intrathoracic abnormality.   CT scans of T and L spine showed 8 mm sclerotic focus on posterior T10 vertebral body, an indeterminate 22 mm L adrenal nodue, and no active paraspinal inflammation. Etiology could be metastatic disease or benign, such as hemangioma, further PET CT recommended.  CTH showed no acute ICH or midline shift, and a 1 cm lesion in the R parietal lobe unchanged from prior exam. MRI recommended.  CT chest showed patchy ground-glass opacities in L upper lobe, possibly representing early infection or treatment-associated pneumonitis. Enhancing soft tissue thickening along right mediastinal border, right major fissure, and posterior pleura concerning for metastatic pleural thickening noted. Also, interval decrease in mass on right lower lobe noted.    Pt given tylenol, empirically covered for meningitis (due to hx of metastatic brain cancer) with vancomycin and cefepime.       Past Medical History  He has a past medical history of Agoraphobia with panic attacks, Anxiety, Awareness under anesthesia (2021), COPD (chronic obstructive pulmonary disease) (Multi), Fatty liver disease, nonalcoholic, GERD (gastroesophageal reflux disease), HLD (hyperlipidemia), Melanoma (Multi), FRANK on CPAP, and PTSD (post-traumatic stress disorder).    Surgical History  He has a past surgical history that includes Back surgery; Malignant skin lesion excision; and Tonsillectomy. He also has a VNS implant.     Social History  He reports that he quit smoking about 10 months ago. His smoking use included cigarettes. He started smoking about 40 years ago. He has a 20 pack-year smoking history. He has never used  smokeless tobacco. He reports that he does not drink alcohol and does not use drugs.    Family History  Family History   Problem Relation Name Age of Onset    Arthritis Mother Patrizia Marrufo     Alcohol abuse Father      Cirrhosis Father      Cervical cancer Sister Kaity Rebolledo     Hypertension Sister Kaity Rebolledo     Other (bladder cancer) Maternal Grandmother Coral Ferrera         Allergies  Bactrim [sulfamethoxazole-trimethoprim], Buspirone, and Penicillin    ROS negative outside of what is mentioned in HPI.     Physical Exam  Constitutional:       General: He is not in acute distress.     Comments: Noted to be mildly short of breath throughout exam.   HENT:      Head: Normocephalic and atraumatic.   Eyes:      Extraocular Movements: Extraocular movements intact.      Conjunctiva/sclera: Conjunctivae normal.      Pupils: Pupils are equal, round, and reactive to light.   Neck:      Comments: Patient had tenderness to palpation over posterior neck. Patient also reported pain with flexion of neck but none with extension, stated that this pain was consistent with baseline neck pain.  Cardiovascular:      Rate and Rhythm: Normal rate and regular rhythm.      Pulses: Normal pulses.      Heart sounds: Normal heart sounds.   Pulmonary:      Effort: Pulmonary effort is normal.      Breath sounds: No rhonchi or rales.      Comments: Faint end-expiratory wheezes noted.  Chest:      Chest wall: Tenderness present.      Comments: Tenderness to palpation over right side of patient's chest.  Abdominal:      General: There is distension.      Palpations: Abdomen is soft.      Tenderness: There is abdominal tenderness in the epigastric area.   Musculoskeletal:      Right lower le+ Pitting Edema present.      Left lower le+ Pitting Edema present.   Neurological:      General: No focal deficit present.      Mental Status: He is alert and oriented to person, place, and time.      Cranial Nerves: No cranial nerve deficit or  facial asymmetry.      Motor: No weakness.          Last Recorded Vitals  /61   Pulse 79   Temp 36.8 °C (98.2 °F) (Oral)   Resp 20   Wt 125 kg (275 lb)   SpO2 96%     Relevant Results  Scheduled medications  [START ON 3/4/2025] ARIPiprazole, 10 mg, oral, Daily  [START ON 3/4/2025] cefepime, 2 g, intravenous, q8h  [START ON 3/4/2025] cholecalciferol, 2,000 Units, oral, Daily  [START ON 3/4/2025] dapsone, 100 mg, oral, Daily  [START ON 3/4/2025] dexAMETHasone, 4 mg, oral, q12h TING  [START ON 3/4/2025] diazePAM, 2 mg, oral, Daily with breakfast  [START ON 3/4/2025] tiotropium, 2 puff, inhalation, Daily   And  [START ON 3/4/2025] fluticasone furoate-vilanteroL, 1 puff, inhalation, Daily  [Held by provider] furosemide, 40 mg, oral, Daily  heparin (porcine), 7,500 Units, subcutaneous, q8h  ipratropium-albuteroL, 3 mL, nebulization, q6h  memantine, 10 mg, oral, BID  metroNIDAZOLE, 500 mg, intravenous, q8h  oxyCODONE ER, 10 mg, oral, q24h  [START ON 3/4/2025] pantoprazole, 40 mg, oral, Daily before breakfast  pregabalin, 200 mg, oral, BID  [Held by provider] rosuvastatin, 10 mg, oral, Daily  sertraline, 200 mg, oral, Daily      Continuous medications     PRN medications  PRN medications: ondansetron ODT, oxyCODONE, polyethylene glycol, vancomycin    Results for orders placed or performed during the hospital encounter of 03/03/25 (from the past 24 hours)   Sars-CoV-2 and Influenza A/B PCR   Result Value Ref Range    Flu A Result Not Detected Not Detected    Flu B Result Not Detected Not Detected    Coronavirus 2019, PCR Not Detected Not Detected   RSV PCR   Result Value Ref Range    RSV PCR Not Detected Not Detected   CBC with Differential   Result Value Ref Range    WBC 13.2 (H) 4.4 - 11.3 x10*3/uL    nRBC 0.3 (H) 0.0 - 0.0 /100 WBCs    RBC 4.90 4.50 - 5.90 x10*6/uL    Hemoglobin 10.0 (L) 13.5 - 17.5 g/dL    Hematocrit 33.2 (L) 41.0 - 52.0 %    MCV 68 (L) 80 - 100 fL    MCH 20.4 (L) 26.0 - 34.0 pg    MCHC 30.1  (L) 32.0 - 36.0 g/dL    RDW 21.2 (H) 11.5 - 14.5 %    Platelets 186 150 - 450 x10*3/uL    Immature Granulocytes %, Automated 7.7 (H) 0.0 - 0.9 %    Immature Granulocytes Absolute, Automated 1.02 (H) 0.00 - 0.70 x10*3/uL   Comprehensive Metabolic Panel   Result Value Ref Range    Glucose 86 74 - 99 mg/dL    Sodium 135 (L) 136 - 145 mmol/L    Potassium 3.9 3.5 - 5.3 mmol/L    Chloride 94 (L) 98 - 107 mmol/L    Bicarbonate 32 21 - 32 mmol/L    Anion Gap 13 10 - 20 mmol/L    Urea Nitrogen 28 (H) 6 - 23 mg/dL    Creatinine 1.38 (H) 0.50 - 1.30 mg/dL    eGFR 59 (L) >60 mL/min/1.73m*2    Calcium 8.9 8.6 - 10.6 mg/dL    Albumin 3.9 3.4 - 5.0 g/dL    Alkaline Phosphatase 84 33 - 120 U/L    Total Protein 6.7 6.4 - 8.2 g/dL    AST 62 (H) 9 - 39 U/L    Bilirubin, Total 0.4 0.0 - 1.2 mg/dL    ALT 74 (H) 10 - 52 U/L   Lactate   Result Value Ref Range    Lactate 1.8 0.4 - 2.0 mmol/L   Blood Culture    Specimen: Peripheral Venipuncture; Blood culture   Result Value Ref Range    Blood Culture Loaded on Instrument - Culture in progress    Blood Culture    Specimen: Peripheral Venipuncture; Blood culture   Result Value Ref Range    Blood Culture Loaded on Instrument - Culture in progress    Manual Differential   Result Value Ref Range    Neutrophils %, Manual 71.3 40.0 - 80.0 %    Bands %, Manual 0.9 0.0 - 5.0 %    Lymphocytes %, Manual 17.4 13.0 - 44.0 %    Monocytes %, Manual 2.6 2.0 - 10.0 %    Eosinophils %, Manual 2.6 0.0 - 6.0 %    Basophils %, Manual 0.9 0.0 - 2.0 %    Metamyelocytes %, Manual 1.7 0.0 - 0.0 %    Myelocytes %, Manual 2.6 0.0 - 0.0 %    Seg Neutrophils Absolute, Manual 9.41 (H) 1.20 - 7.00 x10*3/uL    Bands Absolute, Manual 0.12 0.00 - 0.70 x10*3/uL    Lymphocytes Absolute, Manual 2.30 1.20 - 4.80 x10*3/uL    Monocytes Absolute, Manual 0.34 0.10 - 1.00 x10*3/uL    Eosinophils Absolute, Manual 0.34 0.00 - 0.70 x10*3/uL    Basophils Absolute, Manual 0.12 (H) 0.00 - 0.10 x10*3/uL    Metamyelocytes Absolute, Manual  0.22 0.00 - 0.00 x10*3/uL    Myelocytes Absolute, Manual 0.34 0.00 - 0.00 x10*3/uL    Total Cells Counted 115     Neutrophils Absolute, Manual 9.53 (H) 1.20 - 7.70 x10*3/uL    RBC Morphology See Below     Polychromasia Mild     Hypochromia Mild     Stomatocytes Few    Urinalysis with Reflex Culture and Microscopic   Result Value Ref Range    Color, Urine Light-Yellow Light-Yellow, Yellow, Dark-Yellow    Appearance, Urine Clear Clear    Specific Gravity, Urine >1.050 (N) 1.005 - 1.035    pH, Urine 7.0 5.0, 5.5, 6.0, 6.5, 7.0, 7.5, 8.0    Protein, Urine 10 (TRACE) NEGATIVE, 10 (TRACE), 20 (TRACE) mg/dL    Glucose, Urine Normal Normal mg/dL    Blood, Urine NEGATIVE NEGATIVE mg/dL    Ketones, Urine NEGATIVE NEGATIVE mg/dL    Bilirubin, Urine NEGATIVE NEGATIVE mg/dL    Urobilinogen, Urine Normal Normal mg/dL    Nitrite, Urine NEGATIVE NEGATIVE    Leukocyte Esterase, Urine NEGATIVE NEGATIVE   Urinalysis Microscopic   Result Value Ref Range    WBC, Urine NONE 1-5, NONE /HPF    RBC, Urine 3-5 NONE, 1-2, 3-5 /HPF     See HPI for image results from ED       Assessment/Plan   Assessment & Plan  Fever, unspecified fever cause      Manuel Marrufo is a 59 y.o. male with a history of melanoma with metastases to brain, lungs, and spine. He presented to  ED after recording a temperature of 101.8 F earlier today at his oncology appointment. Notably, he has had a persistent cough for 3 weeks, for which [facility], and he is immunocompromised on immunotherapy and dexamethasone for treatment of his metastatic melanoma.    Differential diagnosis at this time is most concerning for pneumonia and treatment-induced pneumonitis (ICI-pneumonitis or radiation pneumonitis), and spread of metastatic lung disease and less likely for meningitis. Pneumonia and pneumonitis are likely in this patient due to his clinical presentation of a productive cough, chest pain, and shortness of breath,  along with CT findings showing ground glass opacities in the left upper lobe. To differentiate these, could consider obtaining a bronchoalveolar lavage, to determine if the cause is infectious or not. Meningitis is unlikely in this patient due to the patient's benign neuro and mental status exam, and an absence of neck rigidity (patient's neck pain is more consistent with chronic pain). Will have a low threshold to order LP and broaden coverage if patient displays any symptoms concerning for meningitis.    #Fever in immunocompromised patient  ::Patient has been afebrile since initial presentation to ED  - Will follow bcx and ucx  - will continue to monitor for fevers given patient's immunocompromised status  - continuing empiric coverage for pneumonia with IV flagyl 500 mg, IV vancomycin (pharmacist to dose), and IV cefepime 2g    #Productive cough  #Shortness of breath  - Will collect sputum culture    #Metastatic melanoma, to brain and lung   - Continue dexamethasone 4 mg BID  - Will consider post RT-MRI, need to contact patient's VNS nurse prior to turn device off    #HTN  #HLD  ::Patient has 2+ pitting edema bilaterally and an increase in Cr consistent with an PAUL on furosemide 40mg  - Held furosemide    #Anxiety, PTSD, treatment-refractory depression  #S/p vagal nerve stimulator   - Propanolol held due to concern for hypotension  - Valium 10 mg AM and 5 mg PM  - Abilify 10 mg  - Zoloft 200 mg   - Lyrica 200 mg    #COPD  - Continue home Breztri - Spiriva and Breo Ellipta   - PRN albuterol  - Singular 10 mg daily    DNR/DNI, NOK Brother, phone number recorded in EMR.       This patient was seen and discussed with resident physician Dr. Mildred Martell MD. Please see resident note for further details.  Beatrice Campos, MS3

## 2025-03-05 LAB
ALBUMIN SERPL BCP-MCNC: 3.4 G/DL (ref 3.4–5)
ANION GAP SERPL CALC-SCNC: 15 MMOL/L (ref 10–20)
BASO STIPL BLD QL SMEAR: PRESENT
BASOPHILS # BLD AUTO: 0.01 X10*3/UL (ref 0–0.1)
BASOPHILS NFR BLD AUTO: 0.1 %
BUN SERPL-MCNC: 22 MG/DL (ref 6–23)
CALCIUM SERPL-MCNC: 8.6 MG/DL (ref 8.6–10.6)
CHLORIDE SERPL-SCNC: 101 MMOL/L (ref 98–107)
CO2 SERPL-SCNC: 22 MMOL/L (ref 21–32)
CREAT SERPL-MCNC: 1.04 MG/DL (ref 0.5–1.3)
EGFRCR SERPLBLD CKD-EPI 2021: 83 ML/MIN/1.73M*2
EOSINOPHIL # BLD AUTO: 0.12 X10*3/UL (ref 0–0.7)
EOSINOPHIL NFR BLD AUTO: 1.4 %
ERYTHROCYTE [DISTWIDTH] IN BLOOD BY AUTOMATED COUNT: 21.1 % (ref 11.5–14.5)
GLUCOSE SERPL-MCNC: 93 MG/DL (ref 74–99)
HCT VFR BLD AUTO: 30.8 % (ref 41–52)
HGB BLD-MCNC: 9.1 G/DL (ref 13.5–17.5)
IMM GRANULOCYTES # BLD AUTO: 0.36 X10*3/UL (ref 0–0.7)
IMM GRANULOCYTES NFR BLD AUTO: 4.2 % (ref 0–0.9)
LYMPHOCYTES # BLD AUTO: 0.55 X10*3/UL (ref 1.2–4.8)
LYMPHOCYTES NFR BLD AUTO: 6.4 %
MAGNESIUM SERPL-MCNC: 2.58 MG/DL (ref 1.6–2.4)
MCH RBC QN AUTO: 20.5 PG (ref 26–34)
MCHC RBC AUTO-ENTMCNC: 29.5 G/DL (ref 32–36)
MCV RBC AUTO: 70 FL (ref 80–100)
MONOCYTES # BLD AUTO: 0.35 X10*3/UL (ref 0.1–1)
MONOCYTES NFR BLD AUTO: 4.1 %
NEUTROPHILS # BLD AUTO: 7.18 X10*3/UL (ref 1.2–7.7)
NEUTROPHILS NFR BLD AUTO: 83.8 %
NRBC BLD-RTO: 0 /100 WBCS (ref 0–0)
PHOSPHATE SERPL-MCNC: 3.2 MG/DL (ref 2.5–4.9)
PLATELET # BLD AUTO: 164 X10*3/UL (ref 150–450)
POTASSIUM SERPL-SCNC: 4.4 MMOL/L (ref 3.5–5.3)
RBC # BLD AUTO: 4.43 X10*6/UL (ref 4.5–5.9)
RBC MORPH BLD: NORMAL
SODIUM SERPL-SCNC: 134 MMOL/L (ref 136–145)
STAPHYLOCOCCUS SPEC CULT: NORMAL
TSH SERPL-ACNC: 0.59 MIU/L (ref 0.44–3.98)
WBC # BLD AUTO: 8.6 X10*3/UL (ref 4.4–11.3)

## 2025-03-05 PROCEDURE — 80069 RENAL FUNCTION PANEL: CPT

## 2025-03-05 PROCEDURE — 97535 SELF CARE MNGMENT TRAINING: CPT | Mod: GO

## 2025-03-05 PROCEDURE — 36415 COLL VENOUS BLD VENIPUNCTURE: CPT

## 2025-03-05 PROCEDURE — 2500000001 HC RX 250 WO HCPCS SELF ADMINISTERED DRUGS (ALT 637 FOR MEDICARE OP)

## 2025-03-05 PROCEDURE — 99233 SBSQ HOSP IP/OBS HIGH 50: CPT

## 2025-03-05 PROCEDURE — 97165 OT EVAL LOW COMPLEX 30 MIN: CPT | Mod: GO

## 2025-03-05 PROCEDURE — 2500000004 HC RX 250 GENERAL PHARMACY W/ HCPCS (ALT 636 FOR OP/ED)

## 2025-03-05 PROCEDURE — 84443 ASSAY THYROID STIM HORMONE: CPT

## 2025-03-05 PROCEDURE — 83735 ASSAY OF MAGNESIUM: CPT

## 2025-03-05 PROCEDURE — 97161 PT EVAL LOW COMPLEX 20 MIN: CPT | Mod: GP | Performed by: PHYSICAL THERAPIST

## 2025-03-05 PROCEDURE — 85025 COMPLETE CBC W/AUTO DIFF WBC: CPT

## 2025-03-05 PROCEDURE — 2500000002 HC RX 250 W HCPCS SELF ADMINISTERED DRUGS (ALT 637 FOR MEDICARE OP, ALT 636 FOR OP/ED)

## 2025-03-05 PROCEDURE — 2500000005 HC RX 250 GENERAL PHARMACY W/O HCPCS

## 2025-03-05 PROCEDURE — 99223 1ST HOSP IP/OBS HIGH 75: CPT

## 2025-03-05 PROCEDURE — 97530 THERAPEUTIC ACTIVITIES: CPT | Mod: GP | Performed by: PHYSICAL THERAPIST

## 2025-03-05 PROCEDURE — 1170000001 HC PRIVATE ONCOLOGY ROOM DAILY

## 2025-03-05 RX ORDER — LEVOFLOXACIN 500 MG/1
500 TABLET, FILM COATED ORAL EVERY 24 HOURS
Start: 2025-03-06 | End: 2025-03-06

## 2025-03-05 RX ORDER — ACETAMINOPHEN 325 MG/1
975 TABLET ORAL EVERY 8 HOURS
Start: 2025-03-05

## 2025-03-05 RX ORDER — LOPERAMIDE HYDROCHLORIDE 2 MG/1
6 CAPSULE ORAL DAILY
Start: 2025-03-06

## 2025-03-05 RX ORDER — LOPERAMIDE HYDROCHLORIDE 2 MG/1
6 CAPSULE ORAL DAILY
Status: DISCONTINUED | OUTPATIENT
Start: 2025-03-05 | End: 2025-03-06 | Stop reason: HOSPADM

## 2025-03-05 RX ORDER — DIAZEPAM 5 MG/1
5 TABLET ORAL NIGHTLY
Start: 2025-03-05 | End: 2025-03-06

## 2025-03-05 RX ORDER — PROPRANOLOL HYDROCHLORIDE 120 MG/1
120 CAPSULE, EXTENDED RELEASE ORAL 2 TIMES DAILY
Start: 2025-03-05

## 2025-03-05 RX ORDER — HYDROMORPHONE HYDROCHLORIDE 1 MG/ML
0.5 INJECTION, SOLUTION INTRAMUSCULAR; INTRAVENOUS; SUBCUTANEOUS
Status: DISCONTINUED | OUTPATIENT
Start: 2025-03-05 | End: 2025-03-06 | Stop reason: HOSPADM

## 2025-03-05 RX ORDER — POLYETHYLENE GLYCOL 3350 17 G/17G
17 POWDER, FOR SOLUTION ORAL DAILY PRN
Start: 2025-03-05

## 2025-03-05 RX ORDER — DIAZEPAM 5 MG/1
7.5 TABLET ORAL
Status: DISCONTINUED | OUTPATIENT
Start: 2025-03-06 | End: 2025-03-06 | Stop reason: HOSPADM

## 2025-03-05 RX ORDER — DEXAMETHASONE 4 MG/1
4 TABLET ORAL EVERY 12 HOURS SCHEDULED
Start: 2025-03-05

## 2025-03-05 RX ORDER — IPRATROPIUM BROMIDE AND ALBUTEROL SULFATE 2.5; .5 MG/3ML; MG/3ML
3 SOLUTION RESPIRATORY (INHALATION) EVERY 6 HOURS PRN
Start: 2025-03-05 | End: 2025-03-08 | Stop reason: HOSPADM

## 2025-03-05 RX ORDER — GUAIFENESIN 600 MG/1
600 TABLET, EXTENDED RELEASE ORAL 2 TIMES DAILY PRN
Start: 2025-03-05 | End: 2025-03-06

## 2025-03-05 RX ORDER — LIDOCAINE 560 MG/1
1 PATCH PERCUTANEOUS; TOPICAL; TRANSDERMAL DAILY
Start: 2025-03-06

## 2025-03-05 RX ORDER — ACETAMINOPHEN 325 MG/1
975 TABLET ORAL EVERY 8 HOURS
Status: DISCONTINUED | OUTPATIENT
Start: 2025-03-05 | End: 2025-03-06 | Stop reason: HOSPADM

## 2025-03-05 RX ORDER — GUAIFENESIN 600 MG/1
600 TABLET, EXTENDED RELEASE ORAL 2 TIMES DAILY PRN
Status: DISCONTINUED | OUTPATIENT
Start: 2025-03-05 | End: 2025-03-06 | Stop reason: HOSPADM

## 2025-03-05 RX ORDER — PANTOPRAZOLE SODIUM 40 MG/1
40 TABLET, DELAYED RELEASE ORAL
Start: 2025-03-06 | End: 2025-03-06 | Stop reason: HOSPADM

## 2025-03-05 RX ORDER — LIDOCAINE 560 MG/1
1 PATCH PERCUTANEOUS; TOPICAL; TRANSDERMAL DAILY
Status: DISCONTINUED | OUTPATIENT
Start: 2025-03-05 | End: 2025-03-06 | Stop reason: HOSPADM

## 2025-03-05 RX ORDER — LEVOFLOXACIN 500 MG/1
500 TABLET, FILM COATED ORAL EVERY 24 HOURS
Status: DISCONTINUED | OUTPATIENT
Start: 2025-03-05 | End: 2025-03-06 | Stop reason: HOSPADM

## 2025-03-05 RX ORDER — FERROUS SULFATE 325(65) MG
65 TABLET ORAL
Start: 2025-03-06

## 2025-03-05 RX ORDER — DIAZEPAM 5 MG/1
7.5 TABLET ORAL
Start: 2025-03-06 | End: 2025-03-06

## 2025-03-05 RX ORDER — PROPRANOLOL HYDROCHLORIDE 120 MG/1
120 CAPSULE, EXTENDED RELEASE ORAL 2 TIMES DAILY
Status: DISCONTINUED | OUTPATIENT
Start: 2025-03-05 | End: 2025-03-06 | Stop reason: HOSPADM

## 2025-03-05 RX ORDER — IPRATROPIUM BROMIDE AND ALBUTEROL SULFATE 2.5; .5 MG/3ML; MG/3ML
3 SOLUTION RESPIRATORY (INHALATION) EVERY 6 HOURS PRN
Status: DISCONTINUED | OUTPATIENT
Start: 2025-03-05 | End: 2025-03-06 | Stop reason: HOSPADM

## 2025-03-05 RX ORDER — DIAZEPAM 5 MG/1
5 TABLET ORAL NIGHTLY
Status: DISCONTINUED | OUTPATIENT
Start: 2025-03-05 | End: 2025-03-06 | Stop reason: HOSPADM

## 2025-03-05 RX ORDER — OXYCODONE HCL 10 MG/1
10 TABLET, FILM COATED, EXTENDED RELEASE ORAL EVERY 12 HOURS SCHEDULED
Status: DISCONTINUED | OUTPATIENT
Start: 2025-03-05 | End: 2025-03-06 | Stop reason: HOSPADM

## 2025-03-05 RX ADMIN — GUAIFENESIN 600 MG: 600 TABLET ORAL at 12:38

## 2025-03-05 RX ADMIN — OXYCODONE HYDROCHLORIDE 10 MG: 10 TABLET ORAL at 08:34

## 2025-03-05 RX ADMIN — CEFEPIME 2 G: 1 INJECTION, SOLUTION INTRAVENOUS at 04:57

## 2025-03-05 RX ADMIN — LIDOCAINE 4% 1 PATCH: 40 PATCH TOPICAL at 12:38

## 2025-03-05 RX ADMIN — CHOLECALCIFEROL TAB 25 MCG (1000 UNIT) 2000 UNITS: 25 TAB at 08:34

## 2025-03-05 RX ADMIN — ACETAMINOPHEN 975 MG: 325 TABLET ORAL at 12:37

## 2025-03-05 RX ADMIN — DIAZEPAM 2 MG: 2 TABLET ORAL at 08:40

## 2025-03-05 RX ADMIN — PREGABALIN 200 MG: 25 CAPSULE ORAL at 08:34

## 2025-03-05 RX ADMIN — PROPRANOLOL HYDROCHLORIDE 120 MG: 120 CAPSULE, EXTENDED RELEASE ORAL at 20:39

## 2025-03-05 RX ADMIN — ACETAMINOPHEN 975 MG: 325 TABLET ORAL at 20:39

## 2025-03-05 RX ADMIN — OXYCODONE HYDROCHLORIDE 10 MG: 10 TABLET, FILM COATED, EXTENDED RELEASE ORAL at 20:39

## 2025-03-05 RX ADMIN — ARIPIPRAZOLE 10 MG: 5 TABLET ORAL at 08:40

## 2025-03-05 RX ADMIN — PANTOPRAZOLE SODIUM 40 MG: 40 TABLET, DELAYED RELEASE ORAL at 08:49

## 2025-03-05 RX ADMIN — HEPARIN SODIUM 7500 UNITS: 5000 INJECTION, SOLUTION INTRAVENOUS; SUBCUTANEOUS at 05:02

## 2025-03-05 RX ADMIN — SERTRALINE HYDROCHLORIDE 200 MG: 100 TABLET ORAL at 08:34

## 2025-03-05 RX ADMIN — DEXAMETHASONE 4 MG: 4 TABLET ORAL at 08:34

## 2025-03-05 RX ADMIN — HEPARIN SODIUM 7500 UNITS: 5000 INJECTION, SOLUTION INTRAVENOUS; SUBCUTANEOUS at 21:01

## 2025-03-05 RX ADMIN — MEMANTINE 10 MG: 10 TABLET ORAL at 08:40

## 2025-03-05 RX ADMIN — HEPARIN SODIUM 7500 UNITS: 5000 INJECTION, SOLUTION INTRAVENOUS; SUBCUTANEOUS at 13:33

## 2025-03-05 RX ADMIN — MEMANTINE 10 MG: 10 TABLET ORAL at 20:39

## 2025-03-05 RX ADMIN — DEXAMETHASONE 4 MG: 4 TABLET ORAL at 20:39

## 2025-03-05 RX ADMIN — DAPSONE 100 MG: 100 TABLET ORAL at 08:40

## 2025-03-05 RX ADMIN — PREGABALIN 200 MG: 25 CAPSULE ORAL at 20:38

## 2025-03-05 RX ADMIN — FERROUS SULFATE TAB 325 MG (65 MG ELEMENTAL FE) 325 MG: 325 (65 FE) TAB at 08:34

## 2025-03-05 RX ADMIN — CEFEPIME 2 G: 1 INJECTION, SOLUTION INTRAVENOUS at 12:45

## 2025-03-05 RX ADMIN — METRONIDAZOLE 500 MG: 5 INJECTION, SOLUTION INTRAVENOUS at 06:07

## 2025-03-05 RX ADMIN — LEVOFLOXACIN 500 MG: 500 TABLET, FILM COATED ORAL at 12:37

## 2025-03-05 RX ADMIN — PROPRANOLOL HYDROCHLORIDE 120 MG: 120 CAPSULE, EXTENDED RELEASE ORAL at 12:35

## 2025-03-05 RX ADMIN — DIAZEPAM 5 MG: 5 TABLET ORAL at 20:57

## 2025-03-05 ASSESSMENT — COGNITIVE AND FUNCTIONAL STATUS - GENERAL
DRESSING REGULAR LOWER BODY CLOTHING: A LOT
MOVING FROM LYING ON BACK TO SITTING ON SIDE OF FLAT BED WITH BEDRAILS: A LOT
HELP NEEDED FOR BATHING: A LOT
CLIMB 3 TO 5 STEPS WITH RAILING: TOTAL
MOVING FROM LYING ON BACK TO SITTING ON SIDE OF FLAT BED WITH BEDRAILS: A LITTLE
WALKING IN HOSPITAL ROOM: TOTAL
STANDING UP FROM CHAIR USING ARMS: A LOT
PERSONAL GROOMING: A LITTLE
DAILY ACTIVITIY SCORE: 16
CLIMB 3 TO 5 STEPS WITH RAILING: TOTAL
PERSONAL GROOMING: A LITTLE
DAILY ACTIVITIY SCORE: 16
HELP NEEDED FOR BATHING: A LOT
DRESSING REGULAR UPPER BODY CLOTHING: A LOT
HELP NEEDED FOR BATHING: A LOT
DRESSING REGULAR LOWER BODY CLOTHING: A LOT
DRESSING REGULAR UPPER BODY CLOTHING: A LITTLE
CLIMB 3 TO 5 STEPS WITH RAILING: TOTAL
WALKING IN HOSPITAL ROOM: A LITTLE
TURNING FROM BACK TO SIDE WHILE IN FLAT BAD: A LOT
WALKING IN HOSPITAL ROOM: TOTAL
PERSONAL GROOMING: A LITTLE
EATING MEALS: A LITTLE
TOILETING: A LITTLE
MOVING TO AND FROM BED TO CHAIR: A LITTLE
MOVING FROM LYING ON BACK TO SITTING ON SIDE OF FLAT BED WITH BEDRAILS: A LOT
MOBILITY SCORE: 15
MOVING TO AND FROM BED TO CHAIR: A LOT
TURNING FROM BACK TO SIDE WHILE IN FLAT BAD: A LOT
STANDING UP FROM CHAIR USING ARMS: A LITTLE
TOILETING: A LITTLE
STANDING UP FROM CHAIR USING ARMS: A LOT
MOBILITY SCORE: 10
MOVING TO AND FROM BED TO CHAIR: A LOT
DRESSING REGULAR LOWER BODY CLOTHING: A LOT
TURNING FROM BACK TO SIDE WHILE IN FLAT BAD: A LOT
MOBILITY SCORE: 10
DAILY ACTIVITIY SCORE: 16
DRESSING REGULAR UPPER BODY CLOTHING: A LOT
TOILETING: A LITTLE

## 2025-03-05 ASSESSMENT — PAIN SCALES - GENERAL
PAINLEVEL_OUTOF10: 0 - NO PAIN
PAINLEVEL_OUTOF10: 0 - NO PAIN
PAINLEVEL_OUTOF10: 7
PAINLEVEL_OUTOF10: 8

## 2025-03-05 ASSESSMENT — PAIN - FUNCTIONAL ASSESSMENT
PAIN_FUNCTIONAL_ASSESSMENT: 0-10

## 2025-03-05 ASSESSMENT — ACTIVITIES OF DAILY LIVING (ADL)
HOME_MANAGEMENT_TIME_ENTRY: 15
ADL_ASSISTANCE: INDEPENDENT
ADL_ASSISTANCE: INDEPENDENT

## 2025-03-05 NOTE — PROGRESS NOTES
Physical Therapy    Physical Therapy Evaluation & Treatment    Patient Name: Manuel Marrufo  MRN: [id number]  Department: [facility]  Room: [address] [place] [address]  Today's Date: 3/5/2025   Time Calculation  Start Time: 1202  Stop Time: 1240  Time Calculation (min): 38 min    Assessment/Plan   PT Assessment  PT Assessment Results: Decreased strength, Decreased endurance, Impaired balance, Decreased mobility, Pain  Rehab Prognosis: Good  Barriers to Discharge Home: Caregiver assistance, Physical needs  Caregiver Assistance: Caregiver assistance needed per identified barriers - however, level of patient's required assistance exceeds assistance available at home  Physical Needs: Stair navigation into home limited by function/safety, Ambulating household distances limited by function/safety, Intermittent mobility assistance needed, Intermittent ADL assistance needed, High falls risk due to function or environment  Barriers to Participation: Comorbidities  End of Session Communication: Bedside nurse  End of Session Patient Position: Bed, 3 rail up, Alarm off, not on at start of session   IP OR SWING BED PT PLAN  Inpatient or Swing Bed: Inpatient  PT Plan  Treatment/Interventions: Bed mobility, Transfer training, Gait training, Stair training, Balance training, Strengthening, Endurance training, Therapeutic exercise, Therapeutic activity, Home exercise program, Positioning  PT Plan: Ongoing PT  PT Frequency: 3 times per week  PT Discharge Recommendations: Moderate intensity level of continued care  Equipment Recommended upon Discharge:  (TBD, declined to have a FWW)  PT Recommended Transfer Status: Assist x1, Assistive device  PT - OK to Discharge: Yes      Subjective     General Visit Information:  General  Reason for Referral: Presenting with Fever, unspecified fever cause.  Past Medical History Relevant to Rehab: PMHx metastatic melanoma, to brain and lung (s/p two cycles of dual immunotherapy with nivulumab and ipilumab, last dose 1/10  and palliative WBRT 12/14/24), (s/p left shoulder resection 2019), spinal stenosis (s/p L4-S1 lumbar decompression 2021), HTN, HLD, nonalcoholic fatty liver disease, obesity, FRANK on CPAP, PTSD, GERD, COPD, depression s/p vagal nerve stimulator ( x2, 2007)  Co-Treatment: OT  Co-Treatment Reason: Cotx with OT for pt's safety with mobility and expedite D/C  Prior to Session Communication: Bedside nurse  Patient Position Received: Bed, 3 rail up, Alarm on  Preferred Learning Style: verbal  General Comment: Pt supine in bed upon arrival and willing to participate in PT session. Dizziness intially during session. Flat affect  Home Living:  Home Living  Type of Home: House (split level)  Lives With: Alone (HHA 40 hr/wk to assist with cooking, laundry, cleaning, Mom assists with grocery shoppinf)  Home Adaptive Equipment: Cane, Quad cane, Wheelchair-manual (chair lift, rollator)  Home Layout: Multi-level (3 level, chair lift to 2nd and 3rd floor, bedroom/full bath level is on 3rd floor, 1/2 bath on 2nd floor with kitchen and living room)  Alternate Level Stairs-Number of Steps: chair lift to 2nd and 3rd floor  Home Access: Stairs to enter with rails  Entrance Stairs-Rails: None  Entrance Stairs-Number of Steps: 1  Bathroom Shower/Tub: Walk-in shower  Prior Level of Function:  Prior Function Per Pt/Caregiver Report  Level of [place]: Independent with ADLs and functional transfers  ADL Assistance: Independent  Ambulatory Assistance:  (Fernando with cane/rollator, reports use of cane outdoors)  Hand Dominance: Right  Prior Function Comments: -ve drives, 1 recent fall prior to admission  Precautions:  Precautions  Hearing/Visual Limitations: +glasses all the time  Medical Precautions: Fall precautions, Spinal precautions, Oxygen therapy device and L/min (3L O2)  Vital Signs:  BP:102/71  HR: 87 bpm  SpO2: 95%  Patient Position: Sitting    Objective   Pain:  Pain Assessment  Pain Assessment: 0-10  0-10 (Numeric) Pain Score:  7  Pain Type: Chronic pain  Pain Location: Back  Pain Interventions: Repositioned  Cognition:  Cognition  Overall Cognitive Status: Impaired  Arousal/Alertness: Delayed responses to stimuli  Orientation Level: Oriented X4 (Oriented to self, place, year, required VC's for month and date)  Following Commands: Follows one step commands with repetition    General Assessments:    Sensation  Light Touch: No apparent deficits    Strength  Strength Comments: BLE's 3+/5 based on mobility    Static Sitting Balance  Static Sitting-Level of Assistance: Close supervision  Dynamic Sitting Balance  Dynamic Sitting-Level of Assistance: Close supervision    Static Standing Balance  Static Standing-Level of Assistance: Contact guard (RW)  Dynamic Standing Balance  Dynamic Standing-Level of Assistance: Minimum assistance (RW)  Functional Assessments:  Bed Mobility  Bed Mobility: Yes  Bed Mobility 1  Bed Mobility 1: Supine to sitting, Sitting to supine  Level of Assistance 1: Minimum assistance, Minimal verbal cues  Bed Mobility Comments 1: HOB elevated, bedrail, cues for log roll    Transfers  Transfer: Yes  Transfer 1  Transfer From 1: Sit to, Stand to  Transfer to 1: Sit, Stand  Technique 1: Sit to stand, Stand to sit  Transfer Device 1: Walker  Transfer Level of Assistance 1: Minimum assistance, Minimal verbal cues  Trials/Comments 1: cues for safe hand placement  Transfers 2  Transfer From 2: Sit to  Transfer to 2: Toilet  Technique 2: Stand to sit, Stand pivot  Transfer Device 2: Walker (grab bar)  Transfer Level of Assistance 2: Minimum assistance, Minimal verbal cues    Ambulation/Gait Training  Ambulation/Gait Training Performed: Yes  Ambulation/Gait Training 1  Surface 1: Level tile  Device 1: Rolling walker  Assistance 1: Minimum assistance, Minimal verbal cues  Quality of Gait 1: Decreased step length, Diminished heel strike, Wide base of support  Comments/Distance (ft) 1: 3 sidesteps x2, 2x12 ft    Stairs  Stairs:  No    Treatments:       Bed Mobility  Bed Mobility: Yes  Bed Mobility 1  Bed Mobility 1: Supine to sitting, Sitting to supine  Level of Assistance 1: Minimum assistance, Minimal verbal cues  Bed Mobility Comments 1: HOB elevated, bedrail, cues for log roll    Ambulation/Gait Training  Ambulation/Gait Training Performed: Yes  Ambulation/Gait Training 1  Surface 1: Level tile  Device 1: Rolling walker  Assistance 1: Minimum assistance, Minimal verbal cues  Quality of Gait 1: Decreased step length, Diminished heel strike, Wide base of support  Comments/Distance (ft) 1: 3 sidesteps x2, 2x12 ft  Transfers  Transfer: Yes  Transfer 1  Transfer From 1: Sit to, Stand to  Transfer to 1: Sit, Stand  Technique 1: Sit to stand, Stand to sit  Transfer Device 1: Walker  Transfer Level of Assistance 1: Minimum assistance, Minimal verbal cues  Trials/Comments 1: cues for safe hand placement  Transfers 2  Transfer From 2: Sit to  Transfer to 2: Toilet  Technique 2: Stand to sit, Stand pivot  Transfer Device 2: Walker (grab bar)  Transfer Level of Assistance 2: Minimum assistance, Minimal verbal cues      Outcome Measures:  [facility] Basic Mobility  Turning from your back to your side while in a flat bed without using bedrails: A little  Moving from lying on your back to sitting on the side of a flat bed without using bedrails: A lot  Moving to and from bed to chair (including a wheelchair): A little  Standing up from a chair using your arms (e.g. wheelchair or bedside chair): A little  To walk in hospital room: A little  Climbing 3-5 steps with railing: Total  Basic Mobility - Total Score: 15    Encounter Problems       Encounter Problems (Active)       Balance       Pt will score >/=24/28 on [place] to demonstrate decreased falls risk (Progressing)       Start:  03/05/25    Expected End:  03/19/25               Mobility       Pt will be Fernando ambulation 200 ft with LRAD (Progressing)       Start:  03/05/25    Expected End:  03/19/25             Pt will be Fernando ascend/descend 1 step with LRAD (Progressing)       Start:  03/05/25    Expected End:  03/19/25               PT Transfers       Pt will be Fernando with sit to stand and bed to chair transfers with LRAD (Progressing)       Start:  03/05/25    Expected End:  03/19/25            Pt will be Fernando with bed mobility (Progressing)       Start:  03/05/25    Expected End:  03/19/25               Pain - Adult              Education Documentation  Precautions, taught by Mindy Monrdagon PT at 3/5/2025  1:24 PM.  Learner: Patient  Readiness: Acceptance  Method: Explanation  Response: Verbalizes Understanding, Needs Reinforcement    Body Mechanics, taught by Mindy Mondragon PT at 3/5/2025  1:24 PM.  Learner: Patient  Readiness: Acceptance  Method: Explanation  Response: Verbalizes Understanding, Needs Reinforcement    Mobility Training, taught by Mindy Mondragon PT at 3/5/2025  1:24 PM.  Learner: Patient  Readiness: Acceptance  Method: Explanation  Response: Verbalizes Understanding, Needs Reinforcement    Education Comments  No comments found.

## 2025-03-05 NOTE — PROGRESS NOTES
Daily Progress Note    Manuel Marrufo is a 59 y.o. male on day 2 of admission presenting with Fever, unspecified fever cause.    Subjective   No acute events overnight. On evaluation this morning, pt was resting in bed. Reported ongoing headache and nausea, which he states is chronic. No fevers, chills overnight.       10 point ROS performed and negative unless stated above.          Objective   Weight: 125 kg (275 lb) (03/03/25 1420)    Daily Weight  03/03/25 : 129 kg (283 lb 12.8 oz)      Last Recorded Vitals  Heart Rate:  [80-96]   Temp:  [35.9 °C (96.6 °F)-37.3 °C (99.1 °F)]   Resp:  [18-20]   BP: (111-128)/(74-84)   SpO2:  [94 %-98 %]      Physical Exam  Constitutional: NAD, obese, pleasant  HEENT: EOMI, no scleral icterus, PERRL, dry mucous membranes  CV: distant heart sounds, no m/r/g, right sided pain with plapation  Pulm: nonlabored, slight expiratory wheezes, on 2L NC for comfort  GI: Soft, mildly distended, slight TTP in epigastrium  Extremities: 1-2+ edema to knees  Skin: warm  Neuro: grossly alert and oriented, strength and sensation 5/5 in bl UE and LE, CN 3-12 intact, some tenderness over neck predominantly paraspinal, notes pain is worse with chin to chest, no pain with extension  Psych: Mood and affect appropriate to situation       Intake/Output last 3 Shifts:  I/O last 3 completed shifts:  In: 2245.8 (17.4 mL/kg) [P.O.:100; IV Piggyback:2145.8]  Out: 500 (3.9 mL/kg) [Urine:500 (0.1 mL/kg/hr)]  Weight: 128.7 kg     Medications  Scheduled medications  ARIPiprazole, 10 mg, oral, Daily  cefepime, 2 g, intravenous, q8h  cholecalciferol, 2,000 Units, oral, Daily  dapsone, 100 mg, oral, Daily  dexAMETHasone, 4 mg, oral, q12h TING  diazePAM, 2 mg, oral, Daily with breakfast  ferrous sulfate, 65 mg of iron, oral, Daily with breakfast  tiotropium, 2 puff, inhalation, Daily   And  fluticasone furoate-vilanteroL, 1 puff, inhalation, Daily  [Held by provider] furosemide, 40 mg, oral, Daily  heparin (porcine),  7,500 Units, subcutaneous, q8h  ipratropium-albuteroL, 3 mL, nebulization, q6h  memantine, 10 mg, oral, BID  metroNIDAZOLE, 500 mg, intravenous, q8h  oxyCODONE ER, 10 mg, oral, q24h  pantoprazole, 40 mg, oral, Daily before breakfast  pregabalin, 200 mg, oral, BID  [Held by provider] rosuvastatin, 10 mg, oral, Daily  sertraline, 200 mg, oral, Daily      Continuous medications     PRN medications  PRN medications: ondansetron ODT, oxyCODONE, oxygen, polyethylene glycol, vancomycin       Relevant Results    Labs  Results from last 7 days   Lab Units 03/04/25  0728 03/03/25  1506   SODIUM mmol/L 136 135*   POTASSIUM mmol/L 3.7 3.9   CHLORIDE mmol/L 97* 94*   CO2 mmol/L 29 32   BUN mg/dL 20 28*   CREATININE mg/dL 1.36* 1.38*   CALCIUM mg/dL 8.8 8.9      Results from last 7 days   Lab Units 03/04/25  0728 03/03/25  1506   HEMOGLOBIN g/dL 9.0* 10.0*   WBC AUTO x10*3/uL 10.4 13.2*   PLATELETS AUTO x10*3/uL  --  186   HEMATOCRIT % 30.9* 33.2*     Results from last 7 days   Lab Units 03/04/25  0728 03/03/25  1506   ALK PHOS U/L 79 84   BILIRUBIN TOTAL mg/dL 0.6 0.4   PROTEIN TOTAL g/dL 6.3* 6.7   ALT U/L 66* 74*   AST U/L 56* 62*          Lab Results   Component Value Date    LACTATE 1.8 03/03/2025       Results from last 7 days   Lab Units 03/04/25  0728 03/03/25  1506   GLUCOSE mg/dL 84 86                Assessment/Plan   Assessment & Plan  Fever, unspecified fever cause      59 y.o. male PMHx metastatic melanoma, to brain and lung (s/p two cycles of dual immunotherapy with nivulumab and ipilumab, last dose 1/10 and palliative WBRT 12/14/24), (s/p left shoulder resection 2019), spinal stenosis (s/p L4-S1 lumbar decompression 2021), HTN, HLD, nonalcoholic fatty liver disease, obesity, FRANK on CPAP, PTSD, GERD, COPD, depression s/p vagal nerve stimulator (SA x2, 2007) presented to the ED from onc appt for fever of 101.8. Patient endorsing SOB, cough and pleuritic chest pain with CT patchy GGOs in DINO concerning for PNA.  [facility], pneumonia was previously treated with levaquin. Will treat broadly with cefepime/flagyl given recent hospitalizations and pcn allergy. Low concern at this time for meningitis as his HA is at baseline, cervical neck pain is chronic and unchanged, and neurologic exam is benign.     Updates 3/5  - Blood cultures with no growth at 1 day  - PT/OT to evaluate prior to discharge  - Thyroid function tests, cortisol ordered to further evaluate patient's weakness, lethargy     #Fever, hypotension  #Concern for PNA vs ICI pneumonitis  :: wbc 13.2, RSV/flu/COVID PCR -ve,  lactate 0.8,  :: CT chest w/ patchy ground-glass opacities in the left upper lobe may represent early infection or treatment related pneumonitis.  :: s/p 1L LR  :: Procalcitonin 0.73, MRSA nares negative (vancomycin stopped 3/4)  :: UA not c/f infection    -follow up blood cultures  -duonebs q6 scheduled  -continue broad spectrum abx with cefepime/flagyl, will de-escalate as able  -hold lasix iso hypotension  -continuous pulse ox     #PAUL  #HX of HTN  :: Cr 1.38 from bl ~1  -reports good PO intake, suspect s/t starting lasix and hypotension iso infection  -hold lasix 40 mg PO        #Metastatic melanoma to brain and lung (s/p two cycles of dual immunotherapy with nivulumab and ipilumab, last dose 1/10 and palliative WBRT 12/14/24)  :: Seen by Dr. Vanegas on 3/3. Currently on decadron 4mg BID    -continue decadron  -on dapsone for PJP ppx    #Anxiety, PTSD, treatment-refractory depression  #S/p vagal nerve stimulator   -continue home abilify 10mg, valium 5mg qAM and 7.5 mg qPM, zoloft 100mg, and lyrica 200 mg  -restarted propanolol 3/5     #COPD  -Continue home Breztri - Spiriva and Breo Ellipta   -Singular 10 mg daily  -duonebs q6h      #FRANK  - RT consult placed for CPAP     #MIGEL  :: hgb on admission 10, bl ~8-9  -start PO ferrous sulfate     F: PRN  E: PRN  N: Regular diet   DVT ppx: SQH d/t PAUL    Code: DNR/DNI  (confirmed on admission)  NOK: brother Mason [phone]    Jay Ramirez MD  Internal Medicine PGY-1

## 2025-03-05 NOTE — PROGRESS NOTES
Vancomycin Dosing by Pharmacy- Cessation of Therapy    Consult to pharmacy for vancomycin dosing has been discontinued by the prescriber, pharmacy will sign off at this time.    Please call pharmacy if there are further questions or re-enter a consult if vancomycin is resumed.     JENNIFER GIRON

## 2025-03-05 NOTE — PROGRESS NOTES
Vancomycin Dosing by Pharmacy- FOLLOW UP    Manuel Marrufo is a 59 y.o. year old male who Pharmacy has been consulted for vancomycin dosing for CNS/meningoencephalitis. Based on the patient's indication and renal status this patient is being dosed based on a goal trough/random level of 15-20.     Renal function is currently improving.    Current vancomycin dose: 2000 mg given once    Most recent trough level: 4.9 mcg/mL    Visit Vitals  /74 (BP Location: Right arm, Patient Position: Lying)   Pulse 83   Temp 36.1 °C (97 °F) (Temporal)   Resp 18        Lab Results   Component Value Date    CREATININE 1.36 (H) 2025    CREATININE 1.38 (H) 2025    CREATININE 0.97 2025    CREATININE 1.21 2025        Patient weight is as follows:   Vitals:    253   Weight: 129 kg (283 lb 12.8 oz)       Cultures:  No results found for the encounter in last 14 days.       I/O last 3 completed shifts:  In: 1145.8 (8.9 mL/kg) [IV Piggyback:1145.8]  Out: 500 (3.9 mL/kg) [Urine:500 (0.1 mL/kg/hr)]  Weight: 128.7 kg   I/O during current shift:  No intake/output data recorded.    Temp (24hrs), Av.6 °C (97.8 °F), Min:36 °C (96.8 °F), Max:37.3 °C (99.1 °F)      Assessment/Plan    Below goal random/trough level. Orders placed for new vancomycin regimen of 2000 mg once  The next level will be obtained on 3/5 at 24 hours post dose on 3/4. May be obtained sooner if clinically indicated.   Will continue to monitor renal function daily while on vancomycin and order serum creatinine at least every 48 hours if not already ordered.  Follow for continued vancomycin needs, clinical response, and signs/symptoms of toxicity.       CORNELIA ECKERT, PharmD

## 2025-03-05 NOTE — PROGRESS NOTES
Vancomycin Dosing by Pharmacy- FOLLOW UP    Manuel Marrufo is a 59 y.o. year old male who Pharmacy has been consulted for vancomycin dosing for CNS/meningoencephalitis. Based on the patient's indication and renal status this patient is being dosed based on a goal AUC of 500-600.     Renal function is currently improving.  Baseline reported as 1.05 mg/dL - last scr 1.04 mg/dL.      Current vancomycin dose: Dose by level    Most recent random level: 4.9  mcg/mL from 3/4 @ 17:20 (~22.5 hour level)    Visit Vitals  /79 (BP Location: Left arm, Patient Position: Lying)   Pulse 87   Temp 36.3 °C (97.3 °F) (Temporal)   Resp 18        Lab Results   Component Value Date    CREATININE 1.04 2025    CREATININE 1.36 (H) 2025    CREATININE 1.38 (H) 2025    CREATININE 0.97 2025        Patient weight is as follows:   Vitals:    25 2233   Weight: 129 kg (283 lb 12.8 oz)       Cultures:  No results found for the encounter in last 14 days.       I/O last 3 completed shifts:  In: 2245.8 (17.4 mL/kg) [P.O.:100; IV Piggyback:2145.8]  Out: 500 (3.9 mL/kg) [Urine:500 (0.1 mL/kg/hr)]  Weight: 128.7 kg   I/O during current shift:  I/O this shift:  In: 240 [P.O.:240]  Out: -     Temp (24hrs), Av.6 °C (97.8 °F), Min:35.9 °C (96.6 °F), Max:37.3 °C (99.1 °F)      Assessment/Plan    Below goal AUC. Orders placed for new vancomcyin regimen of 1750 every 12 hours to begin at 11 am.     This dosing regimen is predicted by [employer] to result in the following pharmacokinetic parameters:    Loading dose: N/A  Regimen: 1750 mg IV every 12 hours.  Start time: 20:16 on 2025  Exposure target: AUC24 (range)500-600 mg/L.hr   [id number]: 575 mg/L.hr  AUC24,ss: 581 mg/L.hr  Probability of AUC24 > 400: 93 %  Ctrough,ss: 12.3 mg/L  Probability of Ctrough,ss > 20: 0 %    The next level will be obtained on 3/6 at 9 am. May be obtained sooner if clinically indicated.   Will continue to monitor renal function daily while on  vancomycin and order serum creatinine at least every 48 hours if not already ordered.  Follow for continued vancomycin needs, clinical response, and signs/symptoms of toxicity.       JENNIFER GIRON

## 2025-03-05 NOTE — DISCHARGE INSTRUCTIONS
Dear Mr. Marrufo,    You were admitted to [facility] after you were noted to have a fever during an oncology clinic appointment. In the emergency department, you told the doctors that you were also experiencing cough and shortness of breath. A CT scan of your chest showed some changes in your left lung that may represent a pneumonia. You were started on antibiotics to help treat a possible pneumonia while we evaluated you further. Your symptoms gradually improved though you continued to feel weak. Our inpatient physical therapists worked with you and recommended that you go to a skilled nursing facility where you can keep working on your strength. However, you declined and opted to go home.    Next steps:  - Please continue taking your antibiotic, Levaquin, as prescribed through 3/7.  -Please continue taking the dexamethasone 4 mg twice daily until you have your repeat brain MRI with Dr. Vanegas. He will decide how much longer you need to remain on these medication pending the results of that scan.   -You are scheduled to see a  medical oncologist, Dr. Osullivan, on 3/14. She is a specialist in melanoma and will discuss with you your treatment plan going forward.    - You will receive a phone call in the upcoming days to help you schedule primary care follow-up. Please follow up with your doctors to manage your chronic conditions and ongoing cancer care.    If you feel that  your symptoms are returning or worsening, please do not hesitate to seek medical care. We wish you well.    Thank you,  Your  Care Team

## 2025-03-05 NOTE — CARE PLAN
Problem: Fall/Injury  Goal: Not fall by end of shift  3/5/2025 1004 by Stan Bates RN  Outcome: Progressing  3/5/2025 1004 by Stan Bates RN  Outcome: Progressing  Goal: Be free from injury by end of the shift  3/5/2025 1004 by Stan Bates RN  Outcome: Progressing  3/5/2025 1004 by Stan Bates RN  Outcome: Progressing  Goal: Verbalize understanding of personal risk factors for fall in the hospital  3/5/2025 1004 by Stan Bates RN  Outcome: Progressing  3/5/2025 1004 by Stan Bates RN  Outcome: Progressing  Goal: Verbalize understanding of risk factor reduction measures to prevent injury from fall in the home  3/5/2025 1004 by Stan Bates RN  Outcome: Progressing  3/5/2025 1004 by Stan Bates RN  Outcome: Progressing  Goal: Use assistive devices by end of the shift  3/5/2025 1004 by Stan Bates RN  Outcome: Progressing  3/5/2025 1004 by Stan Bates RN  Outcome: Progressing  Goal: Pace activities to prevent fatigue by end of the shift  3/5/2025 1004 by Stan Bates RN  Outcome: Progressing  3/5/2025 1004 by Stan Bates RN  Outcome: Progressing     Problem: Pain - Adult  Goal: Verbalizes/displays adequate comfort level or baseline comfort level  3/5/2025 1004 by Stan Bates RN  Outcome: Progressing  3/5/2025 1004 by Stan Bates RN  Outcome: Progressing     Problem: Safety - Adult  Goal: Free from fall injury  3/5/2025 1004 by Stan Bates RN  Outcome: Progressing  3/5/2025 1004 by Stan Bates RN  Outcome: Progressing     Problem: Discharge Planning  Goal: Discharge to home or other facility with appropriate resources  3/5/2025 1004 by Stan Bates RN  Outcome: Progressing  3/5/2025 1004 by Stan Bates RN  Outcome: Progressing     Problem: Chronic Conditions and Co-morbidities  Goal:  Patient's chronic conditions and co-morbidity symptoms are monitored and maintained or improved  3/5/2025 1004 by Stan Bates RN  Outcome: Progressing  3/5/2025 1004 by Stan Bates RN  Outcome: Progressing     Problem: Nutrition  Goal: Nutrient intake appropriate for maintaining nutritional needs  3/5/2025 1004 by Stan Bates RN  Outcome: Progressing  3/5/2025 1004 by Stan Bates RN  Outcome: Progressing     Problem: Pain  Goal: Takes deep breaths with improved pain control throughout the shift  3/5/2025 1004 by Stan Bates RN  Outcome: Progressing  3/5/2025 1004 by Stan Bates RN  Outcome: Progressing  Goal: Turns in bed with improved pain control throughout the shift  3/5/2025 1004 by Stan Bates RN  Outcome: Progressing  3/5/2025 1004 by Stan Bates RN  Outcome: Progressing  Goal: Walks with improved pain control throughout the shift  3/5/2025 1004 by Stan Bates RN  Outcome: Progressing  3/5/2025 1004 by Stan Bates RN  Outcome: Progressing  Goal: Performs ADL's with improved pain control throughout shift  3/5/2025 1004 by Stan Bates RN  Outcome: Progressing  3/5/2025 1004 by Stan Bates RN  Outcome: Progressing  Goal: Participates in PT with improved pain control throughout the shift  3/5/2025 1004 by Stan Bates RN  Outcome: Progressing  3/5/2025 1004 by Stan Bates RN  Outcome: Progressing  Goal: Free from opioid side effects throughout the shift  3/5/2025 1004 by Stan Bates RN  Outcome: Progressing  3/5/2025 1004 by Stan Bates RN  Outcome: Progressing  Goal: Free from acute confusion related to pain meds throughout the shift  3/5/2025 1004 by Stan Bates RN  Outcome: Progressing  3/5/2025 1004 by Stan Bates RN  Outcome: Progressing     Problem: Skin  Goal: Participates in  plan/prevention/treatment measures  3/5/2025 1004 by Stan Bates RN  Outcome: Progressing  3/5/2025 1004 by Stan Bates RN  Outcome: Progressing  Goal: Prevent/manage excess moisture  3/5/2025 1004 by Stan Bates RN  Outcome: Progressing  3/5/2025 1004 by Stan Bates RN  Outcome: Progressing  Goal: Prevent/minimize sheer/friction injuries  3/5/2025 1004 by Stan Bates RN  Outcome: Progressing  3/5/2025 1004 by Stan Bates RN  Outcome: Progressing  Goal: Promote/optimize nutrition  3/5/2025 1004 by Stan Bates RN  Outcome: Progressing  3/5/2025 1004 by Stan Bates RN  Outcome: Progressing   The patient's goals for the shift include      The clinical goals for the shift include pt will remain injury free

## 2025-03-05 NOTE — CONSULTS
SUPPORTIVE AND PALLIATIVE ONCOLOGY CONSULT    SERVICE DATE: 3/5/2025    Updates and Recommendations (3/5/2025):  Start scheduled acetaminophen 975mg PO q8h     Start lidocaine 4% TD patch once daily     Change scheduled oxycodone ER (OxyContin) 10mg PO q12h [home med]    Start hydromorphone 0.5mg IV q3h PRN for breakthrough pain [hold for AMS/lethargy]--please discontinue in anticipation for discharge    Start scheduled loperamide 6mg PO once daily [home med]     Discontinue diazepam 2mg PO once daily     Start diazepam 7.5mg PO AM / 5mg PO PM [home med, verified with [facility]]    Home propanol LA 120mg PO BID held per primary--Please restart if BP allows as this medication is primarily for mood assistance.    Start guaifenesin 12h tablet 600mg PO BID PRN for cough     ASSESSMENT/PLAN:  Manuel Marrufo is a 59 y.o. 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 PMHx significant for spinal stenosis (s/p L4-S1 lumbar decompression in 2021), HTN, HLD, non-alcoholic fatty liver disease, COPD, obesity, FRANK on CPAP, depression s/p vagal nerve stimulator (SA x2, 2007), PTSD, and GERD. Admitted 3/3/2025 for further evaluation and management of hypotension and new fever of unknown origin, though c/f pna vs ICI pneumonitis. Of note, pt recently had an appointment with Dr. Vanegas from  on 3/3/25 to establish care. Further tx pending updated MRI brain evaluation. Previously seen by Dr[facility] cShaefer [facility] and [facility]as [facility]. Now has appointment with Dr. Osullivan from  Medical Oncology on 3/14/25. Course complicated by ongoing pain, nausea, and ongoing physical deconditioning. Supportive and Palliative Oncology is consulted for assistance with pain and symptom management.     Neoplasm Related Pain  Headache, neck, and BLE/feet  pain related to known malignancy with metastatic lung and brain involvement.   Type: Somatic, neuropathic  Pain control: Well controlled  Home regimen: oxycodone ER (OxyContin) 10mg q12h, oxycodone IR 10mg q6h PRN, lidocaine TD patch once daily, acetaminophen PRN [usually 3g/day]  Intolerances: Vicodin [rx: sweating, shaking]  Personalized pain goal: Mild  Total OME usage for the past 24 hours: 37.5 OME  Renal function WNL, hepatic function unremarkable.    Start scheduled acetaminophen 975mg PO q8h   Start lidocaine 4% TD patch once daily   dexamethasone 4mg PO q12h per primary  Change scheduled oxycodone ER (OxyContin) 10mg PO q12h [home med]  Continue oxycodone IR 10mg PO q3h PRN for moderate to severe pain  Start hydromorphone 0.5mg IV q3h PRN for breakthrough pain [hold for AMS/lethargy]--please discontinue in anticipation for discharge  Continue to monitor pain scores and administer PRN medications as appropriate  Continue/initiate nonpharmacologic pain management strategies including ice/heat therapy, distraction techniques, deep breathing/relaxation techniques, calming music, and repositioning  Continue to monitor for signs of opioid efficacy (pain scores, improved functionality) and toxicity (pinpoint pupils, excess sedation/drowsiness/confusion, respiratory depression, etc.)     Nausea  At risk for nausea and vomiting related to opioid use. Currently denies.   Home regimen: PPI  EKG reviewed from 3/3/25, QTc 475.  PPI per primary   Continue ondansetron 8mg PO/IV q8h PRN for n/v, first line     Constipation  At risk for constipation related to opioids and reduced mobility, currently not constipated.  Has hx of diarrhea prone IBS per pt.   Usual bowel pattern: Once daily while on home loperamide  Home regimen: loperamide 6mg once daily [scheduled]  LBM: 3/3/25  Start scheduled loperamide 6mg PO once daily [home med]   Continue Miralax 17g PO once daily PRN for constipation  Goal to have BM without straining  q48-72h, adjust regimen as needed  Encourage mobility as tolerated, PT/OT following     Altered Mood  Hx of anxiety, PTSD, and depression s/p vagal nerve stimulator (SA x2, 2007).  Allergy to buspirone [rx: n/v]  Sub-optimally controlled.   Previously tried: alprazolam [rx: ineffective], clonazepam [rx: ineffective]  Home regimen: propanol LA 120mg BID (for anxiety per CPRS), diazepam 7.5mg AM / 5mg PM, aripiprazole 10mg once daily, sertraline 200mg once daily, pregabalin 200mg BID, [facility]  Continue aripiprazole 10mg PO once daily  Discontinue diazepam 2mg PO once daily   Start diazepam 7.5mg PO AM / 5mg PO PM [home med, verified [facility]]  Continue pregabalin 200mg PO BID  Home propanol LA 120mg PO BID held per primary--Please restart if BP allows as this medication is primarily for mood assistance.  Continue sertraline 200mg PO once daily   If need arises, consider consulting Psych if medications need adjusting given complexity of existing regimen and extensive psych hx  Spiritual Care consulted for support     Sleeping Difficulty  Impaired sleep related to pain, anxiety, and hospital environment.  Hx of chronic insomnia per pt.   Home regimen: None, see pain and anxiety home meds  See pain and anxiety recs    Cough  Recent non-productive cough likely 2/2 c/f pna vs ICI pneumonitis.   Home regimen: Mucinex PRN  Start guaifenesin 12h tablet 600mg PO BID PRN for cough     Disposition:  Please start the process of having prior authorization with meds to beds deliver medications to patient prior to discharge via [facility] pharmacy. Prescriptions will need to be sent 48-72 hours prior to discharge so that a prior authorization can be completed.     Discharge date pending resolution of acute hospital issues and pain control.  Pt will [facility] with Mandi Crisostomo MD. Called her on 3/5/25 and verified pt's home medications.     SIGNATURE: Mandi MACKEY  ROSSI Pretty  PAGER/CONTACT:  Contact information:  Supportive and Palliative Oncology  Monday-Friday 8 AM-5 PM  Epic Secure chat or pager [phone].  After hours and weekends:  pager [phone]    ==========================================================================================================================  Inpatient consult to [facility]  Consult performed by: ROSSI Brito  Consult ordered by: Kristen Richardson MD MPH      PALLIATIVE MEDICINE OUTPATIENT PROVIDER: [facility] [place]   CURRENT ATTENDING PROVIDER: Kristen Richardson MD MPH     Medical Oncologist: Medardo Vanegas MD   Radiation Oncologist: No care team member to display  Primary Physician: No Assigned PCP Generic Provider  None    REASON FOR CONSULT/CHIEF CONSULT COMPLAINT: Pain and symptom management    Subjective   HISTORY OF PRESENT ILLNESS: Manuel Marrufo is a 59 y.o. 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 PMHx significant for spinal stenosis (s/p L4-S1 lumbar decompression in 2021), HTN, HLD, non-alcoholic fatty liver disease, COPD, obesity, FRANK on CPAP, depression s/p vagal nerve stimulator (SA x2, 2007), PTSD, and GERD. Admitted 3/3/2025 for further evaluation and management of hypotension and new fever of unknown origin, though c/f pna vs ICI pneumonitis. Of note, pt recently had an appointment with Dr. Vanegas from  on 3/3/25 to establish care. Further tx pending updated MRI brain evaluation. Previously seen by [facility] Schaefer [facility] and [facility]as [facility]. Now has appointment with Dr. Osullivan from  Medical Oncology on 3/14/25. Course complicated by ongoing pain, nausea, and ongoing physical deconditioning. Supportive and Palliative Oncology is consulted for assistance with pain and symptom  "management.     Pain Assessment:  Onset: Acute on chronic  Location: Head, posterior neck, low back--radiates down BLE to feet  Duration: Constant  Characteristics:  Ratin/10, \"but that's good for me.\"  Descriptors: Aching, sore, sharp, shooting  Aggravating: Bright lights, loud noises, movement   Relieving: Analgesics--see EMR, positioning, and modifying activity  Intolerances: None  Personal Pain Goal: Mild   Interference with Function: A little  Coping Strategies: Distraction, rest, relaxation  Emotional Response: Anxiety, sadness  Barriers to Pain Management: None    Opioid Requirements  Past 24h opioid requirements:  (3/4-3/5, 0242-2704)  oxycodone ER (OxyContin) 10mg x 1 = 12.5 OME  oxycodone IR 10mg x 2 = 20mg = 25 OME    Total 24h OME use: 37.5 OME    OARRS/PDMP reviewed, no aberrant behavior noted.    Symptom Assessment:  Pain: a little  Headache: a little  Dizziness: none  Lack of energy: somewhat  Difficulty sleeping: somewhat  Worrying: somewhat  Anxiety: somewhat  Depressive symptoms/low mood: somewhat  Pain in mouth/swallowing: none  Dry mouth: none  Taste changes: none  Shortness of breath: none  Lack of appetite: none   Nausea: a little--previously this AM, now resolved  Vomiting: none  Constipation: none  Diarrhea: none  Sore muscles: none  Numbness or tingling in hands/feet/other: none  Weight loss: none    ECOG Performance Status:  [] 0 Fully active, able to carry on all pre-disease performance without restriction  [] 1 Restricted in physically strenuous activity but ambulatory and able to carry out work of a light or sedentary nature, e.g., light house work, office work  [] 2 Ambulatory and capable of all selfcare but unable to carry out any work activities; up and about more than 50% of waking hours  [x] 3 Capable of only limited selfcare; confined to bed or chair more than 50% of waking hours  [] 4 Completely disabled; cannot carry on any selfcare; totally confined to bed or chair  [] 5 " Dead      Palliative Performance Scale (PPS)  Ambulation: Mainly in Bed  Activity Level/Evidence of Disease: Can't do hobbies or housework, significant disease   Self-Care: Mainly assistance   Intake: Normal  Level of Consciousness: Full  PPS: 40 %  Estimated Median Survival: 41 days    Information obtained from: chart review, interview of patient, and discussion with primary team  ______________________________________________________________________     Oncology History    No history exists.     Past Medical History:   Diagnosis Date    Agoraphobia with panic attacks     Anxiety     Awareness under anesthesia 2021    During back surgery    COPD (chronic obstructive pulmonary disease) (Multi)     Fatty liver disease, nonalcoholic     GERD (gastroesophageal reflux disease)     HLD (hyperlipidemia)     Melanoma (Multi)     with mets to brain, spine and lungs    FRANK on CPAP     PTSD (post-traumatic stress disorder)      Past Surgical History:   Procedure Laterality Date    BACK SURGERY      MALIGNANT SKIN LESION EXCISION      left shoulder and armpit    TONSILLECTOMY       Family History   Problem Relation Name Age of Onset    Arthritis Mother Patrizia Marrufo     Alcohol abuse Father      Cirrhosis Father      Cervical cancer Sister Kaity Rebolledo     Hypertension Sister Kaity Rebolledo     Other (bladder cancer) Maternal Grandmother Coral Ferrera       SOCIAL HISTORY:  Social History:  reports that he quit smoking about 10 months ago. His smoking use included cigarettes. He started smoking about 40 years ago. He has a 20 pack-year smoking history. He has never used smokeless tobacco. He reports that he does not drink alcohol and does not use drugs.  Has 2 adult children (son, daughter) but estranged from them  Mother, brother, and sister are involved in pt's life  Single  Does not drive, relies on family for transportation    Roman Catholic and Importance of Roman Catholic:  Catholicism    REVIEW OF SYSTEMS:  Review of systems negative  unless noted in HPI.     Objective     Lab Results   Component Value Date    WBC 10.4 03/04/2025    HGB 9.0 (L) 03/04/2025    HCT 30.9 (L) 03/04/2025    MCV 70 (L) 03/04/2025    PLT  03/04/2025      Comment:      PLATELET CLUMPS PRECLUDE QUANTITATION. PLATELET ESTIMATE APPEARS ADEQUATE      Lab Results   Component Value Date    GLUCOSE 84 03/04/2025    CALCIUM 8.8 03/04/2025     03/04/2025    K 3.7 03/04/2025    CO2 29 03/04/2025    CL 97 (L) 03/04/2025    BUN 20 03/04/2025    CREATININE 1.36 (H) 03/04/2025     Lab Results   Component Value Date    ALT 66 (H) 03/04/2025    AST 56 (H) 03/04/2025    ALKPHOS 79 03/04/2025    BILITOT 0.6 03/04/2025     Estimated Creatinine Clearance: 77.5 mL/min (A) (by C-G formula based on SCr of 1.36 mg/dL (H)).     Encounter Date: 03/03/25   ECG 12 lead   Result Value    Ventricular Rate 94    Atrial Rate 94    CO Interval 144    QRS Duration 88    QT Interval 380    QTC Calculation(Bazett) 475    P Axis 26    R Axis 21    T Axis 56    QRS Count 15    Q Onset 222    P Onset 150    P Offset 207    T Offset 412    QTC Fredericia 441    Narrative    Normal sinus rhythm  Nonspecific ST abnormality  Abnormal ECG  When compared with ECG of 27-JAN-2025 11:13,  PREVIOUS ECG IS PRESENT  Confirmed by Mick Shine ([id number]) on 3/4/2025 4:20:49 PM     Wt Readings from Last 5 Encounters:   03/03/25 129 kg (283 lb 12.8 oz)   01/29/25 125 kg (275 lb 5.7 oz)   01/27/25 119 kg (262 lb 5.6 oz)   11/26/24 117 kg (257 lb 15 oz)   11/24/24 117 kg (258 lb)     Current Outpatient Medications   Medication Instructions    acetaminophen (TYLENOL) 325 mg, oral, 4 times daily PRN    albuterol 90 mcg/actuation inhaler 2 puffs, inhalation, 4 times daily PRN    ARIPiprazole (ABILIFY) 10 mg, Daily    budesonide-glycopyr-formoterol (BREZTRI) 160-9-4.8 mcg/actuation HFA aerosol inhaler 2 puffs, inhalation, 2 times daily RT    cholecalciferol (VITAMIN D-3) 50 mcg, oral, Daily    dapsone 100 mg, oral, Daily     dexAMETHasone (DECADRON) 4 mg, oral, Every 6 hours scheduled    diazePAM (Valium) 5 mg tablet 1-1.5 tablets, oral, See admin instructions, Take 1.5 tablets (7.5 mg) by mouth in the morning and take 1 tablet (5 mg) in the evening    furosemide (LASIX) 40 mg, oral, Daily, Edema    loperamide (Imodium) 2 mg capsule 3 capsules, oral, Daily PRN    memantine (Namenda) 5 mg tablet 2 tablets, oral, 2 times daily    omeprazole (PRILOSEC) 20 mg, oral, 2 times daily before meals    ondansetron ODT (ZOFRAN-ODT) 8 mg, oral, 3 times daily PRN    oxyCODONE (ROXICODONE) 10 mg, oral, Every 3 hours PRN    oxyCODONE ER (OXYCONTIN) 10 mg, oral, 2 times daily, Do not crush, chew, or split.    pantoprazole (PROTONIX) 40 mg, oral, Daily before breakfast, Do not crush, chew, or split.    pregabalin (LYRICA) 200 mg, oral, 2 times daily    propranolol LA (INDERAL LA) 120 mg, oral, 2 times daily    rosuvastatin (CRESTOR) 10 mg, oral, Daily    sertraline (ZOLOFT) 200 mg, oral, Daily    tadalafil (CIALIS) 5 mg, oral, Daily     Scheduled medications   ARIPiprazole, 10 mg, oral, Daily  cefepime, 2 g, intravenous, q8h  cholecalciferol, 2,000 Units, oral, Daily  dapsone, 100 mg, oral, Daily  dexAMETHasone, 4 mg, oral, q12h TING  diazePAM, 2 mg, oral, Daily with breakfast  ferrous sulfate, 65 mg of iron, oral, Daily with breakfast  tiotropium, 2 puff, inhalation, Daily   And  fluticasone furoate-vilanteroL, 1 puff, inhalation, Daily  [Held by provider] furosemide, 40 mg, oral, Daily  heparin (porcine), 7,500 Units, subcutaneous, q8h  ipratropium-albuteroL, 3 mL, nebulization, q6h  memantine, 10 mg, oral, BID  metroNIDAZOLE, 500 mg, intravenous, q8h  oxyCODONE ER, 10 mg, oral, q24h  pantoprazole, 40 mg, oral, Daily before breakfast  pregabalin, 200 mg, oral, BID  [Held by provider] rosuvastatin, 10 mg, oral, Daily  sertraline, 200 mg, oral, Daily    Continuous medications     PRN medications  ondansetron ODT, 8 mg, TID PRN  oxyCODONE, 10 mg, q3h  PRN  oxygen, , Continuous PRN - O2/gases  polyethylene glycol, 17 g, Daily PRN  vancomycin, , Daily PRN    Allergies:   Allergies   Allergen Reactions    Penicillin Anaphylaxis    Bactrim [Sulfamethoxazole-Trimethoprim] Nausea/vomiting    Buspirone GI Upset     PHYSICAL EXAMINATION:  Vital Signs:   Vital signs reviewed  Vitals:    03/05/25 0829   BP: 123/79   Pulse: 87   Resp: 18   Temp: 36.3 °C (97.3 °F)   SpO2: 91%     Pain Score: 7    Physical Exam  Vitals reviewed.   Constitutional:       Comments: Alert, awake, ill appearing gentleman laying in bed. No signs of acute distress. Pleasant, cooperative, and participating in interview.  HENT:   Head:      Comments: Normocephalic, atraumatic.   Eyes:      Comments: Sclera clear, EOM intact, wearing glasses.    Pulmonary:      Comments: Symmetrical chest rise. Regular rate and depth of respirations. 3L NC. Non-productive cough.  Abdominal:      Comments: Abdomen obese, slightly distended, non tender, and semi-firm.   Musculoskeletal:      Comments: Normal muscle strength and tone. RANDLE x4. Generalized non-pitting edema.   Skin:     Comments: No lesions, rash, or abrasions present on visible skin. Skin color appropriate for ethnicity.   Neurological:      Comments: A&Ox4, follows commands, no apparent sensory deficits.   Psychiatric:      Comments: Moderate anxiety and sadness, but behavior otherwise appropriate.    ==========================================================================================================================    PALLIATIVE CARE ENCOUNTER:    Introduction to Supportive and Palliative Oncology:  Spoke with patient at bedside.  Introduced the role and philosophy of Supportive and Palliative oncology in the evaluation and management of symptoms during cancer treatment.  Palliative care was introduced as a service for patients with serious illness to help with symptoms, assist with goals of care conversations, navigate complex decision making,  improve quality of life for patients, and provide support both patients and families.    Supportive and Palliative Oncology encounter:  Spoke with patient at bedside.  Emotional support provided.  Coordination of care: medication and symptom evaluation    Medical Decision Making/Goals of Care/Advance Care Planning:  (3/5/2025) Patient's current clinical condition, including diagnosis, prognosis, and management plan, and goals of care were discussed.   Life limiting disease: Metastatic malignancy   Family: Supportive, mother, brother, and sister  Performance status: Moderate limitations due to progressive physical weakness, anxiety, and pain.  Joys/meaning/strength: [place]  Understanding of health: Demonstrates good prognostic understanding of disease process, understands plan for updated pain and symptom recs.   Information: Appreciates full disclosure  Goals: Cancer tx, pain/symptom control  Worries and fears now and future: Not being offered cancer tx   Code status discussion: DNR-DNI    Advance Directives  Existence of Advance Directives: Yes, but NOT on file in medical record  Decision maker: HCPOA is pt's brotherMason  Code Status: DNR-DNI    Supportive Interventions: Interventions: SPO Spiritual Care: referral placed    Signature and billing:  Medical complexity was high level due to due to complexity of problems, extensive data review, and high risk of management/treatment.    I spent 75 minutes in the care of this patient which included chart review, interviewing patient/family, discussion with primary team, coordination of care, and documentation.    DATA   Diagnostic tests and information reviewed for today's visit: Conversation with primary team, Most recent labs and imaging results, Most recent EKG, Medications     Some elements copied from my note on 1/31/25, the elements have been updated and all reflect current decision making from today, 3/5/2025.    Plan of Care discussed with:  Primary team, pt     Thank you for asking Supportive and Palliative Oncology to assist with care of this patient.  Recommendations will be communicated back to the consulting service by way of shared electronic medical record/secure chat/email or face-to-face.   We will continue to follow.  Please contact us for additional questions or concerns.    SIGNATURE: ANNELISE Brito-BRAD  PAGER/CONTACT:  Contact information:  Supportive and Palliative Oncology  Monday-Friday 8 AM-5 PM  Epic Secure chat or pager [phone].  After hours and weekends:  pager [phone]

## 2025-03-05 NOTE — CARE PLAN
The patient's goals for the shift include      The clinical goals for the shift include pt will remain injury free    Problem: Fall/Injury  Goal: Not fall by end of shift  Outcome: Progressing  Goal: Be free from injury by end of the shift  Outcome: Progressing  Goal: Verbalize understanding of personal risk factors for fall in the hospital  Outcome: Progressing  Goal: Verbalize understanding of risk factor reduction measures to prevent injury from fall in the home  Outcome: Progressing  Goal: Use assistive devices by end of the shift  Outcome: Progressing  Goal: Pace activities to prevent fatigue by end of the shift  Outcome: Progressing     Problem: Pain - Adult  Goal: Verbalizes/displays adequate comfort level or baseline comfort level  Outcome: Progressing     Problem: Safety - Adult  Goal: Free from fall injury  Outcome: Progressing     Problem: Discharge Planning  Goal: Discharge to home or other facility with appropriate resources  Outcome: Progressing     Problem: Chronic Conditions and Co-morbidities  Goal: Patient's chronic conditions and co-morbidity symptoms are monitored and maintained or improved  Outcome: Progressing     Problem: Nutrition  Goal: Nutrient intake appropriate for maintaining nutritional needs  Outcome: Progressing     Problem: Pain  Goal: Takes deep breaths with improved pain control throughout the shift  Outcome: Progressing  Goal: Turns in bed with improved pain control throughout the shift  Outcome: Progressing  Goal: Walks with improved pain control throughout the shift  Outcome: Progressing  Goal: Performs ADL's with improved pain control throughout shift  Outcome: Progressing  Goal: Participates in PT with improved pain control throughout the shift  Outcome: Progressing  Goal: Free from opioid side effects throughout the shift  Outcome: Progressing  Goal: Free from acute confusion related to pain meds throughout the shift  Outcome: Progressing     Problem: Skin  Goal: Participates  in plan/prevention/treatment measures  Outcome: Progressing  Flowsheets (Taken 3/4/2025 2108)  Participates in plan/prevention/treatment measures: Elevate heels  Goal: Prevent/manage excess moisture  Outcome: Progressing  Goal: Prevent/minimize sheer/friction injuries  Outcome: Progressing  Goal: Promote/optimize nutrition  Outcome: Progressing

## 2025-03-05 NOTE — PROGRESS NOTES
Occupational Therapy    Evaluation and Treatment    Patient Name: Manuel Marrufo  MRN: [id number]  Today's Date: 3/5/2025  Room: [address] [place] [address]  Time Calculation  Start Time: 1202  Stop Time: 1240  Time Calculation (min): 38 min    Assessment  IP OT Assessment  Barriers to Discharge Home: Caregiver assistance, Physical needs  Evaluation/Treatment Tolerance:  (Pt with noted fatigue, but able to participate in basic tasks/sort distance mobility in room.)  End of Session Communication: Bedside nurse  End of Session Patient Position: Bed, 3 rail up, Alarm on  Plan:  Inpatient Plan  Treatment Interventions: ADL retraining, Functional transfer training, UE strengthening/ROM, Endurance training, Cognitive reorientation, Equipment evaluation/education, Patient/family training, Compensatory technique education, Continued evaluation  OT Frequency: 3 times per week  OT Discharge Recommendations: Moderate intensity level of continued care  Equipment Recommended upon Discharge:  (TBD next setting. Pt would likely benefit from a shower chair and LB adaptive dressing equipment)  OT - OK to Discharge:  (OT Eval comploeted)  OT Assessment  OT Assessment Results: Decreased ADL status, Decreased upper extremity range of motion, Decreased upper extremity strength, Decreased safe judgment during ADL, Decreased cognition, Decreased endurance, Decreased functional mobility, Decreased trunk control for functional activities  Barriers to Discharge: Decreased caregiver support  Evaluation/Treatment Tolerance:  (Pt with noted fatigue, but able to participate in basic tasks/sort distance mobility in room.)  Barriers to Participation: Comorbidities    Subjective   Current Problem:  1. Fever, unspecified fever cause        2. 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  Past Medical History Relevant to Rehab: PMHx metastatic  "melanoma, to brain and lung (s/p two cycles of dual immunotherapy with nivulumab and ipilumab, last dose 1/10 and palliative WBRT 12/14/24), (s/p left shoulder resection 2019), spinal stenosis (s/p L4-S1 lumbar decompression 2021), HTN, HLD, nonalcoholic fatty liver disease, obesity, FRANK on CPAP, PTSD, GERD, COPD, depression s/p vagal nerve stimulator (SA x2, 2007)  Co-Treatment: PT  Co-Treatment Reason: Seen with PT to maximize safety and to expedite D/C  Prior to Session Communication: Bedside nurse  Patient Position Received: Bed, 3 rail up, Alarm on  General Comment: Pt received in supine, agreeable to therapy. Flat affect, otherwise pleasant and cooperative.   Precautions:  Medical Precautions: Fall precautions, Spinal precautions, Oxygen therapy device and L/min (3L O2)      Pain:  Pain Assessment  Pain Assessment: 0-10  0-10 (Numeric) Pain Score: 7  Pain Type: Chronic pain  Pain Location: Back  Pain Interventions: Rest, Repositioned        Objective   Cognition:  Overall Cognitive Status: Impaired  Arousal/Alertness: Delayed responses to stimuli  Orientation Level:  (Oriented to self, place, year, stated it was \"Feb.\". Delayed responses. When pt asked where he was from, he stated, \"[place]\", but then corrected self to state, \"[place]\" after several moments.Will continue to assess.)  Following Commands: Follows one step commands with repetition (and repetition)  Problem Solving: Exceptions to WFL (Pt noted to have dentures under left hand in supine.OT suggested pt place dentures in denture cup on table, however, pt stated he wanted to continue to hold them in his hand. Additional cues to place dentures onto tray to frree left hand for bed mobility)  Managing Medications: To be assessed in therapy (Pt states he manages his own medications at home. Will continue to assess as pt with noted cognitive deficits at this time.)  Processing Speed: Delayed           Home Living:  Type of Home: House (split level)  Lives " With: Alone (HHA x 40 hrs/week for cooking, cleaning, laundry, states mother assists with groceries)  Home Adaptive Equipment: Cane, Quad cane, Wheelchair-manual (chair lift, rollator)  Home Layout: Multi-level (3 level split level)  Alternate Level Stairs-Number of Steps: chair lift to 2nd and 3rd floor per pt. bedroom/full bath level is on 3rd floor, 1/2 bath on 2nd floor with kitchen and living room  Home Access: Stairs to enter with rails  Entrance Stairs-Rails: None  Entrance Stairs-Number of Steps: 1  Bathroom Shower/Tub: Walk-in shower   Prior Function:  Level of [place]: Independent with ADLs and functional transfers  ADL Assistance: Independent (Reports requiring increased time, states that it is sometimes difficult.)  Ambulatory Assistance:  (Modified independent with canes or rollator, states he uses a can outisde.)  Hand Dominance: Right  Prior Function Comments: Pt reports fall shortly before adm to hospital.       ADL:  UE Dressing Assistance:  (Anticipate at least min assist 2' pt's decreased activity tolerance)  LE Dressing Assistance:  (Anticipate at least mod assist 2' pt's c/o back pain, decreased activity tolerance, decreased strength.)  Activity Tolerance:  Endurance:  (Pt able to participate in session, however, appears to fatigue easily.)  Balance:  Static Sitting Balance  Static Sitting-Level of Assistance: Close supervision  Static Standing Balance  Static Standing-Level of Assistance:  (CGA/Min assist with RW)  Bed Mobility/Transfers: Bed Mobility/Transfers: Bed Mobility  Bed Mobility: Yes  Bed Mobility 1  Bed Mobility 1: Supine to sitting, Sitting to supine  Level of Assistance 1: Minimum assistance, Minimal verbal cues  Bed Mobility Comments 1: HOB elevated, cues to initiate and sequence log roll  Functional Mobility  Functional Mobility Performed: Yes  Functional Mobility 1  Surface 1: Level tile  Device 1: Rolling walker  Assistance 1: Minimum assistance, Minimal verbal  cues  Comments 1: Slow paced gait noted. Pt ambulated to/from restrroom. Refer to PT notes for gait/distance details.   and Transfers  Transfer: Yes  Transfer 1  Transfer From 1: Sit to, Stand to  Transfer to 1: Sit, Stand  Technique 1: Sit to stand, Stand to sit  Transfer Device 1: Walker  Transfer Level of Assistance 1: Minimum assistance, Minimal verbal cues  Trials/Comments 1: repeated cues for safe hand placement in prep for transitions.  Transfers 2  Transfer From 2: Sit to  Transfer to 2: Toilet  Technique 2: Stand to sit, Stand pivot  Transfer Device 2: Walker (grab bar)  Transfer Level of Assistance 2: Minimum assistance, Minimal verbal cues     Vision: Vision - Basic Assessment  Current Vision: Wears glasses all the time (States he feels he needs new glasses)   and    Sensation:  Light Touch: No apparent deficits (UE's)  Strength:  Strength Comments: Grossly 3+-4/5 throughout B/L UE's  Perception:  Inattention/Neglect: Appears intact  Coordination:  Movements are Fluid and Coordinated: Yes   Hand Function:  Hand Function  Gross Grasp: Functional  Extremities:   RUE   RUE :  (shoulder flexion/abduction limited to approximately 110 degrees shoulder flexion/abduction. Per  pt -- baseline 2' h/o shoulder bursitis), LUE   LUE:  (shoulder flexion/abduction limited to approximately 110 degrees shoulder flexion/abduction. Per pt -- baseline 2' h/o shoulder bursitis),  , and        Outcome Measures: [facility] Daily Activity  Putting on and taking off regular lower body clothing: A lot  Bathing (including washing, rinsing, drying): A lot  Putting on and taking off regular upper body clothing: A little  Toileting, which includes using toilet, bedpan or urinal: A little  Taking care of personal grooming such as brushing teeth: A little  Eating Meals: A little  Daily Activity - Total Score: 16         ,     OT Adult Other Outcome Measures  4AT: 2    Education Documentation  Body Mechanics, taught by Radha Hercules OT at  3/5/2025  3:07 PM.  Learner: Patient  Readiness: Acceptance  Method: Explanation  Response: Needs Reinforcement    Precautions, taught by Radha Hercules OT at 3/5/2025  3:07 PM.  Learner: Patient  Readiness: Acceptance  Method: Explanation  Response: Needs Reinforcement    ADL Training, taught by Radha Hercules OT at 3/5/2025  3:07 PM.  Learner: Patient  Readiness: Acceptance  Method: Explanation  Response: Needs Reinforcement    Education Comments  No comments found.        Goals:   Encounter Problems       Encounter Problems (Active)       ADLs       Patient will perform UB and LB bathing  with set-up, stand by assist level of assistance and extended tub bench and long-handled sponge. (Progressing)       Start:  03/05/25    Expected End:  03/19/25            Patient with complete upper body dressing with independent level of assistance donning and doffing t-shirt, button down shirt, and pullover shirt with no adaptive equipment while supported sitting and edge of bed  (Progressing)       Start:  03/05/25    Expected End:  03/19/25            Patient with complete lower body dressing with modified independent level of assistance donning and doffing pants without a zipper/fasteners, underwear, and socks with reacher, shoe horn, and sock-aid for E/C while supported sitting and edge of bed  (Progressing)       Start:  03/05/25    Expected End:  03/19/25            Patient will complete daily grooming tasks brushing teeth and washing face/hair with modified independent level of assistance and PRN adaptive equipment while edge of bed  vs. standing at sink pending progress/safety. (Progressing)       Start:  03/05/25    Expected End:  03/19/25            Patient will complete toileting including hygiene clothing management/hygiene with modified independent level of assistance and raised toilet seat. (Progressing)       Start:  03/05/25    Expected End:  03/19/25               BALANCE       Pt will maintain dynamic standing  balance during ADL task with modified independent   level of assistance  in order to demonstrate decreased risk of falling and improved postural control. (Progressing)       Start:  03/05/25    Expected End:  03/19/25               COGNITION/SAFETY       Patient will follow 90% or greater  Simple commands to allow improved ADL performance. (Progressing)       Start:  03/05/25    Expected End:  03/19/25               COGNITION/SAFETY       Patient will demonstrated orientation x 4 with visual cues 100% of the time. (Progressing)       Start:  03/05/25    Expected End:  03/19/25       ORIENTATION            MOBILITY       Patient will perform Functional mobility  Household distances with modified independent level of assistance and least restrictive device in order to improve safety and functional mobility. (Progressing)       Start:  03/05/25    Expected End:  03/19/25               TRANSFERS       Patient will perform bed mobility independent level of assistance and with log roll technique in order to improve safety and independence with mobility (Progressing)       Start:  03/05/25    Expected End:  03/19/25            Patient will complete functional transfer to all surfaces with least restrictive device with modified independent level of assistance. (Progressing)       Start:  03/05/25    Expected End:  03/19/25                   Treatment Completed on Evaluation    Activities of Daily Living: Toilet Transfers  Toilet Transfers: Minimal assistance (stand <-> sit)  Toilet Transfers Comments: Cues for hand placement and to initiate use of grab bar during transitions to maximize safety in task.        Toileting  Toileting Level of Assistance: Maximum assistance, Minimal verbal cues  Where Assessed: Toilet  Toileting Comments: Pt required Max assist for hygeine while in standing after BM, pt appeared most limited by fatigue. May benefit from toilet aide for E/C and to increased fxnl reach in task.      03/05/25 at  3:08 PM   Radha Hercules, OT   Rehab Office: [phone]

## 2025-03-05 NOTE — PROGRESS NOTES
"Spiritual Care Visit  Spiritual Care Request    Reason for Visit:        Request Received From:       Focus of Care:            Refer to :          Spiritual Care Assessment    Spiritual Assessment:                      Care Provided:       Sense of Community and or Christianity Affiliation:  Nondenominational         Addressed Needs/Concerns and/or Ronni Through:          Outcome:        Advance Directives:         Spiritual Care Annotation    Annotation:   visited with the patient, known to this  from prior admission.      asked how the patient was doing and he shared \"not well.\" He spoke about slowly feeling worse, how he was sleeping all the time at home, and he shared he \"feels like I'm dying. I want the team to tell me whether or not I am dying so I know and can make a plan.\" Patient shared that he is not scared of dying as we processed the difficultly of the last few weeks.       offered ashes for jean marie Wednesday, a blessing, and a prayer with the patient per his request.      will continue to follow and provide support. Please reach out with any needs/concerns.     Rev. Pedro Doss, Supportive Oncology          "

## 2025-03-06 ENCOUNTER — APPOINTMENT (OUTPATIENT)
Dept: PRIMARY CARE | Facility: CLINIC | Age: 60
End: 2025-03-06
Payer: MEDICARE

## 2025-03-06 ENCOUNTER — PHARMACY VISIT (OUTPATIENT)
Dept: PHARMACY | Facility: CLINIC | Age: 60
End: 2025-03-06
Payer: COMMERCIAL

## 2025-03-06 VITALS
TEMPERATURE: 98.6 F | OXYGEN SATURATION: 95 % | HEART RATE: 70 BPM | DIASTOLIC BLOOD PRESSURE: 60 MMHG | SYSTOLIC BLOOD PRESSURE: 108 MMHG | RESPIRATION RATE: 16 BRPM | BODY MASS INDEX: 43.01 KG/M2 | HEIGHT: 68 IN | WEIGHT: 283.8 LBS

## 2025-03-06 LAB
ALBUMIN SERPL BCP-MCNC: 3.3 G/DL (ref 3.4–5)
ANION GAP SERPL CALC-SCNC: 15 MMOL/L (ref 10–20)
BASOPHILS # BLD MANUAL: 0 X10*3/UL (ref 0–0.1)
BASOPHILS NFR BLD MANUAL: 0 %
BUN SERPL-MCNC: 27 MG/DL (ref 6–23)
BURR CELLS BLD QL SMEAR: ABNORMAL
CALCIUM SERPL-MCNC: 8.4 MG/DL (ref 8.6–10.6)
CHLORIDE SERPL-SCNC: 107 MMOL/L (ref 98–107)
CO2 SERPL-SCNC: 22 MMOL/L (ref 21–32)
CREAT SERPL-MCNC: 1.01 MG/DL (ref 0.5–1.3)
DACRYOCYTES BLD QL SMEAR: ABNORMAL
EGFRCR SERPLBLD CKD-EPI 2021: 86 ML/MIN/1.73M*2
EOSINOPHIL # BLD MANUAL: 0 X10*3/UL (ref 0–0.7)
EOSINOPHIL NFR BLD MANUAL: 0 %
ERYTHROCYTE [DISTWIDTH] IN BLOOD BY AUTOMATED COUNT: 21.2 % (ref 11.5–14.5)
GLUCOSE SERPL-MCNC: 130 MG/DL (ref 74–99)
HCT VFR BLD AUTO: 31 % (ref 41–52)
HGB BLD-MCNC: 8.7 G/DL (ref 13.5–17.5)
IMM GRANULOCYTES # BLD AUTO: 0.44 X10*3/UL (ref 0–0.7)
IMM GRANULOCYTES NFR BLD AUTO: 5.4 % (ref 0–0.9)
LYMPHOCYTES # BLD MANUAL: 1.19 X10*3/UL (ref 1.2–4.8)
LYMPHOCYTES NFR BLD MANUAL: 14.5 %
MAGNESIUM SERPL-MCNC: 2.64 MG/DL (ref 1.6–2.4)
MCH RBC QN AUTO: 20.7 PG (ref 26–34)
MCHC RBC AUTO-ENTMCNC: 28.1 G/DL (ref 32–36)
MCV RBC AUTO: 74 FL (ref 80–100)
METAMYELOCYTES # BLD MANUAL: 0.14 X10*3/UL
METAMYELOCYTES NFR BLD MANUAL: 1.7 %
MONOCYTES # BLD MANUAL: 0.14 X10*3/UL (ref 0.1–1)
MONOCYTES NFR BLD MANUAL: 1.7 %
NEUTROPHILS # BLD MANUAL: 6.66 X10*3/UL (ref 1.2–7.7)
NEUTS BAND # BLD MANUAL: 0.28 X10*3/UL (ref 0–0.7)
NEUTS BAND NFR BLD MANUAL: 3.4 %
NEUTS SEG # BLD MANUAL: 6.38 X10*3/UL (ref 1.2–7)
NEUTS SEG NFR BLD MANUAL: 77.8 %
NRBC BLD-RTO: 0 /100 WBCS (ref 0–0)
OVALOCYTES BLD QL SMEAR: ABNORMAL
PHOSPHATE SERPL-MCNC: 3.4 MG/DL (ref 2.5–4.9)
PLATELET # BLD AUTO: 123 X10*3/UL (ref 150–450)
POLYCHROMASIA BLD QL SMEAR: ABNORMAL
POTASSIUM SERPL-SCNC: 4.7 MMOL/L (ref 3.5–5.3)
RBC # BLD AUTO: 4.21 X10*6/UL (ref 4.5–5.9)
RBC MORPH BLD: ABNORMAL
SODIUM SERPL-SCNC: 139 MMOL/L (ref 136–145)
TOTAL CELLS COUNTED BLD: 117
VARIANT LYMPHS # BLD MANUAL: 0.07 X10*3/UL (ref 0–0.5)
VARIANT LYMPHS NFR BLD: 0.9 %
WBC # BLD AUTO: 8.2 X10*3/UL (ref 4.4–11.3)

## 2025-03-06 PROCEDURE — 80069 RENAL FUNCTION PANEL: CPT

## 2025-03-06 PROCEDURE — 99239 HOSP IP/OBS DSCHRG MGMT >30: CPT

## 2025-03-06 PROCEDURE — 36415 COLL VENOUS BLD VENIPUNCTURE: CPT

## 2025-03-06 PROCEDURE — 99233 SBSQ HOSP IP/OBS HIGH 50: CPT

## 2025-03-06 PROCEDURE — 85027 COMPLETE CBC AUTOMATED: CPT

## 2025-03-06 PROCEDURE — 82024 ASSAY OF ACTH: CPT

## 2025-03-06 PROCEDURE — 94640 AIRWAY INHALATION TREATMENT: CPT

## 2025-03-06 PROCEDURE — 2500000001 HC RX 250 WO HCPCS SELF ADMINISTERED DRUGS (ALT 637 FOR MEDICARE OP)

## 2025-03-06 PROCEDURE — 2500000002 HC RX 250 W HCPCS SELF ADMINISTERED DRUGS (ALT 637 FOR MEDICARE OP, ALT 636 FOR OP/ED)

## 2025-03-06 PROCEDURE — 83735 ASSAY OF MAGNESIUM: CPT

## 2025-03-06 PROCEDURE — 85007 BL SMEAR W/DIFF WBC COUNT: CPT

## 2025-03-06 PROCEDURE — 2500000005 HC RX 250 GENERAL PHARMACY W/O HCPCS

## 2025-03-06 PROCEDURE — 2500000004 HC RX 250 GENERAL PHARMACY W/ HCPCS (ALT 636 FOR OP/ED)

## 2025-03-06 PROCEDURE — 84100 ASSAY OF PHOSPHORUS: CPT

## 2025-03-06 PROCEDURE — RXMED WILLOW AMBULATORY MEDICATION CHARGE

## 2025-03-06 RX ORDER — DIAZEPAM 5 MG/1
5 TABLET ORAL NIGHTLY
Qty: 10 TABLET | Refills: 0 | Status: SHIPPED | OUTPATIENT
Start: 2025-03-06 | End: 2025-03-06 | Stop reason: HOSPADM

## 2025-03-06 RX ORDER — GUAIFENESIN 600 MG/1
600 TABLET, EXTENDED RELEASE ORAL 2 TIMES DAILY PRN
Qty: 60 TABLET | Refills: 0 | Status: SHIPPED | OUTPATIENT
Start: 2025-03-06 | End: 2025-04-05

## 2025-03-06 RX ORDER — DIAZEPAM 5 MG/1
7.5 TABLET ORAL
Qty: 15 TABLET | Refills: 0 | Status: SHIPPED | OUTPATIENT
Start: 2025-03-06 | End: 2025-03-06

## 2025-03-06 RX ORDER — DIAZEPAM 5 MG/1
TABLET ORAL
Qty: 25 TABLET | Refills: 0 | Status: SHIPPED | OUTPATIENT
Start: 2025-03-06 | End: 2025-03-16

## 2025-03-06 RX ORDER — LEVOFLOXACIN 500 MG/1
500 TABLET, FILM COATED ORAL EVERY 24 HOURS
Qty: 2 TABLET | Refills: 0 | Status: SHIPPED | OUTPATIENT
Start: 2025-03-06 | End: 2025-03-08

## 2025-03-06 RX ADMIN — ACETAMINOPHEN 975 MG: 325 TABLET ORAL at 03:18

## 2025-03-06 RX ADMIN — DEXAMETHASONE 4 MG: 4 TABLET ORAL at 08:45

## 2025-03-06 RX ADMIN — FERROUS SULFATE TAB 325 MG (65 MG ELEMENTAL FE) 325 MG: 325 (65 FE) TAB at 08:45

## 2025-03-06 RX ADMIN — ARIPIPRAZOLE 10 MG: 5 TABLET ORAL at 08:44

## 2025-03-06 RX ADMIN — OXYCODONE HYDROCHLORIDE 10 MG: 10 TABLET, FILM COATED, EXTENDED RELEASE ORAL at 08:44

## 2025-03-06 RX ADMIN — DIAZEPAM 7.5 MG: 5 TABLET ORAL at 06:07

## 2025-03-06 RX ADMIN — ACETAMINOPHEN 975 MG: 325 TABLET ORAL at 12:53

## 2025-03-06 RX ADMIN — FLUTICASONE FUROATE AND VILANTEROL TRIFENATATE 1 PUFF: 200; 25 POWDER RESPIRATORY (INHALATION) at 10:49

## 2025-03-06 RX ADMIN — PREGABALIN 200 MG: 25 CAPSULE ORAL at 08:44

## 2025-03-06 RX ADMIN — DAPSONE 100 MG: 100 TABLET ORAL at 08:44

## 2025-03-06 RX ADMIN — LEVOFLOXACIN 500 MG: 500 TABLET, FILM COATED ORAL at 12:53

## 2025-03-06 RX ADMIN — PANTOPRAZOLE SODIUM 40 MG: 40 TABLET, DELAYED RELEASE ORAL at 06:07

## 2025-03-06 RX ADMIN — PROPRANOLOL HYDROCHLORIDE 120 MG: 120 CAPSULE, EXTENDED RELEASE ORAL at 08:44

## 2025-03-06 RX ADMIN — CHOLECALCIFEROL TAB 25 MCG (1000 UNIT) 2000 UNITS: 25 TAB at 08:44

## 2025-03-06 RX ADMIN — SERTRALINE HYDROCHLORIDE 200 MG: 100 TABLET ORAL at 08:44

## 2025-03-06 RX ADMIN — MEMANTINE 10 MG: 10 TABLET ORAL at 08:44

## 2025-03-06 RX ADMIN — HEPARIN SODIUM 7500 UNITS: 5000 INJECTION, SOLUTION INTRAVENOUS; SUBCUTANEOUS at 06:08

## 2025-03-06 RX ADMIN — LIDOCAINE 4% 1 PATCH: 40 PATCH TOPICAL at 08:45

## 2025-03-06 RX ADMIN — LOPERAMIDE HYDROCHLORIDE 6 MG: 2 CAPSULE ORAL at 08:44

## 2025-03-06 ASSESSMENT — PAIN SCALES - GENERAL: PAINLEVEL_OUTOF10: 0 - NO PAIN

## 2025-03-06 NOTE — CARE PLAN
Problem: Fall/Injury  Goal: Not fall by end of shift  Outcome: Progressing  Goal: Be free from injury by end of the shift  Outcome: Progressing  Goal: Verbalize understanding of personal risk factors for fall in the hospital  Outcome: Progressing  Goal: Verbalize understanding of risk factor reduction measures to prevent injury from fall in the home  Outcome: Progressing  Goal: Use assistive devices by end of the shift  Outcome: Progressing  Goal: Pace activities to prevent fatigue by end of the shift  Outcome: Progressing   The patient's goals for the shift include      The clinical goals for the shift include remain free from fall

## 2025-03-06 NOTE — PROGRESS NOTES
SUPPORTIVE AND PALLIATIVE ONCOLOGY INPATIENT FOLLOW-UP    SERVICE DATE: 03/06/25     Updates and Recommendations (03/06/25):  Plans for pt to discharge today and for pt to receive outpatient MRI brain in the next 1-2 weeks.     No further medication changes recommended at this time. Pt to follow up with outpatient Palliative Care provider, [facility]ost[facility]     ASSESSMENT/PLAN:  Manuel Marrufo is a 59 y.o. 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 PMHx significant for spinal stenosis (s/p L4-S1 lumbar decompression in 2021), HTN, HLD, non-alcoholic fatty liver disease, COPD, obesity, FRANK on CPAP, depression s/p vagal nerve stimulator (SA x2, 2007), PTSD, and GERD. Admitted 3/3/2025 for further evaluation and management of hypotension and new fever of unknown origin, though c/f pna vs ICI pneumonitis. Of note, pt recently had an appointment with Dr. Vanegas from  on 3/3/25 to establish care. Further tx pending updated MRI brain evaluation. [facility]. Now has appointment with Dr. Osullivan from  Medical Oncology on 3/14/25. Course complicated by ongoing pain, nausea, and ongoing physical deconditioning. Supportive and Palliative Oncology is consulted for assistance with pain and symptom management.      Neoplasm Related Pain  Headache, neck, and BLE/feet pain related to known malignancy with metastatic lung and brain involvement.   Type: Somatic, neuropathic  Pain control: Well controlled  Home regimen: oxycodone ER (OxyContin) 10mg q12h, oxycodone IR 10mg q6h PRN, lidocaine TD patch once daily, acetaminophen PRN [usually 3g/day]  Intolerances: Vicodin [rx: sweating, shaking]  Personalized pain goal: Mild  Total OME usage for the past 24 hours: 37.5 OME  Renal  function WNL, hepatic function unremarkable.    Continue scheduled acetaminophen 975mg PO q8h   Continue lidocaine 4% TD patch once daily   dexamethasone 4mg PO q12h per primary  Continue scheduled oxycodone ER (OxyContin) 10mg PO q12h  Continue oxycodone IR 10mg PO q3h PRN for moderate to severe pain  Continue hydromorphone 0.5mg IV q3h PRN for breakthrough pain--please discontinue in anticipation for discharge  Continue to monitor pain scores and administer PRN medications as appropriate  Continue/initiate nonpharmacologic pain management strategies including ice/heat therapy, distraction techniques, deep breathing/relaxation techniques, calming music, and repositioning  Continue to monitor for signs of opioid efficacy (pain scores, improved functionality) and toxicity (pinpoint pupils, excess sedation/drowsiness/confusion, respiratory depression, etc.)     Nausea  At risk for nausea and vomiting related to opioid use. Currently denies.   Home regimen: PPI  EKG reviewed from 3/4/25, QTc 475.  PPI per primary   Continue ondansetron 8mg PO/IV q8h PRN for n/v, first line     Constipation  At risk for constipation related to opioids and reduced mobility, currently not constipated.  Has hx of diarrhea prone IBS per pt.   Usual bowel pattern: Once daily while on home loperamide  Home regimen: loperamide 6mg once daily [scheduled]  LBM: 3/5/25  Continue scheduled loperamide 6mg PO once daily [home med]   Continue Miralax 17g PO once daily PRN for constipation  Goal to have BM without straining q48-72h, adjust regimen as needed  Encourage mobility as tolerated, PT/OT following     Altered Mood  Hx of anxiety, PTSD, and depression s/p vagal nerve stimulator (SA x2, 2007).  Allergy to buspirone [rx: n/v]  Well controlled.   Previously tried: alprazolam [rx: ineffective], clonazepam [rx: ineffective]  Home regimen: propanol LA 120mg BID (for anxiety per CPRS), diazepam 7.5mg AM / 5mg PM, aripiprazole 10mg once daily,  "sertraline 200mg once daily, pregabalin 200mg BID, [facility]  Continue aripiprazole 10mg PO once daily  Continue diazepam 7.5mg PO AM / 5mg PO PM [home med, verified with [facility]]  Continue pregabalin 200mg PO BID  Continue propanol LA 120mg PO BID  Continue sertraline 200mg PO once daily   If need arises, consider consulting Psych if medications need adjusting given complexity of existing regimen and extensive psych hx  Spiritual Care consulted for support     Sleeping Difficulty  Impaired sleep related to pain, anxiety, and hospital environment.  Hx of chronic insomnia per pt.   Home regimen: None, see pain and anxiety home meds  See pain and anxiety recs     Cough  Recent non-productive cough likely 2/2 c/f pna vs ICI pneumonitis.   Home regimen: Mucinex PRN  Continue guaifenesin 12h tablet 600mg PO BID PRN for cough   Breathing tx per primary      Disposition:  Please start the process of having prior authorization with meds to beds deliver medications to patient prior to discharge via [facility] pharmacy. Prescriptions will need to be sent 48-72 hours prior to discharge so that a prior authorization can be completed.      Discharge date pending resolution of acute hospital issues and pain control.  Pt will [facility] with [facility]ostomo MD. Called her on 3/5/25 and verified pt's home medications.     SIGNATURE: ROSSI Brito   PAGER/CONTACT:  Contact information:  Supportive and Palliative Oncology  Monday-Friday 8 AM-5 PM  Epic Secure chat or pager [phone].  After hours and weekends:  pager [phone]    =====================================================================================================    SUBJECTIVE:    Pain Assessment:  Location: Head, posterior neck, low back--radiates down BLE to feet  Duration: Constant  Characteristics:  Ratin/10, \"I'm have a really good day today.\"  Descriptors: Aching, sore, sharp, " shooting  Aggravating: Bright lights, loud noises, movement   Relieving: Analgesics--see EMR, positioning, and modifying activity  Interference with Function: A little    Opioid Requirements  Past 24h opioid requirements:  (3/5-3/6, 7758-4318)  oxycodone ER (OxyContin) 10mg x 2 = 20mg = 25 OME  oxycodone IR 10mg x 1 = 12.5 OME    Total 24h OME use: 37.5 OME    Symptom Assessment:  Anxiety: a little--though well controlled per pt  Nausea: none    Information obtained from: chart review, interview of patient, and discussion with primary team  ______________________________________________________________________     OBJECTIVE:    Lab Results   Component Value Date    WBC 8.6 03/05/2025    HGB 9.1 (L) 03/05/2025    HCT 30.8 (L) 03/05/2025    MCV 70 (L) 03/05/2025     03/05/2025     Lab Results   Component Value Date    GLUCOSE 93 03/05/2025    CALCIUM 8.6 03/05/2025     (L) 03/05/2025    K 4.4 03/05/2025    CO2 22 03/05/2025     03/05/2025    BUN 22 03/05/2025    CREATININE 1.04 03/05/2025     Lab Results   Component Value Date    ALT 66 (H) 03/04/2025    AST 56 (H) 03/04/2025    ALKPHOS 79 03/04/2025    BILITOT 0.6 03/04/2025     Estimated Creatinine Clearance: 101.4 mL/min (by C-G formula based on SCr of 1.04 mg/dL).    Scheduled medications   acetaminophen, 975 mg, oral, q8h  ARIPiprazole, 10 mg, oral, Daily  cholecalciferol, 2,000 Units, oral, Daily  dapsone, 100 mg, oral, Daily  dexAMETHasone, 4 mg, oral, q12h TING  diazePAM, 5 mg, oral, Nightly  diazePAM, 7.5 mg, oral, Daily before breakfast  ferrous sulfate, 65 mg of iron, oral, Daily with breakfast  tiotropium, 2 puff, inhalation, Daily   And  fluticasone furoate-vilanteroL, 1 puff, inhalation, Daily  [Held by provider] furosemide, 40 mg, oral, Daily  heparin (porcine), 7,500 Units, subcutaneous, q8h  levoFLOXacin, 500 mg, oral, q24h  lidocaine, 1 patch, transdermal, Daily  loperamide, 6 mg, oral, Daily  memantine, 10 mg, oral, BID  oxyCODONE  ER, 10 mg, oral, q12h TING  pantoprazole, 40 mg, oral, Daily before breakfast  pregabalin, 200 mg, oral, BID  propranolol LA, 120 mg, oral, BID  [Held by provider] rosuvastatin, 10 mg, oral, Daily  sertraline, 200 mg, oral, Daily    Continuous medications     PRN medications  guaiFENesin, 600 mg, BID PRN  HYDROmorphone, 0.5 mg, q3h PRN  ipratropium-albuteroL, 3 mL, q6h PRN  ondansetron ODT, 8 mg, TID PRN  oxyCODONE, 10 mg, q3h PRN  oxygen, , Continuous PRN - O2/gases  polyethylene glycol, 17 g, Daily PRN    PHYSICAL EXAMINATION:    Vital Signs:   Vital signs reviewed  Visit Vitals  /89 (BP Location: Right arm, Patient Position: Lying)   Pulse 71   Temp 36 °C (96.8 °F) (Temporal)   Resp 16      0-10 (Numeric) Pain Score: 0 - No pain     Physical Exam   Vitals reviewed.   Constitutional:       Comments: Alert, awake, ill appearing gentleman sitting up in the chair. No signs of acute distress. Pleasant, cooperative, and participating in interview.  HENT:   Head:      Comments: Normocephalic, atraumatic.   Eyes:      Comments: Sclera clear, EOM intact, wearing glasses.    Pulmonary:      Comments: Symmetrical chest rise. Regular rate and depth of respirations. NC.   Abdominal:      Comments: Abdomen obese, slightly distended, non tender, and semi-firm.   Musculoskeletal:      Comments: Normal muscle strength and tone. RANDLE x4. Generalized non-pitting edema.   Skin:     Comments: No lesions, rash, or abrasions present on visible skin. Skin color appropriate for ethnicity.   Neurological:      Comments: A&Ox4, follows commands, no apparent sensory deficits.   Psychiatric:      Comments: Mild anxiety and sadness, but behavior otherwise appropriate.    PALLIATIVE CARE ENCOUNTER:    Supportive and Palliative Oncology encounter:  Spoke with patient at bedside.  Emotional support provided.  Coordination of care: medication and symptom re-evaluation    Medical Decision Making/Goals of Care/Advance Care Planning:  (3/5/2025)  Patient's current clinical condition, including diagnosis, prognosis, and management plan, and goals of care were discussed.   Life limiting disease: Metastatic malignancy   Family: Supportive, mother, brother, and sister  Performance status: Moderate limitations due to progressive physical weakness, anxiety, and pain.  Joys/meaning/strength: [place]  Understanding of health: Demonstrates good prognostic understanding of disease process, understands plan for updated pain and symptom recs.   Information: Appreciates full disclosure  Goals: Cancer tx, pain/symptom control  Worries and fears now and future: Not being offered cancer tx   Code status discussion: DNR-DNI     Advance Directives  Existence of Advance Directives: Yes, but NOT on file in medical record  Decision maker: HCPCHANDLER is pt's brotherMason  Code Status: DNR-DNI    =====================================================================================================    Signature and billing:  Medical complexity was high level due to due to complexity of problems, extensive data review, and high risk of management/treatment.    I spent 50 minutes in the care of this patient which included chart review, interviewing patient/family, discussion with primary team, coordination of care, and documentation.    Data:   Diagnostic tests and information reviewed for today's visit:  Conversation with primary team, Most recent labs, Most recent EKG, Medications    Some elements copied from my note on 3/5/25, the elements have been updated and all reflect current decision making from today, 03/06/25.    Plan of Care discussed with: Primary team, pt     Thank you for asking Supportive and Palliative Oncology to assist with care of this patient.  Recommendations will be communicated back to the consulting service by way of shared electronic medical record/secure chat/email or face-to-face.   We will continue to follow.  Please contact us for additional  questions or concerns.    SIGNATURE: ANNELISE Brito-BRAD   PAGER/CONTACT:  Contact information:  Supportive and Palliative Oncology  Monday-Friday 8 AM-5 PM, Epic Secure chat or pager [phone].  After hours and weekends: pager [phone]

## 2025-03-06 NOTE — PROGRESS NOTES
Daily Progress Note    Manuel Marrufo is a 59 y.o. male on day 3 of admission presenting with Fever, unspecified fever cause.    Subjective   No acute events overnight. On evaluation this morning, pt was resting in bed. Reported ongoing headache and nausea, which he states is chronic. No fevers, chills overnight.       10 point ROS performed and negative unless stated above.          Objective   Weight: 125 kg (275 lb) (03/03/25 1420)    Daily Weight  03/03/25 : 129 kg (283 lb 12.8 oz)      Last Recorded Vitals  Heart Rate:  [64-87]   Temp:  [35.7 °C (96.3 °F)-36.7 °C (98.1 °F)]   Resp:  [16-18]   BP: ()/(61-83)   SpO2:  [91 %-97 %]      Physical Exam  Constitutional: NAD, obese, pleasant  HEENT: EOMI, no scleral icterus, PERRL, dry mucous membranes  CV: distant heart sounds, no m/r/g, right sided pain with plapation  Pulm: nonlabored, on RA  GI: Soft, mildly distended, slight TTP in epigastrium  Extremities: 1-2+ edema to knees  Skin: warm  Neuro: grossly alert and oriented, strength and sensation 5/5 in bl UE and LE, CN 3-12 intact, some tenderness over neck predominantly paraspinal, notes pain is worse with chin to chest, no pain with extension  Psych: Mood and affect appropriate to situation       Intake/Output last 3 Shifts:  I/O last 3 completed shifts:  In: 1820 (14.1 mL/kg) [P.O.:820; IV Piggyback:1000]  Out: 900 (7 mL/kg) [Urine:900 (0.2 mL/kg/hr)]  Weight: 128.7 kg     Medications  Scheduled medications  acetaminophen, 975 mg, oral, q8h  ARIPiprazole, 10 mg, oral, Daily  cholecalciferol, 2,000 Units, oral, Daily  dapsone, 100 mg, oral, Daily  dexAMETHasone, 4 mg, oral, q12h TING  diazePAM, 5 mg, oral, Nightly  diazePAM, 7.5 mg, oral, Daily before breakfast  ferrous sulfate, 65 mg of iron, oral, Daily with breakfast  tiotropium, 2 puff, inhalation, Daily   And  fluticasone furoate-vilanteroL, 1 puff, inhalation, Daily  [Held by provider] furosemide, 40 mg, oral, Daily  heparin (porcine), 7,500 Units,  subcutaneous, q8h  levoFLOXacin, 500 mg, oral, q24h  lidocaine, 1 patch, transdermal, Daily  loperamide, 6 mg, oral, Daily  memantine, 10 mg, oral, BID  oxyCODONE ER, 10 mg, oral, q12h TING  pantoprazole, 40 mg, oral, Daily before breakfast  pregabalin, 200 mg, oral, BID  propranolol LA, 120 mg, oral, BID  [Held by provider] rosuvastatin, 10 mg, oral, Daily  sertraline, 200 mg, oral, Daily      Continuous medications     PRN medications  PRN medications: guaiFENesin, HYDROmorphone, ipratropium-albuteroL, ondansetron ODT, oxyCODONE, oxygen, polyethylene glycol       Relevant Results    Labs  Results from last 7 days   Lab Units 03/05/25  0753 03/04/25  0728 03/03/25  1506   SODIUM mmol/L 134* 136 135*   POTASSIUM mmol/L 4.4 3.7 3.9   CHLORIDE mmol/L 101 97* 94*   CO2 mmol/L 22 29 32   BUN mg/dL 22 20 28*   CREATININE mg/dL 1.04 1.36* 1.38*   CALCIUM mg/dL 8.6 8.8 8.9      Results from last 7 days   Lab Units 03/05/25  1204 03/04/25  0728 03/03/25  1506   HEMOGLOBIN g/dL 9.1* 9.0* 10.0*   WBC AUTO x10*3/uL 8.6 10.4 13.2*   PLATELETS AUTO x10*3/uL 164  --  186   HEMATOCRIT % 30.8* 30.9* 33.2*     Results from last 7 days   Lab Units 03/04/25  0728 03/03/25  1506   ALK PHOS U/L 79 84   BILIRUBIN TOTAL mg/dL 0.6 0.4   PROTEIN TOTAL g/dL 6.3* 6.7   ALT U/L 66* 74*   AST U/L 56* 62*          Lab Results   Component Value Date    LACTATE 1.8 03/03/2025       Results from last 7 days   Lab Units 03/05/25  0753 03/04/25  0728 03/03/25  1506   GLUCOSE mg/dL 93 84 86                Assessment/Plan   Assessment & Plan  Fever, unspecified fever cause      59 y.o. male PMHx metastatic melanoma, to brain and lung (s/p two cycles of dual immunotherapy with nivulumab and ipilumab, last dose 1/10 and palliative WBRT 12/14/24), (s/p left shoulder resection 2019), spinal stenosis (s/p L4-S1 lumbar decompression 2021), HTN, HLD, nonalcoholic fatty liver disease, obesity, FRANK on CPAP, PTSD, GERD, COPD, depression s/p vagal nerve stimulator  (SA x2, 2007) presented to the ED from onc appt for fever of 101.8. Patient endorsing SOB, cough and pleuritic chest pain with CT patchy GGOs in DINO concerning for PNA. [facility], pneumonia was previously treated with levaquin. Will treat broadly with cefepime/flagyl given recent hospitalizations and pcn allergy. Low concern at this time for meningitis as his HA is at baseline, cervical neck pain is chronic and unchanged, and neurologic exam is benign.     Updates 3/6  - Continue levofloxacin for total 5 day course (end of treatment 3/7)  - PT/OT recommending moderate intensity level of continued care, however pt prefers to go home. Plan for discharge today  - TSH wnl; ACTH pending     #Fever, hypotension  #Concern for PNA vs ICI pneumonitis  :: wbc 13.2, RSV/flu/COVID PCR -ve,  lactate 0.8,  :: CT chest w/ patchy ground-glass opacities in the left upper lobe may represent early infection or treatment related pneumonitis.  :: s/p 1L LR  :: Procalcitonin 0.73, MRSA nares negative (vancomycin stopped 3/4)  :: UA not c/f infection  ::blood cultures with no growth    -duonebs q6 scheduled  -continue levaquin for total 5 day course (end of treatment 3/7)  -hold lasix iso hypotension  -continuous pulse ox     #PAUL - improved  #HX of HTN  :: Cr 1.38 from bl ~1 on admission  -reports good PO intake, suspect s/t starting lasix and hypotension iso infection  -hold lasix 40 mg PO        #Metastatic melanoma to brain and lung (s/p two cycles of dual immunotherapy with nivulumab and ipilumab, last dose 1/10 and palliative WBRT 12/14/24)  :: Seen by Dr. Vanegas on 3/3. Currently on decadron 4mg BID    -continue decadron  -on dapsone for PJP ppx    #Anxiety, PTSD, treatment-refractory depression  #S/p vagal nerve stimulator   -continue home abilify 10mg, valium 5mg qAM and 7.5 mg qPM, zoloft 100mg, and lyrica 200 mg  -restarted propanolol 3/5     #COPD  -Continue home Breztri - Spiriva and Breo Ellipta    -Singular 10 mg daily  -duonebs q6h      #FRANK  - RT consult placed for CPAP     #MIGEL  :: hgb on admission 10, bl ~8-9  -start PO ferrous sulfate     F: PRN  E: PRN  N: Regular diet   DVT ppx: SQH d/t PAUL    Code: DNR/DNI (confirmed on admission)  NOK: brother Mason [phone]    Jay Ramirez MD  Internal Medicine PGY-1

## 2025-03-06 NOTE — PROGRESS NOTES
03/06/25 1000   Discharge Planning   Living Arrangements Alone   Support Systems Family members   Assistance Needed HHA   Type of Residence Private residence   Number of Stairs to Enter Residence 0   Number of Stairs Within Residence 10   Do you have animals or pets at home? Yes   Type of Animals or Pets cat   Who is requesting discharge planning? Provider   Home or Post Acute Services In home services   Type of Home Care Services Home health aide   Expected Discharge Disposition Home Health   Does the patient need discharge transport arranged? No     Care Transitions Note    Plan per Medical/Surgical Team: Fever, Hypotension   Status:  Inpatient   Payor Source:  [employer]   Discharge disposition: Home with home health care   Expected date of discharge: 03/06/2025  Barriers: No barriers identified at this time   PCP / Primary Oncologist: In the process of establishing care at    [facility] [id number] [place] [address]   [facility]erred [facility] care agency: [facility]     03/06/2025: [facility] spoke with patient regarding plan of care. Patient to discharge home today. [facility] which he receives 40 hours per week in HHA services. Patient has notified home care that he will resume care this afternoon. [facility] does not need to fax discharge instructions to the home care office.  Patient is in the process of establishing Oncology care at  instead of the [facility]. [facility] gave patient contact information for the Financial Counselor [facility] for follow up. Patient currently utilizes a cane, walker, and manual w/c at home. Family to transport patient home today. Demographics and insurance verifed with patient. Will continue to follow for any discharge planning needs. SAL Ortega [facility]

## 2025-03-07 LAB
ACTH PLAS-MCNC: <1.5 PG/ML (ref 7.2–63.3)
BACTERIA BLD CULT: NORMAL
BACTERIA BLD CULT: NORMAL

## 2025-03-07 NOTE — HOSPITAL COURSE
Manuel Marrufo is a 59 y.o. male PMHx metastatic melanoma, to brain and lung (s/p two cycles of dual immunotherapy with nivulumab and ipilumab, last dose 1/10 and palliative WBRT 12/14/24), (s/p left shoulder resection 2019), spinal stenosis (s/p L4-S1 lumbar decompression 2021), HTN, HLD, nonalcoholic fatty liver disease, obesity, FRANK on CPAP, PTSD, GERD, COPD, depression s/p vagal nerve stimulator (SA x2, 2007) presented to the ED on 3/3 from onc appt for fever of 101.8. Endorsed cough productive of clear sputum, pleuritic chest pain tender to palpation, chronic headache and neck pain. Of note, [facility] to admission; treated with 7 day course of Levaquin.    Pt was febrile with T102 on arrival to ED. CXR showed no intrathoracic abnormality. CTH showed stable metastatic disease in the brain. CT T/L spine without any fracture, destructive osseus process. CT chest showed ground glass opacities in the DINO that may represent early infection vs pneumonitis.     Started on vancomycin, Zosyn on admission. Blood cultures showed no growth. De-escalated to Levaquin prior to discharge for total 5 day course (to be completed 3/8). Supportive oncology was consulted; recommended no changes to home pain/anxiety medications.     PT/OT recommended SNF, but pt preferred to go home. Discharge home with home health care on 3/6.

## 2025-03-07 NOTE — DISCHARGE SUMMARY
Discharge Diagnosis  Fever, unspecified fever cause    Issues Requiring Follow-Up  - Oncology: CT T/L spine showed 8mm sclerotic focus in the posterior T10 body that could represent metastatic disease. Additionally, CT chest showed new/enlarged L adrenal nodule concerning for metastasis.     Discharge Meds     Medication List      START taking these medications     ARIPiprazole 10 mg tablet; Commonly known as: Abilify   ferrous sulfate tablet; Take 1 tablet (325 mg) by mouth once daily with   breakfast.   ipratropium-albuteroL 0.5-2.5 mg/3 mL nebulizer solution; Commonly known   as: Duo-Neb; Take 3 mL by nebulization every 6 hours if needed for   wheezing.   levoFLOXacin 500 mg tablet; Commonly known as: Levaquin; Take 1 tablet   (500 mg) by mouth once every 24 hours for 2 doses.   lidocaine 4 % patch; Place 1 patch over 12 hours on the skin once daily.   Remove & discard patch within 12 hours or as directed by MD.   Mucus Relief  mg 12 hr tablet; Generic drug: guaiFENesin; Take 1   tablet (600 mg) by mouth 2 times a day as needed for cough. Do not crush,   chew, or split.   polyethylene glycol 17 gram packet; Commonly known as: Glycolax,   Miralax; Take 17 g by mouth once daily as needed (constipation).     CHANGE how you take these medications     acetaminophen 325 mg tablet; Commonly known as: Tylenol; Take 3 tablets   (975 mg) by mouth every 8 hours.; What changed: how much to take, when to   take this, reasons to take this   * dexAMETHasone 4 mg tablet; Commonly known as: Decadron; Take 1 tablet   (4 mg) by mouth every 6 hours.; What changed: when to take this   * dexAMETHasone 4 mg tablet; Commonly known as: Decadron; Take 1 tablet   (4 mg) by mouth every 12 hours.; What changed: You were already taking a   medication with the same name, and this prescription was added. Make sure   you understand how and when to take each.   diazePAM 5 mg tablet; Commonly known as: Valium; Take 1.5 tablets (7.5   mg) by  mouth once daily in the morning. Take before meals AND 1 tablet (5   mg) once daily at bedtime. Do all this for 10 days.; What changed: See the   new instructions.   loperamide 2 mg capsule; Commonly known as: Imodium; Take 3 capsules (6   mg) by mouth once daily.; What changed: when to take this, reasons to take   this   * propranolol  mg 24 hr capsule; Commonly known as: Inderal LA;   What changed: Another medication with the same name was added. Make sure   you understand how and when to take each.   * propranolol  mg 24 hr capsule; Commonly known as: Inderal LA;   Take 1 capsule (120 mg) by mouth 2 times a day. Do not crush, chew, or   split.; What changed: You were already taking a medication with the same   name, and this prescription was added. Make sure you understand how and   when to take each.  * This list has 4 medication(s) that are the same as other medications   prescribed for you. Read the directions carefully, and ask your doctor or   other care provider to review them with you.     CONTINUE taking these medications     albuterol 90 mcg/actuation inhaler   budesonide-glycopyr-formoterol 160-9-4.8 mcg/actuation HFA aerosol   inhaler; Commonly known as: BREZTRI   cholecalciferol 50 MCG (2000 UT) tablet; Commonly known as: Vitamin D-3   dapsone 100 mg tablet; Take 1 tablet (100 mg) by mouth once daily.   furosemide 40 mg tablet; Commonly known as: Lasix   memantine 5 mg tablet; Commonly known as: Namenda   ondansetron ODT 8 mg disintegrating tablet; Commonly known as:   Zofran-ODT   * oxyCODONE ER 10 mg 12 hr tablet; Commonly known as: OxyCONTIN   * oxyCODONE 10 mg immediate release tablet; Commonly known as:   Roxicodone; Take 1 tablet (10 mg) by mouth every 3 hours if needed for   severe pain (7 - 10).   pantoprazole 40 mg EC tablet; Commonly known as: ProtoNix; Take 1 tablet   (40 mg) by mouth once daily in the morning. Take before meals. Do not   crush, chew, or split.   pregabalin 200  mg capsule; Commonly known as: Lyrica   rosuvastatin 10 mg tablet; Commonly known as: Crestor   sertraline 100 mg tablet; Commonly known as: Zoloft   tadalafil 5 mg tablet; Commonly known as: Cialis  * This list has 2 medication(s) that are the same as other medications   prescribed for you. Read the directions carefully, and ask your doctor or   other care provider to review them with you.     STOP taking these medications     omeprazole 20 mg DR capsule; Commonly known as: PriLOSEC       Test Results Pending At Discharge  Pending Labs       Order Current Status    ACTH In process    Blood Culture Preliminary result    Blood Culture Preliminary result            Hospital Course  Manuel Marrufo is a 59 y.o. male PMHx metastatic melanoma, to brain and lung (s/p two cycles of dual immunotherapy with nivulumab and ipilumab, last dose 1/10 and palliative WBRT 12/14/24), (s/p left shoulder resection 2019), spinal stenosis (s/p L4-S1 lumbar decompression 2021), HTN, HLD, nonalcoholic fatty liver disease, obesity, FRANK on CPAP, PTSD, GERD, COPD, depression s/p vagal nerve stimulator (SA x2, 2007) presented to the ED on 3/3 from onc appt for fever of 101.8. Endorsed cough productive of clear sputum, pleuritic chest pain tender to palpation, chronic headache and neck pain. Of note, [facility] to [facility]; treated with 7 day course of Levaquin.    Pt was febrile with T102 on arrival to ED. CXR showed no intrathoracic abnormality. CTH showed stable metastatic disease in the brain. CT T/L spine without any fracture, destructive osseus process. CT chest showed ground glass opacities in the DINO that may represent early infection vs pneumonitis.     Started on vancomycin, cefepime, flagyl on admission. Blood cultures showed no growth. De-escalated to Levaquin prior to discharge for total 5 day course (to be completed 3/8). Supportive oncology was consulted; recommended no changes to home  pain/anxiety medications.     PT/OT recommended SNF, but pt preferred to go home. Discharged home with home health care on 3/6.    Pertinent Physical Exam At Time of Discharge  Physical Exam  Constitutional: NAD, obese, pleasant  HEENT: EOMI, no scleral icterus, PERRL, dry mucous membranes  CV: distant heart sounds, no m/r/g, right sided pain with plapation  Pulm: nonlabored, slight expiratory wheezes, on 2L NC for comfort  GI: Soft, mildly distended, slight TTP in epigastrium  Extremities: 1-2+ edema to knees  Skin: warm  Neuro: grossly alert and oriented, strength and sensation 5/5 in bl UE and LE, CN 3-12 intact, some tenderness over neck predominantly paraspinal, notes pain is worse with chin to chest, no pain with extension  Psych: Mood and affect appropriate to situation     Outpatient Follow-Up  Future Appointments   Date Time Provider Department Center   3/14/2025  1:00 PM Padmaja Osullivan MD [id number] [facility]   3/28/2025  8:30 AM Medardo Vanegas MD [id number] Academic         Jay Ramirez MD

## 2025-03-08 RX ORDER — HYDROCORTISONE 10 MG/1
10 TABLET ORAL DAILY
Status: ACTIVE | OUTPATIENT
Start: 2025-03-08

## 2025-03-08 RX ORDER — HYDROCORTISONE 20 MG/1
20 TABLET ORAL EVERY MORNING
Status: ACTIVE | OUTPATIENT
Start: 2025-03-08

## 2025-03-08 RX ORDER — HYDROCORTISONE 10 MG/1
10 TABLET ORAL DAILY
Qty: 30 TABLET | Refills: 0 | Status: SHIPPED | OUTPATIENT
Start: 2025-03-08

## 2025-03-08 RX ORDER — HYDROCORTISONE 20 MG/1
20 TABLET ORAL EVERY MORNING
Qty: 30 TABLET | Refills: 0 | Status: SHIPPED | OUTPATIENT
Start: 2025-03-08 | End: 2025-03-08 | Stop reason: HOSPADM

## 2025-03-12 ENCOUNTER — HOSPITAL ENCOUNTER (INPATIENT)
Facility: HOSPITAL | Age: 60
LOS: 1 days | Discharge: HOME HEALTH | DRG: 391 | End: 2025-03-14
Attending: STUDENT IN AN ORGANIZED HEALTH CARE EDUCATION/TRAINING PROGRAM | Admitting: INTERNAL MEDICINE
Payer: OTHER GOVERNMENT

## 2025-03-12 ENCOUNTER — APPOINTMENT (OUTPATIENT)
Dept: RADIOLOGY | Facility: HOSPITAL | Age: 60
DRG: 391 | End: 2025-03-12
Payer: OTHER GOVERNMENT

## 2025-03-12 DIAGNOSIS — C79.31 MALIGNANT MELANOMA METASTATIC TO BRAIN (MULTI): ICD-10-CM

## 2025-03-12 DIAGNOSIS — R53.1 GENERALIZED WEAKNESS: Primary | ICD-10-CM

## 2025-03-12 DIAGNOSIS — C78.01: ICD-10-CM

## 2025-03-12 DIAGNOSIS — E87.70 HYPERVOLEMIA, UNSPECIFIED HYPERVOLEMIA TYPE: ICD-10-CM

## 2025-03-12 DIAGNOSIS — R19.7 DIARRHEA, UNSPECIFIED TYPE: ICD-10-CM

## 2025-03-12 DIAGNOSIS — F41.1 GENERALIZED ANXIETY DISORDER: ICD-10-CM

## 2025-03-12 PROBLEM — F39 AFFECTIVE PSYCHOSIS (CMS-HCC): Status: ACTIVE | Noted: 2025-03-12

## 2025-03-12 PROBLEM — D12.6 BENIGN NEOPLASM OF COLON: Status: ACTIVE | Noted: 2025-03-12

## 2025-03-12 PROBLEM — F31.81 BIPOLAR 2 DISORDER (MULTI): Status: ACTIVE | Noted: 2025-03-12

## 2025-03-12 PROBLEM — E66.01 MORBID (SEVERE) OBESITY DUE TO EXCESS CALORIES (MULTI): Status: ACTIVE | Noted: 2025-03-12

## 2025-03-12 PROBLEM — R13.12 DYSPHAGIA, OROPHARYNGEAL PHASE: Status: ACTIVE | Noted: 2025-03-12

## 2025-03-12 PROBLEM — F17.200 NICOTINE USE DISORDER: Status: ACTIVE | Noted: 2021-08-11

## 2025-03-12 PROBLEM — I10 BENIGN ESSENTIAL HYPERTENSION: Status: ACTIVE | Noted: 2022-02-19

## 2025-03-12 PROBLEM — J44.9 CHRONIC OBSTRUCTIVE PULMONARY DISEASE (MULTI): Status: ACTIVE | Noted: 2025-03-12

## 2025-03-12 PROBLEM — K21.9 GASTROESOPHAGEAL REFLUX DISEASE WITHOUT ESOPHAGITIS: Status: ACTIVE | Noted: 2025-03-12

## 2025-03-12 PROBLEM — M19.079 OSTEOARTHRITIS OF ANKLE OR FOOT: Status: ACTIVE | Noted: 2025-03-12

## 2025-03-12 PROBLEM — E78.5 HYPERLIPIDEMIA: Status: ACTIVE | Noted: 2025-03-12

## 2025-03-12 PROBLEM — C80.1 MALIGNANT (PRIMARY) NEOPLASM, UNSPECIFIED (MULTI): Status: ACTIVE | Noted: 2025-03-12

## 2025-03-12 PROBLEM — M54.16 RADICULOPATHY, LUMBAR REGION: Status: ACTIVE | Noted: 2025-03-12

## 2025-03-12 PROBLEM — Z85.820 HISTORY OF MELANOMA: Status: ACTIVE | Noted: 2025-03-12

## 2025-03-12 PROBLEM — F32.9 MAJOR DEPRESSIVE DISORDER, SINGLE EPISODE, UNSPECIFIED: Status: ACTIVE | Noted: 2025-03-12

## 2025-03-12 LAB
ALBUMIN SERPL BCP-MCNC: 3.6 G/DL (ref 3.4–5)
ALP SERPL-CCNC: 75 U/L (ref 33–120)
ALT SERPL W P-5'-P-CCNC: 87 U/L (ref 10–52)
ANION GAP BLDV CALCULATED.4IONS-SCNC: 12 MMOL/L (ref 10–25)
ANION GAP SERPL CALC-SCNC: 14 MMOL/L (ref 10–20)
APPEARANCE UR: CLEAR
AST SERPL W P-5'-P-CCNC: 88 U/L (ref 9–39)
BASE EXCESS BLDV CALC-SCNC: -1.6 MMOL/L (ref -2–3)
BASOPHILS # BLD AUTO: 0.03 X10*3/UL (ref 0–0.1)
BASOPHILS NFR BLD AUTO: 0.5 %
BILIRUB SERPL-MCNC: 0.6 MG/DL (ref 0–1.2)
BILIRUB UR STRIP.AUTO-MCNC: NEGATIVE MG/DL
BNP SERPL-MCNC: 15 PG/ML (ref 0–99)
BODY TEMPERATURE: 37 DEGREES CELSIUS
BUN SERPL-MCNC: 14 MG/DL (ref 6–23)
CA-I BLDV-SCNC: 1.13 MMOL/L (ref 1.1–1.33)
CALCIUM SERPL-MCNC: 8.7 MG/DL (ref 8.6–10.3)
CARDIAC TROPONIN I PNL SERPL HS: 16 NG/L (ref 0–20)
CARDIAC TROPONIN I PNL SERPL HS: 17 NG/L (ref 0–20)
CHLORIDE BLDV-SCNC: 107 MMOL/L (ref 98–107)
CHLORIDE SERPL-SCNC: 105 MMOL/L (ref 98–107)
CO2 SERPL-SCNC: 24 MMOL/L (ref 21–32)
COLOR UR: YELLOW
CREAT SERPL-MCNC: 1.25 MG/DL (ref 0.5–1.3)
EGFRCR SERPLBLD CKD-EPI 2021: 67 ML/MIN/1.73M*2
EOSINOPHIL # BLD AUTO: 0.15 X10*3/UL (ref 0–0.7)
EOSINOPHIL NFR BLD AUTO: 2.4 %
ERYTHROCYTE [DISTWIDTH] IN BLOOD BY AUTOMATED COUNT: 22.6 % (ref 11.5–14.5)
FLUAV RNA RESP QL NAA+PROBE: NOT DETECTED
FLUBV RNA RESP QL NAA+PROBE: NOT DETECTED
GLUCOSE BLDV-MCNC: 73 MG/DL (ref 74–99)
GLUCOSE SERPL-MCNC: 75 MG/DL (ref 74–99)
GLUCOSE UR STRIP.AUTO-MCNC: NORMAL MG/DL
HCO3 BLDV-SCNC: 22.8 MMOL/L (ref 22–26)
HCT VFR BLD AUTO: 31.8 % (ref 41–52)
HCT VFR BLD EST: 28 % (ref 41–52)
HGB BLD-MCNC: 9.2 G/DL (ref 13.5–17.5)
HGB BLDV-MCNC: 9.4 G/DL (ref 13.5–17.5)
HYALINE CASTS #/AREA URNS AUTO: ABNORMAL /LPF
IMM GRANULOCYTES # BLD AUTO: 0.27 X10*3/UL (ref 0–0.7)
IMM GRANULOCYTES NFR BLD AUTO: 4.4 % (ref 0–0.9)
INHALED O2 CONCENTRATION: 21 %
KETONES UR STRIP.AUTO-MCNC: ABNORMAL MG/DL
LACTATE BLDV-SCNC: 1.4 MMOL/L (ref 0.4–2)
LACTATE SERPL-SCNC: 0.9 MMOL/L (ref 0.4–2)
LEUKOCYTE ESTERASE UR QL STRIP.AUTO: NEGATIVE
LYMPHOCYTES # BLD AUTO: 1.66 X10*3/UL (ref 1.2–4.8)
LYMPHOCYTES NFR BLD AUTO: 26.9 %
MAGNESIUM SERPL-MCNC: 2.25 MG/DL (ref 1.6–2.4)
MCH RBC QN AUTO: 20.4 PG (ref 26–34)
MCHC RBC AUTO-ENTMCNC: 28.9 G/DL (ref 32–36)
MCV RBC AUTO: 70 FL (ref 80–100)
MONOCYTES # BLD AUTO: 0.33 X10*3/UL (ref 0.1–1)
MONOCYTES NFR BLD AUTO: 5.4 %
MUCOUS THREADS #/AREA URNS AUTO: ABNORMAL /LPF
NEUTROPHILS # BLD AUTO: 3.72 X10*3/UL (ref 1.2–7.7)
NEUTROPHILS NFR BLD AUTO: 60.4 %
NITRITE UR QL STRIP.AUTO: NEGATIVE
NRBC BLD-RTO: 0.3 /100 WBCS (ref 0–0)
OVALOCYTES BLD QL SMEAR: NORMAL
OXYHGB MFR BLDV: 27 % (ref 45–75)
PCO2 BLDV: 36 MM HG (ref 41–51)
PH BLDV: 7.41 PH (ref 7.33–7.43)
PH UR STRIP.AUTO: 5.5 [PH]
PHOSPHATE SERPL-MCNC: 3.5 MG/DL (ref 2.5–4.9)
PLATELET # BLD AUTO: 226 X10*3/UL (ref 150–450)
PO2 BLDV: 22 MM HG (ref 35–45)
POLYCHROMASIA BLD QL SMEAR: NORMAL
POTASSIUM BLDV-SCNC: 3.5 MMOL/L (ref 3.5–5.3)
POTASSIUM SERPL-SCNC: 3.4 MMOL/L (ref 3.5–5.3)
PROT SERPL-MCNC: 6.8 G/DL (ref 6.4–8.2)
PROT UR STRIP.AUTO-MCNC: ABNORMAL MG/DL
RBC # BLD AUTO: 4.52 X10*6/UL (ref 4.5–5.9)
RBC # UR STRIP.AUTO: NEGATIVE MG/DL
RBC #/AREA URNS AUTO: ABNORMAL /HPF
RBC MORPH BLD: NORMAL
SAO2 % BLDV: 27 % (ref 45–75)
SARS-COV-2 RNA RESP QL NAA+PROBE: NOT DETECTED
SODIUM BLDV-SCNC: 138 MMOL/L (ref 136–145)
SODIUM SERPL-SCNC: 140 MMOL/L (ref 136–145)
SP GR UR STRIP.AUTO: 1.04
UROBILINOGEN UR STRIP.AUTO-MCNC: NORMAL MG/DL
WBC # BLD AUTO: 6.2 X10*3/UL (ref 4.4–11.3)
WBC #/AREA URNS AUTO: ABNORMAL /HPF

## 2025-03-12 PROCEDURE — 71045 X-RAY EXAM CHEST 1 VIEW: CPT | Mod: FOREIGN READ | Performed by: RADIOLOGY

## 2025-03-12 PROCEDURE — 74177 CT ABD & PELVIS W/CONTRAST: CPT

## 2025-03-12 PROCEDURE — 84484 ASSAY OF TROPONIN QUANT: CPT | Performed by: PHYSICIAN ASSISTANT

## 2025-03-12 PROCEDURE — 71275 CT ANGIOGRAPHY CHEST: CPT

## 2025-03-12 PROCEDURE — 36415 COLL VENOUS BLD VENIPUNCTURE: CPT | Performed by: PHYSICIAN ASSISTANT

## 2025-03-12 PROCEDURE — 84132 ASSAY OF SERUM POTASSIUM: CPT | Performed by: PHYSICIAN ASSISTANT

## 2025-03-12 PROCEDURE — 96360 HYDRATION IV INFUSION INIT: CPT | Mod: 59

## 2025-03-12 PROCEDURE — 84100 ASSAY OF PHOSPHORUS: CPT | Performed by: STUDENT IN AN ORGANIZED HEALTH CARE EDUCATION/TRAINING PROGRAM

## 2025-03-12 PROCEDURE — 2550000001 HC RX 255 CONTRASTS: Performed by: PHYSICIAN ASSISTANT

## 2025-03-12 PROCEDURE — 87040 BLOOD CULTURE FOR BACTERIA: CPT | Mod: PORLAB | Performed by: PHYSICIAN ASSISTANT

## 2025-03-12 PROCEDURE — 71275 CT ANGIOGRAPHY CHEST: CPT | Mod: FOREIGN READ | Performed by: RADIOLOGY

## 2025-03-12 PROCEDURE — 83880 ASSAY OF NATRIURETIC PEPTIDE: CPT | Performed by: PHYSICIAN ASSISTANT

## 2025-03-12 PROCEDURE — 2500000002 HC RX 250 W HCPCS SELF ADMINISTERED DRUGS (ALT 637 FOR MEDICARE OP, ALT 636 FOR OP/ED)

## 2025-03-12 PROCEDURE — 83605 ASSAY OF LACTIC ACID: CPT | Performed by: PHYSICIAN ASSISTANT

## 2025-03-12 PROCEDURE — 81001 URINALYSIS AUTO W/SCOPE: CPT | Performed by: PHYSICIAN ASSISTANT

## 2025-03-12 PROCEDURE — 2500000001 HC RX 250 WO HCPCS SELF ADMINISTERED DRUGS (ALT 637 FOR MEDICARE OP)

## 2025-03-12 PROCEDURE — 83735 ASSAY OF MAGNESIUM: CPT | Performed by: STUDENT IN AN ORGANIZED HEALTH CARE EDUCATION/TRAINING PROGRAM

## 2025-03-12 PROCEDURE — 99285 EMERGENCY DEPT VISIT HI MDM: CPT | Mod: 25 | Performed by: STUDENT IN AN ORGANIZED HEALTH CARE EDUCATION/TRAINING PROGRAM

## 2025-03-12 PROCEDURE — 2500000004 HC RX 250 GENERAL PHARMACY W/ HCPCS (ALT 636 FOR OP/ED)

## 2025-03-12 PROCEDURE — G0378 HOSPITAL OBSERVATION PER HR: HCPCS

## 2025-03-12 PROCEDURE — 74177 CT ABD & PELVIS W/CONTRAST: CPT | Mod: FOREIGN READ | Performed by: RADIOLOGY

## 2025-03-12 PROCEDURE — 2500000004 HC RX 250 GENERAL PHARMACY W/ HCPCS (ALT 636 FOR OP/ED): Performed by: PHYSICIAN ASSISTANT

## 2025-03-12 PROCEDURE — 99223 1ST HOSP IP/OBS HIGH 75: CPT

## 2025-03-12 PROCEDURE — 96372 THER/PROPH/DIAG INJ SC/IM: CPT

## 2025-03-12 PROCEDURE — 71045 X-RAY EXAM CHEST 1 VIEW: CPT

## 2025-03-12 PROCEDURE — 87636 SARSCOV2 & INF A&B AMP PRB: CPT | Performed by: PHYSICIAN ASSISTANT

## 2025-03-12 PROCEDURE — 85025 COMPLETE CBC W/AUTO DIFF WBC: CPT | Performed by: PHYSICIAN ASSISTANT

## 2025-03-12 RX ORDER — FUROSEMIDE 40 MG/1
40 TABLET ORAL DAILY
Status: DISCONTINUED | OUTPATIENT
Start: 2025-03-12 | End: 2025-03-14 | Stop reason: HOSPADM

## 2025-03-12 RX ORDER — PANTOPRAZOLE SODIUM 40 MG/10ML
40 INJECTION, POWDER, LYOPHILIZED, FOR SOLUTION INTRAVENOUS
Status: DISCONTINUED | OUTPATIENT
Start: 2025-03-13 | End: 2025-03-14 | Stop reason: HOSPADM

## 2025-03-12 RX ORDER — MEMANTINE HYDROCHLORIDE 10 MG/1
10 TABLET ORAL 2 TIMES DAILY
Status: DISCONTINUED | OUTPATIENT
Start: 2025-03-12 | End: 2025-03-14 | Stop reason: HOSPADM

## 2025-03-12 RX ORDER — POLYETHYLENE GLYCOL 3350 17 G/17G
17 POWDER, FOR SOLUTION ORAL DAILY PRN
Status: DISCONTINUED | OUTPATIENT
Start: 2025-03-12 | End: 2025-03-14 | Stop reason: HOSPADM

## 2025-03-12 RX ORDER — PANTOPRAZOLE SODIUM 40 MG/1
40 TABLET, DELAYED RELEASE ORAL
Status: DISCONTINUED | OUTPATIENT
Start: 2025-03-13 | End: 2025-03-14 | Stop reason: HOSPADM

## 2025-03-12 RX ORDER — GUAIFENESIN 600 MG/1
600 TABLET, EXTENDED RELEASE ORAL EVERY 12 HOURS PRN
Status: DISCONTINUED | OUTPATIENT
Start: 2025-03-12 | End: 2025-03-14 | Stop reason: HOSPADM

## 2025-03-12 RX ORDER — OXYCODONE HYDROCHLORIDE 10 MG/1
10 TABLET ORAL EVERY 4 HOURS PRN
Status: DISCONTINUED | OUTPATIENT
Start: 2025-03-12 | End: 2025-03-14 | Stop reason: HOSPADM

## 2025-03-12 RX ORDER — FLUTICASONE FUROATE AND VILANTEROL 200; 25 UG/1; UG/1
1 POWDER RESPIRATORY (INHALATION)
Status: DISCONTINUED | OUTPATIENT
Start: 2025-03-12 | End: 2025-03-14 | Stop reason: HOSPADM

## 2025-03-12 RX ORDER — HYDROCORTISONE 10 MG/1
10 TABLET ORAL DAILY
Status: DISCONTINUED | OUTPATIENT
Start: 2025-03-12 | End: 2025-03-12

## 2025-03-12 RX ORDER — ENOXAPARIN SODIUM 100 MG/ML
40 INJECTION SUBCUTANEOUS EVERY 12 HOURS SCHEDULED
Status: DISCONTINUED | OUTPATIENT
Start: 2025-03-12 | End: 2025-03-14 | Stop reason: HOSPADM

## 2025-03-12 RX ORDER — TALC
3 POWDER (GRAM) TOPICAL NIGHTLY PRN
Status: DISCONTINUED | OUTPATIENT
Start: 2025-03-12 | End: 2025-03-14 | Stop reason: HOSPADM

## 2025-03-12 RX ORDER — ROSUVASTATIN CALCIUM 10 MG/1
10 TABLET, COATED ORAL DAILY
Status: DISCONTINUED | OUTPATIENT
Start: 2025-03-12 | End: 2025-03-14 | Stop reason: HOSPADM

## 2025-03-12 RX ORDER — DEXAMETHASONE 4 MG/1
4 TABLET ORAL 2 TIMES DAILY
Status: DISCONTINUED | OUTPATIENT
Start: 2025-03-12 | End: 2025-03-14 | Stop reason: HOSPADM

## 2025-03-12 RX ORDER — FERROUS SULFATE 325(65) MG
65 TABLET ORAL
Status: DISCONTINUED | OUTPATIENT
Start: 2025-03-13 | End: 2025-03-14 | Stop reason: HOSPADM

## 2025-03-12 RX ORDER — OXYCODONE HYDROCHLORIDE 5 MG/1
5 TABLET ORAL EVERY 4 HOURS PRN
Status: DISCONTINUED | OUTPATIENT
Start: 2025-03-12 | End: 2025-03-14 | Stop reason: HOSPADM

## 2025-03-12 RX ORDER — ACETAMINOPHEN 325 MG/1
650 TABLET ORAL EVERY 4 HOURS PRN
Status: DISCONTINUED | OUTPATIENT
Start: 2025-03-12 | End: 2025-03-13

## 2025-03-12 RX ORDER — OXYCODONE HCL 10 MG/1
10 TABLET, FILM COATED, EXTENDED RELEASE ORAL 2 TIMES DAILY
Status: DISCONTINUED | OUTPATIENT
Start: 2025-03-12 | End: 2025-03-14 | Stop reason: HOSPADM

## 2025-03-12 RX ORDER — SODIUM CHLORIDE, SODIUM LACTATE, POTASSIUM CHLORIDE, CALCIUM CHLORIDE 600; 310; 30; 20 MG/100ML; MG/100ML; MG/100ML; MG/100ML
100 INJECTION, SOLUTION INTRAVENOUS CONTINUOUS
Status: DISCONTINUED | OUTPATIENT
Start: 2025-03-12 | End: 2025-03-13

## 2025-03-12 RX ORDER — SERTRALINE HYDROCHLORIDE 100 MG/1
200 TABLET, FILM COATED ORAL DAILY
Status: DISCONTINUED | OUTPATIENT
Start: 2025-03-12 | End: 2025-03-14 | Stop reason: HOSPADM

## 2025-03-12 RX ORDER — PROPRANOLOL HYDROCHLORIDE 60 MG/1
120 CAPSULE, EXTENDED RELEASE ORAL 2 TIMES DAILY
Status: DISCONTINUED | OUTPATIENT
Start: 2025-03-12 | End: 2025-03-14 | Stop reason: HOSPADM

## 2025-03-12 RX ORDER — POTASSIUM CHLORIDE 1.5 G/1.58G
40 POWDER, FOR SOLUTION ORAL ONCE
Status: COMPLETED | OUTPATIENT
Start: 2025-03-12 | End: 2025-03-12

## 2025-03-12 RX ORDER — ONDANSETRON HYDROCHLORIDE 2 MG/ML
4 INJECTION, SOLUTION INTRAVENOUS EVERY 6 HOURS PRN
Status: DISCONTINUED | OUTPATIENT
Start: 2025-03-12 | End: 2025-03-14 | Stop reason: HOSPADM

## 2025-03-12 RX ADMIN — SODIUM CHLORIDE, POTASSIUM CHLORIDE, SODIUM LACTATE AND CALCIUM CHLORIDE 100 ML/HR: 600; 310; 30; 20 INJECTION, SOLUTION INTRAVENOUS at 15:14

## 2025-03-12 RX ADMIN — DEXAMETHASONE 4 MG: 4 TABLET ORAL at 21:44

## 2025-03-12 RX ADMIN — ONDANSETRON 4 MG: 2 INJECTION INTRAMUSCULAR; INTRAVENOUS at 15:15

## 2025-03-12 RX ADMIN — ROSUVASTATIN 10 MG: 10 TABLET, FILM COATED ORAL at 21:44

## 2025-03-12 RX ADMIN — SODIUM CHLORIDE 500 ML: 9 INJECTION, SOLUTION INTRAVENOUS at 12:09

## 2025-03-12 RX ADMIN — SERTRALINE 200 MG: 100 TABLET, FILM COATED ORAL at 15:14

## 2025-03-12 RX ADMIN — OXYCODONE HYDROCHLORIDE 10 MG: 10 TABLET, FILM COATED, EXTENDED RELEASE ORAL at 15:14

## 2025-03-12 RX ADMIN — PREGABALIN 200 MG: 75 CAPSULE ORAL at 21:44

## 2025-03-12 RX ADMIN — OXYCODONE HYDROCHLORIDE 10 MG: 10 TABLET, FILM COATED, EXTENDED RELEASE ORAL at 21:44

## 2025-03-12 RX ADMIN — PREGABALIN 200 MG: 75 CAPSULE ORAL at 15:14

## 2025-03-12 RX ADMIN — POTASSIUM CHLORIDE 40 MEQ: 1.5 POWDER, FOR SOLUTION ORAL at 16:32

## 2025-03-12 RX ADMIN — PROPRANOLOL HYDROCHLORIDE 120 MG: 60 CAPSULE, EXTENDED RELEASE ORAL at 21:50

## 2025-03-12 RX ADMIN — FUROSEMIDE 40 MG: 40 TABLET ORAL at 15:14

## 2025-03-12 RX ADMIN — DEXAMETHASONE 4 MG: 4 TABLET ORAL at 15:14

## 2025-03-12 RX ADMIN — MEMANTINE 10 MG: 10 TABLET ORAL at 15:14

## 2025-03-12 RX ADMIN — ENOXAPARIN SODIUM 40 MG: 100 INJECTION SUBCUTANEOUS at 15:15

## 2025-03-12 RX ADMIN — TIOTROPIUM BROMIDE INHALATION SPRAY 2 PUFF: 3.12 SPRAY, METERED RESPIRATORY (INHALATION) at 16:32

## 2025-03-12 RX ADMIN — FLUTICASONE FUROATE AND VILANTEROL TRIFENATATE 1 PUFF: 200; 25 POWDER RESPIRATORY (INHALATION) at 16:32

## 2025-03-12 RX ADMIN — IOHEXOL 75 ML: 350 INJECTION, SOLUTION INTRAVENOUS at 11:25

## 2025-03-12 RX ADMIN — MEMANTINE 10 MG: 10 TABLET ORAL at 21:44

## 2025-03-12 SDOH — ECONOMIC STABILITY: INCOME INSECURITY: IN THE PAST 12 MONTHS HAS THE ELECTRIC, GAS, OIL, OR WATER COMPANY THREATENED TO SHUT OFF SERVICES IN YOUR HOME?: NO

## 2025-03-12 SDOH — SOCIAL STABILITY: SOCIAL INSECURITY: WITHIN THE LAST YEAR, HAVE YOU BEEN AFRAID OF YOUR PARTNER OR EX-PARTNER?: NO

## 2025-03-12 SDOH — SOCIAL STABILITY: SOCIAL INSECURITY: WITHIN THE LAST YEAR, HAVE YOU BEEN HUMILIATED OR EMOTIONALLY ABUSED IN OTHER WAYS BY YOUR PARTNER OR EX-PARTNER?: NO

## 2025-03-12 SDOH — SOCIAL STABILITY: SOCIAL INSECURITY: WERE YOU ABLE TO COMPLETE ALL THE BEHAVIORAL HEALTH SCREENINGS?: YES

## 2025-03-12 SDOH — SOCIAL STABILITY: SOCIAL INSECURITY: HAVE YOU HAD THOUGHTS OF HARMING ANYONE ELSE?: NO

## 2025-03-12 SDOH — ECONOMIC STABILITY: FOOD INSECURITY: WITHIN THE PAST 12 MONTHS, THE FOOD YOU BOUGHT JUST DIDN'T LAST AND YOU DIDN'T HAVE MONEY TO GET MORE.: PATIENT DECLINED

## 2025-03-12 ASSESSMENT — ENCOUNTER SYMPTOMS
JOINT SWELLING: 0
VOMITING: 0
BACK PAIN: 0
PALPITATIONS: 0
CHILLS: 0
NAUSEA: 1
CHEST TIGHTNESS: 0
WHEEZING: 0
ACTIVITY CHANGE: 1
ABDOMINAL PAIN: 0
WEAKNESS: 0
CONSTIPATION: 0
APPETITE CHANGE: 1
COUGH: 0
TREMORS: 0
FLANK PAIN: 0
WOUND: 0
FATIGUE: 1
HEADACHES: 0
SHORTNESS OF BREATH: 0
HEMATURIA: 0
DIARRHEA: 1
FEVER: 0

## 2025-03-12 ASSESSMENT — PATIENT HEALTH QUESTIONNAIRE - PHQ9
2. FEELING DOWN, DEPRESSED OR HOPELESS: NOT AT ALL
1. LITTLE INTEREST OR PLEASURE IN DOING THINGS: NOT AT ALL
SUM OF ALL RESPONSES TO PHQ9 QUESTIONS 1 & 2: 0

## 2025-03-12 ASSESSMENT — PAIN SCALES - GENERAL
PAINLEVEL_OUTOF10: 0 - NO PAIN
PAINLEVEL_OUTOF10: 0 - NO PAIN
PAINLEVEL_OUTOF10: 7
PAINLEVEL_OUTOF10: 0 - NO PAIN

## 2025-03-12 ASSESSMENT — ACTIVITIES OF DAILY LIVING (ADL)
HEARING - LEFT EAR: FUNCTIONAL
DRESSING YOURSELF: INDEPENDENT
PATIENT'S MEMORY ADEQUATE TO SAFELY COMPLETE DAILY ACTIVITIES?: YES
HEARING - RIGHT EAR: FUNCTIONAL
JUDGMENT_ADEQUATE_SAFELY_COMPLETE_DAILY_ACTIVITIES: YES
WALKS IN HOME: INDEPENDENT
TOILETING: INDEPENDENT
FEEDING YOURSELF: INDEPENDENT
GROOMING: INDEPENDENT
LACK_OF_TRANSPORTATION: NO
BATHING: INDEPENDENT
ADEQUATE_TO_COMPLETE_ADL: YES

## 2025-03-12 ASSESSMENT — COGNITIVE AND FUNCTIONAL STATUS - GENERAL
MOBILITY SCORE: 24
MOVING TO AND FROM BED TO CHAIR: A LITTLE
PATIENT BASELINE BEDBOUND: NO
STANDING UP FROM CHAIR USING ARMS: A LITTLE
DAILY ACTIVITIY SCORE: 24
PERSONAL GROOMING: A LITTLE
MOBILITY SCORE: 18
TOILETING: A LITTLE
CLIMB 3 TO 5 STEPS WITH RAILING: A LOT
WALKING IN HOSPITAL ROOM: A LOT
DRESSING REGULAR LOWER BODY CLOTHING: A LITTLE
DAILY ACTIVITIY SCORE: 20
HELP NEEDED FOR BATHING: A LITTLE

## 2025-03-12 ASSESSMENT — LIFESTYLE VARIABLES
HOW OFTEN DO YOU HAVE 6 OR MORE DRINKS ON ONE OCCASION: NEVER
SUBSTANCE_ABUSE_PAST_12_MONTHS: NO
AUDIT-C TOTAL SCORE: 0
HOW MANY STANDARD DRINKS CONTAINING ALCOHOL DO YOU HAVE ON A TYPICAL DAY: PATIENT DOES NOT DRINK
HOW OFTEN DO YOU HAVE A DRINK CONTAINING ALCOHOL: NEVER
SKIP TO QUESTIONS 9-10: 1
PRESCIPTION_ABUSE_PAST_12_MONTHS: NO
AUDIT-C TOTAL SCORE: 0

## 2025-03-12 ASSESSMENT — PAIN - FUNCTIONAL ASSESSMENT
PAIN_FUNCTIONAL_ASSESSMENT: 0-10

## 2025-03-12 NOTE — H&P
[facility] - GENERAL MEDICINE HISTORY AND PHYSICAL    History Obtained From (Primary Source): patient, sister  Collateral History (Secondary Sources): D/w ED provider, chart review    History Of Present Illness (HPI):  Manuel Marrufo is a 59 y.o. male with PMHx s/f metastatic melanoma, to brain and lung (s/p two cycles of dual immunotherapy with nivulumab and ipilumab, last dose 1/10 and palliative WBRT 12/14/24), (s/p left shoulder resection 2019), spinal stenosis (s/p L4-S1 lumbar decompression 2021), HTN, HLD, nonalcoholic fatty liver disease, obesity, FRANK on CPAP, PTSD, GERD, COPD, depression s/p vagal nerve stimulator (SA x2, 2007) presenting with generalized weakness and diarrhea. He has an ongoing diarrhea for several weeks, is worsening now with multiple episodes of runny watery bowel movements. He hasn't had any oral intake in the past 2 days, including his medications. He has abdominal discomfort with bilateral lower leg swelling. He spent most of his time in bed, no energy to get up and pain in his spine with movements. Has nausea. Denies f/c/v, chest pain, sob, abd pain. He was hospitalized on 3/3/25-3/6/25 at [facility] for fever and pneumonia.  He was started on vancomycin, cefepime and Flagyl and was later de-escalated to Levaquin prior to discharge for total of 5-day course.  [facility] also [facility] 3 weeks prior to that last hospitalization and was treated with 7-day course of Levaquin.    ED Course (Summary - please note all labs, imaging studies, and interventions noted below have been personally reviewed and/or interpreted on day of admission):   Vitals on presentation: T 36.5 °C (97.7 °F)  HR (!) 113  /73  RR 20  O2 95 % None (Room air)  Labs: CMP-potassium 3.4  Lactate 0.9, BNP 15, troponin 17-16  CBC-hemoglobin 9.2  Flu, COVID-negative  VBG largely unremarkable  EKG: none  Imaging: CXR-no active pulmonary disease  CTA for PE-no PE.  Bilateral pulmonary  nodules measuring up to 1.5 cm in right lower lobe.  No definite new pulmonary nodule.  CTAP-coronary artery calcification.  Hepatic steatosis.  Mild splenomegaly.  Nonspecific left adrenal nodule, unchanged.  Interventions:  ml, admission for further management    12-point ROS reviewed and found to be negative aside from aforementioned positives in HPI and/or noted in dedicated ROS section below.     ED Course (From ED Provider):  ED Course as of 03/12/25 1357   Wed Mar 12, 2025   1028 Currently awaiting to give patient fluid secondary to concern for fluid overload due to exam and patient not taking Lasix.  Patient is tachycardic but vital signs are stable at this time.  Will await BNP and imaging studies before starting fluids. [CJ]   1313 Patient seen and evaluated by attending physician.  Plan of care was discussed with patient.  Patient be admitted to hospital this time for generalized weakness and potential palliative care consult.  Please see attendings documentation for final update with patient and family. [CJ]      ED Course User Index  [CJ] Leonardo Hodge PA-C         Diagnoses as of 03/12/25 1357   Generalized weakness   Diarrhea, unspecified type   Hypervolemia, unspecified hypervolemia type     Relevant Results  Results for orders placed or performed during the hospital encounter of 03/12/25 (from the past 24 hours)   CBC and Auto Differential   Result Value Ref Range    WBC 6.2 4.4 - 11.3 x10*3/uL    nRBC 0.3 (H) 0.0 - 0.0 /100 WBCs    RBC 4.52 4.50 - 5.90 x10*6/uL    Hemoglobin 9.2 (L) 13.5 - 17.5 g/dL    Hematocrit 31.8 (L) 41.0 - 52.0 %    MCV 70 (L) 80 - 100 fL    MCH 20.4 (L) 26.0 - 34.0 pg    MCHC 28.9 (L) 32.0 - 36.0 g/dL    RDW 22.6 (H) 11.5 - 14.5 %    Platelets 226 150 - 450 x10*3/uL    Neutrophils % 60.4 40.0 - 80.0 %    Immature Granulocytes %, Automated 4.4 (H) 0.0 - 0.9 %    Lymphocytes % 26.9 13.0 - 44.0 %    Monocytes % 5.4 2.0 - 10.0 %    Eosinophils % 2.4 0.0 - 6.0 %     Basophils % 0.5 0.0 - 2.0 %    Neutrophils Absolute 3.72 1.20 - 7.70 x10*3/uL    Immature Granulocytes Absolute, Automated 0.27 0.00 - 0.70 x10*3/uL    Lymphocytes Absolute 1.66 1.20 - 4.80 x10*3/uL    Monocytes Absolute 0.33 0.10 - 1.00 x10*3/uL    Eosinophils Absolute 0.15 0.00 - 0.70 x10*3/uL    Basophils Absolute 0.03 0.00 - 0.10 x10*3/uL   Comprehensive Metabolic Panel   Result Value Ref Range    Glucose 75 74 - 99 mg/dL    Sodium 140 136 - 145 mmol/L    Potassium 3.4 (L) 3.5 - 5.3 mmol/L    Chloride 105 98 - 107 mmol/L    Bicarbonate 24 21 - 32 mmol/L    Anion Gap 14 10 - 20 mmol/L    Urea Nitrogen 14 6 - 23 mg/dL    Creatinine 1.25 0.50 - 1.30 mg/dL    eGFR 67 >60 mL/min/1.73m*2    Calcium 8.7 8.6 - 10.3 mg/dL    Albumin 3.6 3.4 - 5.0 g/dL    Alkaline Phosphatase 75 33 - 120 U/L    Total Protein 6.8 6.4 - 8.2 g/dL    AST 88 (H) 9 - 39 U/L    Bilirubin, Total 0.6 0.0 - 1.2 mg/dL    ALT 87 (H) 10 - 52 U/L   Lactate   Result Value Ref Range    Lactate 0.9 0.4 - 2.0 mmol/L   Blood Gas Venous Full Panel   Result Value Ref Range    POCT pH, Venous 7.41 7.33 - 7.43 pH    POCT pCO2, Venous 36 (L) 41 - 51 mm Hg    POCT pO2, Venous 22 (L) 35 - 45 mm Hg    POCT SO2, Venous 27 (L) 45 - 75 %    POCT Oxy Hemoglobin, Venous 27.0 (L) 45.0 - 75.0 %    POCT Hematocrit Calculated, Venous 28.0 (L) 41.0 - 52.0 %    POCT Sodium, Venous 138 136 - 145 mmol/L    POCT Potassium, Venous 3.5 3.5 - 5.3 mmol/L    POCT Chloride, Venous 107 98 - 107 mmol/L    POCT Ionized Calicum, Venous 1.13 1.10 - 1.33 mmol/L    POCT Glucose, Venous 73 (L) 74 - 99 mg/dL    POCT Lactate, Venous 1.4 0.4 - 2.0 mmol/L    POCT Base Excess, Venous -1.6 -2.0 - 3.0 mmol/L    POCT HCO3 Calculated, Venous 22.8 22.0 - 26.0 mmol/L    POCT Hemoglobin, Venous 9.4 (L) 13.5 - 17.5 g/dL    POCT Anion Gap, Venous 12.0 10.0 - 25.0 mmol/L    Patient Temperature 37.0 degrees Celsius    FiO2 21 %   B-Type Natriuretic Peptide   Result Value Ref Range    BNP 15 0 - 99 pg/mL    Troponin I, High Sensitivity, Initial   Result Value Ref Range    Troponin I, High Sensitivity 17 0 - 20 ng/L   Morphology   Result Value Ref Range    RBC Morphology See Below     Polychromasia Mild     Ovalocytes Few    Magnesium   Result Value Ref Range    Magnesium 2.25 1.60 - 2.40 mg/dL   Phosphorus   Result Value Ref Range    Phosphorus 3.5 2.5 - 4.9 mg/dL   Influenza A, and B PCR   Result Value Ref Range    Flu A Result Not Detected Not Detected    Flu B Result Not Detected Not Detected   Sars-CoV-2 PCR   Result Value Ref Range    Coronavirus 2019, PCR Not Detected Not Detected   Troponin, High Sensitivity, 1 Hour   Result Value Ref Range    Troponin I, High Sensitivity 16 0 - 20 ng/L      CT abdomen pelvis w IV contrast    Result Date: 3/12/2025  STUDY: CT Angiogram of the Chest, CT Abdomen and Pelvis with IV Contrast; 3/12/2025 11:40 AM INDICATION: Shortness of breath.  History of cancer.  Tachycardia.  Possible fluid overload.  Abdominal distention.  Cellulitis/discoloration over scrotum and buttocks. Metastatic melanoma. COMPARISON: CT chest 3/3/2025. ACCESSION NUMBER(S): [id number], [id number] ORDERING CLINICIAN: PHONG FAIRCHILD TECHNIQUE:  CTA of the chest was performed following rapid injection of intravenous contrast.  Images are reviewed and processed at a workstation according to the CT angiogram protocol with 3-D and/or MIP post processing imaging generated.  CT of the abdomen and pelvis was performed with intravenous contrast.  Omnipaque 350 75 mL was administered intravenously; positive oral contrast was given. Automated mA/kV exposure control was utilized and patient examination was performed in strict accordance with principles of ALARA. FINDINGS: CHEST CTA: THIS EXAMINATION IS DEGRADED BY PATIENT MOTION ARTIFACT. ALL FINDINGS BELOW ARE WITHIN THIS LIMITATION. Pulmonary arteries: Nondiagnostic to evaluate for pulmonary emboli relating to patient motion artifact and inadequate contrast  opacification of the pulmonary arteries. Lungs/Pleura: Bilateral pulmonary nodules are minimally changed in size measuring up to 1.5 x 1.1 cm in the right lower lobe. No definite new pulmonary nodule. No pleural effusion. No evidence of pneumonia. No pneumothorax. Patent central airways. Mediastinum: No enlarged lymph nodes. Thoracic aorta normal in caliber without evidence for dissection. Heart size normal. No significant pericardial effusion. There is coronary artery calcification, a marker for coronary atherosclerotic disease. ABDOMEN/PELVIS: Liver/gallbladder: The liver is hypodense and this indicates hepatic steatosis. Hepatic contour is normal. No suspicious hepatic lesion. Gallbladder mostly contracted. Adrenal: 2.3 cm nonspecific left adrenal nodule has not significantly changed in size. Renal: No hydronephrosis. No suspicious renal lesion. Spleen: Measures up to 13.5 cm in craniocaudal dimension. Pancreas: Unremarkable without surrounding infiltration or fluid. Vascular: Aorta and common iliac arteries normal in caliber. Urinary bladder: Unremarkable. GI: No CT finding to suggest acute diverticulitis or colitis. No dilated small bowel loops. Appendix normal. Other: No drainable fluid collection. No free air. No abnormality is identified within the visualized splenic soft tissues. SKELETON: No suspicious osseous lesion. No acute fracture identified.    *Nondiagnostic to evaluate for pulmonary emboli relating to patient motion artifact and inadequate contrast opacification of the pulmonary arteries. *Bilateral pulmonary nodules are minimally changed in size measuring up to 1.5 cm in the right lower lobe. No definite new pulmonary nodule. *Coronary artery calcification, a marker for coronary atherosclerotic disease. *No acute process in the abdomen or pelvis. *Hepatic steatosis. *Mild splenomegaly. *Nonspecific left adrenal nodule is unchanged significantly in size. Signed by Aldo Galeano MD    CT angio  chest for pulmonary embolism    Result Date: 3/12/2025  STUDY: CT Angiogram of the Chest, CT Abdomen and Pelvis with IV Contrast; 3/12/2025 11:40 AM INDICATION: Shortness of breath.  History of cancer.  Tachycardia.  Possible fluid overload.  Abdominal distention.  Cellulitis/discoloration over scrotum and buttocks. Metastatic melanoma. COMPARISON: CT chest 3/3/2025. ACCESSION NUMBER(S): [id number], [id number] ORDERING CLINICIAN: PHONG FAIRCHILD TECHNIQUE:  CTA of the chest was performed following rapid injection of intravenous contrast.  Images are reviewed and processed at a workstation according to the CT angiogram protocol with 3-D and/or MIP post processing imaging generated.  CT of the abdomen and pelvis was performed with intravenous contrast.  Omnipaque 350 75 mL was administered intravenously; positive oral contrast was given. Automated mA/kV exposure control was utilized and patient examination was performed in strict accordance with principles of ALARA. FINDINGS: CHEST CTA: THIS EXAMINATION IS DEGRADED BY PATIENT MOTION ARTIFACT. ALL FINDINGS BELOW ARE WITHIN THIS LIMITATION. Pulmonary arteries: Nondiagnostic to evaluate for pulmonary emboli relating to patient motion artifact and inadequate contrast opacification of the pulmonary arteries. Lungs/Pleura: Bilateral pulmonary nodules are minimally changed in size measuring up to 1.5 x 1.1 cm in the right lower lobe. No definite new pulmonary nodule. No pleural effusion. No evidence of pneumonia. No pneumothorax. Patent central airways. Mediastinum: No enlarged lymph nodes. Thoracic aorta normal in caliber without evidence for dissection. Heart size normal. No significant pericardial effusion. There is coronary artery calcification, a marker for coronary atherosclerotic disease. ABDOMEN/PELVIS: Liver/gallbladder: The liver is hypodense and this indicates hepatic steatosis. Hepatic contour is normal. No suspicious hepatic lesion. Gallbladder mostly  contracted. Adrenal: 2.3 cm nonspecific left adrenal nodule has not significantly changed in size. Renal: No hydronephrosis. No suspicious renal lesion. Spleen: Measures up to 13.5 cm in craniocaudal dimension. Pancreas: Unremarkable without surrounding infiltration or fluid. Vascular: Aorta and common iliac arteries normal in caliber. Urinary bladder: Unremarkable. GI: No CT finding to suggest acute diverticulitis or colitis. No dilated small bowel loops. Appendix normal. Other: No drainable fluid collection. No free air. No abnormality is identified within the visualized splenic soft tissues. SKELETON: No suspicious osseous lesion. No acute fracture identified.    *Nondiagnostic to evaluate for pulmonary emboli relating to patient motion artifact and inadequate contrast opacification of the pulmonary arteries. *Bilateral pulmonary nodules are minimally changed in size measuring up to 1.5 cm in the right lower lobe. No definite new pulmonary nodule. *Coronary artery calcification, a marker for coronary atherosclerotic disease. *No acute process in the abdomen or pelvis. *Hepatic steatosis. *Mild splenomegaly. *Nonspecific left adrenal nodule is unchanged significantly in size. Signed by Aldo Galeano MD    XR chest 1 view    Result Date: 3/12/2025  STUDY: Chest Radiograph;  3/12/2025 11:15 AM INDICATION: Shortness of breath, fluid overload. COMPARISON: 3/3/2025 XR Chest, 1/31/2025 XR Chest. ACCESSION NUMBER(S): [id number] ORDERING CLINICIAN: PHONG FAIRCHILD TECHNIQUE:  Frontal chest was obtained at 1114 hours. FINDINGS: CARDIOMEDIASTINAL SILHOUETTE: Cardiomediastinal silhouette is top normal in size and configuration.  LUNGS: Lungs are clear.  No pneumothorax or lung consolidation.  No pleural effusion.  ABDOMEN: No remarkable upper abdominal findings.  BONES: No acute osseous changes.    No active pulmonary disease. Signed by Julián Cedeño MD    Scheduled medications:  enoxaparin, 40 mg, subcutaneous, q12h  TING  [START ON 3/13/2025] ferrous sulfate, 65 mg of iron, oral, Daily with breakfast  tiotropium, 2 puff, inhalation, Daily   And  fluticasone furoate-vilanteroL, 1 puff, inhalation, Daily  furosemide, 40 mg, oral, Daily  hydrocortisone, 10 mg, oral, Daily  memantine, 10 mg, oral, BID  oxyCODONE ER, 10 mg, oral, BID  [START ON 3/13/2025] pantoprazole, 40 mg, oral, Daily before breakfast   Or  [START ON 3/13/2025] pantoprazole, 40 mg, intravenous, Daily before breakfast  pregabalin, 200 mg, oral, BID  propranolol LA, 120 mg, oral, BID  rosuvastatin, 10 mg, oral, Daily  sertraline, 200 mg, oral, Daily      Continuous medications:     PRN medications:  PRN medications: acetaminophen, guaiFENesin, melatonin, ondansetron, oxyCODONE, oxyCODONE, polyethylene glycol     Past Medical History  He has a past medical history of Agoraphobia with panic attacks, Anxiety, Awareness under anesthesia (2021), COPD (chronic obstructive pulmonary disease) (Multi), Fatty liver disease, nonalcoholic, GERD (gastroesophageal reflux disease), HLD (hyperlipidemia), Melanoma (Multi), FRANK on CPAP, and PTSD (post-traumatic stress disorder).    Surgical History  He has a past surgical history that includes Back surgery; Malignant skin lesion excision; and Tonsillectomy.     Social History  He reports that he quit smoking about 10 months ago. His smoking use included cigarettes. He started smoking about 40 years ago. He has a 20 pack-year smoking history. He has never used smokeless tobacco. He reports that he does not drink alcohol and does not use drugs.    Family History  Family History   Problem Relation Name Age of Onset    Arthritis Mother Patrizia Marrufo     Alcohol abuse Father      Cirrhosis Father      Cervical cancer Sister Kaity Rebolledo     Hypertension Sister Kaity Rebolledo     Other (bladder cancer) Maternal Grandmother Coral Ferrera        Allergies  Penicillin, Bactrim [sulfamethoxazole-trimethoprim], and Buspirone    Code Status  DNR and  No Intubation     Review of Systems   Constitutional:  Positive for activity change, appetite change and fatigue. Negative for chills and fever.   Respiratory:  Negative for cough, chest tightness, shortness of breath and wheezing.    Cardiovascular:  Negative for chest pain, palpitations and leg swelling.   Gastrointestinal:  Positive for diarrhea and nausea. Negative for abdominal pain, constipation and vomiting.   Genitourinary:  Negative for flank pain and hematuria.   Musculoskeletal:  Negative for back pain and joint swelling.   Skin:  Negative for rash and wound.   Neurological:  Negative for tremors, syncope, weakness and headaches.       Last Recorded Vitals  /76   Pulse (!) 109   Temp 36.5 °C (97.7 °F) (Temporal)   Resp 16   Wt 124 kg (272 lb 4.8 oz)   SpO2 98%      Physical Exam:  Vital signs and nursing notes reviewed.   Constitutional: Pleasant and cooperative. Laying in bed in no acute distress. Obese. Conversant.   Skin: Warm and dry; no obvious lesions, rashes, pallor, or jaundice.   Eyes: EOMI. Anicteric sclera.   ENT: Mucous membranes moist; no obvious injury or deformity appreciated.   Head and Neck: Normocephalic, atraumatic. ROM preserved. Trachea midline. No appreciable JVD.   Respiratory: Nonlabored on RA. Lungs clear to auscultation bilaterally without obvious adventitious sounds. Chest rise is equal.  Cardiovascular: RRR. No gross murmur, gallop, or rub. Extremities are warm and well-perfused with good capillary refill (< 3 seconds). No chest wall tenderness.   GI: Abdomen soft, tender to mild palpation, nondistended. No obvious organomegaly appreciated. Bowel sounds are present.  : No CVA tenderness.   MSK: No gross abnormalities appreciated. No limitations to AROM/PROM appreciated.   Extremities: 1+ pitting edema in BLE. No cyanosis, or clubbing evident. Neurovascularly intact.   Neuro: A&Ox3. CN 2-12 grossly intact. Able to respond to questions appropriately and clearly. No  acute focal neurologic deficits appreciated.  Psych: Appropriate mood and behavior.    Assessment/Plan     59 y.o. male with PMHx s/f metastatic melanoma, to brain and lung (s/p two cycles of dual immunotherapy with nivulumab and ipilumab, last dose 1/10 and palliative WBRT 12/14/24), (s/p left shoulder resection 2019), spinal stenosis (s/p L4-S1 lumbar decompression 2021), HTN, HLD, nonalcoholic fatty liver disease, obesity, FRANK on CPAP, PTSD, GERD, COPD, depression s/p vagal nerve stimulator (SA x2, 2007) presenting with generalized weakness and diarrhea.     Generalized weakness in immunocompromised patient secondary to acute diarrhea  -has 1+ pitting edema in bilateral lower extremities, however he appears dry on exam. BNP of 15 and no cardiopulmonary process on CT Chest and CXR; will continue with volume expansion and continue home Lasix  -stool pathogen and C. Diff PCR ordered  -antiemetics and pain control  -PT OT  -palliative care consult    Metastatic melanoma, to brain and lung  -Continue dexamethasone 4 mg twice daily  -Continue home oxycodone, Lyrica for pain control  -CT T/L spine on 3/3/25 showed 8mm sclerotic focus in the posterior T10 body that could represent metastatic disease. CT chest on 3/3/25 showed new/enlarged L adrenal nodule concerning for metastasis.  -continue follow-up with heme/onc outpatient    HTN, HLD  -Continue home Lasix, propranolol, rosuvastatin    Anxiety, PTSD, treatment refractory depression  Status post vagal nerve stimulator  -Continue home propranolol, Zoloft, Lyrica    COPD  -Does not appear to be in acute exacerbation  -Continue home inhalers    Diet: clear liquid, ADAT  DVT Prophylaxis: SCDs, Lovenox  Code Status: DNR and No Intubation   Case Discussed With: ED provider  Additional Sources Reviewed: ED note day of admission; prior hospitalization notes    Anticipated Length of Stay (LOS): Patient will require 1-2 midnights     RADHA Pillai dictation  software was used to dictate this note and thus there may be minor errors in translation/transcription including garbled speech or misspellings. Please contact for clarification if needed.

## 2025-03-12 NOTE — ED PROVIDER NOTES
EMERGENCY MEDICINE EVALUATION NOTE    History of Present Illness     Chief Complaint:   Chief Complaint   Patient presents with    Leg Swelling    Weakness, Gen       HPI: Manuel Marrufo is a 59 y.o. male presents with a chief complaint of multiple medical complaints.  Patient was recently discharged from [facility] after being admitted for multiple reasons.  Patient reports that he is feeling very short of breath and his legs are swelling.  He states that he is supposed be on Lasix however is not taking that because it makes him nauseous and makes him have to pee frequently.  He states that he has not been able to get out of bed really since he was discharged home and whenever he pees frequently he has to often urinate on himself.  Patient states this is caused some pain in his scrotal and buttock area.  Patient reports that he feels short of breath but is not having any current chest pain.  He states that he just feels diffusely weak.  Patient has a known history of metastatic cancer that started his melanoma that has spread to the lungs then brain and potentially spine based on recent imaging.    Previous History     Past Medical History:   Diagnosis Date    Agoraphobia with panic attacks     Anxiety     Awareness under anesthesia 2021    During back surgery    COPD (chronic obstructive pulmonary disease) (Multi)     Fatty liver disease, nonalcoholic     GERD (gastroesophageal reflux disease)     HLD (hyperlipidemia)     Melanoma (Multi)     with mets to brain, spine and lungs    FRANK on CPAP     PTSD (post-traumatic stress disorder)      Past Surgical History:   Procedure Laterality Date    BACK SURGERY      MALIGNANT SKIN LESION EXCISION      left shoulder and armpit    TONSILLECTOMY       Social History     Tobacco Use    Smoking status: Former     Current packs/day: 0.00     Average packs/day: 0.5 packs/day for 40.1 years (20.0 ttl pk-yrs)     Types: Cigarettes     Start date: 9/1/1984     Quit date:  2024     Years since quittin.8    Smokeless tobacco: Never   Vaping Use    Vaping status: Never Used   Substance Use Topics    Alcohol use: Never     Comment: tried it but never been a drinker    Drug use: Never     Family History   Problem Relation Name Age of Onset    Arthritis Mother Patrizia Marrufo     Alcohol abuse Father      Cirrhosis Father      Cervical cancer Sister Kaity Rebolledo     Hypertension Sister Kaity Rebolledo     Other (bladder cancer) Maternal Grandmother Coral Ferrera      Allergies   Allergen Reactions    Penicillin Anaphylaxis    Bactrim [Sulfamethoxazole-Trimethoprim] Nausea/vomiting    Buspirone GI Upset     Current Outpatient Medications   Medication Instructions    acetaminophen (TYLENOL) 975 mg, oral, Every 8 hours    albuterol 90 mcg/actuation inhaler 2 puffs, inhalation, 4 times daily PRN    ARIPiprazole (ABILIFY) 10 mg, oral, Daily    budesonide-glycopyr-formoterol (BREZTRI) 160-9-4.8 mcg/actuation HFA aerosol inhaler 2 puffs, inhalation, 2 times daily RT    cholecalciferol (VITAMIN D-3) 50 mcg, oral, Daily    dapsone 100 mg, oral, Daily    dexAMETHasone (DECADRON) 4 mg, oral, Every 6 hours scheduled    dexAMETHasone (DECADRON) 4 mg, oral, Every 12 hours scheduled    diazePAM (Valium) 5 mg tablet Take 1.5 tablets (7.5 mg) by mouth once daily in the morning. Take before meals AND 1 tablet (5 mg) once daily at bedtime. Do all this for 10 days.    ferrous sulfate 325 mg, oral, Daily with breakfast    furosemide (LASIX) 40 mg, oral, Daily, Edema    hydrocortisone (CORTEF) 10 mg, oral, Daily, Take daily every afternoon    lidocaine 4 % patch 1 patch, transdermal, Daily, Remove & discard patch within 12 hours or as directed by MD.    loperamide (IMODIUM) 6 mg, oral, Daily    memantine (Namenda) 5 mg tablet 2 tablets, oral, 2 times daily    Mucus Relief  mg, oral, 2 times daily PRN, Do not crush, chew, or split.    ondansetron ODT (ZOFRAN-ODT) 8 mg, oral, 3 times daily PRN    oxyCODONE  (ROXICODONE) 10 mg, oral, Every 3 hours PRN    oxyCODONE ER (OXYCONTIN) 10 mg, oral, 2 times daily, Do not crush, chew, or split.    pantoprazole (PROTONIX) 40 mg, oral, Daily before breakfast, Do not crush, chew, or split.    polyethylene glycol (GLYCOLAX, MIRALAX) 17 g, oral, Daily PRN    pregabalin (LYRICA) 200 mg, oral, 2 times daily    propranolol LA (INDERAL LA) 120 mg, oral, 2 times daily    propranolol LA (INDERAL LA) 120 mg, oral, 2 times daily, Do not crush, chew, or split.    rosuvastatin (CRESTOR) 10 mg, oral, Daily    sertraline (ZOLOFT) 200 mg, oral, Daily    tadalafil (CIALIS) 5 mg, oral, Daily       Physical Exam     Appearance: Alert, oriented , cooperative.  Chronic ill-appearing.     Skin: Intact,  dry skin, no lesions, rash, petechiae or purpura.      Eyes: PERRLA, EOMs intact,  Conjunctiva pink      ENT: Hearing grossly intact. Pharynx clear, uvula midline.      Neck: Supple. Trachea at midline     Pulmonary: Decreased breath sounds throughout.  No accessory muscle use or stridor.     Cardiac: Tachycardic.     Abdomen: Soft, nontender, active bowel sounds.     Musculoskeletal: Full range of motion.  Pitting edema bilateral lower extremities.     Neurological:Cranial nerves II through XII are grossly intact, normal sensation, no weakness, no focal findings identified.     Results     Labs Reviewed   CBC WITH AUTO DIFFERENTIAL - Abnormal       Result Value    WBC 6.2      nRBC 0.3 (*)     RBC 4.52      Hemoglobin 9.2 (*)     Hematocrit 31.8 (*)     MCV 70 (*)     MCH 20.4 (*)     MCHC 28.9 (*)     RDW 22.6 (*)     Platelets 226      Neutrophils % 60.4      Immature Granulocytes %, Automated 4.4 (*)     Lymphocytes % 26.9      Monocytes % 5.4      Eosinophils % 2.4      Basophils % 0.5      Neutrophils Absolute 3.72      Immature Granulocytes Absolute, Automated 0.27      Lymphocytes Absolute 1.66      Monocytes Absolute 0.33      Eosinophils Absolute 0.15      Basophils Absolute 0.03      COMPREHENSIVE METABOLIC PANEL - Abnormal    Glucose 75      Sodium 140      Potassium 3.4 (*)     Chloride 105      Bicarbonate 24      Anion Gap 14      Urea Nitrogen 14      Creatinine 1.25      eGFR 67      Calcium 8.7      Albumin 3.6      Alkaline Phosphatase 75      Total Protein 6.8      AST 88 (*)     Bilirubin, Total 0.6      ALT 87 (*)    BLOOD GAS VENOUS FULL PANEL - Abnormal    POCT pH, Venous 7.41      POCT pCO2, Venous 36 (*)     POCT pO2, Venous 22 (*)     POCT SO2, Venous 27 (*)     POCT Oxy Hemoglobin, Venous 27.0 (*)     POCT Hematocrit Calculated, Venous 28.0 (*)     POCT Sodium, Venous 138      POCT Potassium, Venous 3.5      POCT Chloride, Venous 107      POCT Ionized Calicum, Venous 1.13      POCT Glucose, Venous 73 (*)     POCT Lactate, Venous 1.4      POCT Base Excess, Venous -1.6      POCT HCO3 Calculated, Venous 22.8      POCT Hemoglobin, Venous 9.4 (*)     POCT Anion Gap, Venous 12.0      Patient Temperature 37.0      FiO2 21     LACTATE - Normal    Lactate 0.9      Narrative:     Venipuncture immediately after or during the administration of Metamizole may lead to falsely low results. Testing should be performed immediately prior to Metamizole dosing.   B-TYPE NATRIURETIC PEPTIDE - Normal    BNP 15      Narrative:        <100 pg/mL - Heart failure unlikely  100-299 pg/mL - Intermediate probability of acute heart                  failure exacerbation. Correlate with clinical                  context and patient history.    >=300 pg/mL - Heart Failure likely. Correlate with clinical                  context and patient history.    BNP testing is performed using different testing methodology at [facility] than at other [facility] hospitals. Direct result comparisons should only be made within the same method.      SERIAL TROPONIN-INITIAL - Normal    Troponin I, High Sensitivity 17      Narrative:     Less than 99th percentile of normal range cutoff-  Female and children under 18  years old <14 ng/L; Male <21 ng/L: Negative  Repeat testing should be performed if clinically indicated.     Female and children under 18 years old 14-50 ng/L; Male 21-50 ng/L:  Consistent with possible cardiac damage and possible increased clinical   risk. Serial measurements may help to assess extent of myocardial damage.     >50 ng/L: Consistent with cardiac damage, increased clinical risk and  myocardial infarction. Serial measurements may help assess extent of   myocardial damage.      NOTE: Children less than 1 year old may have higher baseline troponin   levels and results should be interpreted in conjunction with the overall   clinical context.     NOTE: Troponin I testing is performed using a different   testing methodology at [facility] than at other   [facility]. Direct result comparisons should only   be made within the same method.   SERIAL TROPONIN, 1 HOUR - Normal    Troponin I, High Sensitivity 16      Narrative:     Less than 99th percentile of normal range cutoff-  Female and children under 18 years old <14 ng/L; Male <21 ng/L: Negative  Repeat testing should be performed if clinically indicated.     Female and children under 18 years old 14-50 ng/L; Male 21-50 ng/L:  Consistent with possible cardiac damage and possible increased clinical   risk. Serial measurements may help to assess extent of myocardial damage.     >50 ng/L: Consistent with cardiac damage, increased clinical risk and  myocardial infarction. Serial measurements may help assess extent of   myocardial damage.      NOTE: Children less than 1 year old may have higher baseline troponin   levels and results should be interpreted in conjunction with the overall   clinical context.     NOTE: Troponin I testing is performed using a different   testing methodology at [facility] than at other   [facility]. Direct result comparisons should only   be made within the same method.   INFLUENZA A AND B PCR -  Normal    Flu A Result Not Detected      Flu B Result Not Detected      Narrative:     This assay is an in vitro diagnostic multiplex nucleic acid amplification test for the detection and discrimination of Influenza A & B from nasopharyngeal specimens, and has been validated for use at [facility]. Negative results do not preclude Influenza A/B infections, and should not be used as the sole basis for diagnosis, treatment, or other management decisions. If Influenza A/B and RSV PCR results are negative, testing for Parainfluenza virus, Adenovirus and Metapneumovirus is routinely performed for [facility] pediatric oncology and intensive care inpatients, and is available on other patients by placing an add-on request.   SARS-COV-2 PCR - Normal    Coronavirus 2019, PCR Not Detected      Narrative:     This assay is an FDA-cleared, in vitro diagnostic nucleic acid amplification test for the qualitative detection and differentiation of SARS CoV-2 from nasopharyngeal specimens collected from individuals with signs and symptoms of respiratory tract infections, and has been validated for use at [facility]. Negative results do not preclude COVID-19 infections and should not be used as the sole basis for diagnosis, treatment, or other management decisions. Testing for SARS CoV-2 is recommended only for patients who meet current clinical and/or epidemiological criteria defined by federal, state, or local public health directives.   BLOOD CULTURE   BLOOD CULTURE   C. DIFFICILE, PCR   TROPONIN SERIES- (INITIAL, 1 HR)    Narrative:     The following orders were created for panel order Troponin I Series, High Sensitivity (0, 1 HR).  Procedure                               Abnormality         Status                     ---------                               -----------         ------                     Troponin I, High Sensiti...[[id number]]  Normal              Final result                Troponin, High Sensitivi...[[id number]]  Normal              Final result                 Please view results for these tests on the individual orders.   URINALYSIS WITH REFLEX CULTURE AND MICROSCOPIC    Narrative:     The following orders were created for panel order Urinalysis with Reflex Culture and Microscopic.  Procedure                               Abnormality         Status                     ---------                               -----------         ------                     Urinalysis with Reflex C...[[id number]]                                                 Extra Urine Gray Tube[[id number]]                                                         Please view results for these tests on the individual orders.   URINALYSIS WITH REFLEX CULTURE AND MICROSCOPIC   EXTRA URINE GRAY TUBE   MAGNESIUM   PHOSPHORUS   MORPHOLOGY    RBC Morphology See Below      Polychromasia Mild      Ovalocytes Few       CT abdomen pelvis w IV contrast   Final Result   *Nondiagnostic to evaluate for pulmonary emboli relating to   patient motion artifact and inadequate contrast opacification of the   pulmonary arteries.   *Bilateral pulmonary nodules are minimally changed in size   measuring up to 1.5 cm in the right lower lobe. No definite new   pulmonary nodule.   *Coronary artery calcification, a marker for coronary   atherosclerotic disease.   *No acute process in the abdomen or pelvis.   *Hepatic steatosis.   *Mild splenomegaly.   *Nonspecific left adrenal nodule is unchanged significantly in   size.   Signed by Aldo Galeano MD      CT angio chest for pulmonary embolism   Final Result   *Nondiagnostic to evaluate for pulmonary emboli relating to   patient motion artifact and inadequate contrast opacification of the   pulmonary arteries.   *Bilateral pulmonary nodules are minimally changed in size   measuring up to 1.5 cm in the right lower lobe. No definite new   pulmonary nodule.   *Coronary artery calcification, a marker  "for coronary   atherosclerotic disease.   *No acute process in the abdomen or pelvis.   *Hepatic steatosis.   *Mild splenomegaly.   *Nonspecific left adrenal nodule is unchanged significantly in   size.   Signed by Aldo Galeano MD      XR chest 1 view   Final Result   No active pulmonary disease.   Signed by Julián Cedeño MD            ED Course & Medical Decision Making     Medications   iohexol (OMNIPaque) 350 mg iodine/mL solution 75 mL (75 mL intravenous Given 3/12/25 1125)   sodium chloride 0.9 % bolus 500 mL (500 mL intravenous New Bag 3/12/25 1209)     Heart Rate:  [113-117]   Temperature:  [36.5 °C (97.7 °F)]   Respirations:  [15-20]   BP: (133-138)/(73-92)   Height:  [172.7 cm (5' 8\")]   Weight:  [124 kg (272 lb 4.8 oz)]   Pulse Ox:  [94 %-97 %]    ED Course as of 03/12/25 1314   Wed Mar 12, 2025   1028 Currently awaiting to give patient fluid secondary to concern for fluid overload due to exam and patient not taking Lasix.  Patient is tachycardic but vital signs are stable at this time.  Will await BNP and imaging studies before starting fluids. [CJ]   1313 Patient seen and evaluated by attending physician.  Plan of care was discussed with patient.  Patient be admitted to hospital this time for generalized weakness and potential palliative care consult.  Please see attendings documentation for final update with patient and family. [CJ]      ED Course User Index  [CJ] Leonardo Hodge PA-C         Diagnoses as of 03/12/25 1314   Generalized weakness       Procedures   Procedures    Diagnosis     1. Generalized weakness        Disposition   Admit     ED Prescriptions    None         Disclaimer: This note was dictated by speech recognition. Minor errors in transcription may be present. Please call if questions.       Leonardo Hodge PA-C  03/12/25 1314    "

## 2025-03-13 ENCOUNTER — TELEPHONE (OUTPATIENT)
Dept: PALLIATIVE MEDICINE | Facility: CLINIC | Age: 60
End: 2025-03-13
Payer: MEDICARE

## 2025-03-13 LAB
ALBUMIN SERPL BCP-MCNC: 3.3 G/DL (ref 3.4–5)
ALP SERPL-CCNC: 60 U/L (ref 33–120)
ALT SERPL W P-5'-P-CCNC: 73 U/L (ref 10–52)
ANION GAP SERPL CALC-SCNC: 13 MMOL/L (ref 10–20)
AST SERPL W P-5'-P-CCNC: 61 U/L (ref 9–39)
BILIRUB SERPL-MCNC: 0.4 MG/DL (ref 0–1.2)
BUN SERPL-MCNC: 14 MG/DL (ref 6–23)
CALCIUM SERPL-MCNC: 8 MG/DL (ref 8.6–10.3)
CHLORIDE SERPL-SCNC: 106 MMOL/L (ref 98–107)
CO2 SERPL-SCNC: 22 MMOL/L (ref 21–32)
CREAT SERPL-MCNC: 1.03 MG/DL (ref 0.5–1.3)
EGFRCR SERPLBLD CKD-EPI 2021: 84 ML/MIN/1.73M*2
ERYTHROCYTE [DISTWIDTH] IN BLOOD BY AUTOMATED COUNT: 22.2 % (ref 11.5–14.5)
GLUCOSE SERPL-MCNC: 151 MG/DL (ref 74–99)
HCT VFR BLD AUTO: 27.2 % (ref 41–52)
HGB BLD-MCNC: 7.9 G/DL (ref 13.5–17.5)
HOLD SPECIMEN: NORMAL
MCH RBC QN AUTO: 20.5 PG (ref 26–34)
MCHC RBC AUTO-ENTMCNC: 29 G/DL (ref 32–36)
MCV RBC AUTO: 71 FL (ref 80–100)
NRBC BLD-RTO: 0 /100 WBCS (ref 0–0)
PLATELET # BLD AUTO: 196 X10*3/UL (ref 150–450)
POTASSIUM SERPL-SCNC: 3.8 MMOL/L (ref 3.5–5.3)
PROT SERPL-MCNC: 6 G/DL (ref 6.4–8.2)
RBC # BLD AUTO: 3.86 X10*6/UL (ref 4.5–5.9)
SODIUM SERPL-SCNC: 137 MMOL/L (ref 136–145)
WBC # BLD AUTO: 3.8 X10*3/UL (ref 4.4–11.3)

## 2025-03-13 PROCEDURE — 2500000002 HC RX 250 W HCPCS SELF ADMINISTERED DRUGS (ALT 637 FOR MEDICARE OP, ALT 636 FOR OP/ED)

## 2025-03-13 PROCEDURE — 85027 COMPLETE CBC AUTOMATED: CPT

## 2025-03-13 PROCEDURE — 2500000001 HC RX 250 WO HCPCS SELF ADMINISTERED DRUGS (ALT 637 FOR MEDICARE OP)

## 2025-03-13 PROCEDURE — 2500000004 HC RX 250 GENERAL PHARMACY W/ HCPCS (ALT 636 FOR OP/ED)

## 2025-03-13 PROCEDURE — 96372 THER/PROPH/DIAG INJ SC/IM: CPT

## 2025-03-13 PROCEDURE — 80053 COMPREHEN METABOLIC PANEL: CPT

## 2025-03-13 PROCEDURE — 99232 SBSQ HOSP IP/OBS MODERATE 35: CPT | Performed by: NURSE PRACTITIONER

## 2025-03-13 PROCEDURE — 1200000002 HC GENERAL ROOM WITH TELEMETRY DAILY

## 2025-03-13 PROCEDURE — 36415 COLL VENOUS BLD VENIPUNCTURE: CPT

## 2025-03-13 PROCEDURE — 2500000002 HC RX 250 W HCPCS SELF ADMINISTERED DRUGS (ALT 637 FOR MEDICARE OP, ALT 636 FOR OP/ED): Performed by: NURSE PRACTITIONER

## 2025-03-13 RX ORDER — ACETAMINOPHEN 325 MG/1
650 TABLET ORAL 3 TIMES DAILY
Status: DISCONTINUED | OUTPATIENT
Start: 2025-03-13 | End: 2025-03-14 | Stop reason: HOSPADM

## 2025-03-13 RX ORDER — ACETAMINOPHEN 325 MG/1
650 TABLET ORAL EVERY 12 HOURS PRN
Status: DISCONTINUED | OUTPATIENT
Start: 2025-03-13 | End: 2025-03-14 | Stop reason: HOSPADM

## 2025-03-13 RX ORDER — DIAZEPAM 5 MG/1
5 TABLET ORAL NIGHTLY
Status: DISCONTINUED | OUTPATIENT
Start: 2025-03-13 | End: 2025-03-14 | Stop reason: HOSPADM

## 2025-03-13 RX ORDER — DIAZEPAM 5 MG/1
7.5 TABLET ORAL DAILY
Status: DISCONTINUED | OUTPATIENT
Start: 2025-03-13 | End: 2025-03-14 | Stop reason: HOSPADM

## 2025-03-13 RX ADMIN — ACETAMINOPHEN 650 MG: 325 TABLET ORAL at 14:00

## 2025-03-13 RX ADMIN — SERTRALINE 200 MG: 100 TABLET, FILM COATED ORAL at 09:03

## 2025-03-13 RX ADMIN — PROPRANOLOL HYDROCHLORIDE 120 MG: 60 CAPSULE, EXTENDED RELEASE ORAL at 09:02

## 2025-03-13 RX ADMIN — DEXAMETHASONE 4 MG: 4 TABLET ORAL at 20:38

## 2025-03-13 RX ADMIN — DIAZEPAM 7.5 MG: 5 TABLET ORAL at 13:59

## 2025-03-13 RX ADMIN — ROSUVASTATIN 10 MG: 10 TABLET, FILM COATED ORAL at 20:38

## 2025-03-13 RX ADMIN — FUROSEMIDE 40 MG: 40 TABLET ORAL at 09:02

## 2025-03-13 RX ADMIN — MEMANTINE 10 MG: 10 TABLET ORAL at 09:03

## 2025-03-13 RX ADMIN — OXYCODONE HYDROCHLORIDE 10 MG: 10 TABLET, FILM COATED, EXTENDED RELEASE ORAL at 09:02

## 2025-03-13 RX ADMIN — SODIUM CHLORIDE, POTASSIUM CHLORIDE, SODIUM LACTATE AND CALCIUM CHLORIDE 100 ML/HR: 600; 310; 30; 20 INJECTION, SOLUTION INTRAVENOUS at 00:48

## 2025-03-13 RX ADMIN — MEMANTINE 10 MG: 10 TABLET ORAL at 20:38

## 2025-03-13 RX ADMIN — PREGABALIN 200 MG: 75 CAPSULE ORAL at 20:38

## 2025-03-13 RX ADMIN — FERROUS SULFATE TAB 325 MG (65 MG ELEMENTAL FE) 325 MG: 325 (65 FE) TAB at 09:02

## 2025-03-13 RX ADMIN — ACETAMINOPHEN 650 MG: 325 TABLET ORAL at 20:38

## 2025-03-13 RX ADMIN — ENOXAPARIN SODIUM 40 MG: 100 INJECTION SUBCUTANEOUS at 20:37

## 2025-03-13 RX ADMIN — DEXAMETHASONE 4 MG: 4 TABLET ORAL at 09:03

## 2025-03-13 RX ADMIN — PANTOPRAZOLE SODIUM 40 MG: 40 INJECTION, POWDER, FOR SOLUTION INTRAVENOUS at 06:19

## 2025-03-13 RX ADMIN — FLUTICASONE FUROATE AND VILANTEROL TRIFENATATE 1 PUFF: 200; 25 POWDER RESPIRATORY (INHALATION) at 09:03

## 2025-03-13 RX ADMIN — ENOXAPARIN SODIUM 40 MG: 100 INJECTION SUBCUTANEOUS at 09:03

## 2025-03-13 RX ADMIN — DIAZEPAM 5 MG: 5 TABLET ORAL at 20:38

## 2025-03-13 RX ADMIN — PROPRANOLOL HYDROCHLORIDE 120 MG: 60 CAPSULE, EXTENDED RELEASE ORAL at 20:38

## 2025-03-13 RX ADMIN — PREGABALIN 200 MG: 75 CAPSULE ORAL at 09:02

## 2025-03-13 RX ADMIN — TIOTROPIUM BROMIDE INHALATION SPRAY 2 PUFF: 3.12 SPRAY, METERED RESPIRATORY (INHALATION) at 09:03

## 2025-03-13 RX ADMIN — OXYCODONE HYDROCHLORIDE 10 MG: 10 TABLET, FILM COATED, EXTENDED RELEASE ORAL at 20:38

## 2025-03-13 ASSESSMENT — PAIN - FUNCTIONAL ASSESSMENT
PAIN_FUNCTIONAL_ASSESSMENT: 0-10
PAIN_FUNCTIONAL_ASSESSMENT: 0-10

## 2025-03-13 ASSESSMENT — ENCOUNTER SYMPTOMS
HEADACHES: 1
TROUBLE SWALLOWING: 0
DIZZINESS: 1
SEIZURES: 0
NECK PAIN: 1

## 2025-03-13 ASSESSMENT — PAIN DESCRIPTION - LOCATION
LOCATION: NECK
LOCATION: NECK

## 2025-03-13 ASSESSMENT — PAIN SCALES - GENERAL
PAINLEVEL_OUTOF10: 7
PAINLEVEL_OUTOF10: 0 - NO PAIN
PAINLEVEL_OUTOF10: 7

## 2025-03-13 NOTE — PROGRESS NOTES
Occupational Therapy                 Therapy Communication Note    Patient Name: Manuel Marrufo  MRN: [id number]  Department: [facility] 2 E OBS  Room: 2309/2309-B  Today's Date: 3/13/2025     Discipline: Occupational Therapy    OT Missed Visit: Yes     Missed Visit Reason: Missed Visit Reason: Other (Comment) (Attempted x2; 9:52AM , pt requested therapist to return at later time due to wanting to finish breakfast. 10:46 AM, [facility] and requested [facility] to return. Will re-attempt at later time as schedule allows.)    Missed Time: Attempt    Comment:

## 2025-03-13 NOTE — PROGRESS NOTES
Manuel Marrufo is a 59 y.o. male on day 0 of admission presenting with Generalized weakness.      Subjective   Manuel Marrufo is a 59 y.o. male with PMHx s/f metastatic melanoma, to brain and lung (s/p two cycles of dual immunotherapy with nivulumab and ipilumab, last dose 1/10 and palliative WBRT 12/14/24), (s/p left shoulder resection 2019), spinal stenosis (s/p L4-S1 lumbar decompression 2021), HTN, HLD, nonalcoholic fatty liver disease, obesity, FRANK on CPAP, PTSD, GERD, COPD, depression s/p vagal nerve stimulator (SA x2, 2007) presenting with generalized weakness and diarrhea. He has an ongoing diarrhea for several weeks, is worsening now with multiple episodes of runny watery bowel movements. He hasn't had any oral intake in the past 2 days, including his medications. He has abdominal discomfort with bilateral lower leg swelling. He spent most of his time in bed, no energy to get up and pain in his spine with movements. Has nausea. Denies f/c/v, chest pain, sob, abd pain. He was hospitalized on 3/3/25-3/6/25 at [facility] for fever and pneumonia.  He was started on vancomycin, cefepime and Flagyl and was later de-escalated to Levaquin prior to discharge for total of 5-day course.  [facility] also [facility] 3 weeks prior to that last hospitalization and was treated with 7-day course of Levaquin.     ED Course (Summary - please note all labs, imaging studies, and interventions noted below have been personally reviewed and/or interpreted on day of admission):   Vitals on presentation: T 36.5 °C (97.7 °F)  HR (!) 113  /73  RR 20  O2 95 % None (Room air)  Labs: CMP-potassium 3.4  Lactate 0.9, BNP 15, troponin 17-16  CBC-hemoglobin 9.2  Flu, COVID-negative  VBG largely unremarkable  EKG: none  Imaging: CXR-no active pulmonary disease  CTA for PE-no PE.  Bilateral pulmonary nodules measuring up to 1.5 cm in right lower lobe.  No definite new pulmonary nodule.  CTAP-coronary artery calcification.   Hepatic steatosis.  Mild splenomegaly.  Nonspecific left adrenal nodule, unchanged.  Interventions:  ml, admission for further management    3.13.25 Pt complains of fatigue and nausea, edema. No diarrhea today. Pt restarted on home medications. Tachycardia is resolved.          Review of Systems       Objective     Last Recorded Vitals  /75 (BP Location: Right arm, Patient Position: Lying)   Pulse 69   Temp 36.1 °C (97 °F) (Temporal)   Resp 16   Wt 123 kg (272 lb)   SpO2 94%     Image Results  CT abdomen pelvis w IV contrast    Result Date: 3/12/2025  STUDY: CT Angiogram of the Chest, CT Abdomen and Pelvis with IV Contrast; 3/12/2025 11:40 AM INDICATION: Shortness of breath.  History of cancer.  Tachycardia.  Possible fluid overload.  Abdominal distention.  Cellulitis/discoloration over scrotum and buttocks. Metastatic melanoma. COMPARISON: CT chest 3/3/2025. ACCESSION NUMBER(S): [id number], [id number] ORDERING CLINICIAN: PHONG FAIRCHILD TECHNIQUE:  CTA of the chest was performed following rapid injection of intravenous contrast.  Images are reviewed and processed at a workstation according to the CT angiogram protocol with 3-D and/or MIP post processing imaging generated.  CT of the abdomen and pelvis was performed with intravenous contrast.  Omnipaque 350 75 mL was administered intravenously; positive oral contrast was given. Automated mA/kV exposure control was utilized and patient examination was performed in strict accordance with principles of ALARA. FINDINGS: CHEST CTA: THIS EXAMINATION IS DEGRADED BY PATIENT MOTION ARTIFACT. ALL FINDINGS BELOW ARE WITHIN THIS LIMITATION. Pulmonary arteries: Nondiagnostic to evaluate for pulmonary emboli relating to patient motion artifact and inadequate contrast opacification of the pulmonary arteries. Lungs/Pleura: Bilateral pulmonary nodules are minimally changed in size measuring up to 1.5 x 1.1 cm in the right lower lobe. No definite new pulmonary  nodule. No pleural effusion. No evidence of pneumonia. No pneumothorax. Patent central airways. Mediastinum: No enlarged lymph nodes. Thoracic aorta normal in caliber without evidence for dissection. Heart size normal. No significant pericardial effusion. There is coronary artery calcification, a marker for coronary atherosclerotic disease. ABDOMEN/PELVIS: Liver/gallbladder: The liver is hypodense and this indicates hepatic steatosis. Hepatic contour is normal. No suspicious hepatic lesion. Gallbladder mostly contracted. Adrenal: 2.3 cm nonspecific left adrenal nodule has not significantly changed in size. Renal: No hydronephrosis. No suspicious renal lesion. Spleen: Measures up to 13.5 cm in craniocaudal dimension. Pancreas: Unremarkable without surrounding infiltration or fluid. Vascular: Aorta and common iliac arteries normal in caliber. Urinary bladder: Unremarkable. GI: No CT finding to suggest acute diverticulitis or colitis. No dilated small bowel loops. Appendix normal. Other: No drainable fluid collection. No free air. No abnormality is identified within the visualized splenic soft tissues. SKELETON: No suspicious osseous lesion. No acute fracture identified.    *Nondiagnostic to evaluate for pulmonary emboli relating to patient motion artifact and inadequate contrast opacification of the pulmonary arteries. *Bilateral pulmonary nodules are minimally changed in size measuring up to 1.5 cm in the right lower lobe. No definite new pulmonary nodule. *Coronary artery calcification, a marker for coronary atherosclerotic disease. *No acute process in the abdomen or pelvis. *Hepatic steatosis. *Mild splenomegaly. *Nonspecific left adrenal nodule is unchanged significantly in size. Signed by Aldo Galeano MD    CT angio chest for pulmonary embolism    Result Date: 3/12/2025  STUDY: CT Angiogram of the Chest, CT Abdomen and Pelvis with IV Contrast; 3/12/2025 11:40 AM INDICATION: Shortness of breath.  History of  cancer.  Tachycardia.  Possible fluid overload.  Abdominal distention.  Cellulitis/discoloration over scrotum and buttocks. Metastatic melanoma. COMPARISON: CT chest 3/3/2025. ACCESSION NUMBER(S): [id number], [id number] ORDERING CLINICIAN: PHONG FAIRCHILD TECHNIQUE:  CTA of the chest was performed following rapid injection of intravenous contrast.  Images are reviewed and processed at a workstation according to the CT angiogram protocol with 3-D and/or MIP post processing imaging generated.  CT of the abdomen and pelvis was performed with intravenous contrast.  Omnipaque 350 75 mL was administered intravenously; positive oral contrast was given. Automated mA/kV exposure control was utilized and patient examination was performed in strict accordance with principles of ALARA. FINDINGS: CHEST CTA: THIS EXAMINATION IS DEGRADED BY PATIENT MOTION ARTIFACT. ALL FINDINGS BELOW ARE WITHIN THIS LIMITATION. Pulmonary arteries: Nondiagnostic to evaluate for pulmonary emboli relating to patient motion artifact and inadequate contrast opacification of the pulmonary arteries. Lungs/Pleura: Bilateral pulmonary nodules are minimally changed in size measuring up to 1.5 x 1.1 cm in the right lower lobe. No definite new pulmonary nodule. No pleural effusion. No evidence of pneumonia. No pneumothorax. Patent central airways. Mediastinum: No enlarged lymph nodes. Thoracic aorta normal in caliber without evidence for dissection. Heart size normal. No significant pericardial effusion. There is coronary artery calcification, a marker for coronary atherosclerotic disease. ABDOMEN/PELVIS: Liver/gallbladder: The liver is hypodense and this indicates hepatic steatosis. Hepatic contour is normal. No suspicious hepatic lesion. Gallbladder mostly contracted. Adrenal: 2.3 cm nonspecific left adrenal nodule has not significantly changed in size. Renal: No hydronephrosis. No suspicious renal lesion. Spleen: Measures up to 13.5 cm in craniocaudal  dimension. Pancreas: Unremarkable without surrounding infiltration or fluid. Vascular: Aorta and common iliac arteries normal in caliber. Urinary bladder: Unremarkable. GI: No CT finding to suggest acute diverticulitis or colitis. No dilated small bowel loops. Appendix normal. Other: No drainable fluid collection. No free air. No abnormality is identified within the visualized splenic soft tissues. SKELETON: No suspicious osseous lesion. No acute fracture identified.    *Nondiagnostic to evaluate for pulmonary emboli relating to patient motion artifact and inadequate contrast opacification of the pulmonary arteries. *Bilateral pulmonary nodules are minimally changed in size measuring up to 1.5 cm in the right lower lobe. No definite new pulmonary nodule. *Coronary artery calcification, a marker for coronary atherosclerotic disease. *No acute process in the abdomen or pelvis. *Hepatic steatosis. *Mild splenomegaly. *Nonspecific left adrenal nodule is unchanged significantly in size. Signed by Aldo Galeano MD    XR chest 1 view    Result Date: 3/12/2025  STUDY: Chest Radiograph;  3/12/2025 11:15 AM INDICATION: Shortness of breath, fluid overload. COMPARISON: 3/3/2025 XR Chest, 1/31/2025 XR Chest. ACCESSION NUMBER(S): [id number] ORDERING CLINICIAN: PHONG FAIRCHILD TECHNIQUE:  Frontal chest was obtained at 1114 hours. FINDINGS: CARDIOMEDIASTINAL SILHOUETTE: Cardiomediastinal silhouette is top normal in size and configuration.  LUNGS: Lungs are clear.  No pneumothorax or lung consolidation.  No pleural effusion.  ABDOMEN: No remarkable upper abdominal findings.  BONES: No acute osseous changes.    No active pulmonary disease. Signed by Julián Cedeño MD    ECG 12 lead    Result Date: 3/4/2025  Normal sinus rhythm Nonspecific ST abnormality Abnormal ECG When compared with ECG of 27-JAN-2025 11:13, PREVIOUS ECG IS PRESENT Confirmed by Mick Shine ([id number]) on 3/4/2025 4:20:49 PM    CT chest w IV contrast    Result Date:  3/3/2025  Interpreted By:  Jazmin Lopez and Beyersdorf Conner STUDY: CT CHEST W IV CONTRAST;  3/3/2025 6:46 pm   INDICATION: Signs/Symptoms:cough, fever, hx of metastic cancer. Per chart review, metastatic melanoma. Has undergone cycles of the WBRT and immunotherapy.   COMPARISON: CT chest 11/24/2024   ACCESSION NUMBER(S): [id number]   ORDERING CLINICIAN: MARK FAIR   TECHNIQUE: Helical data acquisition of the chest was obtained  without IV contrast material.  Images were reformatted in axial, coronal, and sagittal planes.   FINDINGS: LUNGS AND AIRWAYS: The trachea and central airways are patent. No endobronchial lesion.   There is a pulmonary nodule in the right lower lobe measuring 1.5 X1.1 X2.0 cm (series 201, image 174 and series 205, image 124), which is significantly decreased in size from the prior CT on November right measured 3.5 X3.2 cm in the axial plane. There has been decreased in size/resolution of the numerous additional subcentimeter pulmonary nodules seen in the bilateral lungs.   There is persistent small right pleural effusion. There is enhancing soft tissue thickening along the medial mediastinum, posterior pleura, and the right major fissure, likely representing metastatic pleural thickening. And round atelectasis of the right lung base.   Trace left pleural effusion and bibasilar atelectasis/scarring. There is patchy ground-glass opacities in the left upper lobe which may represent infection or treatment related pneumonitis.     MEDIASTINUM AND MARYLOU, LOWER NECK AND AXILLA: The visualized thyroid gland is within normal limits.   No evidence of thoracic lymphadenopathy by CT criteria.   Esophagus appears within normal limits as seen.   HEART AND VESSELS: The thoracic aorta is of normal course and caliber without vascular calcifications.   Main pulmonary artery and its branches are normal in caliber.   No significant coronary artery calcifications are seen. The study is not optimized for  evaluation of coronary arteries.   The cardiac chambers are not enlarged.   No evidence of pericardial effusion.   UPPER ABDOMEN: Probable hepatic steatosis and hepatomegaly. There is an indeterminate left adrenal nodule measuring up to 2.3 cm, more pronounced since prior imaging. Subcentimeter low-attenuation focus in the right upper adrenal glands. Borderline enlarged periportal 9 mm lymph node.   CHEST WALL AND OSSEOUS STRUCTURES: There is a battery pack within the subcutaneous tissues of the left chest wall there are no suspicious osseous lesions. Multilevel degenerative changes are present       1.  Patchy ground-glass opacities in the left upper lobe may represent early infection or treatment related pneumonitis. 2. Interval decrease in size of the right lower lobe pulmonary mass, now measuring 1.5 X 1.1 X 2.0 cm, and previously 3.5 X 3.2 cm in the axial plane. Innumerable subcentimeter pulmonary nodules in the bilateral lungs are also decreased in size. 3. Enhancing soft tissue thickening along the right mediastinal border right major fissure, and posterior pleura is concerning for metastatic pleural thickening. 4. New/enlarged left adrenal nodule concerning for metastasis.   I personally reviewed the image(s)/study and resident interpretation. I agree with the findings as stated by resident Ron Loo. Data analyzed and images interpreted at [facility], [place], [place].   MACRO: None   Signed by: Jazmin Lopez 3/3/2025 8:04 PM Dictation workstation:   [id number]    CT thoracic spine w IV contrast    Result Date: 3/3/2025  Interpreted By:  Maurilio Shah, STUDY: CT THORACIC SPINE W IV CONTRAST; CT LUMBAR SPINE W IV CONTRAST; 3/3/2025 6:46 pm   INDICATION: Signs/Symptoms:hx of metastatic melanoma, midline T/L spine TTP and fever; Signs/Symptoms:hx of metastatic cancer, midline T/L spine TTP and fever.     COMPARISON: None.   ACCESSION NUMBER(S): [id number];  [id number]   ORDERING CLINICIAN: FOX MAYORGA   TECHNIQUE: Axial CT images of the thoracic and lumbar spine are obtained. Axial, coronal and sagittal reconstructions are provided for review.   FINDINGS: No fracture line. No compression deformity. No significant spondylolisthesis. No abnormal splaying of the posterior elements or misalignment of the facet joints.   8 mm sclerotic focus posterior T10 vertebral body.   Patient has previously been dorsally decompressed at L5. There is moderate disc height loss with vacuum disc phenomenon at L5-S1.   22 mm left adrenal nodule.       No fracture or destructive osseous process.   8 mm sclerotic focus posterior T10 vertebral body. Both malignant etiology such as metastatic disease and benign etiology such as hemangioma are considerations. This could be further evaluated with a PET-CT or bone scan.   No active paraspinous inflammation identified.   22 mm left adrenal nodule. This is indeterminate and could be further evaluated with PET-CT or MRI.   MACRO: None   Signed by: Maurilio Shah 3/3/2025 7:20 PM Dictation workstation:   [id number]    CT lumbar spine w IV contrast    Result Date: 3/3/2025  Interpreted By:  Maurilio Shah, STUDY: CT THORACIC SPINE W IV CONTRAST; CT LUMBAR SPINE W IV CONTRAST; 3/3/2025 6:46 pm   INDICATION: Signs/Symptoms:hx of metastatic melanoma, midline T/L spine TTP and fever; Signs/Symptoms:hx of metastatic cancer, midline T/L spine TTP and fever.     COMPARISON: None.   ACCESSION NUMBER(S): [id number]; [id number]   ORDERING CLINICIAN: FOX MAYORGA   TECHNIQUE: Axial CT images of the thoracic and lumbar spine are obtained. Axial, coronal and sagittal reconstructions are provided for review.   FINDINGS: No fracture line. No compression deformity. No significant spondylolisthesis. No abnormal splaying of the posterior elements or misalignment of the facet joints.   8 mm sclerotic focus posterior T10 vertebral body.   Patient has  previously been dorsally decompressed at L5. There is moderate disc height loss with vacuum disc phenomenon at L5-S1.   22 mm left adrenal nodule.       No fracture or destructive osseous process.   8 mm sclerotic focus posterior T10 vertebral body. Both malignant etiology such as metastatic disease and benign etiology such as hemangioma are considerations. This could be further evaluated with a PET-CT or bone scan.   No active paraspinous inflammation identified.   22 mm left adrenal nodule. This is indeterminate and could be further evaluated with PET-CT or MRI.   MACRO: None   Signed by: Maurilio Shah 3/3/2025 7:20 PM Dictation workstation:   [id number]    CT head wo IV contrast    Result Date: 3/3/2025  Interpreted By:  Maurilio Shah,  and Cinda Velasquez STUDY: CT HEAD WO IV CONTRAST;  3/3/2025 6:33 pm   INDICATION: Signs/Symptoms:headache, metastic cancer to brain.     COMPARISON: CT head 01/27/2025, MR brain 11/26/2024   ACCESSION NUMBER(S): [id number]   ORDERING CLINICIAN: MARK FAIR   TECHNIQUE: Noncontrast axial CT images of head were obtained with coronal and sagittal reconstructed images.   FINDINGS: BRAIN PARENCHYMA: Mild parenchymal atrophy. Again there numerous areas of hypoattenuating parenchyma throughout the bilateral cerebral hemispheres, consistent with known metastatic disease and surrounding vasogenic edema. There is a 1.0 cm hyperdense lesion in the right posterior parietal lobe (series 204, image 56) which is unchanged in appearance compared to the prior head CT, and is favored to represent a metastatic lesion.   No acute intraparenchymal extralesional hemorrhage. No significant mass-effect.   VENTRICLES and EXTRA-AXIAL SPACES: No acute extra-axial or intraventricular hemorrhage. No effacement of cerebral sulci to suggest large evolving cortical infarction. The ventricles and sulci are age-concordant.   PARANASAL SINUSES/MASTOIDS:  No hemorrhage or air-fluid levels within the  visualized paranasal sinuses. The mastoids are well aerated.   CALVARIUM/ORBITS:  No skull fracture.  The orbits and globes are intact to the extent visualized.   EXTRACRANIAL SOFT TISSUES: No discernible abnormality.       1. No acute intracranial hemorrhage or midline shift. 2. Numerous areas of hypoattenuating parenchyma in the bilateral cerebral hemispheres consistent with known metastatic disease to the brain. A 1 cm lesion in the right parietal lobe is unchanged from the prior exam. Contrast-enhanced MRI can be considered for further evaluation of metastatic lesions.     I personally reviewed the image(s)/study and resident interpretation. I agree with the findings as stated by resident Ron Loo. Data analyzed and images interpreted at [facility], [place], [place].   MACRO: None.   Signed by: Maurilio Shah 3/3/2025 6:56 PM Dictation workstation:   [id number]    XR chest 2 views    Result Date: 3/3/2025  Interpreted By:  Bowen Peterson, STUDY: XR CHEST 2 VIEWS   INDICATION: Signs/Symptoms:cough, fever hx of melanoma with mets to brain and lung.   COMPARISON: January 31, 2025   ACCESSION NUMBER(S): [id number]   ORDERING CLINICIAN: FOX MAYORGA   FINDINGS: No consolidation, effusion, edema, pneumothorax. Cardiomegaly unchanged. Stimulator in the left neck noted.       No evidence of acute intrathoracic abnormality.   Signed by: Bowen Peterson 3/3/2025 4:43 PM Dictation workstation:   [id number]       Lab Results  Results for orders placed or performed during the hospital encounter of 03/12/25 (from the past 24 hours)   Urinalysis with Reflex Culture and Microscopic   Result Value Ref Range    Color, Urine Yellow Light-Yellow, Yellow, Dark-Yellow    Appearance, Urine Clear Clear    Specific Gravity, Urine 1.045 (N) 1.005 - 1.035    pH, Urine 5.5 5.0, 5.5, 6.0, 6.5, 7.0, 7.5, 8.0    Protein, Urine 20 (TRACE) NEGATIVE, 10 (TRACE), 20 (TRACE) mg/dL    Glucose, Urine  Normal Normal mg/dL    Blood, Urine NEGATIVE NEGATIVE mg/dL    Ketones, Urine 40 (2+) (A) NEGATIVE mg/dL    Bilirubin, Urine NEGATIVE NEGATIVE mg/dL    Urobilinogen, Urine Normal Normal mg/dL    Nitrite, Urine NEGATIVE NEGATIVE    Leukocyte Esterase, Urine NEGATIVE NEGATIVE   Extra Urine Gray Tube   Result Value Ref Range    Extra Tube Hold for add-ons.    Urinalysis Microscopic   Result Value Ref Range    WBC, Urine 1-5 1-5, NONE /HPF    RBC, Urine 1-2 NONE, 1-2, 3-5 /HPF    Mucus, Urine FEW Reference range not established. /LPF    Hyaline Casts, Urine 2+ (A) NONE /LPF   CBC   Result Value Ref Range    WBC 3.8 (L) 4.4 - 11.3 x10*3/uL    nRBC 0.0 0.0 - 0.0 /100 WBCs    RBC 3.86 (L) 4.50 - 5.90 x10*6/uL    Hemoglobin 7.9 (L) 13.5 - 17.5 g/dL    Hematocrit 27.2 (L) 41.0 - 52.0 %    MCV 71 (L) 80 - 100 fL    MCH 20.5 (L) 26.0 - 34.0 pg    MCHC 29.0 (L) 32.0 - 36.0 g/dL    RDW 22.2 (H) 11.5 - 14.5 %    Platelets 196 150 - 450 x10*3/uL   Comprehensive metabolic panel   Result Value Ref Range    Glucose 151 (H) 74 - 99 mg/dL    Sodium 137 136 - 145 mmol/L    Potassium 3.8 3.5 - 5.3 mmol/L    Chloride 106 98 - 107 mmol/L    Bicarbonate 22 21 - 32 mmol/L    Anion Gap 13 10 - 20 mmol/L    Urea Nitrogen 14 6 - 23 mg/dL    Creatinine 1.03 0.50 - 1.30 mg/dL    eGFR 84 >60 mL/min/1.73m*2    Calcium 8.0 (L) 8.6 - 10.3 mg/dL    Albumin 3.3 (L) 3.4 - 5.0 g/dL    Alkaline Phosphatase 60 33 - 120 U/L    Total Protein 6.0 (L) 6.4 - 8.2 g/dL    AST 61 (H) 9 - 39 U/L    Bilirubin, Total 0.4 0.0 - 1.2 mg/dL    ALT 73 (H) 10 - 52 U/L        Medications  Scheduled medications:  dexAMETHasone, 4 mg, oral, BID  enoxaparin, 40 mg, subcutaneous, q12h TING  ferrous sulfate, 65 mg of iron, oral, Daily with breakfast  tiotropium, 2 puff, inhalation, Daily   And  fluticasone furoate-vilanteroL, 1 puff, inhalation, Daily  furosemide, 40 mg, oral, Daily  memantine, 10 mg, oral, BID  oxyCODONE ER, 10 mg, oral, BID  pantoprazole, 40 mg, oral, Daily  before breakfast   Or  pantoprazole, 40 mg, intravenous, Daily before breakfast  pregabalin, 200 mg, oral, BID  propranolol LA, 120 mg, oral, BID  rosuvastatin, 10 mg, oral, Daily  sertraline, 200 mg, oral, Daily      Continuous medications:     PRN medications:  PRN medications: acetaminophen, guaiFENesin, melatonin, ondansetron, oxyCODONE, oxyCODONE, polyethylene glycol     Physical Exam  Vitals reviewed.   Constitutional:       General: He is not in acute distress.  HENT:      Head: Normocephalic and atraumatic.      Right Ear: External ear normal.      Left Ear: External ear normal.      Nose: Nose normal.      Mouth/Throat:      Mouth: Mucous membranes are moist.      Pharynx: Oropharynx is clear.   Eyes:      Extraocular Movements: Extraocular movements intact.      Conjunctiva/sclera: Conjunctivae normal.      Pupils: Pupils are equal, round, and reactive to light.   Cardiovascular:      Rate and Rhythm: Normal rate and regular rhythm.      Pulses: Normal pulses.      Heart sounds: Normal heart sounds. No murmur heard.  Pulmonary:      Effort: Pulmonary effort is normal. No respiratory distress.      Breath sounds: Normal breath sounds. No wheezing, rhonchi or rales.   Chest:      Chest wall: No tenderness.   Abdominal:      General: Bowel sounds are normal. There is no distension.      Palpations: Abdomen is soft. There is no mass.      Tenderness: There is no abdominal tenderness. There is no rebound.   Musculoskeletal:         General: No swelling or deformity. Normal range of motion.      Cervical back: Normal range of motion.      Right lower leg: Edema present.      Left lower leg: Edema present.   Skin:     General: Skin is warm and dry.      Capillary Refill: Capillary refill takes less than 2 seconds.   Neurological:      General: No focal deficit present.      Mental Status: He is alert and oriented to person, place, and time.   Psychiatric:         Mood and Affect: Mood normal.         Behavior:  Behavior normal.                  Assessment/Plan        59 y.o. male with PMHx s/f metastatic melanoma, to brain and lung (s/p two cycles of dual immunotherapy with nivulumab and ipilumab, last dose 1/10 and palliative WBRT 12/14/24), (s/p left shoulder resection 2019), spinal stenosis (s/p L4-S1 lumbar decompression 2021), HTN, HLD, nonalcoholic fatty liver disease, obesity, FRANK on CPAP, PTSD, GERD, COPD, depression s/p vagal nerve stimulator (SA x2, 2007) presenting with generalized weakness and diarrhea.      Generalized weakness in immunocompromised patient secondary to acute diarrhea  -has 1+ pitting edema in bilateral lower extremities, however he appears dry on exam. BNP of 15 and no cardiopulmonary process on CT Chest and CXR; will continue with volume expansion and continue home Lasix  -stool pathogen and C. Diff PCR ordered awaiting stool studies, diarrhea improved  -antiemetics and pain control  -PT OT  -palliative care consult     Metastatic melanoma, to brain and lung  -Continue dexamethasone 4 mg twice daily  -Continue home oxycodone, Lyrica for pain control  -CT T/L spine on 3/3/25 showed 8mm sclerotic focus in the posterior T10 body that could represent metastatic disease. CT chest on 3/3/25 showed new/enlarged L adrenal nodule concerning for metastasis.  -continue follow-up with heme/onc outpatient     HTN, HLD  -Continue home Lasix, propranolol, rosuvastatin     Anxiety, PTSD, treatment refractory depression  Status post vagal nerve stimulator  -Continue home propranolol, Zoloft, Lyrica     COPD  -Does not appear to be in acute exacerbation  -Continue home inhalers    Anemia chronic disease  Hx ca, will monitor transfuse if <7      Malnutrition Diagnosis Status: New  Malnutrition Diagnosis: Severe malnutrition related to chronic disease or condition  Related to: metastatic cancer  As Evidenced by: >75% of estimated needs for >7 days, wt loss of 3.89% in 10 days.  I agree with the dietitian's  malnutrition diagnosis.      Code Status: DNR and No Intubation       DVT ppx:      Please see orders for more complete plan    Sunny Staley, APRN-CNP

## 2025-03-13 NOTE — CARE PLAN
The patient's goals for the shift include      The clinical goals for the shift include patient will remain free from fall/injury this shift      Problem: Pain - Adult  Goal: Verbalizes/displays adequate comfort level or baseline comfort level  Outcome: Progressing     Problem: Safety - Adult  Goal: Free from fall injury  Outcome: Progressing     Problem: Discharge Planning  Goal: Discharge to home or other facility with appropriate resources  Outcome: Progressing     Problem: Chronic Conditions and Co-morbidities  Goal: Patient's chronic conditions and co-morbidity symptoms are monitored and maintained or improved  Outcome: Progressing     Problem: Nutrition  Goal: Nutrient intake appropriate for maintaining nutritional needs  Outcome: Progressing     Problem: Fall/Injury  Goal: Not fall by end of shift  Outcome: Progressing  Goal: Be free from injury by end of the shift  Outcome: Progressing  Goal: Verbalize understanding of personal risk factors for fall in the hospital  Outcome: Progressing  Goal: Verbalize understanding of risk factor reduction measures to prevent injury from fall in the home  Outcome: Progressing  Goal: Use assistive devices by end of the shift  Outcome: Progressing  Goal: Pace activities to prevent fatigue by end of the shift  Outcome: Progressing     Problem: Skin  Goal: Participates in plan/prevention/treatment measures  Outcome: Progressing  Goal: Prevent/manage excess moisture  Outcome: Progressing  Goal: Prevent/minimize sheer/friction injuries  Outcome: Progressing

## 2025-03-13 NOTE — PROGRESS NOTES
Physical Therapy                 Therapy Communication Note    Patient Name: Manuel Marrufo  MRN: [id number]  Department: POR 2 E OBS  Room: 2309/2309-B  Today's Date: 3/13/2025     Discipline: Physical Therapy    PT Missed Visit: Yes     Missed Visit Reason: Missed Visit Reason:  (ATTEMPTED TO SEE 2X TODAY: 0953, PT EATING& WANTED TO FINISH BEFORE DOING ANY THERAPY, 1046,PT [facility]ONE W/ [facility] ASK[facility] TO RETURN ANOTHER TIME)    Missed Time: Attempt    Comment:

## 2025-03-13 NOTE — CONSULTS
"Nutrition Initial Assessment:   Nutrition Assessment    Reason for Assessment: Admission nursing screening    Patient is a 59 y.o. male presenting with weakness. Consulted by MST for poor PO. Pt recently discharged from [facility], Pt with hx of metastatic cancer that started his melanoma and has spread to lungs, brain and potentially spine based on recent imaging. Pt with chronic diarrhea/watery stools and nausea. In isolation for C-Diff rule out. Consult with palliative care pending. Reports no intake over past 2 days. Diet just advanced from clears to cardiac diet this am. Pt with wt loss of 11 lbs in 10 days, -3.89% clinically significant.     Nutrition History:  Energy Intake: Poor < 50 %  Food and Nutrient History: Per nursing Pt ate well this am at breakfast. Diet was just advanced from clear liquids this am. Pt in isolation for c-diff rule out, did not answer the phone when i called. Rec; adding on magic cup 2x daily with L/D (290 kcals and 9g pro each) Continue to monitor PO intakes and wt trends. Pt may need additional supplementation once N/D goes away.    Anthropometrics:  Height: 172.7 cm (5' 8\")   Weight: 123 kg (272 lb)   BMI (Calculated): 41.37    Weight Change %:  Weight History / % Weight Change: -11 lbs in 10 days, significant wt change.  Significant Weight Loss: Yes  Interpretation of Weight Loss: >2% in 1 week    Nutrition Focused Physical Exam Findings:  Subcutaneous Fat Loss:   Defer Subcutaneous Fat Loss Assessment: Defer all  Defer All Reason: isolation  Orbital Fat Pads: Well nourished (slightly bulging fat pads)  Buccal Fat Pads: Well nourished (full, rounded cheeks)  Muscle Wasting:  Defer Muscle Wasting Assessment: Defer all  Defer All Reason: isolation  Temporalis: Well nourished (well-defined muscle)  Pectoralis (Clavicular Region): Well nourished (clavicle not visible)  Edema:  Edema: +1 trace  Edema Location: BLE  Physical Findings:  Hair: Negative  Eyes: Negative  Nails: " Negative  Skin: Negative  Digestive System Findings: Nausea, Diarrhea    Nutrition Significant Labs:  CBC Trend:   Results from last 7 days   Lab Units 03/13/25  0654 03/12/25  1035   WBC AUTO x10*3/uL 3.8* 6.2   RBC AUTO x10*6/uL 3.86* 4.52   HEMOGLOBIN g/dL 7.9* 9.2*   HEMATOCRIT % 27.2* 31.8*   MCV fL 71* 70*   PLATELETS AUTO x10*3/uL 196 226    , BMP Trend:   Results from last 7 days   Lab Units 03/13/25  0654 03/12/25  1035   GLUCOSE mg/dL 151* 75   CALCIUM mg/dL 8.0* 8.7   SODIUM mmol/L 137 140   POTASSIUM mmol/L 3.8 3.4*   CO2 mmol/L 22 24   CHLORIDE mmol/L 106 105   BUN mg/dL 14 14   CREATININE mg/dL 1.03 1.25     Nutrition Specific Medications:  dexAMETHasone, 4 mg, oral, BID  enoxaparin, 40 mg, subcutaneous, q12h TING  ferrous sulfate, 65 mg of iron, oral, Daily with breakfast  tiotropium, 2 puff, inhalation, Daily   And  fluticasone furoate-vilanteroL, 1 puff, inhalation, Daily  furosemide, 40 mg, oral, Daily  memantine, 10 mg, oral, BID  oxyCODONE ER, 10 mg, oral, BID  pantoprazole, 40 mg, oral, Daily before breakfast   Or  pantoprazole, 40 mg, intravenous, Daily before breakfast  pregabalin, 200 mg, oral, BID  propranolol LA, 120 mg, oral, BID  rosuvastatin, 10 mg, oral, Daily  sertraline, 200 mg, oral, Daily       I/O:   Last BM Date: 03/12/25; Stool Appearance: Loose (03/12/25 1440)    Dietary Orders (From admission, onward)       Start     Ordered    03/13/25 1116  Oral nutritional supplements  Until discontinued        Question Answer Comment   Deliver with Lunch    Deliver with Dinner    Select supplement: Magic Cup        03/13/25 1115    03/13/25 0855  Adult diet Cardiac; 70 gm fat; 2 - 3 grams Sodium  Diet effective now        Question Answer Comment   Diet type Cardiac    Fat restriction: 70 gm fat    Sodium restriction: 2 - 3 grams Sodium        03/13/25 0854    03/12/25 1354  May Participate in Room Service  ( ROOM SERVICE MAY PARTICIPATE)  Once        Question:  .  Answer:  Yes    03/12/25  1353                  Estimated Needs:   Total Energy Estimated Needs in 24 hours (kCal): 2250 kCal  Method for Estimating Needs: 25kcals/kg ABW  Total Protein Estimated Needs in 24 Hours (g):  ()  Method for Estimating 24 Hour Protein Needs: 1-1.2g/kg ABW  Total Fluid Estimated Needs in 24 Hours (mL): 2250 mL  Method for Estimating 24 Hour Fluid Needs: 1 ml/kcal or per MD        Nutrition Diagnosis   Malnutrition Diagnosis  Patient has Malnutrition Diagnosis: Yes  Diagnosis Status: New  Malnutrition Diagnosis: Severe malnutrition related to chronic disease or condition  Related to: metastatic cancer  As Evidenced by: >75% of estimated needs for >7 days, wt loss of 3.89% in 10 days.    Nutrition Diagnosis  Patient has Nutrition Diagnosis: Yes  Diagnosis Status (1): New  Nutrition Diagnosis 1: Inadequate oral intake  Related to (1): diarrhea/nausea  As Evidenced by (1): weakness, >75% of estimated needs       Nutrition Interventions/Recommendations   Nutrition prescription for oral nutrition    Nutrition Recommendations:  Individualized Nutrition Prescription Provided for : Individualized nutrition prescription of 2250 kcals and 100g of protein to be provided with diet order. Continue with cardiac diet order. Rec; Magic cup 2x with L/D.    Nutrition Interventions/Goals:   Interventions: Meals and snacks, Medical food supplement  Meals and Snacks: Fat-modified diet, Mineral-modified diet  Goal: provide diet as ordered  Medical Food Supplement: Commercial food medical food supplement therapy  Goal: magic cup 2x (290 kcals and 9g pro each)      Education Documentation  No documentation found.       Nutrition Monitoring and Evaluation   Food/Nutrient Related History Monitoring  Monitoring and Evaluation Plan: Intake / amount of food, Estimated Energy Intake  Estimated Energy Intake: Energy intake 50 -75% of estimated energy needs  Intake / Amount of food: Consumes at least 50% or more of  meals/snacks/supplements  Additional Plans: magic cup 2x daily.    Anthropometric Measurements  Monitoring and Evaluation Plan: Body weight  Body Weight: Body weight - Maintain stable weight    Biochemical Data, Medical Tests and Procedures  Monitoring and Evaluation Plan: Electrolyte/renal panel  Electrolyte and Renal Panel: Electrolytes within normal limits    Goal Status: New goal(s) identified    Time Spent (min): 45 minutes

## 2025-03-13 NOTE — TELEPHONE ENCOUNTER
Patient referred to supportive oncology/palliative care by Dylan Rueda CNP inpatient palliative care at [place]. Patient with metastatic melanoma (mets brain, lungs, and likely bone and adrenal gland) experiencing pain, fatigue, GOC. [facility] but [facility] to [facility] as is following with  oncology and closer to home. I called but line rings busy. Patient scheduled for 1st available NPV with Salvador Morales CNP at [facility] on Fri. 4/11/25 at 1pm. Referral source notified, will be on patient's discharge papers, as well.

## 2025-03-13 NOTE — CONSULTS
Palliative Care Consultation    History obtained from: patient and medical records    HPI: Manuel Marrufo is a 59 y.o. male with past medical history significant for melanoma of left shoulder diagnosed in 2019 now with metastases to the brain, lungs, and likely bone and adrenal gland s/p dual immunotherapy last dose in January and palliative WBRT last in December, spinal stenosis s/p L4-S1 lumbar decompression 2021, HTN, HLD, nonalcoholic fatty liver disease, obesity, FRANK on CPAP, PTSD, agoraphobia, anxiety, GERD, COPD, depression. [facility] Dr. Schaefer, radiation oncologist Dr. Dias and [facility] but [facility] as well     Patient presented from home to the ED 3/12 d/t general weakness, decreased PO intake/unable to take routine medications x 3 days, leg swelling, decreased mobility/unable to ambulate, abdominal distention and diarrhea.     PSH: vagal nerve stimulator, back surgery, malignant skin lesion excision and Tonsillectomy.     FH: father liver cirrhosis, grandmother bladder cancer, sister cervical cancer and HTN    Palliative care consulted for goals of care, code status, advanced directive, high risk for readmissions, symptom management and outpatient support.    Met with patient who did not want other loved ones to be present for meeting. Discussed PMH, HPI, and current hospitalization. Answered questions, provided support and education.     Social History   Marital Status:    Children: 2 adult children   Occupation: disabled,  in the past    service: yes, served in the [employer]   Tobacco use: former, started smoking 1984 and quit June 2024  Alcohol use: denies   Drug use: denies  Islam/Cultural/Spiritual: Adventism  Patient finds terry and happiness in: TV, yard and house work in the past    Living arrangement and functional status prior to admission: Patient lives at home  "alone, has a home health aid that comes in 40 hours per week [facility] who helps with housekeeping, grocery shopping and laundry.   Recent falls: 2 weeks ago   Cognitive status: lose thought, can be in the middle of a sentence and forget   Cooking, cleaning, laundry: mom helps cook, aid also hleps with meals   Transportation: last drove, 6 months ago    Prior Hospitalizations: patient has had 4 hospitalizations in the last 4 months   *Cannot see all [facility] records   3/3-3/6/25: [facility]: Fever, T10 bone mets with new/enlarged left adrenal node also concerning for mets, pneumonia   1/27-1/31/25:  [facility]: headaches, metastatic melanoma   11/24-11/27/24: [facility]:   [facility]    Patient/family description of QOL over the last 6 months: \"sucks a year ago I was perfectly fine\"      Goals of Care  Decisional Capacity: yes, appears to have capacity   Understanding of illness: good  Most important at this time: his house which he has put a lot of work into and his cat named Buddy  Fears/concerns: \"I just don't want to suffer\"    Current code status: DNR and No Intubation   Code status discussed today? Yes, confirmed DNR/DNI    Review of Systems   HENT:  Negative for trouble swallowing.    Cardiovascular:         Edema    Musculoskeletal:  Positive for neck pain.   Neurological:  Positive for dizziness and headaches. Negative for seizures.        Poor gait and balance       Physical Exam  Cardiovascular:      Rate and Rhythm: Normal rate.   Pulmonary:      Effort: Pulmonary effort is normal.      Comments: Room air   Neurological:      Mental Status: He is alert and oriented to person, place, and time.       Plan    Interventions for symptom management:   Pain: scheduled tylenol  Consult music therapy and pastoral care   Expectations of treatment/Goals of care: further cancer work-up and treatments, hospitalizations as needed   Advanced directives/Advanced care planning: not on file, " requested copies   Health care power of  or Identified surrogate medical decision maker based on [place] hierarchy: reportedly brother Mason is HCPOA and documents at home   Code status: DNR/DNI [place] DNR: states he has an [place] DNR CCA at home   Outpatient referral: referral sent for outpatient supportive oncology at [facility]     Collaborated with: Chadd Staley and RN    I spent 80 minutes in the professional and overall care of this patient.      Dylan Rueda, APRN-CNP

## 2025-03-13 NOTE — PROGRESS NOTES
03/13/25 1604   Discharge Planning   Living Arrangements Alone   Support Systems Parent;Family members;Home care staff   Assistance Needed YES-Home Health-Aid   Type of Residence Private residence   Home or Post Acute Services In home services   Type of Home Care Services Home nursing visits;Home OT;Home PT;Home health aide   Expected Discharge Disposition Home Health   Patient Choice   Patient / Family choosing to utilize agency / facility established prior to hospitalization Yes   Intensity of Service   Intensity of Service 0-30 min     Met with patient at bedside, introduced self and role as RN TCC. Patient lives at home alone. He is a , he has 40 hours a week of [facility] and also Home Care Skilled based care with the [facility]. Patient states he is 95% service connected. [facility] with [facility]. [facility] is his Primary Office. He is able to walk very short distances, and uses walker when able. He does endorse overall weakness. Patient was seen by Palliative Care, discussion was made on Hospice Care, patient states he is not ready for that quite yet. He plans to return home when medically ready, with his services through the [facility] at this time. PT OT is on here to see patient as well. Will call and leave message for [facility] transfer [facility] regarding patient admission, although he does not want to transfer. Care Transitions to follow for discharge planning.

## 2025-03-14 ENCOUNTER — APPOINTMENT (OUTPATIENT)
Dept: HEMATOLOGY/ONCOLOGY | Facility: HOSPITAL | Age: 60
End: 2025-03-14
Payer: MEDICARE

## 2025-03-14 VITALS
TEMPERATURE: 97.8 F | HEIGHT: 68 IN | DIASTOLIC BLOOD PRESSURE: 65 MMHG | OXYGEN SATURATION: 94 % | RESPIRATION RATE: 16 BRPM | HEART RATE: 78 BPM | BODY MASS INDEX: 41.22 KG/M2 | WEIGHT: 272 LBS | SYSTOLIC BLOOD PRESSURE: 100 MMHG

## 2025-03-14 LAB
ANION GAP SERPL CALC-SCNC: 13 MMOL/L (ref 10–20)
BUN SERPL-MCNC: 25 MG/DL (ref 6–23)
CALCIUM SERPL-MCNC: 8.5 MG/DL (ref 8.6–10.3)
CHLORIDE SERPL-SCNC: 108 MMOL/L (ref 98–107)
CO2 SERPL-SCNC: 23 MMOL/L (ref 21–32)
CREAT SERPL-MCNC: 1.16 MG/DL (ref 0.5–1.3)
EGFRCR SERPLBLD CKD-EPI 2021: 73 ML/MIN/1.73M*2
ERYTHROCYTE [DISTWIDTH] IN BLOOD BY AUTOMATED COUNT: 22.1 % (ref 11.5–14.5)
GLUCOSE SERPL-MCNC: 134 MG/DL (ref 74–99)
HCT VFR BLD AUTO: 27.9 % (ref 41–52)
HGB BLD-MCNC: 8.1 G/DL (ref 13.5–17.5)
MCH RBC QN AUTO: 20.7 PG (ref 26–34)
MCHC RBC AUTO-ENTMCNC: 29 G/DL (ref 32–36)
MCV RBC AUTO: 71 FL (ref 80–100)
NRBC BLD-RTO: 0 /100 WBCS (ref 0–0)
PLATELET # BLD AUTO: 253 X10*3/UL (ref 150–450)
POTASSIUM SERPL-SCNC: 3.8 MMOL/L (ref 3.5–5.3)
RBC # BLD AUTO: 3.91 X10*6/UL (ref 4.5–5.9)
SODIUM SERPL-SCNC: 140 MMOL/L (ref 136–145)
WBC # BLD AUTO: 8.4 X10*3/UL (ref 4.4–11.3)

## 2025-03-14 PROCEDURE — 85027 COMPLETE CBC AUTOMATED: CPT | Performed by: NURSE PRACTITIONER

## 2025-03-14 PROCEDURE — 82374 ASSAY BLOOD CARBON DIOXIDE: CPT | Performed by: NURSE PRACTITIONER

## 2025-03-14 PROCEDURE — 97112 NEUROMUSCULAR REEDUCATION: CPT | Mod: GP

## 2025-03-14 PROCEDURE — 2500000001 HC RX 250 WO HCPCS SELF ADMINISTERED DRUGS (ALT 637 FOR MEDICARE OP)

## 2025-03-14 PROCEDURE — 99239 HOSP IP/OBS DSCHRG MGMT >30: CPT | Performed by: NURSE PRACTITIONER

## 2025-03-14 PROCEDURE — 97166 OT EVAL MOD COMPLEX 45 MIN: CPT | Mod: GO

## 2025-03-14 PROCEDURE — 2500000004 HC RX 250 GENERAL PHARMACY W/ HCPCS (ALT 636 FOR OP/ED)

## 2025-03-14 PROCEDURE — 36415 COLL VENOUS BLD VENIPUNCTURE: CPT | Performed by: NURSE PRACTITIONER

## 2025-03-14 PROCEDURE — 97162 PT EVAL MOD COMPLEX 30 MIN: CPT | Mod: GP

## 2025-03-14 PROCEDURE — 2500000002 HC RX 250 W HCPCS SELF ADMINISTERED DRUGS (ALT 637 FOR MEDICARE OP, ALT 636 FOR OP/ED)

## 2025-03-14 PROCEDURE — 2500000002 HC RX 250 W HCPCS SELF ADMINISTERED DRUGS (ALT 637 FOR MEDICARE OP, ALT 636 FOR OP/ED): Performed by: NURSE PRACTITIONER

## 2025-03-14 RX ADMIN — FLUTICASONE FUROATE AND VILANTEROL TRIFENATATE 1 PUFF: 200; 25 POWDER RESPIRATORY (INHALATION) at 05:33

## 2025-03-14 RX ADMIN — DEXAMETHASONE 4 MG: 4 TABLET ORAL at 08:04

## 2025-03-14 RX ADMIN — OXYCODONE HYDROCHLORIDE 10 MG: 10 TABLET, FILM COATED, EXTENDED RELEASE ORAL at 08:00

## 2025-03-14 RX ADMIN — PANTOPRAZOLE SODIUM 40 MG: 40 TABLET, DELAYED RELEASE ORAL at 05:33

## 2025-03-14 RX ADMIN — FUROSEMIDE 40 MG: 40 TABLET ORAL at 08:04

## 2025-03-14 RX ADMIN — ENOXAPARIN SODIUM 40 MG: 100 INJECTION SUBCUTANEOUS at 07:59

## 2025-03-14 RX ADMIN — SERTRALINE 200 MG: 100 TABLET, FILM COATED ORAL at 08:03

## 2025-03-14 RX ADMIN — FERROUS SULFATE TAB 325 MG (65 MG ELEMENTAL FE) 325 MG: 325 (65 FE) TAB at 08:03

## 2025-03-14 RX ADMIN — PREGABALIN 200 MG: 75 CAPSULE ORAL at 08:03

## 2025-03-14 RX ADMIN — DIAZEPAM 7.5 MG: 5 TABLET ORAL at 08:03

## 2025-03-14 RX ADMIN — TIOTROPIUM BROMIDE INHALATION SPRAY 2 PUFF: 3.12 SPRAY, METERED RESPIRATORY (INHALATION) at 05:34

## 2025-03-14 RX ADMIN — ACETAMINOPHEN 650 MG: 325 TABLET ORAL at 08:03

## 2025-03-14 RX ADMIN — PROPRANOLOL HYDROCHLORIDE 120 MG: 60 CAPSULE, EXTENDED RELEASE ORAL at 08:03

## 2025-03-14 RX ADMIN — MEMANTINE 10 MG: 10 TABLET ORAL at 08:04

## 2025-03-14 ASSESSMENT — ACTIVITIES OF DAILY LIVING (ADL)
BATHING_ASSISTANCE: MAXIMAL
ADL_ASSISTANCE: INDEPENDENT

## 2025-03-14 ASSESSMENT — COGNITIVE AND FUNCTIONAL STATUS - GENERAL
TURNING FROM BACK TO SIDE WHILE IN FLAT BAD: A LITTLE
HELP NEEDED FOR BATHING: A LOT
MOBILITY SCORE: 7
DAILY ACTIVITIY SCORE: 12
HELP NEEDED FOR BATHING: A LOT
DRESSING REGULAR LOWER BODY CLOTHING: TOTAL
STANDING UP FROM CHAIR USING ARMS: TOTAL
TOILETING: A LOT
DRESSING REGULAR LOWER BODY CLOTHING: A LOT
CLIMB 3 TO 5 STEPS WITH RAILING: TOTAL
MOVING TO AND FROM BED TO CHAIR: A LOT
WALKING IN HOSPITAL ROOM: TOTAL
CLIMB 3 TO 5 STEPS WITH RAILING: A LOT
MOVING TO AND FROM BED TO CHAIR: TOTAL
EATING MEALS: A LITTLE
DRESSING REGULAR UPPER BODY CLOTHING: A LOT
MOBILITY SCORE: 15
TURNING FROM BACK TO SIDE WHILE IN FLAT BAD: TOTAL
MOVING FROM LYING ON BACK TO SITTING ON SIDE OF FLAT BED WITH BEDRAILS: A LITTLE
PERSONAL GROOMING: A LITTLE
TOILETING: TOTAL
STANDING UP FROM CHAIR USING ARMS: A LITTLE
DAILY ACTIVITIY SCORE: 16
DRESSING REGULAR UPPER BODY CLOTHING: A LITTLE
WALKING IN HOSPITAL ROOM: A LOT
MOVING FROM LYING ON BACK TO SITTING ON SIDE OF FLAT BED WITH BEDRAILS: A LOT
PERSONAL GROOMING: A LITTLE

## 2025-03-14 ASSESSMENT — PAIN SCALES - GENERAL
PAINLEVEL_OUTOF10: 0 - NO PAIN
PAINLEVEL_OUTOF10: 0 - NO PAIN
PAINLEVEL_OUTOF10: 2
PAINLEVEL_OUTOF10: 0 - NO PAIN

## 2025-03-14 ASSESSMENT — PAIN - FUNCTIONAL ASSESSMENT: PAIN_FUNCTIONAL_ASSESSMENT: 0-10

## 2025-03-14 ASSESSMENT — ENCOUNTER SYMPTOMS
FATIGUE: 1
TREMORS: 1
WEAKNESS: 1

## 2025-03-14 ASSESSMENT — PAIN DESCRIPTION - LOCATION: LOCATION: GENERALIZED

## 2025-03-14 NOTE — PROGRESS NOTES
Social work consult placed for positive medical risk screen. SW reviewed pt's chart and communicated with TCC. No SW needs foreseen at this time. SW signing off; available upon request.    ABHAY Banks, LSW ([id number])   Care Transitions

## 2025-03-14 NOTE — CARE PLAN
Problem: Pain - Adult  Goal: Verbalizes/displays adequate comfort level or baseline comfort level  Outcome: Progressing     Problem: Safety - Adult  Goal: Free from fall injury  Outcome: Progressing     Problem: Discharge Planning  Goal: Discharge to home or other facility with appropriate resources  Outcome: Progressing     Problem: Chronic Conditions and Co-morbidities  Goal: Patient's chronic conditions and co-morbidity symptoms are monitored and maintained or improved  Outcome: Progressing     Problem: Nutrition  Goal: Nutrient intake appropriate for maintaining nutritional needs  Outcome: Progressing     Problem: Fall/Injury  Goal: Not fall by end of shift  Outcome: Progressing  Goal: Be free from injury by end of the shift  Outcome: Progressing  Goal: Verbalize understanding of personal risk factors for fall in the hospital  Outcome: Progressing  Goal: Verbalize understanding of risk factor reduction measures to prevent injury from fall in the home  Outcome: Progressing  Goal: Use assistive devices by end of the shift  Outcome: Progressing  Goal: Pace activities to prevent fatigue by end of the shift  Outcome: Progressing     Problem: Skin  Goal: Participates in plan/prevention/treatment measures  Outcome: Progressing  Flowsheets (Taken 3/14/2025 0008)  Participates in plan/prevention/treatment measures: Discuss with provider PT/OT consult  Goal: Prevent/manage excess moisture  Outcome: Progressing  Flowsheets (Taken 3/14/2025 0008)  Prevent/manage excess moisture:   Cleanse incontinence/protect with barrier cream   Moisturize dry skin  Goal: Prevent/minimize sheer/friction injuries  Outcome: Progressing  Flowsheets (Taken 3/14/2025 0008)  Prevent/minimize sheer/friction injuries:   Increase activity/out of bed for meals   Turn/reposition every 2 hours/use positioning/transfer devices   The patient's goals for the shift include  Patient remain safe and comfortable.    The clinical goals for the shift include  patient will remain free from fall/injury this shift

## 2025-03-14 NOTE — PROGRESS NOTES
Occupational Therapy  Evaluation    Patient Name: Manuel Marrufo  MRN: [id number]  Today's Date: 3/14/2025  Time Calculation  Start Time: 1349  Stop Time: 1419  Time Calculation (min): 30 min    Current Problem:   1. Generalized weakness    2. Diarrhea, unspecified type    3. Hypervolemia, unspecified hypervolemia type    4. 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 Secondary malignant neoplasm of right lower lobe of lung (Multi)    6. Generalized anxiety disorder        OT order: OT eval and treat   Referred by: ROBBIE BOSTON  Reason for referral: ADLs, safety assessment  Past medical history related to rehab: WEAK LE EDEMA, SOB, FALLS; DX: WEAK, DIARRHEA, HYPERVOLEMIA; HX: LUNG CA, BRAINMETS, AGORAPHOBIA, PANIC ATTACKS, ANXIETY DEPRESSION, COPD, FATTYLIVER, MELANOMA, PTSD, BACK SURG, HTN,  VAGAL NERVE STIMULATOR    Precautions:   Hearing/Visual Limitations: intact  Medical Precautions: Fall precautions    ASSESSMENT  OT Assessment: OT eval completed. The patient is functioning below baseline for ADLs and mobility. can benefit from continued OT. Pt with Decreased ADL status, Decreased upper extremity strength, Decreased safe judgment during ADL, Decreased endurance, Decreased cognition, Decreased functional mobility, Decreased gross motor control, Decreased IADLs  Prognosis:    Barriers to discharge home: Caregiver assistance, Cognition needs, Physical needs           Tolerance:      PLAN  Frequency: 3 times per week  Treatment Interventions: ADL retraining, Functional transfer training, UE strengthening/ROM, Endurance training, Cognitive reorientation, Patient/family training, Equipment evaluation/education, Neuromuscular reeducation, Fine motor coordination activities, Compensatory technique education  Discharge Recommendations: Moderate intensity level of continued care  OT OK to discharge: Yes    GENERAL VISIT INFORMATION   Start of session communication: Bedside nurse  End of session communication: Bedside  nurse  Family/caregiver present: No  Caregiver feedback:    Co-Treatment: PT  Reason for co-treatment: to optimize safety and mobility, while focusing on discipline specific goals   Position Pt Received:  Bed, 3 rail up, Alarm on  End of session position: Bed, 3 rail up, Alarm on    SUBJECTIVE  Home Living:  Type of Home: House  Lives With: Alone  Home Adaptive Equipment: Walker rolling or standard, Wheelchair-manual, Cane, Adaptive bed  Home Layout: Two level (stair glide to second floor. one step entrance)  Home Access: Stairs to enter with rails  Entrance Stairs-Number of Steps: 1     Prior Level of Function:  Receives Help From: Home health, Personal care attendant (40 hours per week.)  ADL Assistance: Independent (fairly independent with increased time)  Homemaking Assistance: Needs assistance (home health assists with all iadls except finances. set-up for medications)  Ambulatory Assistance: Independent (mod I with rollator. shorts distances. LEs weaker and pt reports legs give out on him and he falls. crawls around sometimes)    IADL History:       Pain:  Assessment: 0-10  Score: 0 - No pain  Type:    Location:    Interventions:    Response to pain interventions:      OBJECTIVE  Vital Signs:       Cognition:  Overall Cognitive Status: Impaired, Impaired at baseline (baseline cognitive deficits. brain mets. pt reports difficulty with memory)  Arousal/Alertness: Delayed responses to stimuli  Orientation Level:  (oriented to person, month, year. time on clock. not place. pt stated he was in [place] and needed max cueing and coaching to answer correct city: [place] but never fully stated name of hospital)  Following Commands:  (followed 100% simple commands. 75% multiple step. delayed processing)  Safety Judgment: Decreased awareness of need for safety precautions  Planning: Reduced planning skills  Organization: Mildly disorganized  Processing Speed: Delayed             Current ADL function:   EATING:  Stand  "by     GROOMING: Minimal     BATHING: Maximal     UB DRESSING: Minimal     LB DRESSING: Total     TOILETING: Total    ADL comments:       Activity Tolerance:  Endurance: Decreased tolerance for upright activites    Bed Mobility/Transfers:   Bed Mobility  Bed Mobility: Yes  Bed Mobility 1  Bed Mobility 1: Supine to sitting, Sitting to supine  Level of Assistance 1: Minimum assistance (x1-2)  Transfers  Transfer:  (sit<>stand min A x2 FWW)    Ambulation/Gait Training:  Functional Mobility  Functional Mobility Performed:  (pt took steps at bedside, towards HOB with min A x2 FWW)    Sitting Balance:  Static Sitting Balance  Static Sitting-Level of Assistance: Close supervision  Dynamic Sitting Balance  Dynamic Sitting-Level of Assistance: Close supervision    Standing Balance:  Static Standing Balance  Static Standing-Level of Assistance: Minimum assistance  Dynamic Standing Balance  Dynamic Standing-Level of Assistance: Minimum assistance, Moderate assistance    Vision: Vision - Basic Assessment  Current Vision: Wears glasses all the time  Patient Visual Report:  (reports visual disturbances sometimes. \"vision like I am drunk\")   and      Sensation:  Light Touch: No apparent deficits    Strength:  Strength Comments: BUE grossly 3+/5    Perception:  Inattention/Neglect: Appears intact    Coordination:  Movements are Fluid and Coordinated: Yes     Hand Function:  Hand Function  Gross Grasp: Functional    Extremities: RUE   RUE : Within Functional Limits and LUE   LUE: Within Functional Limits    Outcome Measures: [facility] Daily Activity   Putting on and taking off regular lower body clothing: Total  Bathing (including washing, rinsing, drying): A lot  Putting on and taking off regular upper body clothing: A lot  Toileting, which includes using toilet, bedpan or urinal: Total  Taking care of personal grooming such as brushing teeth: A little   Eating Meals: A little   Daily Activity - Total Score: 12    SBT: score of " 2    EDUCATION:     Education Documentation  ADL Training, taught by Keyana Gregorio OT at 3/14/2025  3:44 PM.  Learner: Patient  Readiness: Acceptance  Method: Explanation  Response: Needs Reinforcement    Education Comments  No comments found.        Goals:   Encounter Problems       Encounter Problems (Active)       ADLs       Patient with complete lower body dressing with modified independent level of assistance donning and doffing all LE clothes  with PRN adaptive equipment while supported sitting and standing (Progressing)       Start:  03/14/25    Expected End:  03/28/25               TRANSFERS       Patient will complete functional transfer to 4 with least restrictive device with modified independent level of assistance. (Progressing)       Start:  03/14/25    Expected End:  03/28/25

## 2025-03-14 NOTE — PROGRESS NOTES
Manuel Marrufo is a 59 y.o. male admitted for Generalized weakness. Pharmacy reviewed the patient's [id number] medications and allergies for accuracy.    The list below reflects the PTA list prior to pharmacy medication history. A summary a changes to the PTA medication list has been listed below. Please review each medication in order reconciliation for additional clarification and justification.    Source of information:  Patient   notes    Medications added:    Medications modified:  Ferrous sulfate tablet 325mg   1 tablet once daily with breakfast    Medications to be removed:  Dexamethasone 4mg tablet (duplicate)  Levofloxacin 500mg  Pantoprazole 40mg  Propranolol 120mg (duplicate)  Medications of concern:      Prior to Admission Medications   Prescriptions Last Dose Informant Patient Reported? Taking?   acetaminophen (Tylenol) 325 mg tablet Past Week  No Yes   Sig: Take 3 tablets (975 mg) by mouth every 8 hours.   albuterol 90 mcg/actuation inhaler 3/11/2025 Other, Self Yes Yes   Sig: Inhale 2 puffs 4 times a day as needed for wheezing or shortness of breath.   budesonide-glycopyr-formoterol (BREZTRI) 160-9-4.8 mcg/actuation HFA aerosol inhaler Past Week Other, Self Yes Yes   Sig: Inhale 2 puffs 2 times a day.   cholecalciferol (Vitamin D-3) 50 MCG (2000 UT) tablet Past Week Other, Self Yes Yes   Sig: Take 1 tablet (50 mcg) by mouth once daily.   dapsone 100 mg tablet Past Week Self, Other No Yes   Sig: Take 1 tablet (100 mg) by mouth once daily.   dexAMETHasone (Decadron) 4 mg tablet  Self, Other No No   Sig: Take 1 tablet (4 mg) by mouth every 6 hours.   Patient taking differently: Take 1 tablet (4 mg) by mouth 2 times a day.   dexAMETHasone (Decadron) 4 mg tablet Past Week  No Yes   Sig: Take 1 tablet (4 mg) by mouth every 12 hours.   diazePAM (Valium) 5 mg tablet Past Week  No Yes   Sig: Take 1.5 tablets (7.5 mg) by mouth once daily in the morning. Take before meals AND 1 tablet (5 mg) once daily  at bedtime. Do all this for 10 days.   ferrous sulfate tablet More than a month  No No   Sig: Take 1 tablet (325 mg) by mouth once daily with breakfast.   Patient not taking: Reported on 3/12/2025   furosemide (Lasix) 40 mg tablet Past Week Other, Self Yes Yes   Sig: Take 1 tablet (40 mg) by mouth once daily. Edema   guaiFENesin (Mucinex) 600 mg 12 hr tablet Past Week  No Yes   Sig: Take 1 tablet (600 mg) by mouth 2 times a day as needed for cough. Do not crush, chew, or split.   hydrocortisone (Cortef) 10 mg tablet Past Week  No Yes   Sig: Take 1 tablet (10 mg) by mouth once daily. Take daily every afternoon   levoFLOXacin (Levaquin) 500 mg tablet   No No   Sig: Take 1 tablet (500 mg) by mouth once every 24 hours for 2 doses.   lidocaine 4 % patch More than a month  No No   Sig: Place 1 patch over 12 hours on the skin once daily. Remove & discard patch within 12 hours or as directed by MD.   Patient not taking: Reported on 3/12/2025   loperamide (Imodium) 2 mg capsule More than a month  No No   Sig: Take 3 capsules (6 mg) by mouth once daily.   Patient not taking: Reported on 3/12/2025   memantine (Namenda) 5 mg tablet Past Week Other, Self Yes Yes   Sig: Take 2 tablets (10 mg) by mouth 2 times a day.   ondansetron ODT (Zofran-ODT) 8 mg disintegrating tablet Past Week Other, Self Yes Yes   Sig: Dissolve 1 tablet (8 mg) in the mouth 3 times a day as needed for nausea.   oxyCODONE (Roxicodone) 10 mg immediate release tablet Past Week Other, Self No Yes   Sig: Take 1 tablet (10 mg) by mouth every 3 hours if needed for severe pain (7 - 10).   oxyCODONE ER (OxyCONTIN) 10 mg 12 hr tablet Past Week Other, Self Yes Yes   Sig: Take 1 tablet (10 mg) by mouth 2 times a day. Do not crush, chew, or split.   pantoprazole (ProtoNix) 40 mg EC tablet Past Week Self, Other No Yes   Sig: Take 1 tablet (40 mg) by mouth once daily in the morning. Take before meals. Do not crush, chew, or split.   polyethylene glycol (Glycolax, Miralax)  17 gram packet More than a month  No Yes   Sig: Take 17 g by mouth once daily as needed (constipation).   pregabalin (Lyrica) 200 mg capsule Past Week Other, Self Yes Yes   Sig: Take 1 capsule (200 mg) by mouth 2 times a day.   propranolol LA (Inderal LA) 120 mg 24 hr capsule Past Week Other, Self Yes Yes   Sig: Take 1 capsule (120 mg) by mouth 2 times a day.   propranolol LA (Inderal LA) 120 mg 24 hr capsule Past Week  No Yes   Sig: Take 1 capsule (120 mg) by mouth 2 times a day. Do not crush, chew, or split.   rosuvastatin (Crestor) 10 mg tablet Past Week Other, Self Yes Yes   Sig: Take 1 tablet (10 mg) by mouth once daily.   sertraline (Zoloft) 100 mg tablet Past Week Other, Self Yes Yes   Sig: Take 2 tablets (200 mg) by mouth once daily.   tadalafil (Cialis) 5 mg tablet Past Week Other, Self Yes Yes   Sig: Take 1 tablet (5 mg) by mouth once daily.      Facility-Administered Medications: None       Emelina Shen

## 2025-03-14 NOTE — PROGRESS NOTES
I was asked by RN to stop by pts room to speak with pt/family regarding DC plan and HC.  They let me know that they were interested in trying to get additional follow up care in  the home for pt.  I spoke with them about arranging HC but would need [facility] approval and I will get that going.  I encouraged pt to work with therapy as [facility] needs to see his abilities/limitations/recommendations.  I saw that pt is active with Home based care so I reached out to [facility] PAUL Daley [facility], to inquire of process and if home based care is able to add this.  Await return call.    1500 I received return call from Thuy. She told me that since pt is active with home based care, either they will order HHC when they discharge pt from service or, pt can call HBC and cancel them himself and they will order HHC afterwhich HBC can resume.  Pt can not have both HBC and HHC at the same time.  She is sending [facility] message to follow up with pt about this when he gets home.  I let pt know this and also was placed on speakerphone with his sister, Kaity who I also explained this.  When I asked pt about SNF, he said he is not ready for that yet and he inquired should he feel that he does need it after he gets home, what to do.  I told him that his [facility] MD can help with paperwork to get him approved at a facility. Kaity also acknowledged understanding.  Kaity asked to be called when pt is ready to discharge and I told her I would message the RN to call her. SAL Zuñiga sent a message via secure chat to call her.

## 2025-03-14 NOTE — PROGRESS NOTES
Palliative Care Follow-Up Progress Note    Manuel Marrufo is a 59 y.o. male on day 1 of admission presenting from home to the ED 3/12 d/t general weakness, decreased PO intake/unable to take routine medications x 3 days, leg swelling, decreased mobility/unable to ambulate, abdominal distention and diarrhea.     Past medical history includes melanoma of left shoulder diagnosed in 2019 now with metastases to the brain, lungs, and likely bone and adrenal gland s/p dual immunotherapy last dose in January and palliative WBRT last in December, spinal stenosis s/p L4-S1 lumbar decompression 2021, HTN, HLD, nonalcoholic fatty liver disease, obesity, FRANK on CPAP, PTSD, agoraphobia, anxiety, GERD, COPD, depression. [facility] Dr. Schaefer, radiation oncologist Dr. Dias and [facility] but has [facility] utilize [facility] system as well     Patient lives at home alone, has a home health aid that comes in 40 hours per week through the [facility] who helps with housekeeping, grocery shopping, meals and laundry. He had a fall 2 weeks ago and has difficulty with word finding and loses his track of thought at times. His mother also helps with cooking and he last drove 6 months ago. He hashad 4 hospitalizations in the last 4 months     3/14/2025: Met with patient and he was on speakerphone with his [facility] psychologist Dr. Sayra Otero. Patient provided permission for me to speak to her. Discussed updates with scheduling supportive oncology in [place]. Patient agreed for me to come back later to finish visit.     Returned later and his mother, home health aid, sister and cousin were at the bedside. Patient provided permission for me to speak everybody in the room. Discussed updates and answered questions. Patient denies new complaints. Provided [place] DNR CCA and outpatient supportive oncology written information. Patient hopeful he can receive oncology care in [place] as it is very  difficult to get to [facility].     Review of Systems   Constitutional:  Positive for fatigue.   Musculoskeletal:         Pain     Neurological:  Positive for tremors and weakness.   Psychiatric/Behavioral:          Forgetful         Physical Exam  Constitutional:       General: He is not in acute distress.  HENT:      Mouth/Throat:      Mouth: Mucous membranes are moist.   Eyes:      General: No scleral icterus.  Cardiovascular:      Rate and Rhythm: Normal rate.   Pulmonary:      Effort: Pulmonary effort is normal.   Musculoskeletal:      Cervical back: Neck supple.   Neurological:      Mental Status: He is alert and oriented to person, place, and time.      Comments: Forgetful at times       Plan     Expectations of treatment/Goals of care: further cancer work-up and treatments as able, hospitalizations as needed   Advanced directives/Advanced care planning: HCPOA and Living Will obtained and copy placed on the chart  Health care power of : brother Mason is HCPOA   Code status: DNR/DNI [place] DNR: states he has an [place] DNR CCA at home   Outpatient referral: referral sent for outpatient supportive oncology at [facility] scheduled 4/11 at 1 pm     Collaborated with: Chadd Staley CNP, RN and TCC    I spent 15 minutes in the professional and overall care of this patient.    Palliative care will not be available again until 3/17/25    ANNELISE Bustillos-BRAD

## 2025-03-14 NOTE — DISCHARGE SUMMARY
Discharge Diagnosis  Generalized weakness  Diarrhea resolved.   Metastatic melanoma to brain and lung  HTN  HLD  Anemia of chronic disease  Anxiety, PTSD, depression  COPD    Issues Requiring Follow-Up  Follow up cancer treatments    Discharge Meds     Medication List      ASK your doctor about these medications     acetaminophen 325 mg tablet; Commonly known as: Tylenol; Take 3 tablets   (975 mg) by mouth every 8 hours.   albuterol 90 mcg/actuation inhaler   budesonide-glycopyr-formoterol 160-9-4.8 mcg/actuation HFA aerosol   inhaler; Commonly known as: BREZTRI   cholecalciferol 50 MCG (2000 UT) tablet; Commonly known as: Vitamin D-3   dapsone 100 mg tablet; Take 1 tablet (100 mg) by mouth once daily.   * dexAMETHasone 4 mg tablet; Commonly known as: Decadron; Take 1 tablet   (4 mg) by mouth every 6 hours.   * dexAMETHasone 4 mg tablet; Commonly known as: Decadron; Take 1 tablet   (4 mg) by mouth every 12 hours.   diazePAM 5 mg tablet; Commonly known as: Valium; Take 1.5 tablets (7.5   mg) by mouth once daily in the morning. Take before meals AND 1 tablet (5   mg) once daily at bedtime. Do all this for 10 days.   ferrous sulfate tablet; Take 1 tablet (325 mg) by mouth once daily with   breakfast.   furosemide 40 mg tablet; Commonly known as: Lasix   hydrocortisone 10 mg tablet; Commonly known as: Cortef; Take 1 tablet   (10 mg) by mouth once daily. Take daily every afternoon   levoFLOXacin 500 mg tablet; Commonly known as: Levaquin; Take 1 tablet   (500 mg) by mouth once every 24 hours for 2 doses.; Ask about: Should I   take this medication?   lidocaine 4 % patch; Place 1 patch over 12 hours on the skin once daily.   Remove & discard patch within 12 hours or as directed by MD.   loperamide 2 mg capsule; Commonly known as: Imodium; Take 3 capsules (6   mg) by mouth once daily.   memantine 5 mg tablet; Commonly known as: Namenda   Mucus Relief  mg 12 hr tablet; Generic drug: guaiFENesin; Take 1   tablet (600  mg) by mouth 2 times a day as needed for cough. Do not crush,   chew, or split.   ondansetron ODT 8 mg disintegrating tablet; Commonly known as:   Zofran-ODT   * oxyCODONE ER 10 mg 12 hr tablet; Commonly known as: OxyCONTIN   * oxyCODONE 10 mg immediate release tablet; Commonly known as:   Roxicodone; Take 1 tablet (10 mg) by mouth every 3 hours if needed for   severe pain (7 - 10).   pantoprazole 40 mg EC tablet; Commonly known as: ProtoNix; Take 1 tablet   (40 mg) by mouth once daily in the morning. Take before meals. Do not   crush, chew, or split.   polyethylene glycol 17 gram packet; Commonly known as: Glycolax,   Miralax; Take 17 g by mouth once daily as needed (constipation).   pregabalin 200 mg capsule; Commonly known as: Lyrica   * propranolol  mg 24 hr capsule; Commonly known as: Inderal LA   * propranolol  mg 24 hr capsule; Commonly known as: Inderal LA;   Take 1 capsule (120 mg) by mouth 2 times a day. Do not crush, chew, or   split.   rosuvastatin 10 mg tablet; Commonly known as: Crestor   sertraline 100 mg tablet; Commonly known as: Zoloft   tadalafil 5 mg tablet; Commonly known as: Cialis  * This list has 6 medication(s) that are the same as other medications   prescribed for you. Read the directions carefully, and ask your doctor or   other care provider to review them with you.       Test Results Pending At Discharge  Pending Labs       Order Current Status    Blood Culture Preliminary result    Blood Culture Preliminary result            Hospital Course   Manuel Marrufo is a 59 y.o. male with PMHx s/f metastatic melanoma, to brain and lung (s/p two cycles of dual immunotherapy with nivulumab and ipilumab, last dose 1/10 and palliative WBRT 12/14/24), (s/p left shoulder resection 2019), spinal stenosis (s/p L4-S1 lumbar decompression 2021), HTN, HLD, nonalcoholic fatty liver disease, obesity, FRANK on CPAP, PTSD, GERD, COPD, depression s/p vagal nerve stimulator (SA x2, 2007) presenting with  generalized weakness and diarrhea. He has an ongoing diarrhea for several weeks, is worsening now with multiple episodes of runny watery bowel movements. He hasn't had any oral intake in the past 2 days, including his medications. He has abdominal discomfort with bilateral lower leg swelling. He spent most of his time in bed, no energy to get up and pain in his spine with movements. Has nausea. Denies f/c/v, chest pain, sob, abd pain. He was hospitalized on 3/3/25-3/6/25 at [facility] for fever and pneumonia.  He was started on vancomycin, cefepime and Flagyl and was later de-escalated to Levaquin prior to discharge for total of 5-day course.  [facility] to that last hospitalization and was treated with 7-day course of Levaquin.     ED Course (Summary - please note all labs, imaging studies, and interventions noted below have been personally reviewed and/or interpreted on day of admission):   Vitals on presentation: T 36.5 °C (97.7 °F)  HR (!) 113  /73  RR 20  O2 95 % None (Room air)  Labs: CMP-potassium 3.4  Lactate 0.9, BNP 15, troponin 17-16  CBC-hemoglobin 9.2  Flu, COVID-negative  VBG largely unremarkable  EKG: none  Imaging: CXR-no active pulmonary disease  CTA for PE-no PE.  Bilateral pulmonary nodules measuring up to 1.5 cm in right lower lobe.  No definite new pulmonary nodule.  CTAP-coronary artery calcification.  Hepatic steatosis.  Mild splenomegaly.  Nonspecific left adrenal nodule, unchanged.  Interventions:  ml, admission for further management     3.13.25 Pt complains of fatigue and nausea, edema. No diarrhea today. Pt restarted on home medications. Tachycardia is resolved.      3.14.25 GI symptoms are improved, no diarrhea. The patient continues to feel weak, has some memory issues. The patient will be discharged to home, [facility]. He wishes to relocate some of his treatments to [place] as travel has  beome more taxing. Advised to speak to his oncology team, referrals sent for supportive oncology and local oncology to follow up and assist as we can locally as best we can. Approximately 35 minutes spent preparing discharge.     Pertinent Physical Exam At Time of Discharge  Physical Exam  Vitals reviewed.   Constitutional:       General: He is not in acute distress.     Appearance: He is obese.   HENT:      Head: Normocephalic and atraumatic.      Right Ear: External ear normal.      Left Ear: External ear normal.      Nose: Nose normal.      Mouth/Throat:      Mouth: Mucous membranes are moist.      Pharynx: Oropharynx is clear.   Eyes:      Extraocular Movements: Extraocular movements intact.      Conjunctiva/sclera: Conjunctivae normal.      Pupils: Pupils are equal, round, and reactive to light.   Cardiovascular:      Rate and Rhythm: Normal rate and regular rhythm.      Pulses: Normal pulses.      Heart sounds: Normal heart sounds.   Pulmonary:      Effort: Pulmonary effort is normal. No respiratory distress.      Breath sounds: Normal breath sounds. No wheezing, rhonchi or rales.   Chest:      Chest wall: No tenderness.   Abdominal:      General: Bowel sounds are normal. There is no distension.      Palpations: Abdomen is soft. There is no mass.      Tenderness: There is no abdominal tenderness. There is no rebound.   Musculoskeletal:         General: No swelling or deformity. Normal range of motion.      Cervical back: Normal range of motion.      Right lower leg: Edema present.      Left lower leg: Edema present.   Skin:     General: Skin is warm and dry.      Capillary Refill: Capillary refill takes less than 2 seconds.   Neurological:      General: No focal deficit present.      Mental Status: He is alert and oriented to person, place, and time.   Psychiatric:         Mood and Affect: Mood normal.         Behavior: Behavior normal.         Outpatient Follow-Up  Future Appointments   Date Time Provider  Department Center   3/28/2025  8:30 AM Medardo Vanegas MD [id number] [facility]   3/28/2025 10:20 AM Padmaja Osullivan MD [id number] Academic   4/11/2025  1:00 PM Salvador Morales, APRN-[facility] [id number] [facility]         Sunny Staley, APRN-CNP

## 2025-03-14 NOTE — PROGRESS NOTES
Physical Therapy    Physical Therapy Evaluation    Patient Name: Manuel Marrufo  MRN: [id number]  Department: [facility] E  Room: [facility][note 3][id number]  Today's Date: 3/14/2025   Time Calculation  Start Time: 1340  Stop Time: 1423  Time Calculation (min): 43 min    Assessment/Plan   PT Assessment  PT Assessment Results: Decreased strength, Decreased endurance, Impaired balance, Decreased mobility, Decreased coordination, Decreased safety awareness, Impaired judgement, Impaired vision  Rehab Prognosis: Fair  Barriers to Discharge Home: Caregiver assistance, Cognition needs, Physical needs  Caregiver Assistance: Patient lives alone and/or does not have reliable caregiver assistance (HAS HHA 40 HRS /WK)  Cognition Needs: 24hr supervision for safety awareness needed, Medication and/or medical management daily assist needed, Recollection or understanding of precautions/restrictions limited, Recollection or understanding of home exercise program limited, Insight of patient limited regarding functional ability/needs, Cognition-related high falls risk  Physical Needs: Ambulating household distances limited by function/safety, 24hr mobility assistance needed, 24hr ADL assistance needed, High falls risk due to function or environment (STAIR GLIDE TO 2ND FLOOR)  Evaluation/Treatment Tolerance: Patient limited by fatigue  Barriers to Participation: Insight into problems, Ability to acquire knowledge  End of Session Communication: Bedside nurse, Care Coordinator (MOBILITY STATUS)  Assessment Comment: REQUIRES 2 A FOR STANDING/TAKING STEPS AT EOB, TAKING BED SUPPORT ON BACKOF LEGS DURING STANDING 2 A FOR BED MOBITY,POOR ENDURANCE, STRENGTH, DECREASED SAFETY AWARENESS, HIGH FALLRISK WILLNEEDMOD REHAB ON DISCH  End of Session Patient Position: Bed, 3 rail up, Alarm on (CALL LIGHT IN REACH)  IP OR SWING BED PT PLAN  Inpatient or Swing Bed: Inpatient  PT Plan  Treatment/Interventions: Bed mobility, Transfer training, Gait training, Strengthening,  Endurance training  PT Plan: Ongoing PT  PT Frequency: 3 times per week  PT Discharge Recommendations: Moderate intensity level of continued care  Equipment Recommended upon Discharge:  (TBD)  PT Recommended Transfer Status: Assist x2 (FWW)  PT - OK to Discharge: Yes (WHEN MEDICALLY CLEARED)    Subjective   General Visit Information:  General  Reason for Referral: IMPAIRED MOBILTIY, GAIT TRAINING  Referred By: ROBBIE BOSTON  Past Medical History Relevant to Rehab: WEAK LE EDEMA, SOB, FALLS; DX: WEAK, DIARRHEA, HYPERVOLEMIA; HX: LUNG CA, BRAINMETS, AGORAPHOBIA, PANIC ATTACKS, ANXIETY DEPRESSION, COPD, FATTYLIVER, MELANOMA, PTSD, BACK SURG, HTN,  VAGAL NERVE STIMULATOR  PT Missed Visit: Yes  Missed Visit Reason:  (ATTEMPTED TO SEE 2X TODAY: 0953, PT EATING& WANTED TO FINISH BEFORE DOING ANY THERAPY, 1046,PT ON PHONE W/ [facility] ANOTHER TIME)  Co-Treatment: OT  Co-Treatment Reason: FACILITATE MOBILITY SAFETY  Prior to Session Communication: Bedside nurse (OK FOR THERAPY)  Patient Position Received: Bed, 3 rail up, Alarm on (ROOM 2321 ALERT IV; AGREES TO THERAPY)  General Comment: MOD EDEMA IN LEGS  Home Living:  Home Living  Home Living Comments: ALONEIN SPLIT LEVEL HOME, 1STE, STAIR GLIDE TO 2ND LEVEL, HAS HHA 40 HRS /WK, AMB SHRT DISTANCES W/ FWW, LATELY NLY TRANSFERS TO , HHA DO CHORES, MOM GETS GROCERIES, (+) FALLS NEEDS A SOMETIMES TO GET UP, STATES HE IS OGING TO STAY W/ M OM ON DISCH, OTHER FAMILY MEMBERS LIVE NEARBY  Prior Level of Function:     Precautions:  Precautions  Hearing/Visual Limitations: REPORTS DISTORTED VISIN AT TIMES  Medical Precautions: Fall precautions (VAGAL NERVE STIMULATOR)               Objective   Pain:  Pain Assessment  0-10 (Numeric) Pain Score: 0 - No pain  Cognition:  Cognition  Overall Cognitive Status: Impaired  Arousal/Alertness: Delayed responses to stimuli  Orientation Level: Disoriented to situation  Following Commands:  (INCREASED TIME & REPETITION)  Safety  Judgment: Decreased awareness of need for safety precautions  Problem Solving: Assistance required to identify errors made  Attention: Exceptions to WFL  Sustained Attention: Impaired  Memory: Exceptions to WFL  Long-Term Memory: Impaired  Short-Term Memory: Impaired  Problem Solving: Exceptions to WFL  Complex Functional Tasks: Impaired  Performing Discharge Planning: Moderate  Safety/Judgement: Exceptions to WFL  Complex Functional Tasks: Moderate  Novel Situations: Moderate  Routine Tasks: Moderate  Unable to Self-Monitor and Self-Correct Consistently: Moderate  Insight: Moderate  Task Initiation: Initiates with cues  Flexibility of Thought: Reduced flexibility  Planning: Reduced planning skills  Organization: Moderately disorganized  Processing Speed: Delayed    General Assessments:                  Activity Tolerance  Endurance: Decreased tolerance for upright activites    Sensation  Sensation Comment: REPORTS NO DIFICITS BUT STATES HE DID NOT FEEL THE  L COMPRESSIN PUMP WORKING - WHEN THERAPIST CHECKED THE PUMP WAS FUNCTIONING    Strength  Strength Comments: ROM LEGS WFL, DEMONSTRATED 3-/5 STRENGTHIN KNEES AND ANKLES 2+/5 IN HIPS  Strength  Strength Comments: ROM LEGS WFL, DEMONSTRATED 3-/5 STRENGTHIN KNEES AND ANKLES 2+/5 IN HIPS                     Static Sitting Balance  Static Sitting-Comment/Number of Minutes: FAIR  Dynamic Sitting Balance  Dynamic Sitting-Comments: FAIR-    Static Standing Balance  Static Standing-Comment/Number of Minutes: POOR  Dynamic Standing Balance  Dynamic Standing-Comments: POOR  Functional Assessments:  Bed Mobility  Bed Mobility:  (MIN X2 SUPINE>SIT, HOB 40 DEGREES USED RAIL; SITS EOB TOTAL 6 MIN SBA, SIT>SUPINE MIN X1 HOB FLAT; UP IN BED MAX X2 USINGPADS)    Transfers  Transfer:  (SIT<>STADN 2 REPS MIN X2, WIDE MIGUEL ANGEL TOOK BED SUPPORT OON BACK OF LEGS, HOLD BREATH)    Ambulation/Gait Training  Ambulation/Gait Training Performed:  (MOD X2 TO STAND AND MARCH IN PLACE 4 REPS SHAE &  TO AMB 2 FT TO R TOWARDS HOB, TAKES BED SUPPORT ON BACK OF LEGS, SAT RESTED, STOOD AGAIN FOR 1 MIN WIDE MIGUEL ANGEL,LEGS THREATENING TO BUCKLE, HOLD BREATH =MILDSOB W/ ACTIVITY)  Extremity/Trunk Assessments:     Outcome Measures:  [facility] Basic Mobility  Turning from your back to your side while in a flat bed without using bedrails: A lot  Moving from lying on your back to sitting on the side of a flat bed without using bedrails: Total  Moving to and from bed to chair (including a wheelchair): Total  Standing up from a chair using your arms (e.g. wheelchair or bedside chair): Total  To walk in hospital room: Total  Climbing 3-5 steps with railing: Total  Basic Mobility - Total Score: 7    Encounter Problems       Encounter Problems (Active)       Mobility       STG - Patient will ambulate (Not Progressing)       Start:  03/14/25    Expected End:  03/28/25       FWW 30 FT  MIN A X1         Goal 1 (Not Progressing)       Start:  03/14/25    Expected End:  04/04/25       20 REPS AROM/RROM INCREASING STRENGTH TO STABILIZE GAIT            PT Transfers       STG - Transfer from bed to chair (Not Progressing)       Start:  03/14/25    Expected End:  03/21/25       FWW MIN A X1         STG - Patient to transfer to and from sit to supine (Not Progressing)       Start:  03/14/25    Expected End:  03/17/25       MIN A X1 HOB 30 DEGREES NO RAILS         STG - Patient will transfer sit to and from stand (Not Progressing)       Start:  03/14/25    Expected End:  03/19/25       FWW USING PROPER TECHNIQUE MIN A X1            Pain - Adult              Education Documentation  Mobility Training, taught by Beata Randall, PT at 3/14/2025  3:04 PM.  Learner: Patient  Readiness: Acceptance  Method: Explanation  Response: Needs Reinforcement  Comment: MOBILITYSAFETY NEEDS FOR 24HR ASSISTANCE AND 2 ASSIST FOR MOBIILTY    Education Comments  No comments found.

## 2025-03-14 NOTE — CARE PLAN
Problem: Mobility  Goal: STG - Patient will ambulate  Description: FWW 30 FT  MIN A X1  Outcome: Not Progressing  Goal: Goal 1  Description: 20 REPS AROM/RROM INCREASING STRENGTH TO STABILIZE GAIT  Outcome: Not Progressing     Problem: PT Transfers  Goal: STG - Transfer from bed to chair  Description: FWW MIN A X1  Outcome: Not Progressing  Goal: STG - Patient to transfer to and from sit to supine  Description: MIN A X1 HOB 30 DEGREES NO RAILS  Outcome: Not Progressing  Goal: STG - Patient will transfer sit to and from stand  Description: FWW USING PROPER TECHNIQUE MIN A X1  Outcome: Not Progressing

## 2025-03-14 NOTE — NURSING NOTE
Pt given discharge instructions and education, pt verbalizes understanding. Ivs removed, cath tips intact, pt tolerated well. Pt states he is waiting for his sister to help him get dressed, refusing help from staff to change into own clothing. Pt denies further needs. No new scripts. Pt awaiting ride from sister now.

## 2025-03-16 LAB
BACTERIA BLD CULT: NORMAL
BACTERIA BLD CULT: NORMAL

## 2025-03-24 DIAGNOSIS — C79.31 MALIGNANT MELANOMA METASTATIC TO BRAIN (MULTI): Primary | ICD-10-CM

## 2025-03-24 NOTE — PROGRESS NOTES
STAT MRI brain w/wo contrast order placed -- Dr. Vanegas wanted patient to have an MRI within the 3-4 weeks following the last appt on 3/3/25, however MRI was not ordered. Patient is scheduled to come in to see Dr. Vanegas again on 3/28/25.

## 2025-03-25 ENCOUNTER — HOSPITAL ENCOUNTER (OUTPATIENT)
Dept: RADIOLOGY | Facility: HOSPITAL | Age: 60
End: 2025-03-25
Payer: MEDICARE

## 2025-03-28 ENCOUNTER — APPOINTMENT (OUTPATIENT)
Dept: HEMATOLOGY/ONCOLOGY | Facility: HOSPITAL | Age: 60
End: 2025-03-28
Payer: MEDICARE

## 2025-03-28 NOTE — PROGRESS NOTES
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 [facility]ef[facility], radiation oncologist Dr. Dias and [facility] but has [facility] utilize [facility] system as well. He is hoping to be able to move his care [place] as it is closer to home.     Information was collected from chart review, discussion with patient/family, and discussion with care team     SUBJECTIVE:    ***    {Associated S/Sx:[id number]}    Pain Assessment:  Pain Score:    Location:    Education:        Symptom Assessment:  ROS otherwise negative, pertinent positives documented in the HPI       Information obtained from: chart review and interview of patient  ______________________________________________________________________     Oncology History    No history exists.       Past Medical History:   Diagnosis Date    Agoraphobia with panic attacks     Anxiety     Awareness under anesthesia 2021    During back surgery    COPD (chronic obstructive pulmonary disease) (Multi)     Fatty liver disease, nonalcoholic     GERD (gastroesophageal reflux disease)     HLD (hyperlipidemia)     Melanoma (Multi)     with mets to brain, spine and lungs    FRANK on CPAP     PTSD (post-traumatic stress disorder)      Past Surgical History:   Procedure Laterality Date    BACK SURGERY      MALIGNANT SKIN LESION EXCISION      left  shoulder and armpit    TONSILLECTOMY       Family History   Problem Relation Name Age of Onset    Arthritis Mother Patrizia Marrufo     Alcohol abuse Father      Cirrhosis Father      Cervical cancer Sister Kaity Rebolledo     Hypertension Sister Kaity Rebolledo     Other (bladder cancer) Maternal Grandmother Coral Ferrera         SOCIAL HISTORY  {SOC HX:54715}   Social History:  reports that he quit smoking about 10 months ago. His smoking use included cigarettes. He started smoking about 40 years ago. He has a 20 pack-year smoking history. He has never used smokeless tobacco. He reports that he does not drink alcohol and does not use drugs.  ***    REVIEW OF SYSTEMS  Review of systems negative unless noted in HPI.       Objective     Palliative Performance Scale % (PPS) ***     Labs:  No results found for this or any previous visit (from the past 96 hours).      Pain Management Panel           No data to display                 Medications:   Current Outpatient Medications   Medication Instructions    acetaminophen (TYLENOL) 975 mg, oral, Every 8 hours    albuterol 90 mcg/actuation inhaler 2 puffs, 4 times daily PRN    budesonide-glycopyr-formoterol (BREZTRI) 160-9-4.8 mcg/actuation HFA aerosol inhaler 2 puffs, 2 times daily RT    cholecalciferol (VITAMIN D-3) 50 mcg, Daily    dapsone 100 mg, oral, Daily    dexAMETHasone (DECADRON) 4 mg, oral, Every 6 hours scheduled    dexAMETHasone (DECADRON) 4 mg, oral, Every 12 hours scheduled    diazePAM (Valium) 5 mg tablet Take 1.5 tablets (7.5 mg) by mouth once daily in the morning. Take before meals AND 1 tablet (5 mg) once daily at bedtime. Do all this for 10 days.    ferrous sulfate 325 mg, oral, Daily with breakfast    furosemide (LASIX) 40 mg, Daily    hydrocortisone (CORTEF) 10 mg, oral, Daily, Take daily every afternoon    memantine (Namenda) 5 mg tablet 2 tablets, 2 times daily    Mucus Relief  mg, oral, 2 times daily PRN, Do not crush, chew, or split.    ondansetron ODT  (ZOFRAN-ODT) 8 mg, 3 times daily PRN    oxyCODONE (ROXICODONE) 10 mg, oral, Every 3 hours PRN    oxyCODONE ER (OXYCONTIN) 10 mg, 2 times daily    polyethylene glycol (GLYCOLAX, MIRALAX) 17 g, oral, Daily PRN    pregabalin (LYRICA) 200 mg, 2 times daily    propranolol LA (INDERAL LA) 120 mg, 2 times daily    propranolol LA (INDERAL LA) 120 mg, oral, 2 times daily, Do not crush, chew, or split.    rosuvastatin (CRESTOR) 10 mg, Daily    sertraline (ZOLOFT) 200 mg, Daily    tadalafil (CIALIS) 5 mg, Daily       Allergies:   Allergies   Allergen Reactions    Penicillin Anaphylaxis    Bactrim [Sulfamethoxazole-Trimethoprim] Nausea/vomiting    Buspirone GI Upset       PHYSICAL EXAMINATION  Vital Signs:   Vital signs reviewed  There were no vitals filed for this visit.  Pain Score:           Physical Exam    ASSESSMENT/PLAN    Pain  Pain is: { :79187}  Type: { :12021}  Pain control: { :65093}  Intolerances/previously tried: ***  Personalized pain goal: ***  Pain Plan:  Increase   Oxycontin 10mg BID   Oxycodone 10mg Q3 PRN   Dex 4mg   Lyrica 200mg BID     Opioid Use  Medication Management:   - OARRS report reviewed with no aberrant behavior; consistent with  prescriptions/records and patient history  - MED ***.  Overdose Risk Score 580.   This has been discussed with patient.   - We will continue to closely monitor the patient for signs of prescription misuse including UDS, OARRS review and subjective reports at each visit.  - *** concurrent benzodiazepine use   - I am a provider who is certified in Hospice and Palliative Medicine and have conducted a face-face visit and examination for this patient.  - Routine Urine Drug Screen is needed and was completed on *** appropriately positive for opioids and negative for illicit substances  - Controlled Substance Agreement: is needed. Completed on: ***  - Specifically discussed that controlled substance prescriptions will only be provided by our group as outlined in the completed  agreement  - Naloxone {IS/ IS NOT:63749} needed  - Red Flags: ***     Nausea   { :84982} nausea {with or without:54733} vomiting related to { :82696}  Nausea Plan:  Continue Zofran Prn     Constipation   At risk for constipation related to opioids, ***   LBM ***  Constipation Plan:  Continue Miralax     Altered Mood  {Acute/Chronic:74351} {anxiety/depression:98563} related to {related to:72856}   Mood Plan:  Continue Valium 7.5mg in the AM, 5mg in the afternoon, and 5mg at bedtime   Continue Zoloft 200mg daily       Decreased appetite  Related to { :86721}  Reports weight loss over the past year of *** pounds   Anorexia Plan:   Continue     Introduction to Supportive and Palliative Oncology:  Introduced the role and philosophy of Supportive and Palliative oncology in the evaluation and management of symptoms during cancer treatment  Palliative care was introduced as a service for patients with serious illness to help with symptoms, assist with goals of care conversations, navigate complex decision making, improve quality of life for patients, and provide support both patients and families.  Patient seemed to appreciate the extra layer of support.      Advance Directives  Existence of Advance Directives:Yes, documentation or copy in medical record  Decision maker: FRANCISCO is Mason rodriguez  Code Status: DNR    Next Follow-Up Visit:  Return to clinic in ***    Signature and billing  Thank you for allowing us to participate in the care of this patient. Recommendations will be communicated back to the consulting service by way of shared electronic medical record or face-to-face.    Medical complexity was {medical complexity:15643} level due to due to complexity of problems, extensive data review, and high risk of management/treatment.  Time was spent on the following: {time spent:63305}. Total time spent: ***      DATA   Diagnostic tests and information reviewed for today's visit:  { :71102}           SIGNATURE: Salvador MACKEY  ANNELISE Morales-CNS    Contact information:  Supportive and Palliative Oncology  Monday-Friday 8 AM-5 PM  Phone:  [phone], press option #5, then option #1.   Or Epic Secure Chat

## 2025-04-01 ENCOUNTER — TELEPHONE (OUTPATIENT)
Dept: HEMATOLOGY/ONCOLOGY | Facility: HOSPITAL | Age: 60
End: 2025-04-01
Payer: MEDICARE

## 2025-04-01 NOTE — TELEPHONE ENCOUNTER
Called and left message for a call back about his upcoming scan and asked him to call back.    Wanting to know [facility] so I can request the images before his FUV on 4/11.    Sri MANDEL RN

## 2025-04-03 NOTE — TELEPHONE ENCOUNTER
Unable to contact Manuel.   General message left on unidentified voicemail instructing him to call us back.

## 2025-04-06 ENCOUNTER — APPOINTMENT (OUTPATIENT)
Dept: RADIOLOGY | Facility: HOSPITAL | Age: 60
End: 2025-04-06
Payer: OTHER GOVERNMENT

## 2025-04-06 ENCOUNTER — APPOINTMENT (OUTPATIENT)
Dept: CARDIOLOGY | Facility: HOSPITAL | Age: 60
End: 2025-04-06
Payer: OTHER GOVERNMENT

## 2025-04-06 ENCOUNTER — HOSPITAL ENCOUNTER (EMERGENCY)
Facility: HOSPITAL | Age: 60
Discharge: ANOTHER HOSPITAL | End: 2025-04-06
Attending: EMERGENCY MEDICINE
Payer: OTHER GOVERNMENT

## 2025-04-06 ENCOUNTER — HOSPITAL ENCOUNTER (INPATIENT)
Facility: HOSPITAL | Age: 60
End: 2025-04-06
Attending: INTERNAL MEDICINE | Admitting: INTERNAL MEDICINE
Payer: MEDICARE

## 2025-04-06 VITALS
TEMPERATURE: 98.4 F | HEART RATE: 101 BPM | OXYGEN SATURATION: 96 % | DIASTOLIC BLOOD PRESSURE: 59 MMHG | SYSTOLIC BLOOD PRESSURE: 107 MMHG | WEIGHT: 278 LBS | RESPIRATION RATE: 23 BRPM | HEIGHT: 68 IN | BODY MASS INDEX: 42.13 KG/M2

## 2025-04-06 DIAGNOSIS — K59.00 CONSTIPATION, UNSPECIFIED CONSTIPATION TYPE: ICD-10-CM

## 2025-04-06 DIAGNOSIS — C79.31 MALIGNANT MELANOMA METASTATIC TO BRAIN (MULTI): Primary | ICD-10-CM

## 2025-04-06 DIAGNOSIS — Z79.52 CURRENT USE OF STEROID MEDICATION: ICD-10-CM

## 2025-04-06 DIAGNOSIS — G44.89 OTHER HEADACHE SYNDROME: Primary | ICD-10-CM

## 2025-04-06 DIAGNOSIS — E61.1 IRON DEFICIENCY: ICD-10-CM

## 2025-04-06 DIAGNOSIS — C43.9 METASTATIC MELANOMA (MULTI): ICD-10-CM

## 2025-04-06 LAB
ALBUMIN SERPL BCP-MCNC: 3.6 G/DL (ref 3.4–5)
ALP SERPL-CCNC: 79 U/L (ref 33–120)
ALT SERPL W P-5'-P-CCNC: 49 U/L (ref 10–52)
ANION GAP BLDV CALCULATED.4IONS-SCNC: 9 MMOL/L (ref 10–25)
ANION GAP SERPL CALC-SCNC: 18 MMOL/L (ref 10–20)
AST SERPL W P-5'-P-CCNC: 34 U/L (ref 9–39)
BASE EXCESS BLDV CALC-SCNC: 1 MMOL/L (ref -2–3)
BASOPHILS # BLD AUTO: 0.06 X10*3/UL (ref 0–0.1)
BASOPHILS NFR BLD AUTO: 0.5 %
BILIRUB SERPL-MCNC: 0.5 MG/DL (ref 0–1.2)
BNP SERPL-MCNC: 12 PG/ML (ref 0–99)
BODY TEMPERATURE: 37 DEGREES CELSIUS
BUN SERPL-MCNC: 11 MG/DL (ref 6–23)
CA-I BLDV-SCNC: 1.18 MMOL/L (ref 1.1–1.33)
CALCIUM SERPL-MCNC: 8.9 MG/DL (ref 8.6–10.3)
CARDIAC TROPONIN I PNL SERPL HS: 10 NG/L (ref 0–20)
CARDIAC TROPONIN I PNL SERPL HS: 9 NG/L (ref 0–20)
CHLORIDE BLDV-SCNC: 110 MMOL/L (ref 98–107)
CHLORIDE SERPL-SCNC: 108 MMOL/L (ref 98–107)
CO2 SERPL-SCNC: 23 MMOL/L (ref 21–32)
CREAT SERPL-MCNC: 1.02 MG/DL (ref 0.5–1.3)
EGFRCR SERPLBLD CKD-EPI 2021: 85 ML/MIN/1.73M*2
EOSINOPHIL # BLD AUTO: 0.11 X10*3/UL (ref 0–0.7)
EOSINOPHIL NFR BLD AUTO: 0.9 %
ERYTHROCYTE [DISTWIDTH] IN BLOOD BY AUTOMATED COUNT: 22.5 % (ref 11.5–14.5)
FERRITIN SERPL-MCNC: 74 NG/ML (ref 20–300)
GLUCOSE BLDV-MCNC: 102 MG/DL (ref 74–99)
GLUCOSE SERPL-MCNC: 100 MG/DL (ref 74–99)
HCO3 BLDV-SCNC: 25.1 MMOL/L (ref 22–26)
HCT VFR BLD AUTO: 33.8 % (ref 41–52)
HCT VFR BLD EST: 29 % (ref 41–52)
HGB BLD-MCNC: 9.9 G/DL (ref 13.5–17.5)
HGB BLDV-MCNC: 9.7 G/DL (ref 13.5–17.5)
IMM GRANULOCYTES # BLD AUTO: 0.35 X10*3/UL (ref 0–0.7)
IMM GRANULOCYTES NFR BLD AUTO: 2.8 % (ref 0–0.9)
INHALED O2 CONCENTRATION: 0 %
IRON SATN MFR SERPL: ABNORMAL %
IRON SERPL-MCNC: <10 UG/DL (ref 35–150)
LACTATE BLDV-SCNC: 1.2 MMOL/L (ref 0.4–2)
LIPASE SERPL-CCNC: 16 U/L (ref 9–82)
LYMPHOCYTES # BLD AUTO: 2.6 X10*3/UL (ref 1.2–4.8)
LYMPHOCYTES NFR BLD AUTO: 21 %
MAGNESIUM SERPL-MCNC: 1.95 MG/DL (ref 1.6–2.4)
MCH RBC QN AUTO: 21 PG (ref 26–34)
MCHC RBC AUTO-ENTMCNC: 29.3 G/DL (ref 32–36)
MCV RBC AUTO: 72 FL (ref 80–100)
MONOCYTES # BLD AUTO: 0.86 X10*3/UL (ref 0.1–1)
MONOCYTES NFR BLD AUTO: 6.9 %
NEUTROPHILS # BLD AUTO: 8.42 X10*3/UL (ref 1.2–7.7)
NEUTROPHILS NFR BLD AUTO: 67.9 %
NRBC BLD-RTO: 0 /100 WBCS (ref 0–0)
OVALOCYTES BLD QL SMEAR: NORMAL
OXYHGB MFR BLDV: 74.2 % (ref 45–75)
PCO2 BLDV: 37 MM HG (ref 41–51)
PH BLDV: 7.44 PH (ref 7.33–7.43)
PHOSPHATE SERPL-MCNC: 3.2 MG/DL (ref 2.5–4.9)
PLATELET # BLD AUTO: 285 X10*3/UL (ref 150–450)
PO2 BLDV: 46 MM HG (ref 35–45)
POLYCHROMASIA BLD QL SMEAR: NORMAL
POTASSIUM BLDV-SCNC: 4.6 MMOL/L (ref 3.5–5.3)
POTASSIUM SERPL-SCNC: 3.9 MMOL/L (ref 3.5–5.3)
PROT SERPL-MCNC: 6.7 G/DL (ref 6.4–8.2)
RBC # BLD AUTO: 4.72 X10*6/UL (ref 4.5–5.9)
RBC MORPH BLD: NORMAL
SAO2 % BLDV: 76 % (ref 45–75)
SODIUM BLDV-SCNC: 139 MMOL/L (ref 136–145)
SODIUM SERPL-SCNC: 145 MMOL/L (ref 136–145)
TIBC SERPL-MCNC: ABNORMAL UG/DL
UIBC SERPL-MCNC: 310 UG/DL (ref 110–370)
WBC # BLD AUTO: 12.4 X10*3/UL (ref 4.4–11.3)

## 2025-04-06 PROCEDURE — 2500000001 HC RX 250 WO HCPCS SELF ADMINISTERED DRUGS (ALT 637 FOR MEDICARE OP)

## 2025-04-06 PROCEDURE — 70450 CT HEAD/BRAIN W/O DYE: CPT | Performed by: STUDENT IN AN ORGANIZED HEALTH CARE EDUCATION/TRAINING PROGRAM

## 2025-04-06 PROCEDURE — 84100 ASSAY OF PHOSPHORUS: CPT | Performed by: EMERGENCY MEDICINE

## 2025-04-06 PROCEDURE — 71275 CT ANGIOGRAPHY CHEST: CPT

## 2025-04-06 PROCEDURE — 87081 CULTURE SCREEN ONLY: CPT

## 2025-04-06 PROCEDURE — 82728 ASSAY OF FERRITIN: CPT

## 2025-04-06 PROCEDURE — 2500000005 HC RX 250 GENERAL PHARMACY W/O HCPCS: Performed by: INTERNAL MEDICINE

## 2025-04-06 PROCEDURE — 96365 THER/PROPH/DIAG IV INF INIT: CPT

## 2025-04-06 PROCEDURE — 36415 COLL VENOUS BLD VENIPUNCTURE: CPT | Performed by: EMERGENCY MEDICINE

## 2025-04-06 PROCEDURE — 99222 1ST HOSP IP/OBS MODERATE 55: CPT | Performed by: NEUROLOGICAL SURGERY

## 2025-04-06 PROCEDURE — 83550 IRON BINDING TEST: CPT

## 2025-04-06 PROCEDURE — 1200000003 HC ONCOLOGY  ROOM WITH TELEMETRY DAILY

## 2025-04-06 PROCEDURE — 71045 X-RAY EXAM CHEST 1 VIEW: CPT | Performed by: RADIOLOGY

## 2025-04-06 PROCEDURE — 87449 NOS EACH ORGANISM AG IA: CPT

## 2025-04-06 PROCEDURE — 93005 ELECTROCARDIOGRAM TRACING: CPT

## 2025-04-06 PROCEDURE — 96361 HYDRATE IV INFUSION ADD-ON: CPT

## 2025-04-06 PROCEDURE — 96374 THER/PROPH/DIAG INJ IV PUSH: CPT | Mod: 59

## 2025-04-06 PROCEDURE — 83690 ASSAY OF LIPASE: CPT | Performed by: EMERGENCY MEDICINE

## 2025-04-06 PROCEDURE — 70450 CT HEAD/BRAIN W/O DYE: CPT

## 2025-04-06 PROCEDURE — 87635 SARS-COV-2 COVID-19 AMP PRB: CPT

## 2025-04-06 PROCEDURE — 74177 CT ABD & PELVIS W/CONTRAST: CPT

## 2025-04-06 PROCEDURE — 87040 BLOOD CULTURE FOR BACTERIA: CPT

## 2025-04-06 PROCEDURE — 36415 COLL VENOUS BLD VENIPUNCTURE: CPT

## 2025-04-06 PROCEDURE — 84132 ASSAY OF SERUM POTASSIUM: CPT | Performed by: EMERGENCY MEDICINE

## 2025-04-06 PROCEDURE — 2500000002 HC RX 250 W HCPCS SELF ADMINISTERED DRUGS (ALT 637 FOR MEDICARE OP, ALT 636 FOR OP/ED)

## 2025-04-06 PROCEDURE — 94640 AIRWAY INHALATION TREATMENT: CPT

## 2025-04-06 PROCEDURE — 2500000004 HC RX 250 GENERAL PHARMACY W/ HCPCS (ALT 636 FOR OP/ED): Performed by: INTERNAL MEDICINE

## 2025-04-06 PROCEDURE — 87634 RSV DNA/RNA AMP PROBE: CPT

## 2025-04-06 PROCEDURE — 71045 X-RAY EXAM CHEST 1 VIEW: CPT

## 2025-04-06 PROCEDURE — 85025 COMPLETE CBC W/AUTO DIFF WBC: CPT | Performed by: EMERGENCY MEDICINE

## 2025-04-06 PROCEDURE — 83880 ASSAY OF NATRIURETIC PEPTIDE: CPT | Performed by: EMERGENCY MEDICINE

## 2025-04-06 PROCEDURE — 87899 AGENT NOS ASSAY W/OPTIC: CPT

## 2025-04-06 PROCEDURE — 2500000004 HC RX 250 GENERAL PHARMACY W/ HCPCS (ALT 636 FOR OP/ED): Performed by: EMERGENCY MEDICINE

## 2025-04-06 PROCEDURE — 2500000004 HC RX 250 GENERAL PHARMACY W/ HCPCS (ALT 636 FOR OP/ED)

## 2025-04-06 PROCEDURE — 84484 ASSAY OF TROPONIN QUANT: CPT | Performed by: EMERGENCY MEDICINE

## 2025-04-06 PROCEDURE — 84145 PROCALCITONIN (PCT): CPT

## 2025-04-06 PROCEDURE — 83735 ASSAY OF MAGNESIUM: CPT | Performed by: EMERGENCY MEDICINE

## 2025-04-06 PROCEDURE — 83036 HEMOGLOBIN GLYCOSYLATED A1C: CPT | Mod: PORLAB

## 2025-04-06 PROCEDURE — 87636 SARSCOV2 & INF A&B AMP PRB: CPT

## 2025-04-06 PROCEDURE — 83540 ASSAY OF IRON: CPT

## 2025-04-06 PROCEDURE — 99285 EMERGENCY DEPT VISIT HI MDM: CPT | Mod: 25 | Performed by: EMERGENCY MEDICINE

## 2025-04-06 PROCEDURE — 2500000001 HC RX 250 WO HCPCS SELF ADMINISTERED DRUGS (ALT 637 FOR MEDICARE OP): Performed by: EMERGENCY MEDICINE

## 2025-04-06 PROCEDURE — 2550000001 HC RX 255 CONTRASTS: Performed by: EMERGENCY MEDICINE

## 2025-04-06 PROCEDURE — 87641 MR-STAPH DNA AMP PROBE: CPT

## 2025-04-06 RX ORDER — POLYETHYLENE GLYCOL 3350 17 G/17G
17 POWDER, FOR SOLUTION ORAL DAILY
Status: DISCONTINUED | OUTPATIENT
Start: 2025-04-06 | End: 2025-04-06

## 2025-04-06 RX ORDER — PANTOPRAZOLE SODIUM 40 MG/1
40 TABLET, DELAYED RELEASE ORAL
Status: DISCONTINUED | OUTPATIENT
Start: 2025-04-06 | End: 2025-04-17 | Stop reason: HOSPADM

## 2025-04-06 RX ORDER — ENOXAPARIN SODIUM 100 MG/ML
40 INJECTION SUBCUTANEOUS EVERY 12 HOURS SCHEDULED
Status: DISCONTINUED | OUTPATIENT
Start: 2025-04-06 | End: 2025-04-17 | Stop reason: HOSPADM

## 2025-04-06 RX ORDER — NALOXONE HYDROCHLORIDE 0.4 MG/ML
0.2 INJECTION, SOLUTION INTRAMUSCULAR; INTRAVENOUS; SUBCUTANEOUS EVERY 5 MIN PRN
Status: DISCONTINUED | OUTPATIENT
Start: 2025-04-06 | End: 2025-04-17 | Stop reason: HOSPADM

## 2025-04-06 RX ORDER — MEMANTINE HYDROCHLORIDE 10 MG/1
10 TABLET ORAL 2 TIMES DAILY
Status: DISCONTINUED | OUTPATIENT
Start: 2025-04-06 | End: 2025-04-17 | Stop reason: HOSPADM

## 2025-04-06 RX ORDER — SERTRALINE HYDROCHLORIDE 100 MG/1
200 TABLET, FILM COATED ORAL DAILY
Status: DISCONTINUED | OUTPATIENT
Start: 2025-04-06 | End: 2025-04-17 | Stop reason: HOSPADM

## 2025-04-06 RX ORDER — OXYCODONE HYDROCHLORIDE 10 MG/1
10 TABLET ORAL EVERY 4 HOURS PRN
Status: DISCONTINUED | OUTPATIENT
Start: 2025-04-06 | End: 2025-04-07

## 2025-04-06 RX ORDER — DAPSONE 100 MG/1
100 TABLET ORAL DAILY
Status: DISCONTINUED | OUTPATIENT
Start: 2025-04-06 | End: 2025-04-17 | Stop reason: HOSPADM

## 2025-04-06 RX ORDER — ALBUTEROL SULFATE 0.83 MG/ML
2.5 SOLUTION RESPIRATORY (INHALATION) EVERY 6 HOURS PRN
Status: DISCONTINUED | OUTPATIENT
Start: 2025-04-06 | End: 2025-04-17 | Stop reason: HOSPADM

## 2025-04-06 RX ORDER — ACETAMINOPHEN 325 MG/1
975 TABLET ORAL ONCE
Status: COMPLETED | OUTPATIENT
Start: 2025-04-06 | End: 2025-04-06

## 2025-04-06 RX ORDER — IPRATROPIUM BROMIDE AND ALBUTEROL SULFATE 2.5; .5 MG/3ML; MG/3ML
3 SOLUTION RESPIRATORY (INHALATION)
Status: DISCONTINUED | OUTPATIENT
Start: 2025-04-06 | End: 2025-04-07

## 2025-04-06 RX ORDER — CHOLECALCIFEROL (VITAMIN D3) 25 MCG
2000 TABLET ORAL DAILY
Status: DISCONTINUED | OUTPATIENT
Start: 2025-04-06 | End: 2025-04-17 | Stop reason: HOSPADM

## 2025-04-06 RX ORDER — ONDANSETRON HYDROCHLORIDE 2 MG/ML
4 INJECTION, SOLUTION INTRAVENOUS ONCE
Status: COMPLETED | OUTPATIENT
Start: 2025-04-06 | End: 2025-04-06

## 2025-04-06 RX ORDER — DEXAMETHASONE 4 MG/1
4 TABLET ORAL EVERY 12 HOURS SCHEDULED
Status: DISCONTINUED | OUTPATIENT
Start: 2025-04-06 | End: 2025-04-06

## 2025-04-06 RX ORDER — OXYCODONE HYDROCHLORIDE 5 MG/1
5 TABLET ORAL ONCE
Status: COMPLETED | OUTPATIENT
Start: 2025-04-06 | End: 2025-04-06

## 2025-04-06 RX ORDER — LEVOFLOXACIN 500 MG/1
500 TABLET, FILM COATED ORAL EVERY 24 HOURS
Status: COMPLETED | OUTPATIENT
Start: 2025-04-06 | End: 2025-04-10

## 2025-04-06 RX ORDER — PROPRANOLOL HYDROCHLORIDE 120 MG/1
120 CAPSULE, EXTENDED RELEASE ORAL 2 TIMES DAILY
Status: DISCONTINUED | OUTPATIENT
Start: 2025-04-06 | End: 2025-04-17 | Stop reason: HOSPADM

## 2025-04-06 RX ORDER — OXYCODONE HYDROCHLORIDE 5 MG/1
5 TABLET ORAL EVERY 6 HOURS PRN
Status: DISCONTINUED | OUTPATIENT
Start: 2025-04-06 | End: 2025-04-07

## 2025-04-06 RX ORDER — ROSUVASTATIN CALCIUM 10 MG/1
10 TABLET, COATED ORAL DAILY
Status: DISCONTINUED | OUTPATIENT
Start: 2025-04-06 | End: 2025-04-17 | Stop reason: HOSPADM

## 2025-04-06 RX ORDER — VANCOMYCIN HYDROCHLORIDE 1 G/20ML
INJECTION, POWDER, LYOPHILIZED, FOR SOLUTION INTRAVENOUS DAILY PRN
Status: DISCONTINUED | OUTPATIENT
Start: 2025-04-06 | End: 2025-04-07

## 2025-04-06 RX ORDER — POLYETHYLENE GLYCOL 3350 17 G/17G
17 POWDER, FOR SOLUTION ORAL DAILY PRN
Status: DISCONTINUED | OUTPATIENT
Start: 2025-04-06 | End: 2025-04-10

## 2025-04-06 RX ADMIN — Medication 2000 UNITS: at 21:51

## 2025-04-06 RX ADMIN — ROSUVASTATIN CALCIUM 10 MG: 10 TABLET, FILM COATED ORAL at 22:52

## 2025-04-06 RX ADMIN — MEMANTINE 10 MG: 10 TABLET ORAL at 21:49

## 2025-04-06 RX ADMIN — OXYCODONE HYDROCHLORIDE 5 MG: 5 TABLET ORAL at 21:48

## 2025-04-06 RX ADMIN — ACETAMINOPHEN 975 MG: 325 TABLET ORAL at 08:56

## 2025-04-06 RX ADMIN — IPRATROPIUM BROMIDE AND ALBUTEROL SULFATE 3 ML: .5; 3 SOLUTION RESPIRATORY (INHALATION) at 22:25

## 2025-04-06 RX ADMIN — IOHEXOL 100 ML: 350 INJECTION, SOLUTION INTRAVENOUS at 11:00

## 2025-04-06 RX ADMIN — PANTOPRAZOLE SODIUM 40 MG: 40 TABLET, DELAYED RELEASE ORAL at 21:51

## 2025-04-06 RX ADMIN — DEXAMETHASONE SODIUM PHOSPHATE 10 MG: 4 INJECTION, SOLUTION INTRA-ARTICULAR; INTRALESIONAL; INTRAMUSCULAR; INTRAVENOUS; SOFT TISSUE at 21:49

## 2025-04-06 RX ADMIN — LEVOFLOXACIN 500 MG: 500 TABLET, FILM COATED ORAL at 21:48

## 2025-04-06 RX ADMIN — PREGABALIN 200 MG: 25 CAPSULE ORAL at 21:49

## 2025-04-06 RX ADMIN — DOXYCYCLINE 100 MG: 100 INJECTION, POWDER, LYOPHILIZED, FOR SOLUTION INTRAVENOUS at 13:05

## 2025-04-06 RX ADMIN — ONDANSETRON 4 MG: 2 INJECTION INTRAMUSCULAR; INTRAVENOUS at 08:56

## 2025-04-06 RX ADMIN — DAPSONE 100 MG: 100 TABLET ORAL at 21:48

## 2025-04-06 RX ADMIN — POLYETHYLENE GLYCOL 3350 17 G: 17 POWDER, FOR SOLUTION ORAL at 21:50

## 2025-04-06 RX ADMIN — OXYCODONE HYDROCHLORIDE 5 MG: 5 TABLET ORAL at 14:58

## 2025-04-06 RX ADMIN — SODIUM CHLORIDE 1000 ML: 0.9 INJECTION, SOLUTION INTRAVENOUS at 08:56

## 2025-04-06 RX ADMIN — SERTRALINE 200 MG: 100 TABLET, FILM COATED ORAL at 21:49

## 2025-04-06 RX ADMIN — VANCOMYCIN HYDROCHLORIDE 1250 MG: 5 INJECTION, POWDER, LYOPHILIZED, FOR SOLUTION INTRAVENOUS at 21:50

## 2025-04-06 ASSESSMENT — LIFESTYLE VARIABLES
EVER FELT BAD OR GUILTY ABOUT YOUR DRINKING: NO
HAVE PEOPLE ANNOYED YOU BY CRITICIZING YOUR DRINKING: NO
TOTAL SCORE: 0
HAVE YOU EVER FELT YOU SHOULD CUT DOWN ON YOUR DRINKING: NO
EVER HAD A DRINK FIRST THING IN THE MORNING TO STEADY YOUR NERVES TO GET RID OF A HANGOVER: NO

## 2025-04-06 ASSESSMENT — COGNITIVE AND FUNCTIONAL STATUS - GENERAL
TURNING FROM BACK TO SIDE WHILE IN FLAT BAD: A LITTLE
DAILY ACTIVITIY SCORE: 18
MOVING FROM LYING ON BACK TO SITTING ON SIDE OF FLAT BED WITH BEDRAILS: A LITTLE
MOVING TO AND FROM BED TO CHAIR: A LITTLE
CLIMB 3 TO 5 STEPS WITH RAILING: A LITTLE
DRESSING REGULAR UPPER BODY CLOTHING: A LITTLE
STANDING UP FROM CHAIR USING ARMS: A LITTLE
WALKING IN HOSPITAL ROOM: A LITTLE
EATING MEALS: A LITTLE
DRESSING REGULAR LOWER BODY CLOTHING: A LITTLE
TOILETING: A LITTLE
HELP NEEDED FOR BATHING: A LITTLE
MOBILITY SCORE: 18
PERSONAL GROOMING: A LITTLE

## 2025-04-06 ASSESSMENT — PAIN SCALES - GENERAL
PAINLEVEL_OUTOF10: 4
PAINLEVEL_OUTOF10: 4
PAINLEVEL_OUTOF10: 8

## 2025-04-06 ASSESSMENT — COLUMBIA-SUICIDE SEVERITY RATING SCALE - C-SSRS
2. HAVE YOU ACTUALLY HAD ANY THOUGHTS OF KILLING YOURSELF?: NO
1. IN THE PAST MONTH, HAVE YOU WISHED YOU WERE DEAD OR WISHED YOU COULD GO TO SLEEP AND NOT WAKE UP?: NO
6. HAVE YOU EVER DONE ANYTHING, STARTED TO DO ANYTHING, OR PREPARED TO DO ANYTHING TO END YOUR LIFE?: NO

## 2025-04-06 ASSESSMENT — PAIN DESCRIPTION - LOCATION
LOCATION: HEAD
LOCATION: HEAD

## 2025-04-06 ASSESSMENT — PAIN DESCRIPTION - PAIN TYPE: TYPE: CHRONIC PAIN

## 2025-04-06 ASSESSMENT — PAIN - FUNCTIONAL ASSESSMENT: PAIN_FUNCTIONAL_ASSESSMENT: 0-10

## 2025-04-06 NOTE — CONSULTS
Vancomycin Dosing by Pharmacy- INITIAL    Manuel LOBO Marrufo is a 60 y.o. year old male who Pharmacy has been consulted for vancomycin dosing for pneumonia. Based on the patient's indication and renal status this patient will be dosed based on a goal AUC of 400-600.     Renal function is currently stable.    Visit Vitals  BP (!) 147/97 (BP Location: Left arm, Patient Position: Lying) Comment: RN notified   Pulse 110   Temp 36.7 °C (98.1 °F) (Temporal)   Resp 22        Lab Results   Component Value Date    CREATININE 1.02 2025    CREATININE 1.16 2025    CREATININE 1.03 2025    CREATININE 1.25 2025        Patient weight is as follows: There were no vitals filed for this visit.    Cultures:  No results found for the encounter in last 14 days.        No intake/output data recorded.  I/O during current shift:  No intake/output data recorded.    Temp (24hrs), Av.8 °C (98.3 °F), Min:36.7 °C (98.1 °F), Max:36.9 °C (98.4 °F)         Assessment/Plan     Patient will not be given a loading dose.  Will initiate vancomycin maintenance,  1250 mg every 12 hours.    This dosing regimen is predicted by [employer] to result in the following pharmacokinetic parameters:  Loading dose: N/A  Regimen: 1250 mg IV every 12 hours.  Start time: 19:16 on 2025  Exposure target: AUC24 (range)400-600 mg/L.hr   [id number]: 460 mg/L.hr  AUC24,ss: 482 mg/L.hr  Probability of AUC24 > 400: 64 %  Ctrough,ss: 12.6 mg/L  Probability of Ctrough,ss > 20: 29 %    Follow-up level will be ordered on  at AM labs unless clinically indicated sooner.  Will continue to monitor renal function daily while on vancomycin and order serum creatinine at least every 48 hours if not already ordered.  Follow for continued vancomycin needs, clinical response, and signs/symptoms of toxicity.       Issac Avendano, PharmD

## 2025-04-06 NOTE — SIGNIFICANT EVENT
Senior Staffing Note    HPI:  Manuel Marrufo is a 60 y.o. male with PMHx s/f metastatic melanoma, to brain and lung (s/p two cycles of dual immunotherapy with nivulumab and ipilumab, last dose 1/10 and palliative WBRT 12/14/24), (s/p left shoulder resection 2019), spinal stenosis (s/p L4-S1 lumbar decompression 2021), HTN, HLD, nonalcoholic fatty liver disease, obesity, FRANK on CPAP, PTSD, GERD, COPD, depression s/p vagal nerve stimulator (SA x2, 2007) presenting with worsening headache for 6 months    The patient is a poor historian and slow to respond to questions. He reports worsening headaches over the past six months and proximal lower extremity weakness. He also notes an increase in sputum production without any change in color. He denies fever or chills. The patient recalls experiencing both diarrhea and constipation approximately three weeks ago. [facility], [facility] primary Oncologist: Dr. Schaefer. He is uncertain about the timing of his last immunotherapy session.    The patient presented to [facility] with an enlarged right parietal metastatic brain lesion (now 1.7 cm) and increased surrounding vasogenic edema compared to the 03/03/2025 CT, intralesional hemorrhage is possible. 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 The patient was transferred to [facility] for further evaluation.     Per chart review:  The [facility] but [facility]. [facility], a mass was later discovered during a lung cancer screening in the right lower lobe, leading to a PET scan that confirmed malignancy. An attempt at robotic surgery was aborted upon discovering metastatic implants in the intrathoracic area, with pathology confirming metastatic  melanoma. He was admitted to [facility] in November 2024 due to concerns of hemorrhagic brain metastases identified on a CT scan after [facility] with [facility], resulting in his transfer to . He was started on steroids and underwent an MRI, which revealed diffuse metastatic disease. Consequently, a decision was made to pursue WBRT through the [facility], and he completed ten sessions of palliative WBRT in December 2024. In January, the patient has undergone two cycles of dual immunotherapy with nivolumab and ipilimumab. [facility], while Dr. Schaefer is seeing chemo/immunotherapy.    ROS: 12 point ROS negative unless as per HPI       Allergies:  Allergies   Allergen Reactions    Penicillin Anaphylaxis    Bactrim [Sulfamethoxazole-Trimethoprim] Nausea/vomiting    Buspirone GI Upset       Medications prior to admission:  Prior to Admission medications    Medication Sig Start Date End Date Taking? Authorizing Provider   acetaminophen (Tylenol) 325 mg tablet Take 3 tablets (975 mg) by mouth every 8 hours. 3/5/25   Melanie Melgar MD   albuterol 90 mcg/actuation inhaler Inhale 2 puffs 4 times a day as needed for wheezing or shortness of breath.    Historical Provider, MD   budesonide-glycopyr-formoterol (BREZTRI) 160-9-4.8 mcg/actuation HFA aerosol inhaler Inhale 2 puffs 2 times a day. 4/8/24   Historical Provider, MD   cholecalciferol (Vitamin D-3) 50 MCG (2000 UT) tablet Take 1 tablet (50 mcg) by mouth once daily.    Historical Provider, MD   dapsone 100 mg tablet Take 1 tablet (100 mg) by mouth once daily. 1/31/25   Tali Ribeiro MD   dexAMETHasone (Decadron) 4 mg tablet Take 1 tablet (4 mg) by mouth every 6 hours.  Patient not taking: Reported on 3/14/2025 1/31/25 3/4/25  Tali Ribeiro MD   dexAMETHasone (Decadron) 4 mg tablet Take 1 tablet (4 mg) by mouth every 12 hours. 3/5/25   Melanie Melgar MD   diazePAM (Valium) 5 mg tablet Take 1.5 tablets (7.5 mg) by mouth once daily  in the morning. Take before meals AND 1 tablet (5 mg) once daily at bedtime. Do all this for 10 days. 3/6/25 3/16/25  Melanie Melgar MD   ferrous sulfate tablet Take 1 tablet (325 mg) by mouth once daily with breakfast. 3/6/25   Melanie Melgar MD   furosemide (Lasix) 40 mg tablet Take 1 tablet (40 mg) by mouth once daily. Edema    Historical Provider, MD   guaiFENesin (Mucinex) 600 mg 12 hr tablet Take 1 tablet (600 mg) by mouth 2 times a day as needed for cough. Do not crush, chew, or split. 3/6/25 4/5/25  Melanie Melgar MD   hydrocortisone (Cortef) 10 mg tablet Take 1 tablet (10 mg) by mouth once daily. Take daily every afternoon 3/8/25   Jay Ramirez MD   memantine (Namenda) 5 mg tablet Take 2 tablets (10 mg) by mouth 2 times a day.    Historical Provider, MD   ondansetron ODT (Zofran-ODT) 8 mg disintegrating tablet Dissolve 1 tablet (8 mg) in the mouth 3 times a day as needed for nausea. 11/8/24   Historical Provider, MD   oxyCODONE (Roxicodone) 10 mg immediate release tablet Take 1 tablet (10 mg) by mouth every 3 hours if needed for severe pain (7 - 10). 11/27/24   Mehul Villanueva,    oxyCODONE ER (OxyCONTIN) 10 mg 12 hr tablet Take 1 tablet (10 mg) by mouth 2 times a day. Do not crush, chew, or split.    Historical Provider, MD   polyethylene glycol (Glycolax, Miralax) 17 gram packet Take 17 g by mouth once daily as needed (constipation). 3/5/25   Melanie Melgar MD   pregabalin (Lyrica) 200 mg capsule Take 1 capsule (200 mg) by mouth 2 times a day.    Historical Provider, MD   propranolol LA (Inderal LA) 120 mg 24 hr capsule Take 1 capsule (120 mg) by mouth 2 times a day.  Patient not taking: Reported on 3/14/2025    Historical Provider, MD   propranolol LA (Inderal LA) 120 mg 24 hr capsule Take 1 capsule (120 mg) by mouth 2 times a day. Do not crush, chew, or split. 3/5/25   Melanie Melgar MD   rosuvastatin (Crestor) 10 mg tablet Take 1 tablet (10 mg) by mouth once daily.    Historical  Provider, MD   sertraline (Zoloft) 100 mg tablet Take 2 tablets (200 mg) by mouth once daily.    Historical Provider, MD   tadalafil (Cialis) 5 mg tablet Take 1 tablet (5 mg) by mouth once daily.    Historical Provider, MD         Physical exam:  Constitutional: Obese male in no acute distress, on 2 L NC  HEENT: Normocephalic, atraumatic. PERRL. EOMI. No cervical lymphadenopathy.  Respiratory: Bilateral expiratory wheezes, no rales, or rhonchi. Normal respiratory effort.  Cardiovascular: RRR. No murmurs, gallops, or rubs. No JVD. Radial pulses 2+.  Abdominal: Soft, distended, nontender to palpation. Bowel sounds present. No hepatosplenomegaly or masses. No CVA tenderness.  Neuro: CN II-XII intact. UE and LE strength 5/5 bilaterally and sensation intact. Normal FTN testing.  MSK: 1+ LE edema bilaterally.  Skin: Warm, dry. No rashes or wounds.  Psych: Appropriate mood and affect.      VS:  BP (!) 147/97 (BP Location: Left arm, Patient Position: Lying) Comment: RN notified  Pulse 110   Temp 36.7 °C (98.1 °F) (Temporal)   Resp 22   SpO2 97%      Labs:  Results from last 7 days   Lab Units 04/06/25  0836   SODIUM mmol/L 145   POTASSIUM mmol/L 3.9   CHLORIDE mmol/L 108*   CO2 mmol/L 23   BUN mg/dL 11   CREATININE mg/dL 1.02   CALCIUM mg/dL 8.9      Results from last 7 days   Lab Units 04/06/25  0836   HEMOGLOBIN g/dL 9.9*   WBC AUTO x10*3/uL 12.4*   PLATELETS AUTO x10*3/uL 285   HEMATOCRIT % 33.8*     Results from last 7 days   Lab Units 04/06/25  0836   ALK PHOS U/L 79   BILIRUBIN TOTAL mg/dL 0.5   PROTEIN TOTAL g/dL 6.7   ALT U/L 49   AST U/L 34             Assessment and plan:  Manuel Marrufo is a 60 y.o. male with PMHx s/f metastatic melanoma, to brain and lung (s/p two cycles of dual immunotherapy with nivulumab and ipilumab, last dose 1/10 and palliative WBRT 12/14/24), (s/p left shoulder resection 2019), spinal stenosis (s/p L4-S1 lumbar decompression 2021), HTN, HLD, nonalcoholic fatty liver disease, obesity,  FRANK on CPAP, PTSD, GERD, COPD, depression s/p vagal nerve stimulator (SA x2, 2007) presenting with worsening headache for 6 months    The patient is tachycardic with NSR on EKG, saturating well on 2L NC, and hemodynamically stable. Labs reveal leukocytosis and microcytic anemia (currently on ferrous sulfate). The patient presented with worsening headache and was found on CT head to have an enlarged right parietal metastatic brain lesion (now 1.7 cm) with increased surrounding vasogenic edema compared to the 03/03/2025 CT. Intralesional hemorrhage cannot be ruled out. Neurosurgery was consulted, and the patient was started on dexamethasone per protocol for vasogenic edema. 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 Although there is concerns for multifocal pneumonia, for that a full pneumonia workup was obtained. The patient was started on levofloxacin and vancomycin after obtaining MRSA nares swab due to a history of multiple episodes of CAP in recent months and a known penicillin allergy (anaphylaxis). Blood cultures are pending.      #Right parietal metastatic brain lesion  #Vasogenic edema  :: CT head (4/6): Enlarged to 1.7 cm with increased vasogenic edema; possible intralesional hemorrhage.    Plan:  -Consulted Neurosurgery, appreciate recommendations   -Started dexamethasone 10 mg IV now, followed by 4 mg q6  -NO DVT PPx given concerns for possible intralesional hemorrhage    #Immune checkpoint inhibitor-related pneumonitis   #c/f Multifocal pneumonia  #COPD  ::Grade 2-3 likely, multifocal lung opacities on CT.    Plan:  -Patient is on dexamethasone as noted above.  -Sent procalcitonin, urinary Streptococcus and Legionella antigens, sputum culture, RSV/flu/COVID and blood cultures.  -Started on levofloxacin and vancomycin after obtaining MRSA nares swab due to a history of multiple episodes of  community-acquired pneumonia (CAP) in recent months and a known penicillin allergy (anaphylaxis).  -c/w dapsone for PJP ppx     F: PRN  E: Replete lytes PRN, K>4, Mg >2  Diet: NPO (Pending Neurosurgery recommendations)  Access: PIV  Bowel regimen: Miralax PRN    DVT prophylaxis: SCD only (NO DVT PPx given concerns for possible intralesional hemorrhage)  GI prophylaxis: PPI    Code status: DNR/DNI (Discussed with patient at bedside upon admission)   NOK: KAMINI JOHN (Brother)[phone]     Patient to be discussed with attending physician in AM. Please see excellent H&P by Dr. Hall for comprehensive history and plan.    Robert Montejo MD  Internal Medicine, PGY2

## 2025-04-06 NOTE — ED PROVIDER NOTES
Emergency Department Provider Note        MEDICAL DECISION MAKING:  Medical Decision Making       4/6/25     Chief Complaint   Patient presents with   • Headache   • Weakness, Gen      History/Exam limitations: none.   Additional history was obtained from patient, relative(s), EMS personnel, and past medical records.    HPI: Manuel Marrufo  is a 60 y.o. presents with generalized weakness and fatigue. PMHx s/f metastatic melanoma, to brain and lung (s/p two cycles of dual immunotherapy with nivulumab and ipilumab, last dose 1/10 and palliative WBRT 12/14/24), (s/p left shoulder resection 2019), spinal stenosis (s/p L4-S1 lumbar decompression 2021), HTN, HLD, nonalcoholic fatty liver disease, obesity, FRANK on CPAP, PTSD, GERD, COPD, depression s/p vagal nerve stimulator (SA x2, 2007) presenting with generalized weakness and diarrhea. He has an ongoing diarrhea for several weeks, is worsening now with multiple episodes of runny watery bowel movements.  States he has had decreased oral intake also notes abdominal discomfort and bilateral lower leg swelling.  Spends most of the time in bed.  Patient also complains of headache over the past week that is mild, diffuse, not the worst of his life and not sudden onset.  Symptoms are constant.  Complains of nausea but denies any chest pain, shortness of breath.  Other than abdominal discomfort described as a fullness no actual pain. Pt was hospitalized on 3/3/25-3/6/25 at [facility] for fever and pneumonia. He was started on vancomycin, cefepime and Flagyl and was later de-escalated to Levaquin prior to discharge for total of 5-day course. [facility] prior to that last hospitalization and was treated with 7-day course of Levaquin.  2 weeks since last immunotherapy course.    Past medical records, Past medical history and surgical history reviewed and as documented.  History reviewed and as noted. past medical records reviewed.    Active  Ambulatory Problems     Diagnosis Date Noted   • 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 (Multi) 11/24/2024   • Headache 01/29/2025   • Fever, unspecified fever cause 03/03/2025   • Affective psychosis (CMS-HCC) 03/12/2025   • Benign essential hypertension 02/19/2022   • Benign neoplasm of colon 03/12/2025   • Bipolar 2 disorder (Multi) 03/12/2025   • Chronic obstructive pulmonary disease (Multi) 03/12/2025   • Dysphagia, oropharyngeal phase 03/12/2025   • Gastroesophageal reflux disease without esophagitis 03/12/2025   • Hyperlipidemia 03/12/2025   • History of melanoma 03/12/2025   • Radiculopathy, lumbar region 03/12/2025   • Major depressive disorder, single episode, unspecified 03/12/2025   • Malignant (primary) neoplasm, unspecified (Multi) 03/12/2025   • Nicotine use disorder 08/11/2021   • Morbid (severe) obesity due to excess calories (Multi) 03/12/2025   • Osteoarthritis of ankle or foot 03/12/2025   • Generalized anxiety disorder 03/12/2025   • Secondary malignant neoplasm of right lower lobe of lung (Multi) 03/12/2025   • Generalized weakness 03/12/2025     Resolved Ambulatory Problems     Diagnosis Date Noted   • No Resolved Ambulatory Problems     Past Medical History:   Diagnosis Date   • Agoraphobia with panic attacks    • Anxiety    • Awareness under anesthesia 2021   • COPD (chronic obstructive pulmonary disease) (Multi)    • Fatty liver disease, nonalcoholic    • GERD (gastroesophageal reflux disease)    • HLD (hyperlipidemia)    • Melanoma (Multi)    • FRANK on CPAP    • PTSD (post-traumatic stress disorder)         Past Surgical History:   Procedure Laterality Date   • BACK SURGERY     • MALIGNANT SKIN LESION EXCISION      left shoulder and armpit   • TONSILLECTOMY          Family History   Problem Relation Name Age of Onset   • Arthritis Mother Patrizia Marrufo    • Alcohol abuse Father     • Cirrhosis Father     • Cervical cancer Sister Kaity Rebolledo    • Hypertension Sister Kaity Rebolledo    • Other  "(bladder cancer) Maternal Grandmother Coral Ferrera         Social History     Tobacco Use   • Smoking status: Former     Current packs/day: 0.00     Average packs/day: 0.5 packs/day for 40.1 years (20.0 ttl pk-yrs)     Types: Cigarettes     Start date: 1984     Quit date: 2024     Years since quittin.9   • Smokeless tobacco: Never   Vaping Use   • Vaping status: Never Used   Substance Use Topics   • Alcohol use: Never     Comment: tried it but never been a drinker   • Drug use: Never        Allergies   Allergen Reactions   • Penicillin Anaphylaxis   • Bactrim [Sulfamethoxazole-Trimethoprim] Nausea/vomiting   • Buspirone GI Upset        Unless otherwise stated in this report the patient's positive and negative responses for review of systems for constitutional, eyes, ENT, cardiovascular, respiratory, gastrointestinal, neurological, genitourinary, musculoskeletal, and integument systems and related systems to the presenting problem are either as stated in the HPI or were not pertinent or were negative for the symptoms and/or complaints related to the presenting medical problem.    PHYSICAL EXAM  Triage/nursing notes and vital signs reviewed as available and as noted    Vitals:    25 0811 25 0900   BP: 144/88 136/85   Pulse: (!) 113 (!) 102   Resp: 20 12   Temp: 36.9 °C (98.4 °F)    SpO2: 97% 94%   Weight: 126 kg (278 lb)    Height: 1.727 m (5' 8\")            Constitutional:       Appearance: Patient not ill-appearing or toxic-appearing.   HENT:      Head: Atraumatic.      Mouth/Throat:      Mouth: Mucous membranes are moist.      Pharynx: Oropharynx is clear. No pharyngeal swelling.      Neck: No Obvious JVD. Trachea midline. No neck swelling.  Eyes:      Normal Ocular tracking  Cardiovascular:      Rate and Rhythm: Normal rate and regular rhythm.      Pulses: Normal pulses.      Heart sounds: No murmur heard.  Pulmonary:      Effort: Pulmonary effort is normal.      Breath sounds: Normal breath " sounds.   Abdominal:      General: Bowel sounds are normal.      Palpations: Abdomen is soft.      Tenderness: There is no abdominal tenderness. There is no guarding or rebound.   Musculoskeletal:      Cervical back: Neck supple.      Right lower leg: No tenderness. No edema.      Left lower leg: No tenderness. No edema.   Skin:     General: Skin is warm and dry.      Capillary Refill: Capillary refill takes less than 2 seconds.   Neurological:      General: No focal deficit present.      Mental Status: Patient is alert.  Appropriate conversant     Sensory: Gross Sensation is intact.      Motor: Gross Motor function is intact symmetrically  Psychiatric:         Mood and Affect: Mood normal.      Cooperative, no apparent risk to self or others    ED COURSE        Work-up performed to evaluate for differential diagnosis as clinically indicated   Orders Placed This Encounter   Procedures   • CT head wo IV contrast   • XR chest 1 view   • Blood Gas Venous Full Panel   • CBC and Auto Differential   • Phosphorus   • Magnesium   • Comprehensive metabolic panel   • Lipase   • Troponin I Series, High Sensitivity (0, 1 HR)   • B-Type Natriuretic Peptide   • Troponin I, High Sensitivity, Initial   • Urinalysis with Reflex Culture and Microscopic   • Urinalysis with Reflex Culture and Microscopic   • Extra Urine Gray Tube   • Troponin, High Sensitivity, 1 Hour   • NPO Diet; Effective now   • ECG 12 lead   • Insert and maintain peripheral IV          Labs and imaging reviewed by me  and note         Labs Reviewed   BLOOD GAS VENOUS FULL PANEL - Abnormal       Result Value    POCT pH, Venous 7.44 (*)     POCT pCO2, Venous 37 (*)     POCT pO2, Venous 46 (*)     POCT SO2, Venous 76 (*)     POCT Oxy Hemoglobin, Venous 74.2      POCT Hematocrit Calculated, Venous 29.0 (*)     POCT Sodium, Venous 139      POCT Potassium, Venous 4.6      POCT Chloride, Venous 110 (*)     POCT Ionized Calicum, Venous 1.18      POCT Glucose, Venous 102  (*)     POCT Lactate, Venous 1.2      POCT Base Excess, Venous 1.0      POCT HCO3 Calculated, Venous 25.1      POCT Hemoglobin, Venous 9.7 (*)     POCT Anion Gap, Venous 9.0 (*)     Patient Temperature 37.0      FiO2 0     CBC WITH AUTO DIFFERENTIAL - Abnormal    WBC 12.4 (*)     nRBC 0.0      RBC 4.72      Hemoglobin 9.9 (*)     Hematocrit 33.8 (*)     MCV 72 (*)     MCH 21.0 (*)     MCHC 29.3 (*)     RDW 22.5 (*)     Platelets 285      Neutrophils % 67.9      Immature Granulocytes %, Automated 2.8 (*)     Lymphocytes % 21.0      Monocytes % 6.9      Eosinophils % 0.9      Basophils % 0.5      Neutrophils Absolute 8.42 (*)     Immature Granulocytes Absolute, Automated 0.35      Lymphocytes Absolute 2.60      Monocytes Absolute 0.86      Eosinophils Absolute 0.11      Basophils Absolute 0.06     COMPREHENSIVE METABOLIC PANEL - Abnormal    Glucose 100 (*)     Sodium 145      Potassium 3.9      Chloride 108 (*)     Bicarbonate 23      Anion Gap 18      Urea Nitrogen 11      Creatinine 1.02      eGFR 85      Calcium 8.9      Albumin 3.6      Alkaline Phosphatase 79      Total Protein 6.7      AST 34      Bilirubin, Total 0.5      ALT 49     PHOSPHORUS - Normal    Phosphorus 3.2     MAGNESIUM - Normal    Magnesium 1.95     LIPASE - Normal    Lipase 16      Narrative:     Venipuncture immediately after or during the administration of Metamizole may lead to falsely low results. Testing should be performed immediately prior to Metamizole dosing.   B-TYPE NATRIURETIC PEPTIDE - Normal    BNP 12      Narrative:        <100 pg/mL - Heart failure unlikely  100-299 pg/mL - Intermediate probability of acute heart                  failure exacerbation. Correlate with clinical                  context and patient history.    >=300 pg/mL - Heart Failure likely. Correlate with clinical                  context and patient history.    BNP testing is performed using different testing methodology at [facility] than at  [facility]. Direct result comparisons should only be made within the same method.      SERIAL TROPONIN-INITIAL - Normal    Troponin I, High Sensitivity 10      Narrative:     Less than 99th percentile of normal range cutoff-  Female and children under 18 years old <14 ng/L; Male <21 ng/L: Negative  Repeat testing should be performed if clinically indicated.     Female and children under 18 years old 14-50 ng/L; Male 21-50 ng/L:  Consistent with possible cardiac damage and possible increased clinical   risk. Serial measurements may help to assess extent of myocardial damage.     >50 ng/L: Consistent with cardiac damage, increased clinical risk and  myocardial infarction. Serial measurements may help assess extent of   myocardial damage.      NOTE: Children less than 1 year old may have higher baseline troponin   levels and results should be interpreted in conjunction with the overall   clinical context.     NOTE: Troponin I testing is performed using a different   testing methodology at [facility] than at [facility]. Direct result comparisons should only   be made within the same method.   SERIAL TROPONIN, 1 HOUR - Normal    Troponin I, High Sensitivity 9      Narrative:     Less than 99th percentile of normal range cutoff-  Female and children under 18 years old <14 ng/L; Male <21 ng/L: Negative  Repeat testing should be performed if clinically indicated.     Female and children under 18 years old 14-50 ng/L; Male 21-50 ng/L:  Consistent with possible cardiac damage and possible increased clinical   risk. Serial measurements may help to assess extent of myocardial damage.     >50 ng/L: Consistent with cardiac damage, increased clinical risk and  myocardial infarction. Serial measurements may help assess extent of   myocardial damage.      NOTE: Children less than 1 year old may have higher baseline troponin   levels and results should be interpreted in conjunction with the  overall   clinical context.     NOTE: Troponin I testing is performed using a different   testing methodology at [facility] than at other   [facility] hospitals. Direct result comparisons should only   be made within the same method.   TROPONIN SERIES- (INITIAL, 1 HR)    Narrative:     The following orders were created for panel order Troponin I Series, High Sensitivity (0, 1 HR).  Procedure                               Abnormality         Status                     ---------                               -----------         ------                     Troponin I, High Sensiti...[[id number]]  Normal              Final result               Troponin, High Sensitivi...[[id number]]  Normal              Final result                 Please view results for these tests on the individual orders.   URINALYSIS WITH REFLEX CULTURE AND MICROSCOPIC    Narrative:     The following orders were created for panel order Urinalysis with Reflex Culture and Microscopic.  Procedure                               Abnormality         Status                     ---------                               -----------         ------                     Urinalysis with Reflex C...[[id number]]                                                 Extra Urine Gray Tube[[id number]]                                                         Please view results for these tests on the individual orders.   URINALYSIS WITH REFLEX CULTURE AND MICROSCOPIC   EXTRA URINE GRAY TUBE   MORPHOLOGY    RBC Morphology See Below      Polychromasia Mild      Ovalocytes Few          CT head wo IV contrast   Final Result   Increased size of now 1.7 cm right parietal metastatic lesion with   increased adjacent vasogenic edema when compared to 03/03/2025 CT.   Superimposed intralesional hemorrhage can not be excluded. No acute   intracranial hemorrhage or new mass effect otherwise. Further   evaluation with MRI may be warranted.        Multiple known intracranial metastatic  lesions with vasogenic edema   better visualized on the prior 11/26/2024 MRI.        MACRO   None        Signed by: PatrickbeatrizVirgieTerrance Jansenan 4/6/2025 10:04 AM   Dictation workstation:   [id number]      XR chest 1 view   Final Result   No radiographic evidence of an acute cardiopulmonary process.        MACRO:   None.        Signed by: Chadd Haskins 4/6/2025 9:36 AM   Dictation workstation:   [id number]      CT abdomen pelvis w IV contrast    (Results Pending)   CT angio chest for pulmonary embolism    (Results Pending)            Pt course which Intervention and treatment included :    Procedure  Procedures    Medications   ondansetron (Zofran) injection 4 mg (4 mg intravenous Given 4/6/25 0856)   acetaminophen (Tylenol) tablet 975 mg (975 mg oral Given 4/6/25 0856)   sodium chloride 0.9 % bolus 1,000 mL (0 mL intravenous Stopped 4/6/25 0954)   iohexol (OMNIPaque) 350 mg iodine/mL solution 100 mL (100 mL intravenous Given 4/6/25 1100)        ED Course as of 04/08/25 0620   Sun Apr 06, 2025   0910 POCT Lactate, Venous: 1.2 [ML]   0910 WBC(!): 12.4 [ML]   1037 Pulse Ox(!): 89 % [ML]   1037 Patient seen and examined for weakness, cough and history of malignancy with headache.  CT of the head obtained as well as chest x-ray.  Septic workup initiated due to tachycardia.  Pulse ox is 89%, given history of cancer will obtain CTA. [ML]   1315 Spoke with neurosurgery at [facility], recommended no surgical intervention at this time as it is not surgical indication [ML]   1328 Patient does have worsening lesion in the brain.  White count 12.4.  Remainder of labs are grossly unremarkable including negative lactate.  Findings of pneumonia compatible on chest imaging this may be a latent finding from his pneumonia that he just had however will give dose of doxycycline given leukocytosis. [ML]   1356 Dr. Wilburn accepts transfer.  [ML]      ED Course User Index  [ML] Marty R Lejeune, DO          DISPOSITION: No diagnosis found.    -------------------------------------------------------------------    04/06/25 at 9:08 AM - Marty R LeJeune, DO   Internal & Emergency Medicine          Marty R Lejeune, DO  Resident  04/08/25 0620

## 2025-04-06 NOTE — CONSULTS
Inpatient consult to Neurosurgery  Consult performed by: Dom Lane MD  Consult ordered by: Magdy Montague MD          Date of Service:  4/6/2025 Attending Provider:  Magdy Montague MD     Reason for Consultation:  Manuel Marrufo is being seen today for a consult requested by Magdy Montague MD for brain mass.      Subjective   History of Present Illness:  Manuel is a 60 y.o. male with h/o L shoulder melanoma s/p resection in 2019, recently diagnosed RLL mass s/p partial resection and biopsy ([facility]), has VNS, HTN, HLD, fatty liver disease, obesity, FRANK on CPAP, PTSD, GERD, p/w HAx8 days, CTH enumerous hemorrhagic supra and infra tentorial small mets w associated edema, 11/26 MRI brain diffuse, innumerable brain mets, s/p 10 sessions WBRT (last session 12/24), s/p 2 cycles immunotherapy, 1/29 p/w HA, CTH stable, 4/6 p/w worsened HA and generalized weakness.     Patient reports worsening of typical headache over the past several weeks. He completed two [facility] of [facility]. He was on steroids. Patient had difficulty recalling recent events and felt SOB.    Review of Systems   10 point ROS is obtained and negative except the ones mentioned in the HPI    Objective     Vitals:  Vitals:    04/06/25 1847   BP: (!) 147/97   Pulse: 110   Resp: 22   Temp: 36.7 °C (98.1 °F)   SpO2: 97%         Exam:  Constitutional: No acute distress  Resp: on nasal cannula   Cardio: well perfused  GI: nondistended  MSK: full range of motion  Neuro: No acute distress, Ox2, 3 w choice  Cranial Nerves II-XII: OU3R, EOMI, Face symmetric, Facial SILT, Palate/Tongue midline and symmetric, shoulder shrugs symmetric, hearing intact to finger rubs bilaterally  Motor:   BUE 5/5  BLE 5/5  Sensation: SILT throughout all extremities  Psych: appropriate  Skin: no obvious lesions    Medical History  Past Medical History:   Diagnosis Date    Agoraphobia with panic attacks     Anxiety     Awareness under anesthesia 2021     During back surgery    COPD (chronic obstructive pulmonary disease) (Multi)     Fatty liver disease, nonalcoholic     GERD (gastroesophageal reflux disease)     HLD (hyperlipidemia)     Melanoma (Multi)     with mets to brain, spine and lungs    FRANK on CPAP     PTSD (post-traumatic stress disorder)        Surgical History  Past Surgical History:   Procedure Laterality Date    BACK SURGERY      MALIGNANT SKIN LESION EXCISION      left shoulder and armpit    TONSILLECTOMY          Medications  Current Outpatient Medications   Medication Instructions    acetaminophen (TYLENOL) 975 mg, oral, Every 8 hours    albuterol 90 mcg/actuation inhaler 2 puffs, 4 times daily PRN    budesonide-glycopyr-formoterol (BREZTRI) 160-9-4.8 mcg/actuation HFA aerosol inhaler 2 puffs, 2 times daily RT    cholecalciferol (VITAMIN D-3) 50 mcg, Daily    dapsone 100 mg, oral, Daily    dexAMETHasone (DECADRON) 4 mg, oral, Every 6 hours scheduled    dexAMETHasone (DECADRON) 4 mg, oral, Every 12 hours scheduled    diazePAM (Valium) 5 mg tablet Take 1.5 tablets (7.5 mg) by mouth once daily in the morning. Take before meals AND 1 tablet (5 mg) once daily at bedtime. Do all this for 10 days.    ferrous sulfate 325 mg, oral, Daily with breakfast    furosemide (LASIX) 40 mg, Daily    hydrocortisone (CORTEF) 10 mg, oral, Daily, Take daily every afternoon    memantine (Namenda) 5 mg tablet 2 tablets, 2 times daily    ondansetron ODT (ZOFRAN-ODT) 8 mg, 3 times daily PRN    oxyCODONE (ROXICODONE) 10 mg, oral, Every 3 hours PRN    oxyCODONE ER (OXYCONTIN) 10 mg, 2 times daily    polyethylene glycol (GLYCOLAX, MIRALAX) 17 g, oral, Daily PRN    pregabalin (LYRICA) 200 mg, 2 times daily    propranolol LA (INDERAL LA) 120 mg, 2 times daily    propranolol LA (INDERAL LA) 120 mg, oral, 2 times daily, Do not crush, chew, or split.    rosuvastatin (CRESTOR) 10 mg, Daily    sertraline (ZOLOFT) 200 mg, Daily    tadalafil (CIALIS) 5 mg, Daily        Diagnostic  Results:    Lab Results   Component Value Date    WBC 12.4 (H) 04/06/2025    HGB 9.9 (L) 04/06/2025    HCT 33.8 (L) 04/06/2025    MCV 72 (L) 04/06/2025     04/06/2025     Lab Results   Component Value Date    CREATININE 1.02 04/06/2025    BUN 11 04/06/2025     04/06/2025    K 3.9 04/06/2025     (H) 04/06/2025    CO2 23 04/06/2025     Lab Results   Component Value Date    INR 1.0 11/24/2024    PROTIME 11.2 11/24/2024       === 04/06/25 ===    CT ANGIO CHEST FOR PULMONARY EMBOLISM    - Impression -  Metastatic melanoma, imaging for shortness of breath, in comparison  to prior CTs from March 2025:  1. No acute pulmonary embolism to the segmental level.  2. 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 The differential diagnosis  includes multifocal pneumonia, felt to be less likely. Given  patient's presentation of shortness of breath, this could be grade 2  or grade 3 pneumonitis. Follow-up CT chest is recommended in 4-6  weeks.  3. Stable left adrenal nodule, which is likely metastatic.  4. Unchanged indeterminate sclerotic lesions in the T10 vertebral  body and right superior acetabulum, suspicious for metastasis.  Confirmation with nonemergent PET-CT is recommended.  5. Patient's known pulmonary nodules are obscured by the above  described pneumonitis. Tumor burden in this area can be reassessed on  follow-up evaluation.  6. Otherwise, no definite new site of disease is evident.    Reference: Christelle, K. JANET., Tess, N. H., Samantha, K. R., & GABRIEL Metzger (2019). Immune checkpoint inhibitor therapy-related pneumonitis:  patterns and management. Radiographics, 39(7), 6084-4535.    MACRO:  None    Signed by: Ghada Mckinley 4/6/2025 12:03 PM  Dictation  workstation:   [id number]  === 11/24/24 ===    MR BRAIN W AND WO CONTRAST    - Impression -  1. Diffuse, innumerable infratentorial and supratentorial  intraparenchymal avidly enhancing and mildly diffusion restricting  lesions consistent with metastatic disease. No midline shift or  herniation.  2. Additional findings as described above.      I personally reviewed the images/study and I agree with the findings  as stated by Resident Ki Toribio MD.    MACRO:  None    Signed by: Mary Daniel 11/26/2024 6:41 PM  Dictation workstation:   [id number]      Assessment/Plan   Assessment:  Manuel is a 60 y.o. male with h/o L shoulder melanoma s/p resection in 2019, recently diagnosed RLL mass s/p partial resection and biopsy ([facility]), has VNS, HTN, HLD, fatty liver disease, obesity, FRANK on CPAP, PTSD, GERD, p/w HAx8 days, CTH enumerous hemorrhagic supra and infra tentorial small mets w associated edema, 11/26 MRI brain diffuse, innumerable brain mets, s/p 10 sessions WBRT (last session 12/24), s/p 2 cycles immunotherapy, 1/29 p/w HA, CTH stable, 4/6 p/w worsened HA and generalized weakness, CTH incr R parietal lesion and L temporal edema    Plan:  Obtain MRI presurgical perfusion and fingerprinting () to further characterize lesions and edema  Agree with dex 4q6, PPI, sliding scale insulin  Discussion at tumor board  Radiation oncology consult for evaluation and further interventions     Dom Lane MD  Plan not finalized until note signed by attending

## 2025-04-06 NOTE — PROGRESS NOTES
Emergency Medicine Transition of Care Note.    I received Manuel Marrufo in signout from Dr. Fontanez.  Please see the previous ED provider note for all HPI, PE and MDM up to the time of signout at 1500. This is in addition to the primary record.    In brief Manuel Marrufo is an 60 y.o. male presenting for   Chief Complaint   Patient presents with    Headache    Weakness, Gen     At the time of signout we were awaiting: Bed assignment and transfer to [facility]    ED Course as of 04/06/25 1718   Sun Apr 06, 2025   0910 POCT Lactate, Venous: 1.2 [ML]   0910 WBC(!): 12.4 [ML]   1037 Pulse Ox(!): 89 % [ML]   1037 Patient seen and examined for weakness, cough and history of malignancy with headache.  CT of the head obtained as well as chest x-ray.  Septic workup initiated due to tachycardia.  Pulse ox is 89%, given history of cancer will obtain CTA. [ML]   1315 Spoke with neurosurgery at [facility], recommended no surgical intervention at this time as it is not surgical indication [ML]   1328 Patient does have worsening lesion in the brain.  White count 12.4.  Remainder of labs are grossly unremarkable including negative lactate.  Findings of pneumonia compatible on chest imaging this may be a latent finding from his pneumonia that he just had however will give dose of doxycycline given leukocytosis. [ML]   1356 Dr. Wilburn accepts transfer.  [ML]      ED Course User Index  [ML] Marty R Lejeune, DO       Medical Decision Making  Patient was signed out to me from the outgoing team.  60-year-old male presenting for headache and weakness.  He was found by the prior team to have evidence of an increasing right parietal metastatic lesion with increased adjacent vasogenic edema when compared to previous.  He was accepted to [facility] in [place] at  for continued treatment, was signed out to me pending bed.  Bed was assigned, and patient was transferred to [facility] for definitive management    Final diagnoses:   None            Procedure  Procedures    Jorge L Wade MD

## 2025-04-06 NOTE — H&P
History of present illness:  Manuel Marrufo is a 60 y.o. male PMHx s/f metastatic melanoma, to brain and lung (on C3 of dual immunotherapy with nivulumab and ipilumab and palliative WBRT 12/14/24), (s/p left shoulder resection 2019), spinal stenosis (s/p L4-S1 lumbar decompression 2021), HTN, HLD, nonalcoholic fatty liver disease, obesity, FRANK on CPAP, PTSD, GERD, COPD, depression s/p vagal nerve stimulator (SA x2, 2007) presenting with generalized weakness, fatigue, SOB, and chronic worsening headache.     Patient reports that over the last 6 months chronic headache, memory issues, and fatigue have been worsening. Reports that headache did not have an acute onset. Reports worsening proximal thigh weakness that is bilateral. Prevents him from holding his own weight in standing. Ambulates with walker at home, report no history of falls. Reports intermittent constipation and diarrhea.  One episode of diarrhea in last 24 hrs     [facility] to have finished two cycles of ICI, patient reports that he is currently receiving therapy, last dose 3 weeks ago however poor historian.. Received WBRT in December of 2024.     Denies worsening of baseline cough, fever/chills, hemoptysis, CP, dysuria, abdominal pain.          Labs:  Last CBC:  Lab Results   Component Value Date    WBC 12.4 (H) 04/06/2025    HGB 9.9 (L) 04/06/2025    HCT 33.8 (L) 04/06/2025    MCV 72 (L) 04/06/2025     04/06/2025       Last RFP:  Lab Results   Component Value Date    GLUCOSE 100 (H) 04/06/2025    CALCIUM 8.9 04/06/2025     04/06/2025    K 3.9 04/06/2025    CO2 23 04/06/2025     (H) 04/06/2025    BUN 11 04/06/2025    CREATININE 1.02 04/06/2025       Last LFTs:  Lab Results   Component Value Date    ALT 49 04/06/2025    AST 34 04/06/2025    ALKPHOS 79 04/06/2025    BILITOT 0.5 04/06/2025       Last coags:  Lab Results   Component Value Date    INR 1.0 11/24/2024    APTT 30 11/24/2024         Past  Medical History:  See above    Past Surgical History:  He has a past surgical history that includes Back surgery; Malignant skin lesion excision; and Tonsillectomy.      Allergies:  Penicillin, Bactrim [sulfamethoxazole-trimethoprim], and Buspirone    Home medications:  Prior to Admission medications    Medication Sig Start Date End Date Taking? Authorizing Provider   acetaminophen (Tylenol) 325 mg tablet Take 3 tablets (975 mg) by mouth every 8 hours. 3/5/25   Melanie Melgar MD   albuterol 90 mcg/actuation inhaler Inhale 2 puffs 4 times a day as needed for wheezing or shortness of breath.    Historical Provider, MD   budesonide-glycopyr-formoterol (BREZTRI) 160-9-4.8 mcg/actuation HFA aerosol inhaler Inhale 2 puffs 2 times a day. 4/8/24   Historical Provider, MD   cholecalciferol (Vitamin D-3) 50 MCG (2000 UT) tablet Take 1 tablet (50 mcg) by mouth once daily.    Historical Provider, MD   dapsone 100 mg tablet Take 1 tablet (100 mg) by mouth once daily. 1/31/25   Tali Ribeiro MD   dexAMETHasone (Decadron) 4 mg tablet Take 1 tablet (4 mg) by mouth every 6 hours.  Patient not taking: Reported on 3/14/2025 1/31/25 3/4/25  Tali Ribeiro MD   dexAMETHasone (Decadron) 4 mg tablet Take 1 tablet (4 mg) by mouth every 12 hours. 3/5/25   Melanie Melgar MD   diazePAM (Valium) 5 mg tablet Take 1.5 tablets (7.5 mg) by mouth once daily in the morning. Take before meals AND 1 tablet (5 mg) once daily at bedtime. Do all this for 10 days. 3/6/25 3/16/25  Melanie Melgar MD   ferrous sulfate tablet Take 1 tablet (325 mg) by mouth once daily with breakfast. 3/6/25   Melanie Melgar MD   furosemide (Lasix) 40 mg tablet Take 1 tablet (40 mg) by mouth once daily. Edema    Historical Provider, MD   guaiFENesin (Mucinex) 600 mg 12 hr tablet Take 1 tablet (600 mg) by mouth 2 times a day as needed for cough. Do not crush, chew, or split. 3/6/25 4/5/25  Melanie Melgar MD   hydrocortisone (Cortef) 10 mg tablet Take 1 tablet (10  mg) by mouth once daily. Take daily every afternoon 3/8/25   Jay Ramirez MD   memantine (Namenda) 5 mg tablet Take 2 tablets (10 mg) by mouth 2 times a day.    Historical Provider, MD   ondansetron ODT (Zofran-ODT) 8 mg disintegrating tablet Dissolve 1 tablet (8 mg) in the mouth 3 times a day as needed for nausea. 11/8/24   Historical Provider, MD   oxyCODONE (Roxicodone) 10 mg immediate release tablet Take 1 tablet (10 mg) by mouth every 3 hours if needed for severe pain (7 - 10). 11/27/24   Mehul Villanueva,    oxyCODONE ER (OxyCONTIN) 10 mg 12 hr tablet Take 1 tablet (10 mg) by mouth 2 times a day. Do not crush, chew, or split.    Historical Provider, MD   polyethylene glycol (Glycolax, Miralax) 17 gram packet Take 17 g by mouth once daily as needed (constipation). 3/5/25   Melanie Melgar MD   pregabalin (Lyrica) 200 mg capsule Take 1 capsule (200 mg) by mouth 2 times a day.    Historical Provider, MD   propranolol LA (Inderal LA) 120 mg 24 hr capsule Take 1 capsule (120 mg) by mouth 2 times a day.  Patient not taking: Reported on 3/14/2025    Historical Provider, MD   propranolol LA (Inderal LA) 120 mg 24 hr capsule Take 1 capsule (120 mg) by mouth 2 times a day. Do not crush, chew, or split. 3/5/25   Melanie Melgar MD   rosuvastatin (Crestor) 10 mg tablet Take 1 tablet (10 mg) by mouth once daily.    Historical Provider, MD   sertraline (Zoloft) 100 mg tablet Take 2 tablets (200 mg) by mouth once daily.    Historical Provider, MD   tadalafil (Cialis) 5 mg tablet Take 1 tablet (5 mg) by mouth once daily.    Historical Provider, MD       Review of systems  Review of Systems        Objective     Visit Vitals  BP (!) 147/97 (BP Location: Left arm, Patient Position: Lying) Comment: RN notified   Pulse 110   Temp 36.7 °C (98.1 °F) (Temporal)   Resp 22   SpO2 97%   Smoking Status Former        General: ill appearing, pressured speech  HEENT: NCAT, MMM  CV: RRR, no m/r/g  PULM: wheezing bilateral, no  crackles  ABD: Distended, tense, NT  : no suprapubic or CVA tenderness   EXT: WWP, 1+ pitting edema to mid calves  SKIN: no rashes noted   NEURO: A&Ox4, 3/5 BLE strength, 5/5 BUE strength, sensation grossly intact  PSYCH: appropriate affect         Assessment/Plan   Manuel Marrufo is a 60 y.o. male PMHx s/f metastatic melanoma, to brain and lung (s/p two cycles of dual immunotherapy with nivulumab and ipilumab, last dose 1/10 and palliative WBRT 12/14/24), (s/p left shoulder resection 2019), spinal stenosis (s/p L4-S1 lumbar decompression 2021), HTN, HLD, nonalcoholic fatty liver disease, obesity, FRANK on CPAP, PTSD, GERD, COPD, depression s/p vagal nerve stimulator (SA x2, 2007) presenting with generalized weakness, headache, fatigue, and SOB.    Consulting NSG for c/f hemorrhagic metastasis and c/f vasogenic edema. Will treat as CAP for now, however CT c/f pneumonitis. Will rule out with Bcx/procal. Starting high dose dex for vasogenic edema.     #Metastatic melanoma  #Increasing size of 1.7cm right parietal lesion  #Nausea/Fatigue  :: possible superimposed intralesional hemorrhage  :: c/f vasogenic edema  :: s/p WBRT x 10 fractions 12/2024  :: s/p two cycles of dual immunotherapy with nivulumab and ipilumab, last dose 1/10   :: [facility]: Dr. Schaefer   Plan:  - nsg consult for steroids and hemorrhage work up  - PRN IV zofran  - PT/OT  - c/w pregabalin 200mg BID  - Inform Dr. Schaefer of admission  - load dex with 10mg IV, 4mg IM q6h for vasogenic edema  - supportive onc in am  - 5mg oxy q6h for moderate, 10mg oxy q4h severe, 0.4 dilaudid breakthrough q4h    # ICI pneumonitis vs CAP  # COPD  :: grade 2-3 pneumonitis on CT  :: presents with leukocytosis  :: will rule out CAP, meanwhile treat with abx  :: no O2 at home  Plan:  - procal  - start vanc  - levofloxacin for penicillin allergy  - RSV/flu/COVID PCR  - MRSA nares  - blood cultures  - consider pulm consult in am  - c/w dapsone for PJP ppx    - scheduled duonebs q6h to replace breztri  - albuterol PRN  - adding BNP for SOB and     #FRANK  - RT consult for night CPAP    #MIGEL  :: at baseline Hgb of ~10 at admission  Plan:  - holding oral iron     #HLD  - c/w rosuvastatin 10mg daily    #Anxiety, PTSD, treatment-refractory depression  #S/p vagal nerve stimulator   - c/w sertraline 200mg daily  - c/w memenatine 10mg BID          Access: PIV  O2: 2L NC  Diet: NPO pending possible procedure  GI ppx: miralax PRN  DVT ppx: none, c/f bleed  Code status:  DNR and No Intubation  Surrogate medical decision maker: Mason (brother, [phone] )    Ellie Hall PGY-1  Internal Medicine

## 2025-04-07 LAB
ABO GROUP (TYPE) IN BLOOD: NORMAL
ALBUMIN SERPL BCP-MCNC: 3.3 G/DL (ref 3.4–5)
ALP SERPL-CCNC: 83 U/L (ref 33–120)
ALT SERPL W P-5'-P-CCNC: 36 U/L (ref 10–52)
ANION GAP SERPL CALC-SCNC: 15 MMOL/L (ref 10–20)
ANTIBODY SCREEN: NORMAL
APTT PPP: 28 SECONDS (ref 26–36)
AST SERPL W P-5'-P-CCNC: 29 U/L (ref 9–39)
ATRIAL RATE: 115 BPM
BASOPHILS # BLD MANUAL: 0 X10*3/UL (ref 0–0.1)
BASOPHILS NFR BLD MANUAL: 0 %
BILIRUB SERPL-MCNC: 0.5 MG/DL (ref 0–1.2)
BLASTS # BLD MANUAL: 0 X10*3/UL
BLASTS NFR BLD MANUAL: 0 %
BNP SERPL-MCNC: 7 PG/ML (ref 0–99)
BUN SERPL-MCNC: 12 MG/DL (ref 6–23)
CALCIUM SERPL-MCNC: 9 MG/DL (ref 8.6–10.6)
CHLORIDE SERPL-SCNC: 110 MMOL/L (ref 98–107)
CO2 SERPL-SCNC: 22 MMOL/L (ref 21–32)
CREAT SERPL-MCNC: 1.05 MG/DL (ref 0.5–1.3)
EGFRCR SERPLBLD CKD-EPI 2021: 82 ML/MIN/1.73M*2
EOSINOPHIL # BLD MANUAL: 0 X10*3/UL (ref 0–0.7)
EOSINOPHIL NFR BLD MANUAL: 0 %
ERYTHROCYTE [DISTWIDTH] IN BLOOD BY AUTOMATED COUNT: 21.8 % (ref 11.5–14.5)
EST. AVERAGE GLUCOSE BLD GHB EST-MCNC: 131 MG/DL
FLUAV RNA RESP QL NAA+PROBE: NOT DETECTED
FLUBV RNA RESP QL NAA+PROBE: NOT DETECTED
GLUCOSE BLD MANUAL STRIP-MCNC: 131 MG/DL (ref 74–99)
GLUCOSE BLD MANUAL STRIP-MCNC: 133 MG/DL (ref 74–99)
GLUCOSE BLD MANUAL STRIP-MCNC: 142 MG/DL (ref 74–99)
GLUCOSE BLD MANUAL STRIP-MCNC: 155 MG/DL (ref 74–99)
GLUCOSE BLD MANUAL STRIP-MCNC: 158 MG/DL (ref 74–99)
GLUCOSE SERPL-MCNC: 129 MG/DL (ref 74–99)
HBA1C MFR BLD: 6.2 %
HCT VFR BLD AUTO: 30.7 % (ref 41–52)
HGB BLD-MCNC: 8.9 G/DL (ref 13.5–17.5)
IMM GRANULOCYTES # BLD AUTO: 0.47 X10*3/UL (ref 0–0.7)
IMM GRANULOCYTES NFR BLD AUTO: 4.9 % (ref 0–0.9)
INR PPP: 1.1 (ref 0.9–1.1)
LEGIONELLA AG UR QL: NEGATIVE
LYMPHOCYTES # BLD MANUAL: 0 X10*3/UL (ref 1.2–4.8)
LYMPHOCYTES NFR BLD MANUAL: 0 %
MAGNESIUM SERPL-MCNC: 2.06 MG/DL (ref 1.6–2.4)
MCH RBC QN AUTO: 21.3 PG (ref 26–34)
MCHC RBC AUTO-ENTMCNC: 29 G/DL (ref 32–36)
MCV RBC AUTO: 74 FL (ref 80–100)
METAMYELOCYTES # BLD MANUAL: 0.09 X10*3/UL
METAMYELOCYTES NFR BLD MANUAL: 0.9 %
MONOCYTES # BLD MANUAL: 0.16 X10*3/UL (ref 0.1–1)
MONOCYTES NFR BLD MANUAL: 1.7 %
MRSA DNA SPEC QL NAA+PROBE: NOT DETECTED
MYELOCYTES # BLD MANUAL: 0.09 X10*3/UL
MYELOCYTES NFR BLD MANUAL: 0.9 %
NEUTROPHILS # BLD MANUAL: 9.08 X10*3/UL (ref 1.2–7.7)
NEUTS BAND # BLD MANUAL: 0 X10*3/UL (ref 0–0.7)
NEUTS BAND NFR BLD MANUAL: 0 %
NEUTS SEG # BLD MANUAL: 9.08 X10*3/UL (ref 1.2–7)
NEUTS SEG NFR BLD MANUAL: 95.6 %
NRBC BLD MANUAL-RTO: 0 % (ref 0–0)
NRBC BLD-RTO: 0.2 /100 WBCS (ref 0–0)
OSMOLALITY SERPL: 292 MOSM/KG (ref 280–300)
P AXIS: 30 DEGREES
PHOSPHATE SERPL-MCNC: 3.4 MG/DL (ref 2.5–4.9)
PLASMA CELLS # BLD MANUAL: 0 X10*3/UL
PLASMA CELLS NFR BLD MANUAL: 0 %
PLATELET # BLD AUTO: 284 X10*3/UL (ref 150–450)
POTASSIUM SERPL-SCNC: 4.3 MMOL/L (ref 3.5–5.3)
PR INTERVAL: 141 MS
PROCALCITONIN SERPL-MCNC: 0.24 NG/ML
PROMYELOCYTES # BLD MANUAL: 0 X10*3/UL
PROMYELOCYTES NFR BLD MANUAL: 0 %
PROT SERPL-MCNC: 6.3 G/DL (ref 6.4–8.2)
PROTHROMBIN TIME: 12.6 SECONDS (ref 9.8–12.4)
Q ONSET: 253 MS
QRS COUNT: 19 BEATS
QRS DURATION: 89 MS
QT INTERVAL: 316 MS
QTC CALCULATION(BAZETT): 436 MS
QTC FREDERICIA: 391 MS
R AXIS: 19 DEGREES
RBC # BLD AUTO: 4.17 X10*6/UL (ref 4.5–5.9)
RBC MORPH BLD: ABNORMAL
RH FACTOR (ANTIGEN D): NORMAL
RSV RNA RESP QL NAA+PROBE: NOT DETECTED
S PNEUM AG UR QL: NEGATIVE
SARS-COV-2 RNA RESP QL NAA+PROBE: NOT DETECTED
SODIUM SERPL-SCNC: 143 MMOL/L (ref 136–145)
T AXIS: 45 DEGREES
T OFFSET: 411 MS
TOTAL CELLS COUNTED BLD: 115
VANCOMYCIN SERPL-MCNC: 9.6 UG/ML (ref 5–20)
VARIANT LYMPHS # BLD MANUAL: 0.09 X10*3/UL (ref 0–0.5)
VARIANT LYMPHS NFR BLD: 0.9 %
VENTRICULAR RATE: 114 BPM
WBC # BLD AUTO: 9.5 X10*3/UL (ref 4.4–11.3)

## 2025-04-07 PROCEDURE — 84100 ASSAY OF PHOSPHORUS: CPT

## 2025-04-07 PROCEDURE — 2500000005 HC RX 250 GENERAL PHARMACY W/O HCPCS: Performed by: INTERNAL MEDICINE

## 2025-04-07 PROCEDURE — 85610 PROTHROMBIN TIME: CPT

## 2025-04-07 PROCEDURE — 97161 PT EVAL LOW COMPLEX 20 MIN: CPT | Mod: GP | Performed by: PHYSICAL THERAPIST

## 2025-04-07 PROCEDURE — 99223 1ST HOSP IP/OBS HIGH 75: CPT | Performed by: STUDENT IN AN ORGANIZED HEALTH CARE EDUCATION/TRAINING PROGRAM

## 2025-04-07 PROCEDURE — 99233 SBSQ HOSP IP/OBS HIGH 50: CPT

## 2025-04-07 PROCEDURE — 80053 COMPREHEN METABOLIC PANEL: CPT

## 2025-04-07 PROCEDURE — 97165 OT EVAL LOW COMPLEX 30 MIN: CPT | Mod: GO

## 2025-04-07 PROCEDURE — 82947 ASSAY GLUCOSE BLOOD QUANT: CPT

## 2025-04-07 PROCEDURE — 83930 ASSAY OF BLOOD OSMOLALITY: CPT

## 2025-04-07 PROCEDURE — 85027 COMPLETE CBC AUTOMATED: CPT

## 2025-04-07 PROCEDURE — 83735 ASSAY OF MAGNESIUM: CPT

## 2025-04-07 PROCEDURE — 86901 BLOOD TYPING SEROLOGIC RH(D): CPT

## 2025-04-07 PROCEDURE — 2500000001 HC RX 250 WO HCPCS SELF ADMINISTERED DRUGS (ALT 637 FOR MEDICARE OP)

## 2025-04-07 PROCEDURE — 1200000003 HC ONCOLOGY  ROOM WITH TELEMETRY DAILY

## 2025-04-07 PROCEDURE — 2500000004 HC RX 250 GENERAL PHARMACY W/ HCPCS (ALT 636 FOR OP/ED): Performed by: INTERNAL MEDICINE

## 2025-04-07 PROCEDURE — 36415 COLL VENOUS BLD VENIPUNCTURE: CPT

## 2025-04-07 PROCEDURE — 2500000002 HC RX 250 W HCPCS SELF ADMINISTERED DRUGS (ALT 637 FOR MEDICARE OP, ALT 636 FOR OP/ED)

## 2025-04-07 PROCEDURE — 2500000004 HC RX 250 GENERAL PHARMACY W/ HCPCS (ALT 636 FOR OP/ED)

## 2025-04-07 PROCEDURE — 80202 ASSAY OF VANCOMYCIN: CPT | Performed by: INTERNAL MEDICINE

## 2025-04-07 PROCEDURE — 94640 AIRWAY INHALATION TREATMENT: CPT

## 2025-04-07 PROCEDURE — 85007 BL SMEAR W/DIFF WBC COUNT: CPT

## 2025-04-07 PROCEDURE — 86900 BLOOD TYPING SEROLOGIC ABO: CPT

## 2025-04-07 PROCEDURE — 83880 ASSAY OF NATRIURETIC PEPTIDE: CPT

## 2025-04-07 PROCEDURE — 2500000002 HC RX 250 W HCPCS SELF ADMINISTERED DRUGS (ALT 637 FOR MEDICARE OP, ALT 636 FOR OP/ED): Performed by: INTERNAL MEDICINE

## 2025-04-07 PROCEDURE — 99223 1ST HOSP IP/OBS HIGH 75: CPT

## 2025-04-07 RX ORDER — OXYCODONE HYDROCHLORIDE 10 MG/1
10 TABLET ORAL
Status: DISCONTINUED | OUTPATIENT
Start: 2025-04-07 | End: 2025-04-17 | Stop reason: HOSPADM

## 2025-04-07 RX ORDER — LOPERAMIDE HCL 2 MG
2 TABLET ORAL 3 TIMES DAILY PRN
COMMUNITY
End: 2025-04-17 | Stop reason: HOSPADM

## 2025-04-07 RX ORDER — GUAIFENESIN 600 MG/1
600 TABLET, EXTENDED RELEASE ORAL 2 TIMES DAILY PRN
Status: DISCONTINUED | OUTPATIENT
Start: 2025-04-07 | End: 2025-04-17 | Stop reason: HOSPADM

## 2025-04-07 RX ORDER — LIDOCAINE 560 MG/1
1 PATCH PERCUTANEOUS; TOPICAL; TRANSDERMAL DAILY
Status: DISCONTINUED | OUTPATIENT
Start: 2025-04-07 | End: 2025-04-17 | Stop reason: HOSPADM

## 2025-04-07 RX ORDER — ONDANSETRON 8 MG/1
8 TABLET, ORALLY DISINTEGRATING ORAL EVERY 8 HOURS PRN
Status: DISCONTINUED | OUTPATIENT
Start: 2025-04-07 | End: 2025-04-15

## 2025-04-07 RX ORDER — OXYCODONE HCL 10 MG/1
10 TABLET, FILM COATED, EXTENDED RELEASE ORAL EVERY 12 HOURS SCHEDULED
Status: DISCONTINUED | OUTPATIENT
Start: 2025-04-07 | End: 2025-04-17 | Stop reason: HOSPADM

## 2025-04-07 RX ORDER — ARIPIPRAZOLE 10 MG/1
10 TABLET ORAL DAILY
Status: DISCONTINUED | OUTPATIENT
Start: 2025-04-07 | End: 2025-04-17 | Stop reason: HOSPADM

## 2025-04-07 RX ORDER — LIDOCAINE 50 MG/G
3 PATCH TOPICAL DAILY PRN
COMMUNITY

## 2025-04-07 RX ORDER — DIAZEPAM 10 MG/1
5 TABLET ORAL EVERY EVENING
Status: DISCONTINUED | OUTPATIENT
Start: 2025-04-07 | End: 2025-04-17 | Stop reason: HOSPADM

## 2025-04-07 RX ORDER — DEXTROSE 50 % IN WATER (D50W) INTRAVENOUS SYRINGE
12.5
Status: DISCONTINUED | OUTPATIENT
Start: 2025-04-07 | End: 2025-04-17 | Stop reason: HOSPADM

## 2025-04-07 RX ORDER — LOPERAMIDE HYDROCHLORIDE 2 MG/1
6 CAPSULE ORAL DAILY
Status: DISCONTINUED | OUTPATIENT
Start: 2025-04-07 | End: 2025-04-14

## 2025-04-07 RX ORDER — INSULIN LISPRO 100 [IU]/ML
0-5 INJECTION, SOLUTION INTRAVENOUS; SUBCUTANEOUS
Status: DISCONTINUED | OUTPATIENT
Start: 2025-04-07 | End: 2025-04-17 | Stop reason: HOSPADM

## 2025-04-07 RX ORDER — ARIPIPRAZOLE 10 MG/1
10 TABLET ORAL DAILY
COMMUNITY

## 2025-04-07 RX ORDER — DIAZEPAM 5 MG/1
7.5 TABLET ORAL DAILY
Status: DISCONTINUED | OUTPATIENT
Start: 2025-04-08 | End: 2025-04-17 | Stop reason: HOSPADM

## 2025-04-07 RX ORDER — IPRATROPIUM BROMIDE AND ALBUTEROL SULFATE 2.5; .5 MG/3ML; MG/3ML
3 SOLUTION RESPIRATORY (INHALATION) 3 TIMES DAILY
Status: DISCONTINUED | OUTPATIENT
Start: 2025-04-07 | End: 2025-04-09

## 2025-04-07 RX ORDER — DEXTROSE 50 % IN WATER (D50W) INTRAVENOUS SYRINGE
25
Status: DISCONTINUED | OUTPATIENT
Start: 2025-04-07 | End: 2025-04-17 | Stop reason: HOSPADM

## 2025-04-07 RX ORDER — ONDANSETRON HYDROCHLORIDE 2 MG/ML
4 INJECTION, SOLUTION INTRAVENOUS EVERY 8 HOURS PRN
Status: DISCONTINUED | OUTPATIENT
Start: 2025-04-07 | End: 2025-04-15

## 2025-04-07 RX ORDER — ACETAMINOPHEN 325 MG/1
975 TABLET ORAL EVERY 8 HOURS
Status: DISCONTINUED | OUTPATIENT
Start: 2025-04-07 | End: 2025-04-17 | Stop reason: HOSPADM

## 2025-04-07 RX ORDER — FLUTICASONE PROPIONATE 50 MCG
2 SPRAY, SUSPENSION (ML) NASAL DAILY
COMMUNITY

## 2025-04-07 RX ADMIN — MEMANTINE 10 MG: 10 TABLET ORAL at 08:06

## 2025-04-07 RX ADMIN — IPRATROPIUM BROMIDE AND ALBUTEROL SULFATE 3 ML: .5; 3 SOLUTION RESPIRATORY (INHALATION) at 15:16

## 2025-04-07 RX ADMIN — Medication 2000 UNITS: at 20:28

## 2025-04-07 RX ADMIN — OXYCODONE HYDROCHLORIDE 10 MG: 10 TABLET ORAL at 01:36

## 2025-04-07 RX ADMIN — BENZOCAINE AND MENTHOL 1 LOZENGE: 15; 3.6 LOZENGE ORAL at 03:08

## 2025-04-07 RX ADMIN — DEXAMETHASONE SODIUM PHOSPHATE 4 MG: 4 INJECTION, SOLUTION INTRA-ARTICULAR; INTRALESIONAL; INTRAMUSCULAR; INTRAVENOUS; SOFT TISSUE at 07:55

## 2025-04-07 RX ADMIN — SERTRALINE 200 MG: 100 TABLET, FILM COATED ORAL at 20:37

## 2025-04-07 RX ADMIN — PROPRANOLOL HYDROCHLORIDE 120 MG: 120 CAPSULE, EXTENDED RELEASE ORAL at 20:30

## 2025-04-07 RX ADMIN — PREGABALIN 200 MG: 25 CAPSULE ORAL at 08:06

## 2025-04-07 RX ADMIN — INSULIN LISPRO 1 UNITS: 100 INJECTION, SOLUTION INTRAVENOUS; SUBCUTANEOUS at 16:55

## 2025-04-07 RX ADMIN — OXYCODONE HYDROCHLORIDE 10 MG: 10 TABLET ORAL at 14:31

## 2025-04-07 RX ADMIN — DAPSONE 100 MG: 100 TABLET ORAL at 20:29

## 2025-04-07 RX ADMIN — VANCOMYCIN HYDROCHLORIDE 1250 MG: 5 INJECTION, POWDER, LYOPHILIZED, FOR SOLUTION INTRAVENOUS at 07:55

## 2025-04-07 RX ADMIN — OXYCODONE HYDROCHLORIDE 10 MG: 10 TABLET, FILM COATED, EXTENDED RELEASE ORAL at 20:30

## 2025-04-07 RX ADMIN — DEXAMETHASONE SODIUM PHOSPHATE 4 MG: 4 INJECTION, SOLUTION INTRA-ARTICULAR; INTRALESIONAL; INTRAMUSCULAR; INTRAVENOUS; SOFT TISSUE at 01:36

## 2025-04-07 RX ADMIN — PREGABALIN 200 MG: 25 CAPSULE ORAL at 20:26

## 2025-04-07 RX ADMIN — DIAZEPAM 5 MG: 10 TABLET ORAL at 20:26

## 2025-04-07 RX ADMIN — DEXAMETHASONE SODIUM PHOSPHATE 4 MG: 4 INJECTION, SOLUTION INTRA-ARTICULAR; INTRALESIONAL; INTRAMUSCULAR; INTRAVENOUS; SOFT TISSUE at 20:26

## 2025-04-07 RX ADMIN — MEMANTINE 10 MG: 10 TABLET ORAL at 20:29

## 2025-04-07 RX ADMIN — ARIPIPRAZOLE 10 MG: 10 TABLET ORAL at 18:49

## 2025-04-07 RX ADMIN — ACETAMINOPHEN 975 MG: 325 TABLET, FILM COATED ORAL at 20:27

## 2025-04-07 RX ADMIN — ROSUVASTATIN CALCIUM 10 MG: 10 TABLET, FILM COATED ORAL at 20:37

## 2025-04-07 RX ADMIN — LEVOFLOXACIN 500 MG: 500 TABLET, FILM COATED ORAL at 18:49

## 2025-04-07 RX ADMIN — DEXAMETHASONE SODIUM PHOSPHATE 4 MG: 4 INJECTION, SOLUTION INTRA-ARTICULAR; INTRALESIONAL; INTRAMUSCULAR; INTRAVENOUS; SOFT TISSUE at 14:28

## 2025-04-07 RX ADMIN — IPRATROPIUM BROMIDE AND ALBUTEROL SULFATE 3 ML: .5; 3 SOLUTION RESPIRATORY (INHALATION) at 09:39

## 2025-04-07 RX ADMIN — OXYCODONE HYDROCHLORIDE 10 MG: 10 TABLET ORAL at 06:06

## 2025-04-07 RX ADMIN — PANTOPRAZOLE SODIUM 40 MG: 40 TABLET, DELAYED RELEASE ORAL at 06:06

## 2025-04-07 ASSESSMENT — COGNITIVE AND FUNCTIONAL STATUS - GENERAL
MOBILITY SCORE: 6
TOILETING: TOTAL
DAILY ACTIVITIY SCORE: 10
WALKING IN HOSPITAL ROOM: TOTAL
PERSONAL GROOMING: A LOT
DRESSING REGULAR UPPER BODY CLOTHING: A LOT
MOVING FROM LYING ON BACK TO SITTING ON SIDE OF FLAT BED WITH BEDRAILS: TOTAL
STANDING UP FROM CHAIR USING ARMS: TOTAL
DRESSING REGULAR LOWER BODY CLOTHING: TOTAL
MOVING TO AND FROM BED TO CHAIR: TOTAL
TURNING FROM BACK TO SIDE WHILE IN FLAT BAD: TOTAL
CLIMB 3 TO 5 STEPS WITH RAILING: TOTAL
HELP NEEDED FOR BATHING: TOTAL
EATING MEALS: A LITTLE

## 2025-04-07 ASSESSMENT — PAIN SCALES - GENERAL
PAINLEVEL_OUTOF10: 7
PAINLEVEL_OUTOF10: 8
PAINLEVEL_OUTOF10: 7
PAINLEVEL_OUTOF10: 5 - MODERATE PAIN
PAINLEVEL_OUTOF10: 8
PAINLEVEL_OUTOF10: 10 - WORST POSSIBLE PAIN
PAINLEVEL_OUTOF10: 8

## 2025-04-07 ASSESSMENT — PAIN SCALES - PAIN ASSESSMENT IN ADVANCED DEMENTIA (PAINAD): TOTALSCORE: MEDICATION (SEE MAR)

## 2025-04-07 ASSESSMENT — PAIN - FUNCTIONAL ASSESSMENT
PAIN_FUNCTIONAL_ASSESSMENT: 0-10
PAIN_FUNCTIONAL_ASSESSMENT: 0-10

## 2025-04-07 ASSESSMENT — ACTIVITIES OF DAILY LIVING (ADL)
ADL_ASSISTANCE: NEEDS ASSISTANCE
BATHING_ASSISTANCE: MAXIMAL
ADL_ASSISTANCE: NEEDS ASSISTANCE

## 2025-04-07 ASSESSMENT — PAIN DESCRIPTION - ORIENTATION: ORIENTATION: LOWER;POSTERIOR

## 2025-04-07 ASSESSMENT — PAIN DESCRIPTION - LOCATION: LOCATION: HEAD

## 2025-04-07 NOTE — PROGRESS NOTES
Vancomycin Dosing by Pharmacy- Cessation of Therapy    Consult to pharmacy for vancomycin dosing has been discontinued by the prescriber, pharmacy will sign off at this time.    Please call pharmacy if there are further questions or re-enter a consult if vancomycin is resumed.     Neo Coto, [facility]

## 2025-04-07 NOTE — PROGRESS NOTES
04/07/25 1200   Discharge Planning   Living Arrangements Alone   Support Systems Family members   Assistance Needed HHA at home   Type of Residence Private residence   Number of Stairs to Enter Residence 0   Number of Stairs Within Residence 10   Who is requesting discharge planning? Provider   Home or Post Acute Services In home services   Type of Home Care Services Home health aide   Expected Discharge Disposition Home Heal   Does the patient need discharge transport arranged? No   Financial Resource Strain   How hard is it for you to pay for the very basics like food, housing, medical care, and heating? Not hard        04/07/25 1200   Discharge Planning   Living Arrangements Alone   Support Systems Family members   Assistance Needed HHA at home   Type of Residence Private residence   Number of Stairs to Enter Residence 0   Number of Stairs Within Residence 10   Who is requesting discharge planning? Provider   Home or Post Acute Services In home services   Type of Home Care Services Home health aide   Expected Discharge Disposition Home Heal   Does the patient need discharge transport arranged? No   Financial Resource Strain   How hard is it for you to pay for the very basics like food, housing, medical care, and heating? Not hard     Care Transitions Note    Plan per Medical/Surgical Team: Admitted with Metastatic Melanoma  Status: Inpatient   Payor Source: [employer]/[facility]   Discharge disposition: Home with Home Health care   Expected date of discharge: 04/10/2025  Barriers: Mo barriers identified at this time   PCP / Primary Oncologist: Shirlene Estrada MD   Preferred home care agency: [facility] (HHA and skilled nursing)    04/07/2025: TCC spoke with patient at bedside regarding discharge plan. [facility] in which he receives 40 hours per week in HHA services. He also has a nurse to come out for med management and vitals. Lives alone and currently utilizes a cane,  jose, and manual w.c.as needed. Demographics and insurance verifed with patient. Will continue to follow for any discharge planning needs. Shannon HUANG TCC

## 2025-04-07 NOTE — PROGRESS NOTES
Occupational Therapy      Evaluation    Patient Name: Manuel Marrufo  MRN: [id number]  Today's Date: 4/7/2025  Room: [address] [place] [address]  Time Calculation  Start Time: 1131  Stop Time: 1153  Time Calculation (min): 22 min    Assessment  IP OT Assessment  Prognosis: Fair  Barriers to Discharge Home: Physical needs  Physical Needs: 24hr ADL assistance needed, High falls risk due to function or environment, 24hr mobility assistance needed  Evaluation/Treatment Tolerance: Patient limited by fatigue, Patient limited by pain  End of Session Communication: Bedside nurse  End of Session Patient Position: Alarm on, Bed, 3 rail up  Plan:  Inpatient Plan  Treatment Interventions: ADL retraining, Compensatory technique education, UE strengthening/ROM, Functional transfer training  OT Frequency: 3 times per week  OT Discharge Recommendations: Moderate intensity level of continued care  OT Recommended Transfer Status: Assist of 2  OT - OK to Discharge: Yes  OT Assessment  OT Assessment Results: Decreased ADL status, Decreased functional mobility  Prognosis: Fair  Evaluation/Treatment Tolerance: Patient limited by fatigue, Patient limited by pain    Subjective   Current Problem:  1. Metastatic melanoma (Multi)  Tumor Board Request      2. 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 (Multi)  Tumor Board Request        General:  Reason for Referral: presenting with generalized weakness, fatigue, SOB, and chronic worsening headache. CTH incr R parietal lesion and L temporal edema  Past Medical History Relevant to Rehab: PMHx s/f metastatic melanoma, to brain and lung (on C3 of dual immunotherapy with nivulumab and ipilumab and palliative WBRT 12/14/24), (s/p left shoulder resection 2019), spinal stenosis (s/p L4-S1 lumbar decompression 2021), HTN, HLD, nonalcoholic fatty liver disease, obesity, FRANK on CPAP, PTSD, GERD, COPD, depression s/p vagal nerve stimulator (SA x2, 2007)  Co-Treatment: PT  Co-Treatment Reason: AMPAC < 10, requires 2 set of  skilled hands to mobilize safely  Prior to Session Communication: Bedside nurse  General Comment: pt in supine on arrival, willing to participate in OT   Precautions:  Medical Precautions: Fall precautions      Pain:  Pain Assessment  Pain Assessment: 0-10  0-10 (Numeric) Pain Score: 8  Pain Location: Back (neck and head)  Lines/Tubes/Drains:  External Urinary Catheter Male (Active)   Number of days: 1         Objective     Home Living:  Type of Home: House  Lives With: Alone (HHA 5 days/week for 6 hrs)   Prior Function:  Level of [place]: Needs assistance with ADLs, Needs assistance with homemaking  ADL Assistance: Needs assistance  Homemaking Assistance: Needs assistance  Prior Function Comments: Pt reports HHA assist with dressing and bathing, furniture walks, denies falls, HHA and family complete cooking and cleaning       ADL:  Eating Assistance: Stand by  Eating Deficit: Setup  Grooming Assistance: Minimal  Bathing Assistance: Maximal  UE Dressing Assistance: Moderate  UE Dressing Deficit: Thread RUE, Thread LUE, Pull over head  LE Dressing Assistance: Total  Toileting Assistance with Device: Total  Activity Tolerance:  Endurance: Decreased tolerance for upright activites       Bed Mobility/Transfers: Bed Mobility  Bed Mobility: Yes  Bed Mobility 1  Bed Mobility 1: Supine to sitting, Sitting to supine  Level of Assistance 1: Maximum assistance, +2   and Transfers  Transfer: No (pt declined)       Strength:  Strength Comments: grossly deconditioned        Hand Function:  Hand Function  Gross Grasp: Functional  Coordination: Functional  Extremities: RUE   RUE : Exceptions to WFL  RUE Strength  RUE Overall Strength: Greater than or equal to 3/5 as evidenced by functional mobility, LUE   LUE: Exceptions to WFL  LUE Strength  LUE Overall Strength: Greater than or equal to 3/5 as evidenced by functional mobility, RLE   RLE : Exceptions to WFL, and LLE   LLE : Exceptions to WFL  Strength LLE  LLE Overall  Strength: Greater than or equal to 3/5 as evidenced by functional mobility    Outcome Measures: [facility] Daily Activity  Putting on and taking off regular lower body clothing: Total  Bathing (including washing, rinsing, drying): Total  Putting on and taking off regular upper body clothing: A lot  Toileting, which includes using toilet, bedpan or urinal: Total  Taking care of personal grooming such as brushing teeth: A lot  Eating Meals: A little  Daily Activity - Total Score: 10         ,     OT Adult Other Outcome Measures  4AT: negative    Education Documentation  Body Mechanics, taught by Tiki Taylor OT at 4/7/2025  3:23 PM.  Learner: Patient  Readiness: Acceptance  Method: Explanation  Response: Needs Reinforcement    Precautions, taught by Tiki Taylor OT at 4/7/2025  3:23 PM.  Learner: Patient  Readiness: Acceptance  Method: Explanation  Response: Needs Reinforcement    ADL Training, taught by Tiki Taylor OT at 4/7/2025  3:23 PM.  Learner: Patient  Readiness: Acceptance  Method: Explanation  Response: Needs Reinforcement    Education Comments  No comments found.        Goals:     Encounter Problems       Encounter Problems (Active)       ADLs       Patient with complete upper body dressing with contact guard assist level of assistance donning and doffing all UE clothes with no adaptive equipment while edge of bed  (Progressing)       Start:  04/07/25    Expected End:  04/21/25            Patient with complete lower body dressing with minimal assist  level of assistance donning and doffing all LE clothes  with PRN adaptive equipment while edge of bed  (Progressing)       Start:  04/07/25    Expected End:  04/21/25            Patient will complete daily grooming tasks brushing teeth and washing face/hair with stand by assist level of assistance and PRN adaptive equipment while edge of bed . (Progressing)       Start:  04/07/25    Expected End:  04/21/25            Patient will complete toileting  including hygiene clothing management/hygiene with contact guard assist level of assistance and bedside commode. (Progressing)       Start:  04/07/25    Expected End:  04/21/25               EXERCISE/STRENGTHENING       Patient with increase BUE strength in order to maximize independence with ADLs. (Progressing)       Start:  04/07/25    Expected End:  04/21/25               MOBILITY       Patient will perform Functional mobility min Household distances with minimal assist  level of assistance and least restrictive device in order to improve safety and functional mobility. (Progressing)       Start:  04/07/25    Expected End:  04/21/25               TRANSFERS       Patient will perform bed mobility contact guard assist level of assistance and bed rails in order to improve safety and independence with mobility (Progressing)       Start:  04/07/25    Expected End:  04/21/25            Patient will complete sit to stand transfer with minimal assist  level of assistance and least restrictive device in order to improve safety and prepare for out of bed mobility. (Progressing)       Start:  04/07/25    Expected End:  04/21/25 04/07/25 at 3:24 PM   Tiki Taylor, OT   Rehab Office: [phone]

## 2025-04-07 NOTE — CONSULTS
SUPPORTIVE AND PALLIATIVE ONCOLOGY CONSULT    SERVICE DATE: 4/7/2025    Updates and Recommendations (4/7/2025):  Below recommendations reflective of pt's historic effective pain/symptom inpatient regimen and home regimen.    Start scheduled acetaminophen 975mg PO q8h     Start lidocaine 4% TD patch once daily     Start scheduled oxycodone ER (OxyContin) 10mg PO q12h    Discontinue oxycodone IR 5mg PO q6h PRN     Change oxycodone IR 10mg PO q3h PRN for moderate to severe pain    Change hydromorphone 0.5mg IV q3h PRN for breakthrough pain--please discontinue in anticipation for discharge    Start ondansetron 8mg PO/IV q8h PRN for n/v, first line    Start scheduled loperamide 6mg PO once daily     Start aripiprazole 10mg PO once daily    Start diazepam 7.5mg PO AM / 5mg PO PM    Start propanol LA 120mg PO BID    Start guaifenesin 12h tablet 600mg PO BID PRN for cough     ASSESSMENT/PLAN:  Manuel Marrufo is a 60 y.o. 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 PMHx significant for spinal stenosis (s/p L4-S1 lumbar decompression in 2021), HTN, HLD, non-alcoholic fatty liver disease, COPD, obesity, FRANK on CPAP, depression s/p vagal nerve stimulator (SA x2, 2007), PTSD, and GERD. Admitted 4/6/2025 for further evaluation and management of generalized weakness, fatigue, SOB, and chronic worsening headache. MRI brain pending. Supportive and Palliative Oncology is consulted for assistance with pain and symptom management.      Neoplasm Related Pain  Headache, neck, and BLE/feet pain related to known malignancy with metastatic lung and brain involvement.   Type: Somatic, neuropathic  Pain control: Sub-optimally controlled  Home regimen: oxycodone ER (OxyContin) 10mg q12h, oxycodone IR 10mg q6h PRN, lidocaine TD patch once daily, acetaminophen PRN [usually  3g/day]  Intolerances: Vicodin [rx: sweating, shaking]  Personalized pain goal: Mild  Total OME usage for the past 24 hours: 37.5 OME  Renal function and hepatic function WNL.  Start scheduled acetaminophen 975mg PO q8h   Start lidocaine 4% TD patch once daily   dexamethasone 4mg IV q6h per primary  Start scheduled oxycodone ER (OxyContin) 10mg PO q12h  Discontinue oxycodone IR 5mg PO q6h PRN   Change oxycodone IR 10mg PO q3h PRN for moderate to severe pain  Change hydromorphone 0.5mg IV q3h PRN for breakthrough pain--please discontinue in anticipation for discharge  Continue to monitor pain scores and administer PRN medications as appropriate  Continue/initiate nonpharmacologic pain management strategies including ice/heat therapy, distraction techniques, deep breathing/relaxation techniques, calming music, and repositioning  Continue to monitor for signs of opioid efficacy (pain scores, improved functionality) and toxicity (pinpoint pupils, excess sedation/drowsiness/confusion, respiratory depression, etc.)     Nausea  At risk for nausea and vomiting related to opioid use.  Home regimen: PPI  EKG reviewed from 4/6/25, QTc 436.  Currently denies.   PPI per primary   Start ondansetron 8mg PO/IV q8h PRN for n/v, first line     Constipation  At risk for constipation related to opioids and reduced mobility, currently not constipated.  Has hx of diarrhea prone IBS per pt.   Usual bowel pattern: Once daily while on home loperamide  Home regimen: loperamide 6mg once daily [scheduled]  LBM: 4/5/25 per pt   Start scheduled loperamide 6mg PO once daily  Continue Miralax 17g PO once daily PRN for constipation  Goal to have BM without straining q48-72h, adjust regimen as needed  Encourage mobility as tolerated, PT/OT following     Altered Mood  Hx of anxiety, PTSD, and depression s/p vagal nerve stimulator (SA x2, 2007).  Allergy to buspirone [rx: n/v]  Sub-optimally controlled. Plans to restart home medications.   Previously  tried: alprazolam [rx: ineffective], clonazepam [rx: ineffective]  Home regimen: propanol LA 120mg BID (for anxiety per CPRS), diazepam 7.5mg AM / 5mg PM, aripiprazole 10mg once daily, sertraline 200mg once daily, pregabalin 200mg BID, follows with [facility]  Start aripiprazole 10mg PO once daily  Start diazepam 7.5mg PO AM / 5mg PO PM  Continue pregabalin 200mg PO BID  Start propanol LA 120mg PO BID  Continue sertraline 200mg PO once daily   If need arises, consider consulting Psych if medications need adjusting given complexity of existing regimen and extensive psych hx  Spiritual Care consulted for support     Sleeping Difficulty  Impaired sleep related to pain, anxiety, and hospital environment.  Hx of chronic insomnia per pt.   Home regimen: None, see pain and anxiety home meds  See pain and anxiety recs     Cough  Recent productive cough likely 2/2 recent c/f pna vs ICI pneumonitis.   Pt states his recent phlegm has been clear, non-odorous.   Home regimen: Mucinex PRN  Start guaifenesin 12h tablet 600mg PO BID PRN for cough      Disposition:  Please start the process of having prior authorization with meds to beds deliver medications to patient prior to discharge via [facility] pharmacy. Prescriptions will need to be sent 48-72 hours prior to discharge so that a prior authorization can be completed.      Discharge date pending resolution of acute hospital issues and pain control.  Pt will continue to [facility] with Mandi Crisostomo MD.    SIGNATURE: ANNELISE Brito-BRAD  PAGER/CONTACT:  Contact information:  Supportive and Palliative Oncology  Monday-Friday 8 AM-5 PM  Epic Secure chat or pager [phone].  After hours and weekends:  pager [phone]    ==========================================================================================================================  Inpatient consult to [facility] Adult Supportive Oncology  Consult performed by: ANNELISE Brito-CNP  Consult ordered by:  Fouzia Quinonez MD      PALLIATIVE MEDICINE OUTPATIENT PROVIDER: [facility]  CURRENT ATTENDING PROVIDER: Fouzia Quinonez MD     Medical Oncologist: Medardo Vanegas MD   Radiation Oncologist: No care team member to display  Primary Physician: No Assigned PCP Generic Provider  None    REASON FOR CONSULT/CHIEF CONSULT COMPLAINT: Pain and symptom management, continuity of care    Subjective   HISTORY OF PRESENT ILLNESS: Manuel Marrufo is a 60 y.o. 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 PMHx significant for spinal stenosis (s/p L4-S1 lumbar decompression in ), HTN, HLD, non-alcoholic fatty liver disease, COPD, obesity, FRANK on CPAP, depression s/p vagal nerve stimulator (SA x2, ), PTSD, and GERD. Admitted 2025 for further evaluation and management of generalized weakness, fatigue, SOB, and chronic worsening headache. MRI brain pending. Supportive and Palliative Oncology is consulted for assistance with pain and symptom management.     Pain Assessment:  Onset: Acute on chronic  Location: Head, posterior neck, low back--radiates down BLE to feet  Duration: Constant  Characteristics:  Ratin/10  Descriptors: Aching, sore, sharp, shooting  Aggravating: Bright lights, loud noises, movement   Relieving: Analgesics--see EMR, positioning, and modifying activity  Intolerances: Vicodin [rx: sweating, shaking]  Personal Pain Goal: Mild  Interference with Function: Very much   Coping Strategies: Distraction, rest, relaxation  Emotional Response: Anxiety, sadness  Barriers to Pain Management: None    Opioid Requirements  Past 24h opioid requirements:  (-, 3796-1354)  oxycodone IR 5mg x 2 = 10mg = 12.5 OME  oxycodone IR 10mg x 2 = 20mg = 25 OME    Total 24h OME use: 37.5 OME    OARRS/PDMP reviewed, no aberrant behavior  noted.    Symptom Assessment:  Pain: very much   Headache: very much   Dizziness: none  Lack of energy: a little  Difficulty sleeping: somewhat  Worrying: somewhat  Anxiety: somewhat  Depressive symptoms/low mood: somewhat  Pain in mouth/swallowing: none  Dry mouth: none  Taste changes: none  Shortness of breath: a little--though getting better per pt   Lack of appetite: none   Nausea: none  Vomiting: none  Constipation: none  Diarrhea: none  Sore muscles: none  Numbness or tingling in hands/feet/other: none  Weight loss: None    Information obtained from: chart review, interview of patient, and discussion with primary team  ______________________________________________________________________     Oncology History    No history exists.     Past Medical History:   Diagnosis Date    Agoraphobia with panic attacks     Anxiety     Awareness under anesthesia 2021    During back surgery    COPD (chronic obstructive pulmonary disease) (Multi)     Fatty liver disease, nonalcoholic     GERD (gastroesophageal reflux disease)     HLD (hyperlipidemia)     Melanoma (Multi)     with mets to brain, spine and lungs    FRANK on CPAP     PTSD (post-traumatic stress disorder)      Past Surgical History:   Procedure Laterality Date    BACK SURGERY      MALIGNANT SKIN LESION EXCISION      left shoulder and armpit    TONSILLECTOMY       Family History   Problem Relation Name Age of Onset    Arthritis Mother Patrizia Marrufo     Alcohol abuse Father      Cirrhosis Father      Cervical cancer Sister Kaity Rebolledo     Hypertension Sister Kaity Rebolledo     Other (bladder cancer) Maternal Grandmother Coral Ferrera       SOCIAL HISTORY:   Social History:  reports that he quit smoking about 11 months ago. His smoking use included cigarettes. He started smoking about 40 years ago. He has a 20 pack-year smoking history. He has never used smokeless tobacco. He reports that he does not drink alcohol and does not use drugs.  Has 2 adult children (son,  daughter) but estranged from them  Mother, brother, and sister are involved in pt's life  Single  Does not drive, relies on family for transportation    Jain and Importance of Jain:  Catholicism     REVIEW OF SYSTEMS:  Review of systems negative unless noted in HPI.       Objective     Lab Results   Component Value Date    WBC 9.5 04/07/2025    HGB 8.9 (L) 04/07/2025    HCT 30.7 (L) 04/07/2025    MCV 74 (L) 04/07/2025     04/07/2025      Lab Results   Component Value Date    GLUCOSE 129 (H) 04/07/2025    CALCIUM 9.0 04/07/2025     04/07/2025    K 4.3 04/07/2025    CO2 22 04/07/2025     (H) 04/07/2025    BUN 12 04/07/2025    CREATININE 1.05 04/07/2025     Lab Results   Component Value Date    ALT 36 04/07/2025    AST 29 04/07/2025    ALKPHOS 83 04/07/2025    BILITOT 0.5 04/07/2025     Estimated Creatinine Clearance: 97.9 mL/min (by C-G formula based on SCr of 1.05 mg/dL).     Encounter Date: 04/06/25   ECG 12 lead   Result Value    Ventricular Rate 114    Atrial Rate 115    TN Interval 141    QRS Duration 89    QT Interval 316    QTC Calculation(Bazett) 436    P Axis 30    R Axis 19    T Axis 45    QRS Count 19    Q Onset 253    T Offset 411    QTC Fredericia 391    Narrative    Sinus tachycardia  Nonspecific repol abnormality, diffuse leads  ST elevation, consider inferior injury  Baseline wander in lead(s) V2     Wt Readings from Last 5 Encounters:   04/06/25 126 kg (277 lb 12.5 oz)   04/06/25 126 kg (278 lb)   03/12/25 123 kg (272 lb)   03/03/25 129 kg (283 lb 12.8 oz)   01/29/25 125 kg (275 lb 5.7 oz)     Current Outpatient Medications   Medication Instructions    acetaminophen (TYLENOL) 975 mg, oral, Every 8 hours    albuterol 90 mcg/actuation inhaler 2 puffs, 4 times daily PRN    budesonide-glycopyr-formoterol (BREZTRI) 160-9-4.8 mcg/actuation HFA aerosol inhaler 2 puffs, 2 times daily RT    cholecalciferol (VITAMIN D-3) 50 mcg, Daily    dapsone 100 mg, oral, Daily    dexAMETHasone  (DECADRON) 4 mg, oral, Every 6 hours scheduled    dexAMETHasone (DECADRON) 4 mg, oral, Every 12 hours scheduled    diazePAM (Valium) 5 mg tablet Take 1.5 tablets (7.5 mg) by mouth once daily in the morning. Take before meals AND 1 tablet (5 mg) once daily at bedtime. Do all this for 10 days.    ferrous sulfate 325 mg, oral, Daily with breakfast    furosemide (LASIX) 40 mg, Daily    hydrocortisone (CORTEF) 10 mg, oral, Daily, Take daily every afternoon    memantine (Namenda) 5 mg tablet 2 tablets, 2 times daily    ondansetron ODT (ZOFRAN-ODT) 8 mg, 3 times daily PRN    oxyCODONE (ROXICODONE) 10 mg, oral, Every 3 hours PRN    oxyCODONE ER (OXYCONTIN) 10 mg, 2 times daily    polyethylene glycol (GLYCOLAX, MIRALAX) 17 g, oral, Daily PRN    pregabalin (LYRICA) 200 mg, 2 times daily    propranolol LA (INDERAL LA) 120 mg, 2 times daily    propranolol LA (INDERAL LA) 120 mg, oral, 2 times daily, Do not crush, chew, or split.    rosuvastatin (CRESTOR) 10 mg, Daily    sertraline (ZOLOFT) 200 mg, Daily    tadalafil (CIALIS) 5 mg, Daily     Scheduled medications   cholecalciferol, 2,000 Units, oral, Daily  dapsone, 100 mg, oral, Daily  dexAMETHasone, 4 mg, intravenous, q6h  [Held by provider] enoxaparin, 40 mg, subcutaneous, q12h TING  insulin lispro, 0-5 Units, subcutaneous, TID AC  ipratropium-albuteroL, 3 mL, nebulization, TID  levoFLOXacin, 500 mg, oral, q24h  memantine, 10 mg, oral, BID  pantoprazole, 40 mg, oral, Daily before breakfast  pregabalin, 200 mg, oral, BID  [Held by provider] propranolol LA, 120 mg, oral, BID  rosuvastatin, 10 mg, oral, Daily  sertraline, 200 mg, oral, Daily    Continuous medications     PRN medications  albuterol, 2.5 mg, q6h PRN  dextrose, 12.5 g, q15 min PRN  dextrose, 25 g, q15 min PRN  glucagon, 1 mg, q15 min PRN  glucagon, 1 mg, q15 min PRN  HYDROmorphone, 0.4 mg, q4h PRN  naloxone, 0.2 mg, q5 min PRN  oxyCODONE, 10 mg, q4h PRN  oxyCODONE, 5 mg, q6h PRN  polyethylene glycol, 17 g, Daily  PRN    Allergies:   Allergies   Allergen Reactions    Penicillin Anaphylaxis    Bactrim [Sulfamethoxazole-Trimethoprim] Nausea/vomiting    Buspirone GI Upset     PHYSICAL EXAMINATION:  Vital Signs:   Vital signs reviewed  Vitals:    04/07/25 0745   BP: 138/85   Pulse: (!) 113   Resp: 18   Temp: 36.4 °C (97.5 °F)   SpO2: 94%     Pain Score: 7    Physical Exam  Vitals reviewed.   Constitutional:       Comments: Alert, awake, ill appearing gentleman laying in bed. No signs of acute distress. Pleasant, cooperative, and participating in interview.  HENT:   Head:      Comments: Normocephalic, atraumatic.   Eyes:      Comments: Sclera clear, EOM intact, wearing glasses.    Pulmonary:      Comments: Symmetrical chest rise. Regular rate and depth of respirations. NC.   Abdominal:      Comments: Abdomen obese, slightly distended, non tender, and semi-firm.   Musculoskeletal:      Comments: Normal muscle strength and tone. RANDLE x4. Generalized non-pitting edema.   Skin:     Comments: No lesions, rash, or abrasions present on visible skin. Skin color appropriate for ethnicity.   Neurological:      Comments: A&Ox4, follows commands, no apparent sensory deficits.   Psychiatric:      Comments: Mild anxiety and sadness, but behavior otherwise appropriate.    ==========================================================================================================================    PALLIATIVE CARE ENCOUNTER:    Introduction to Supportive and Palliative Oncology:  Spoke with patient at bedside.  Introduced the role and philosophy of Supportive and Palliative oncology in the evaluation and management of symptoms during cancer treatment.  Palliative care was introduced as a service for patients with serious illness to help with symptoms, assist with goals of care conversations, navigate complex decision making, improve quality of life for patients, and provide support both patients and families.    Supportive and Palliative Oncology  encounter:  Spoke with patient at bedside.  Emotional support provided.  Coordination of care: medication and symptom evaluation    Medical Decision Making/Goals of Care/Advance Care Planning:  (4/7/2025) (3/5/2025) Patient's current clinical condition, including diagnosis, prognosis, and management plan, and goals of care were discussed.   Life limiting disease: Metastatic malignancy   Family: Supportive, mother, brother, and sister  Performance status: Moderate limitations due to progressive physical weakness, anxiety, and pain.  Joys/meaning/strength: Page  Understanding of health: Demonstrates good prognostic understanding of disease process, understands plan for updated pain and symptom recs.   Information: Appreciates full disclosure  Goals: Cancer tx, pain/symptom control  Worries and fears now and future: Not being offered cancer tx   Code status discussion: DNR-DNI    (4/7/35) Code status discussion: Confirmed DNR-DNI. Rad Onc attempting to visit at this time and will address above GOC further during next visit.     Advance Directives  Existence of Advance Directives: Yes, but NOT on file in medical record  Decision maker: HCPOA is pt's brotherMason  Code Status: DNR-DNI    Supportive Interventions: Interventions: SPO Spiritual Care: referral placed    Signature and billing:  Medical complexity was high level due to due to complexity of problems, extensive data review, and high risk of management/treatment.    I spent 60 minutes in the care of this patient which included chart review, interviewing patient/family, discussion with primary team, coordination of care, and documentation.    DATA   Diagnostic tests and information reviewed for today's visit: Conversation with primary team, Most recent labs and imaging results, Most recent EKG, Medications     Some elements copied from my note on 3/6/25, the elements have been updated and all reflect current decision making from today,  4/7/2025.    Plan of Care discussed with: Primary team, pt     Thank you for asking Supportive and Palliative Oncology to assist with care of this patient.  Recommendations will be communicated back to the consulting service by way of shared electronic medical record/secure chat/email or face-to-face.   We will continue to follow.  Please contact us for additional questions or concerns.    SIGNATURE: ROSSI Brito  PAGER/CONTACT:  Contact information:  Supportive and Palliative Oncology  Monday-Friday 8 AM-5 PM  Epic Secure chat or pager [phone].  After hours and weekends:  pager [phone]

## 2025-04-07 NOTE — PROGRESS NOTES
"Manuel Marrufo is a 60 y.o. male on day 1 of admission presenting with Metastatic melanoma (Multi).      Subjective   No acute events overnight. Patient reports 9/10 headache and neck pain, slightly worsened by light. Patient states \"I know I'm going to die\" when discussing treatment plan moving forward.       Objective   Last Recorded Vitals  /71 (BP Location: Right arm, Patient Position: Lying)   Pulse 107   Temp 36.6 °C (97.9 °F) (Temporal)   Resp 18   Wt 126 kg (277 lb 12.5 oz)   SpO2 93%     Intake/Output last 3 Shifts:  Intake/Output Summary (Last 24 hours) at 4/7/2025 1610  Last data filed at 4/7/2025 1131  Gross per 24 hour   Intake 525 ml   Output 650 ml   Net -125 ml     Admission Weight  Weight: 126 kg (277 lb 12.5 oz) (04/06/25 2100)    Daily Weight  04/06/25 : 126 kg (277 lb 12.5 oz)    Physical Exam  GEN: In mild acute distress, resting in bed; cushingoid habitus  HEENT: Mucous membranes dry; eyes, ears, and nose without exudates; no scleral icterus  CV: Regular rate and rhythm; no murmurs, rubs, or gallops  RESP: Clear to auscultation bilaterally; no accessory muscle use; normal work of breathing; NC in place  ABD: Soft, non-tender; bowel sounds present  EXT: Radial pulses strong bilaterally; no peripheral edema  SKIN: Warm and dry; no visible rashes  NEURO: A&O x 4, moves all four extremities against gravity, sensation to light touch intact in all four extremities    Relevant Results  Scheduled medications  cholecalciferol, 2,000 Units, oral, Daily  dapsone, 100 mg, oral, Daily  dexAMETHasone, 4 mg, intravenous, q6h  [Held by provider] enoxaparin, 40 mg, subcutaneous, q12h TING  insulin lispro, 0-5 Units, subcutaneous, TID AC  ipratropium-albuteroL, 3 mL, nebulization, TID  levoFLOXacin, 500 mg, oral, q24h  memantine, 10 mg, oral, BID  pantoprazole, 40 mg, oral, Daily before breakfast  pregabalin, 200 mg, oral, BID  [Held by provider] propranolol LA, 120 mg, oral, BID  rosuvastatin, 10 mg, " oral, Daily  sertraline, 200 mg, oral, Daily    Continuous medications     PRN medications  PRN medications: albuterol, dextrose, dextrose, glucagon, glucagon, HYDROmorphone, naloxone, oxyCODONE, oxyCODONE, polyethylene glycol    Results from last 72 hours   Lab Units 04/07/25  0722 04/06/25  0836   WBC AUTO x10*3/uL 9.5 12.4*   HEMOGLOBIN g/dL 8.9* 9.9*   PLATELETS AUTO x10*3/uL 284 285   SODIUM mmol/L 143 145   POTASSIUM mmol/L 4.3 3.9   CO2 mmol/L 22 23   BUN mg/dL 12 11   MAGNESIUM mg/dL 2.06 1.95   GLUCOSE mg/dL 129* 100*      CXR (4/6/2025)  No radiographic evidence of an acute cardiopulmonary process.    CTH w/o contrast (4/6/2025)  Increased size of now 1.7 cm right parietal metastatic lesion with increased adjacent vasogenic edema when compared to 03/03/2025 CT.  Superimposed intralesional hemorrhage can not be excluded. No acute intracranial hemorrhage or new mass effect otherwise. Further evaluation with MRI may be warranted.  Multiple known intracranial metastatic lesions with vasogenic edema better visualized on the prior 11/26/2024 MRI    CT PE (4/6/2025)  No acute pulmonary embolism to the segmental level.  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 The differential diagnosis includes multifocal pneumonia, felt to be less likely. Given patient's presentation of shortness of breath, this could be grade 2 or grade 3 pneumonitis. Follow-up CT chest is recommended in 4-6 weeks.  Stable left adrenal nodule, which is likely metastatic.  Unchanged indeterminate sclerotic lesions in the T10 vertebral body and right superior acetabulum, suspicious for metastasis. Confirmation with nonemergent PET-CT is recommended.  Patient's known pulmonary nodules are  obscured by the above described pneumonitis. Tumor burden in this area can be  reassessed on follow-up evaluation.   Otherwise, no definite new site of disease is evident.       Assessment/Plan   Manuel Marrufo is a 60-year-old male with past medical history significant for metastatic melanoma (brain, lung, spine, and adrenal) s/p dual immunotherapy with nivulumab and ipilumab x 2 cycles (last dose Jan 2025) and palliative WBRT (Dec 2024) s/p left shoulder resection (2019), spinal stenosis s/p L4-S1 lumbar decompression (2021), HTN, HLD, MASH, obesity, FRANK on CPAP, PTSD, GERD, COPD, and depression s/p vagal nerve stimulator (SA x 2, 2007) admitted for management of hemorrhagic brain metastases with vasogenic edema. Neurosurgery consulted. CT PE showed findings consistent with pneumonitis. On high-dose dexamethasone for vasogenic edema. MRI brain pending. Radiation Oncology and Supportive Oncology consulted.    Updates (4/7/2025)  - Radiation Oncology consulted, appreciate recommendations  - Supportive Oncology consulted, appreciate recommendations     #Metastatic Melanoma  #Brain Metastases, c/f intra-lesional hemorrhage and vasogenic edema  #Nausea  #Fatigue  : : Increasing size of 1.7 cm right parietal lesion  : : Possible superimposed intralesional hemorrhage  : : S/p WBRT x 10 fractions (2024)  : : S/p two cycles of dual immunotherapy with nivulumab and ipilumab, last dose 1/10   : : Primary oncologist at [facility]: Dr. Schaefer   : : S/p dexamethasone load 10 mg IV  - Neurosurgery consulted, appreciate recommendations  Obtain MRI presurgical perfusion and fingerprinting () to further characterize lesions and edema  Agree with dex 4q6, PPI, sliding scale insulin  Discussion at tumor board  Radiation oncology consult for evaluation and further interventions  - Continue ondansetron PRN for nausea  - PT/OT  - Continue pregabalin 200 mg BID  - Continue dexamethasone 4 mg IV Q6H for vasogenic edema  - Radiation Oncology  consulted, appreciate recommendations  - Supportive Oncology consulted, appreciate recommendations  Start scheduled acetaminophen 975mg PO q8h  Start lidocaine 4% TD patch once daily   Start scheduled oxycodone ER (OxyContin) 10mg PO q12h  Discontinue oxycodone IR 5mg PO q6h PRN   Change oxycodone IR 10mg PO q3h PRN for moderate to severe pain  Change hydromorphone 0.5mg IV q3h PRN for breakthrough pain--please discontinue in anticipation for discharge  Start ondansetron 8mg PO/IV q8h PRN for n/v, first line  Start scheduled loperamide 6mg PO once daily   Start aripiprazole 10mg PO once daily  Start diazepam 7.5mg PO AM / 5mg PO PM  Start propanol LA 120mg PO BID  Start guaifenesin 12h tablet 600mg PO BID PRN for cough     #ICI Pneumonitis vs CAP  #COPD  : : Grade 2-3 pneumonitis on CT PE  : : Leukocytosis on admission  : : No O2 requirement at baseline  : : Procal 0.24  : : RSV/flu/COVID PCR negative  : : MRSA nares negative  : : Strep, Legionella urine antigens negative  - Discontinue vancomycin  - Continue levofloxacin 500 mg daily (due to penicillin allergy)  - Bcx NGTD  - Continue dapsone for PJP ppx   - Continue DuoNebs Q6H  - Continue albuterol PRN    #FRANK  - Continue CPAP QHS     #MIGEL  : : At baseline Hgb ~10 on admission  - Hold iron supplement     #HLD  - Continue rosuvastatin 10 mg daily     #Anxiety  #PTSD  #MDD, treatment-refractory, s/p vagal nerve stimulator   - Continue sertraline 200 mg daily  - Continue memantine 10 mg BID    Dispo: OT recommending moderate intensity at discharge, PT pending    F: Cautious, no documented echo  E: K >4, Mg >2  N: Regular, 2 L fluid restriction  A: PIV  O2: 2 L NC  Abx: Levofloxacin  DVT ppx: None -> C/f intra-lesional hemorrhage  GI ppx: None    Code Status: DNR/DNI  Surrogate Decision-Maker: Mason Be (Brother, [phone])       This patient was seen, discussed and examined with the attending, Dr. Quinonez, who agrees with the management plan.    Jakob Dillard,  MD  PGY-1, Neurology

## 2025-04-07 NOTE — CARE PLAN
Problem: Pain - Adult  Goal: Verbalizes/displays adequate comfort level or baseline comfort level  4/7/2025 0428 by Khushbu Garcia RN  Outcome: Progressing  4/6/2025 2320 by Khushbu Garcia RN  Outcome: Progressing     Problem: Skin  Goal: Decreased wound size/increased tissue granulation at next dressing change  Outcome: Progressing  Flowsheets (Taken 4/7/2025 0428)  Decreased wound size/increased tissue granulation at next dressing change: Protective dressings over bony prominences  Goal: Participates in plan/prevention/treatment measures  Outcome: Progressing  Goal: Prevent/manage excess moisture  Outcome: Progressing  Goal: Prevent/minimize sheer/friction injuries  Outcome: Progressing  Goal: Promote/optimize nutrition  Outcome: Progressing  Goal: Promote skin healing  Outcome: Progressing   The patient's goals for the shift include      The clinical goals for the shift include maintain respiratory status

## 2025-04-07 NOTE — PROGRESS NOTES
Physical Therapy    Physical Therapy Evaluation    Patient Name: Manuel Marrufo  MRN: [id number]  Department: [facility]  Room: [address] [place] [address]  Today's Date: 4/7/2025   Time Calculation  Start Time: 1132  Stop Time: 1153  Time Calculation (min): 21 min    Assessment/Plan   PT Assessment  PT Assessment Results: Decreased strength, Decreased endurance, Impaired balance, Decreased mobility, Pain  Rehab Prognosis: Good  Barriers to Discharge Home: Caregiver assistance, Physical needs  Caregiver Assistance: Caregiver assistance needed per identified barriers - however, level of patient's required assistance exceeds assistance available at home  Physical Needs: Stair navigation into home limited by function/safety, Ambulating household distances limited by function/safety, 24hr mobility assistance needed, 24hr ADL assistance needed, High falls risk due to function or environment  Evaluation/Treatment Tolerance: Patient limited by pain  Barriers to Participation: Comorbidities  End of Session Communication: Bedside nurse  End of Session Patient Position: Bed, 3 rail up, Alarm on  IP OR SWING BED PT PLAN  Inpatient or Swing Bed: Inpatient  PT Plan  Treatment/Interventions: Bed mobility, Transfer training, Gait training, Stair training, Balance training, Strengthening, Endurance training, Therapeutic exercise, Therapeutic activity  PT Plan: Ongoing PT  PT Frequency: 3 times per week  PT Discharge Recommendations: Moderate intensity level of continued care  Equipment Recommended upon Discharge: Wheeled walker  PT Recommended Transfer Status: Assist x2, Assistive device  PT - OK to Discharge: Yes    Subjective   General Visit Information:  General  Reason for Referral: presenting with generalized weakness, fatigue, SOB, and chronic worsening headache. CTH incr R parietal lesion and L temporal edema  Past Medical History Relevant to Rehab: PMHx s/f metastatic melanoma, to brain and lung (on C3 of dual immunotherapy with nivulumab and  ipilumab and palliative WBRT 12/14/24), (s/p left shoulder resection 2019), spinal stenosis (s/p L4-S1 lumbar decompression 2021), HTN, HLD, nonalcoholic fatty liver disease, obesity, FRANK on CPAP, PTSD, GERD, COPD, depression s/p vagal nerve stimulator (SA x2, 2007)  Co-Treatment: OT  Co-Treatment Reason: Cotx with OT for pt's safety with mobility, AMPAC<10  Prior to Session Communication: Bedside nurse  Patient Position Received: Bed, 3 rail up, Alarm on  Preferred Learning Style: verbal  General Comment: Pt supine in bed upon arrival and wiling to participate in PT session. Limited by pain. Questionable historian. Not on supplemental O2 at home per pt  Home Living:  Home Living  Type of Home: House (split level)  Lives With: Alone (HHA 5 days/week for 6 hrs to assist with cooking, laundry, cleaning, Mom assists with grocery shopping))  Home Adaptive Equipment: Walker rolling or standard, Cane, Wheelchair-manual (rollator, chair lift)  Home Layout: Multi-level (chair lift to 2nd and 3rd floor, bedroom/full bath level is on 3rd floor, 1/2 bath on 2nd floor with kitchen and living room)  Home Access: Stairs to enter with rails  Entrance Stairs-Number of Steps: 1 (1 handrail)  Bathroom Shower/Tub: Walk-in shower  Prior Level of Function:  Prior Function Per Pt/Caregiver Report  Level of [place]: Needs assistance with ADLs, Needs assistance with homemaking  Receives Help From: Home health  ADL Assistance: Needs assistance  Ambulatory Assistance:  (Pt reports Fernando with rollator intially, then reported furniture walks)  Hand Dominance: Right  Prior Function Comments: HHA assists with bathing, dressing, laundry, cooking, denies falls, per chart review + falls, -ve drives, pet cat  Precautions:  Precautions  Medical Precautions: Fall precautions, Oxygen therapy device and L/min (protective spinal precautions)        Objective   Pain:  Pain Assessment  Pain Assessment: 0-10  0-10 (Numeric) Pain Score: 8  Pain Location:  Back (neck and head)  Cognition:  Cognition  Arousal/Alertness: Delayed responses to stimuli  Orientation Level: Oriented X4 (VC's for date)  Following Commands: Follows one step commands with repetition  Processing Speed: Delayed    General Assessments:    Activity Tolerance  Endurance: Decreased tolerance for upright activites    Sensation  Light Touch: No apparent deficits    Strength  Strength Comments: BLE's 3/5 based on mobility       Static Sitting Balance  Static Sitting-Level of Assistance: Contact guard  Dynamic Sitting Balance  Dynamic Sitting-Level of Assistance: Minimum assistance       Functional Assessments:  Bed Mobility  Bed Mobility: Yes  Bed Mobility 1  Bed Mobility 1: Supine to sitting, Sitting to supine  Level of Assistance 1: Maximum assistance, +2, Moderate verbal cues, Moderate tactile cues  Bed Mobility Comments 1: HOB elevated (cues for log roll)  Bed Mobility 2  Bed Mobility  2: Rolling right, Rolling left  Level of Assistance 2: Maximum assistance, +2, Moderate verbal cues, Moderate tactile cues  Bed Mobility 3  Bed Mobility 3: Scooting (to HOB)  Level of Assistance 3: Dependent, +2  Bed Mobility Comments 3: attempted sidescooting seated EOB, pt unable    Transfers  Transfer: No (pt declined)       Outcome Measures:  [facility] Basic Mobility  Turning from your back to your side while in a flat bed without using bedrails: Total  Moving from lying on your back to sitting on the side of a flat bed without using bedrails: Total  Moving to and from bed to chair (including a wheelchair): Total  Standing up from a chair using your arms (e.g. wheelchair or bedside chair): Total  To walk in hospital room: Total  Climbing 3-5 steps with railing: Total  Basic Mobility - Total Score: 6    Encounter Problems       Encounter Problems (Active)       Balance       Pt will maintain static standing balance with upper extremity support for >1 min with CGA (Progressing)       Start:  04/07/25    Expected End:   04/21/25               Mobility       Pt will be Babak to ascend/descend 1 step with 1 handrail (Progressing)       Start:  04/07/25    Expected End:  04/21/25               Mobility       Pt will be Babak ambulation 40 ft with LRAD (Progressing)       Start:  04/07/25    Expected End:  04/21/25               PT Transfers       Pt will be Babak for sit to stand and bed to chair transfers with LRAD (Progressing)       Start:  04/07/25    Expected End:  04/21/25            Pt will be Babak for bed mobility (Progressing)       Start:  04/07/25    Expected End:  04/21/25               Pain - Adult              Education Documentation  Precautions, taught by Mindy Mondragon PT at 4/7/2025  3:24 PM.  Learner: Patient  Readiness: Acceptance  Method: Explanation  Response: Verbalizes Understanding, Needs Reinforcement    Body Mechanics, taught by Mindy Mondragon PT at 4/7/2025  3:24 PM.  Learner: Patient  Readiness: Acceptance  Method: Explanation  Response: Verbalizes Understanding, Needs Reinforcement    Mobility Training, taught by Mindy Mondragon PT at 4/7/2025  3:24 PM.  Learner: Patient  Readiness: Acceptance  Method: Explanation  Response: Verbalizes Understanding, Needs Reinforcement    Education Comments  No comments found.

## 2025-04-07 NOTE — CARE PLAN
Problem: Pain - Adult  Goal: Verbalizes/displays adequate comfort level or baseline comfort level  Outcome: Progressing     Problem: Safety - Adult  Goal: Free from fall injury  Outcome: Progressing     Problem: Chronic Conditions and Co-morbidities  Goal: Patient's chronic conditions and co-morbidity symptoms are monitored and maintained or improved  Outcome: Progressing     Problem: Nutrition  Goal: Nutrient intake appropriate for maintaining nutritional needs  Outcome: Progressing   The patient's goals for the shift include      The clinical goals for the shift include

## 2025-04-07 NOTE — CARE PLAN
The patient's goals for the shift include      The clinical goals for the shift include pt will remain HDS for remainder of shift      Problem: Pain - Adult  Goal: Verbalizes/displays adequate comfort level or baseline comfort level  Outcome: Progressing     Problem: Safety - Adult  Goal: Free from fall injury  Outcome: Progressing     Problem: Discharge Planning  Goal: Discharge to home or other facility with appropriate resources  Outcome: Progressing     Problem: Chronic Conditions and Co-morbidities  Goal: Patient's chronic conditions and co-morbidity symptoms are monitored and maintained or improved  Outcome: Progressing     Problem: Nutrition  Goal: Nutrient intake appropriate for maintaining nutritional needs  Outcome: Progressing     Problem: Fall/Injury  Goal: Not fall by end of shift  Outcome: Progressing  Goal: Be free from injury by end of the shift  Outcome: Progressing  Goal: Verbalize understanding of personal risk factors for fall in the hospital  Outcome: Progressing  Goal: Verbalize understanding of risk factor reduction measures to prevent injury from fall in the home  Outcome: Progressing  Goal: Use assistive devices by end of the shift  Outcome: Progressing  Goal: Pace activities to prevent fatigue by end of the shift  Outcome: Progressing     Problem: Pain  Goal: Takes deep breaths with improved pain control throughout the shift  Outcome: Progressing  Goal: Turns in bed with improved pain control throughout the shift  Outcome: Progressing  Goal: Walks with improved pain control throughout the shift  Outcome: Progressing  Goal: Performs ADL's with improved pain control throughout shift  Outcome: Progressing  Goal: Participates in PT with improved pain control throughout the shift  Outcome: Progressing  Goal: Free from opioid side effects throughout the shift  Outcome: Progressing  Goal: Free from acute confusion related to pain meds throughout the shift  Outcome: Progressing     Problem:  Respiratory  Goal: Clear secretions with interventions this shift  Outcome: Progressing  Goal: Minimize anxiety/maximize coping throughout shift  Outcome: Progressing  Goal: Minimal/no exertional discomfort or dyspnea this shift  Outcome: Progressing  Goal: No signs of respiratory distress (eg. Use of accessory muscles. Peds grunting)  Outcome: Progressing  Goal: Patent airway maintained this shift  Outcome: Progressing  Goal: Tolerate mechanical ventilation evidenced by VS/agitation level this shift  Outcome: Progressing  Goal: Tolerate pulmonary toileting this shift  Outcome: Progressing  Goal: Verbalize decreased shortness of breath this shift  Outcome: Progressing  Goal: Wean oxygen to maintain O2 saturation per order/standard this shift  Outcome: Progressing  Goal: Increase self care and/or family involvement in next 24 hours  Outcome: Progressing     Problem: Skin  Goal: Decreased wound size/increased tissue granulation at next dressing change  Outcome: Progressing  Goal: Participates in plan/prevention/treatment measures  Outcome: Progressing  Goal: Prevent/manage excess moisture  Outcome: Progressing  Goal: Prevent/minimize sheer/friction injuries  Outcome: Progressing  Goal: Promote/optimize nutrition  Outcome: Progressing  Goal: Promote skin healing  Outcome: Progressing

## 2025-04-07 NOTE — PROGRESS NOTES
Vancomycin Dosing by Pharmacy- FOLLOW UP    Manuel Marrufo is a 60 y.o. year old male who Pharmacy has been consulted for vancomycin dosing for pneumonia. Based on the patient's indication and renal status this patient is being dosed based on a goal AUC of 400-600.     Renal function is currently stable.    Current vancomycin dose: 1250 mg given every 12 hours    Estimated vancomycin AUC on current dose: 508 mg/L.hr     Visit Vitals  /85 (BP Location: Right arm, Patient Position: Lying)   Pulse (!) 113   Temp 36.4 °C (97.5 °F)   Resp 18        Lab Results   Component Value Date    CREATININE 1.05 2025    CREATININE 1.02 2025    CREATININE 1.16 2025    CREATININE 1.03 2025        Patient weight is as follows:   Vitals:    25 2100   Weight: 126 kg (277 lb 12.5 oz)       Cultures:  No results found for the encounter in last 14 days.       I/O last 3 completed shifts:  In: 250 (2 mL/kg) [IV Piggyback:250]  Out: 650 (5.2 mL/kg) [Urine:650 (0.1 mL/kg/hr)]  Weight: 126 kg   I/O during current shift:  No intake/output data recorded.    Temp (24hrs), Av.9 °C (98.4 °F), Min:36.4 °C (97.5 °F), Max:37.2 °C (99 °F)      Assessment/Plan    Within goal AUC range. Continue current vancomycin regimen.    This dosing regimen is predicted by [employer] to result in the following pharmacokinetic parameters:  Regimen: 1250 mg IV every 12 hours.  Start time: 19:55 on 2025  Exposure target: AUC24 (range)400-600 mg/L.hr   [id number]: 508 mg/L.hr  AUC24,ss: 511 mg/L.hr  Probability of AUC24 > 400: 84 %  Ctrough,ss: 13.3 mg/L  Probability of Ctrough,ss > 20: 23 %  The next level will be obtained on 25 at 0500. May be obtained sooner if clinically indicated.   Will continue to monitor renal function daily while on vancomycin and order serum creatinine at least every 48 hours if not already ordered.  Follow for continued vancomycin needs, clinical response, and signs/symptoms of toxicity.        Neo Coto, [facility]

## 2025-04-07 NOTE — PROGRESS NOTES
Pharmacy Medication History Review    Manuel Marrufo is a 60 y.o. male admitted for Metastatic melanoma (Multi). Pharmacy reviewed the patient's rwfvu-ei-heiqgzocd medications and allergies for accuracy.    Medications ADDED:  Abilify 10 mg  Flonase Nasal Spray  Lidocaine 5% patch  Loperamide 2 mg  Potassium Oral  Medications CHANGED:  None   Medications REMOVED:   Propranolol  mg 24 hr capsule (duplicate)     The list below reflects the updated PTA list.   Prior to Admission Medications   Prescriptions Last Dose Informant   ARIPiprazole (Abilify) 10 mg tablet  Self, Other   Sig: Take 1 tablet (10 mg) by mouth once daily.   POTASSIUM ORAL  Self   Sig: Take 1 Dose by mouth once daily.   acetaminophen (Tylenol) 325 mg tablet  Other, Self   Sig: Take 3 tablets (975 mg) by mouth every 8 hours.   albuterol 90 mcg/actuation inhaler  Other, Self   Sig: Inhale 2 puffs 4 times a day as needed for wheezing or shortness of breath.   budesonide-glycopyr-formoterol (BREZTRI) 160-9-4.8 mcg/actuation HFA aerosol inhaler  Other, Self   Sig: Inhale 2 puffs 2 times a day.   cholecalciferol (Vitamin D-3) 50 MCG (2000 UT) tablet  Other, Self   Sig: Take 1 tablet (50 mcg) by mouth once daily.   dapsone 100 mg tablet  Self, Other   Sig: Take 1 tablet (100 mg) by mouth once daily.   dexAMETHasone (Decadron) 4 mg tablet  Other, Self   Sig: Take 1 tablet (4 mg) by mouth every 12 hours.   diazePAM (Valium) 5 mg tablet     Sig: Take 1.5 tablets (7.5 mg) by mouth once daily in the morning. Take before meals AND 1 tablet (5 mg) once daily at bedtime. Do all this for 10 days.Patient takes 10 mg every morning and 7.5 mg at bedtime.    ferrous sulfate tablet Not Taking Other, Self   Sig: Take 1 tablet (325 mg) by mouth once daily with breakfast.   Patient not taking: Reported on 4/7/2025   fluticasone (Flonase) 50 mcg/actuation nasal spray  Other, Self   Sig: Administer 2 sprays into each nostril once daily. Shake gently. Before first use,  prime pump. After use, clean tip and replace cap.   furosemide (Lasix) 40 mg tablet  Other, Self   Sig: Take 1 tablet (40 mg) by mouth once daily. Edema   guaiFENesin (Mucinex) 600 mg 12 hr tablet     Sig: Take 1 tablet (600 mg) by mouth 2 times a day as needed for cough. Do not crush, chew, or split.   hydrocortisone (Cortef) 10 mg tablet Not Taking Other, Self   Sig: Take 1 tablet (10 mg) by mouth once daily. Take daily every afternoon   Patient not taking: Reported on 4/7/2025   lidocaine (Lidoderm) 5 % patch  Other, Self   Sig: Place 3 patches on the skin if needed for mild pain (1 - 3). Remove & discard patch within 12 hours or as directed by MD.   loperamide (Imodium A-D) 2 mg tablet  Other, Self   Sig: Take 1 tablet (2 mg) by mouth 3 times a day as needed for diarrhea.   memantine (Namenda) 5 mg tablet  Other, Self   Sig: Take 2 tablets (10 mg) by mouth 2 times a day.   ondansetron ODT (Zofran-ODT) 8 mg disintegrating tablet  Other, Self   Sig: Dissolve 1 tablet (8 mg) in the mouth 3 times a day as needed for nausea.   oxyCODONE (Roxicodone) 10 mg immediate release tablet  Other, Self   Sig: Take 1 tablet (10 mg) by mouth every 6 hours if needed for severe pain (7 - 10).   oxyCODONE ER (OxyCONTIN) 10 mg 12 hr tablet  Other, Self   Sig: Take 1 tablet (10 mg) by mouth 2 times a day. Do not crush, chew, or split.   polyethylene glycol (Glycolax, Miralax) 17 gram packet Not Taking Other, Self   Sig: Take 17 g by mouth once daily as needed (constipation).   Patient not taking: Reported on 4/7/2025   pregabalin (Lyrica) 200 mg capsule  Other, Self   Sig: Take 1 capsule (200 mg) by mouth 2 times a day.   propranolol LA (Inderal LA) 120 mg 24 hr capsule  Other, Self   Sig: Take 1 capsule (120 mg) by mouth 2 times a day. Do not crush, chew, or split.   rosuvastatin (Crestor) 10 mg tablet  Other, Self   Sig: Take 1 tablet (10 mg) by mouth once daily.   sertraline (Zoloft) 100 mg tablet  Other, Self   Sig: Take 2  "tablets (200 mg) by mouth once daily.   tadalafil (Cialis) 5 mg tablet  Other, Self   Sig: Take 1 tablet (5 mg) by mouth once daily.      Facility-Administered Medications: None         The list below reflects the updated allergy list. Please review each documented allergy for additional clarification and justification.  Allergies  Reviewed by Yu Rivas on 4/7/2025        Severity Reactions Comments    Penicillin High Anaphylaxis     Bactrim [sulfamethoxazole-trimethoprim] Not Specified Nausea/vomiting     Buspirone Not Specified GI Upset             Patient accepts M2B at discharge.     Sources:   [facility]  Pharmacy dispense history  Patient Interview Moderate historian  Chart Review  Care Everywhere  [phone] was called to verify patient medication list      Additional Comments:  Patient stated that he takes 10 mg of Diazepam every morning and 7.5 mg of Diazepam at bedtime.      Yu Rivas  Pharmacy Technician  04/07/25     Secure Chat preferred   If no response call [phone] or [employer] \"Med Rec\"   "

## 2025-04-08 LAB
ALBUMIN SERPL BCP-MCNC: 3.4 G/DL (ref 3.4–5)
ANION GAP SERPL CALC-SCNC: 16 MMOL/L (ref 10–20)
BASOPHILS # BLD MANUAL: 0 X10*3/UL (ref 0–0.1)
BASOPHILS NFR BLD MANUAL: 0 %
BUN SERPL-MCNC: 21 MG/DL (ref 6–23)
CALCIUM SERPL-MCNC: 8.7 MG/DL (ref 8.6–10.6)
CHLORIDE SERPL-SCNC: 107 MMOL/L (ref 98–107)
CO2 SERPL-SCNC: 21 MMOL/L (ref 21–32)
CREAT SERPL-MCNC: 1.05 MG/DL (ref 0.5–1.3)
DACRYOCYTES BLD QL SMEAR: ABNORMAL
EGFRCR SERPLBLD CKD-EPI 2021: 82 ML/MIN/1.73M*2
EOSINOPHIL # BLD MANUAL: 0 X10*3/UL (ref 0–0.7)
EOSINOPHIL NFR BLD MANUAL: 0 %
ERYTHROCYTE [DISTWIDTH] IN BLOOD BY AUTOMATED COUNT: 22.2 % (ref 11.5–14.5)
GLUCOSE BLD MANUAL STRIP-MCNC: 143 MG/DL (ref 74–99)
GLUCOSE BLD MANUAL STRIP-MCNC: 145 MG/DL (ref 74–99)
GLUCOSE BLD MANUAL STRIP-MCNC: 159 MG/DL (ref 74–99)
GLUCOSE BLD MANUAL STRIP-MCNC: 221 MG/DL (ref 74–99)
GLUCOSE SERPL-MCNC: 124 MG/DL (ref 74–99)
HCT VFR BLD AUTO: 27.9 % (ref 41–52)
HGB BLD-MCNC: 7.8 G/DL (ref 13.5–17.5)
IMM GRANULOCYTES # BLD AUTO: 0.28 X10*3/UL (ref 0–0.7)
IMM GRANULOCYTES NFR BLD AUTO: 3.3 % (ref 0–0.9)
LYMPHOCYTES # BLD MANUAL: 0.59 X10*3/UL (ref 1.2–4.8)
LYMPHOCYTES NFR BLD MANUAL: 6.9 %
MAGNESIUM SERPL-MCNC: 2.18 MG/DL (ref 1.6–2.4)
MCH RBC QN AUTO: 20.9 PG (ref 26–34)
MCHC RBC AUTO-ENTMCNC: 28 G/DL (ref 32–36)
MCV RBC AUTO: 75 FL (ref 80–100)
METAMYELOCYTES # BLD MANUAL: 0.08 X10*3/UL
METAMYELOCYTES NFR BLD MANUAL: 0.9 %
MONOCYTES # BLD MANUAL: 0.22 X10*3/UL (ref 0.1–1)
MONOCYTES NFR BLD MANUAL: 2.6 %
MYELOCYTES # BLD MANUAL: 0.08 X10*3/UL
MYELOCYTES NFR BLD MANUAL: 0.9 %
NEUTROPHILS # BLD MANUAL: 7.63 X10*3/UL (ref 1.2–7.7)
NEUTS BAND # BLD MANUAL: 0.29 X10*3/UL (ref 0–0.7)
NEUTS BAND NFR BLD MANUAL: 3.4 %
NEUTS SEG # BLD MANUAL: 7.34 X10*3/UL (ref 1.2–7)
NEUTS SEG NFR BLD MANUAL: 85.3 %
NRBC BLD-RTO: 0.2 /100 WBCS (ref 0–0)
OVALOCYTES BLD QL SMEAR: ABNORMAL
PHOSPHATE SERPL-MCNC: 3.7 MG/DL (ref 2.5–4.9)
PLATELET # BLD AUTO: 250 X10*3/UL (ref 150–450)
POLYCHROMASIA BLD QL SMEAR: ABNORMAL
POTASSIUM SERPL-SCNC: 4.4 MMOL/L (ref 3.5–5.3)
RBC # BLD AUTO: 3.73 X10*6/UL (ref 4.5–5.9)
RBC MORPH BLD: ABNORMAL
SODIUM SERPL-SCNC: 140 MMOL/L (ref 136–145)
STAPHYLOCOCCUS SPEC CULT: NORMAL
TOTAL CELLS COUNTED BLD: 116
WBC # BLD AUTO: 8.6 X10*3/UL (ref 4.4–11.3)

## 2025-04-08 PROCEDURE — 83735 ASSAY OF MAGNESIUM: CPT

## 2025-04-08 PROCEDURE — 99221 1ST HOSP IP/OBS SF/LOW 40: CPT | Performed by: PHYSICIAN ASSISTANT

## 2025-04-08 PROCEDURE — 82947 ASSAY GLUCOSE BLOOD QUANT: CPT

## 2025-04-08 PROCEDURE — 36415 COLL VENOUS BLD VENIPUNCTURE: CPT

## 2025-04-08 PROCEDURE — 2500000002 HC RX 250 W HCPCS SELF ADMINISTERED DRUGS (ALT 637 FOR MEDICARE OP, ALT 636 FOR OP/ED)

## 2025-04-08 PROCEDURE — 2500000004 HC RX 250 GENERAL PHARMACY W/ HCPCS (ALT 636 FOR OP/ED)

## 2025-04-08 PROCEDURE — 2500000001 HC RX 250 WO HCPCS SELF ADMINISTERED DRUGS (ALT 637 FOR MEDICARE OP)

## 2025-04-08 PROCEDURE — 85007 BL SMEAR W/DIFF WBC COUNT: CPT

## 2025-04-08 PROCEDURE — 94640 AIRWAY INHALATION TREATMENT: CPT

## 2025-04-08 PROCEDURE — 80069 RENAL FUNCTION PANEL: CPT

## 2025-04-08 PROCEDURE — 1200000003 HC ONCOLOGY  ROOM WITH TELEMETRY DAILY

## 2025-04-08 PROCEDURE — 99232 SBSQ HOSP IP/OBS MODERATE 35: CPT | Performed by: STUDENT IN AN ORGANIZED HEALTH CARE EDUCATION/TRAINING PROGRAM

## 2025-04-08 PROCEDURE — 2500000002 HC RX 250 W HCPCS SELF ADMINISTERED DRUGS (ALT 637 FOR MEDICARE OP, ALT 636 FOR OP/ED): Performed by: INTERNAL MEDICINE

## 2025-04-08 PROCEDURE — 85027 COMPLETE CBC AUTOMATED: CPT

## 2025-04-08 RX ORDER — FUROSEMIDE 40 MG/1
40 TABLET ORAL DAILY
Status: DISCONTINUED | OUTPATIENT
Start: 2025-04-08 | End: 2025-04-10

## 2025-04-08 RX ADMIN — DEXAMETHASONE SODIUM PHOSPHATE 4 MG: 4 INJECTION, SOLUTION INTRA-ARTICULAR; INTRALESIONAL; INTRAMUSCULAR; INTRAVENOUS; SOFT TISSUE at 03:11

## 2025-04-08 RX ADMIN — DIAZEPAM 5 MG: 10 TABLET ORAL at 20:58

## 2025-04-08 RX ADMIN — PROPRANOLOL HYDROCHLORIDE 120 MG: 120 CAPSULE, EXTENDED RELEASE ORAL at 09:19

## 2025-04-08 RX ADMIN — ROSUVASTATIN CALCIUM 10 MG: 10 TABLET, FILM COATED ORAL at 21:05

## 2025-04-08 RX ADMIN — DEXAMETHASONE SODIUM PHOSPHATE 4 MG: 4 INJECTION, SOLUTION INTRA-ARTICULAR; INTRALESIONAL; INTRAMUSCULAR; INTRAVENOUS; SOFT TISSUE at 09:18

## 2025-04-08 RX ADMIN — DIAZEPAM 7.5 MG: 5 TABLET ORAL at 09:36

## 2025-04-08 RX ADMIN — PANTOPRAZOLE SODIUM 40 MG: 40 TABLET, DELAYED RELEASE ORAL at 06:20

## 2025-04-08 RX ADMIN — ACETAMINOPHEN 975 MG: 325 TABLET, FILM COATED ORAL at 20:59

## 2025-04-08 RX ADMIN — IPRATROPIUM BROMIDE AND ALBUTEROL SULFATE 3 ML: .5; 3 SOLUTION RESPIRATORY (INHALATION) at 15:17

## 2025-04-08 RX ADMIN — PREGABALIN 200 MG: 25 CAPSULE ORAL at 09:19

## 2025-04-08 RX ADMIN — OXYCODONE HYDROCHLORIDE 10 MG: 10 TABLET, FILM COATED, EXTENDED RELEASE ORAL at 09:19

## 2025-04-08 RX ADMIN — FUROSEMIDE 40 MG: 40 TABLET ORAL at 10:52

## 2025-04-08 RX ADMIN — OXYCODONE HYDROCHLORIDE 10 MG: 10 TABLET ORAL at 14:53

## 2025-04-08 RX ADMIN — MEMANTINE 10 MG: 10 TABLET ORAL at 21:00

## 2025-04-08 RX ADMIN — ACETAMINOPHEN 975 MG: 325 TABLET, FILM COATED ORAL at 14:49

## 2025-04-08 RX ADMIN — IPRATROPIUM BROMIDE AND ALBUTEROL SULFATE 3 ML: .5; 3 SOLUTION RESPIRATORY (INHALATION) at 09:36

## 2025-04-08 RX ADMIN — DAPSONE 100 MG: 100 TABLET ORAL at 20:59

## 2025-04-08 RX ADMIN — INSULIN LISPRO 1 UNITS: 100 INJECTION, SOLUTION INTRAVENOUS; SUBCUTANEOUS at 06:21

## 2025-04-08 RX ADMIN — ARIPIPRAZOLE 10 MG: 10 TABLET ORAL at 09:19

## 2025-04-08 RX ADMIN — DEXAMETHASONE SODIUM PHOSPHATE 4 MG: 4 INJECTION, SOLUTION INTRA-ARTICULAR; INTRALESIONAL; INTRAMUSCULAR; INTRAVENOUS; SOFT TISSUE at 20:58

## 2025-04-08 RX ADMIN — DEXAMETHASONE SODIUM PHOSPHATE 4 MG: 4 INJECTION, SOLUTION INTRA-ARTICULAR; INTRALESIONAL; INTRAMUSCULAR; INTRAVENOUS; SOFT TISSUE at 14:49

## 2025-04-08 RX ADMIN — PREGABALIN 200 MG: 25 CAPSULE ORAL at 20:58

## 2025-04-08 RX ADMIN — LOPERAMIDE HYDROCHLORIDE 6 MG: 2 CAPSULE ORAL at 20:59

## 2025-04-08 RX ADMIN — ACETAMINOPHEN 975 MG: 325 TABLET, FILM COATED ORAL at 06:20

## 2025-04-08 RX ADMIN — SERTRALINE 200 MG: 100 TABLET, FILM COATED ORAL at 20:58

## 2025-04-08 RX ADMIN — Medication 2000 UNITS: at 20:59

## 2025-04-08 RX ADMIN — LEVOFLOXACIN 500 MG: 500 TABLET, FILM COATED ORAL at 18:24

## 2025-04-08 RX ADMIN — OXYCODONE HYDROCHLORIDE 10 MG: 10 TABLET, FILM COATED, EXTENDED RELEASE ORAL at 20:59

## 2025-04-08 RX ADMIN — MEMANTINE 10 MG: 10 TABLET ORAL at 09:20

## 2025-04-08 RX ADMIN — PROPRANOLOL HYDROCHLORIDE 120 MG: 120 CAPSULE, EXTENDED RELEASE ORAL at 21:00

## 2025-04-08 ASSESSMENT — PAIN DESCRIPTION - LOCATION
LOCATION: BACK

## 2025-04-08 ASSESSMENT — PAIN SCALES - GENERAL
PAINLEVEL_OUTOF10: 7
PAINLEVEL_OUTOF10: 6
PAINLEVEL_OUTOF10: 7

## 2025-04-08 ASSESSMENT — PAIN SCALES - PAIN ASSESSMENT IN ADVANCED DEMENTIA (PAINAD): TOTALSCORE: MEDICATION (SEE MAR)

## 2025-04-08 ASSESSMENT — PAIN - FUNCTIONAL ASSESSMENT
PAIN_FUNCTIONAL_ASSESSMENT: 0-10

## 2025-04-08 NOTE — CARE PLAN
Problem: Pain - Adult  Goal: Verbalizes/displays adequate comfort level or baseline comfort level  Outcome: Progressing     Problem: Safety - Adult  Goal: Free from fall injury  Outcome: Progressing     Problem: Discharge Planning  Goal: Discharge to home or other facility with appropriate resources  Outcome: Progressing     Problem: Chronic Conditions and Co-morbidities  Goal: Patient's chronic conditions and co-morbidity symptoms are monitored and maintained or improved  Outcome: Progressing     Problem: Nutrition  Goal: Nutrient intake appropriate for maintaining nutritional needs  Outcome: Progressing     Problem: Fall/Injury  Goal: Not fall by end of shift  Outcome: Progressing  Goal: Be free from injury by end of the shift  Outcome: Progressing  Goal: Verbalize understanding of personal risk factors for fall in the hospital  Outcome: Progressing  Goal: Verbalize understanding of risk factor reduction measures to prevent injury from fall in the home  Outcome: Progressing  Goal: Use assistive devices by end of the shift  Outcome: Progressing  Goal: Pace activities to prevent fatigue by end of the shift  Outcome: Progressing     Problem: Pain  Goal: Takes deep breaths with improved pain control throughout the shift  Outcome: Progressing  Goal: Turns in bed with improved pain control throughout the shift  Outcome: Progressing  Goal: Walks with improved pain control throughout the shift  Outcome: Progressing  Goal: Performs ADL's with improved pain control throughout shift  Outcome: Progressing  Goal: Participates in PT with improved pain control throughout the shift  Outcome: Progressing  Goal: Free from opioid side effects throughout the shift  Outcome: Progressing  Goal: Free from acute confusion related to pain meds throughout the shift  Outcome: Progressing     Problem: Respiratory  Goal: Clear secretions with interventions this shift  Outcome: Progressing  Goal: Minimize anxiety/maximize coping throughout  shift  Outcome: Progressing  Goal: Minimal/no exertional discomfort or dyspnea this shift  Outcome: Progressing  Goal: No signs of respiratory distress (eg. Use of accessory muscles. Peds grunting)  Outcome: Progressing  Goal: Patent airway maintained this shift  Outcome: Progressing  Goal: Tolerate mechanical ventilation evidenced by VS/agitation level this shift  Outcome: Progressing  Goal: Tolerate pulmonary toileting this shift  Outcome: Progressing  Goal: Verbalize decreased shortness of breath this shift  Outcome: Progressing  Goal: Wean oxygen to maintain O2 saturation per order/standard this shift  Outcome: Progressing  Goal: Increase self care and/or family involvement in next 24 hours  Outcome: Progressing     Problem: Skin  Goal: Decreased wound size/increased tissue granulation at next dressing change  Outcome: Progressing  Goal: Participates in plan/prevention/treatment measures  Outcome: Progressing  Goal: Prevent/manage excess moisture  Outcome: Progressing  Goal: Prevent/minimize sheer/friction injuries  Outcome: Progressing  Goal: Promote/optimize nutrition  Outcome: Progressing  Goal: Promote skin healing  Outcome: Progressing        The clinical goals for the shift include patient will remain HDS & free of falls thru 4/8/25 1900    Patient with stable VS. Taking pain meds for back pain. Started home lasix dose. Notified doctor that patient coughs quite a bit when eating. Patient states has had swallow eval in past & should be tucking chin, but does not always do that. Patient remained safe this shift.

## 2025-04-08 NOTE — PROGRESS NOTES
"Spiritual Care Visit  Spiritual Care Request        Spiritual Care Annotation    Annotation:  Responding to EMR referral,  had a spiritual support visit with the patient, who is known to this  from prior admissions.     Patient spoke about the team's plan of action and how they are \"figuring out if they want to do surgery or a procedure.\"  asked the patient what he was hoping for and he shared that \"getting home is all that matters.\" Patient said that he is sick of this and wants to get home. Patient shared that he has the support he needs at home and family who are willing to help out.     Patient shared there are no other needs at this time. Please reach out with any needs/concerns.     Rev. Pedro Doss, Supportive Oncology          "

## 2025-04-08 NOTE — CONSULTS
Radiation Oncology Inpatient Consult    Patient Name:  Manuel Marrufo  MRN:  [id number]  :  1965    Referring Provider: Fouzia Quinonez MD  Primary Care Provider: No Assigned PCP Generic Provider, MD  Care Team: Patient Care Team:  No Assigned Pcp Generic Provider, MD as PCP - General (General Practice)  Dawn Estrada MD as PCP - [employer] PCP  Medardo Vanegas MD as Consulting Physician (Hematology and Oncology)    Date of Service: 2025    SUBJECTIVE  History of Present Illness:  This is a 60 year old male with a [facility].  The patient presents to [facility] ED complaining of worsening headaches and generalized weakness. CTH wo contrast shows an interval increase in size and edema of 1.7 cm R parietal metastasis.  The patient has been transferred to [facility] for further management.  He has evaluated by neurosurgery services, provided with dexamethasone, with pending request for MRI brain with perfusion.  Radiation Oncology has been asked to follow for potential care coordination.    Today, the patient is found to be comfortable in appearance.  He states that there has been an improvement in headaches following the augmentation of dexamethasone.  He reports chronic weakness in lower extremities, particularly in the hips, which limits his ability to walk.  He has been using a walker for transfer for multiple months.  He does not report experiencing any new focal deficits.  He has experienced some subacute double vision, primarily in his left eye.    Prior Radiotherapy:      [facility]    Current Systemic Treatment:    [facility]:    Primary oncologist: Dr. Schaefer  Radiation oncologist: Dr. Dias    On C3 of dual immunotherapy with nivulumab and ipilumab     Presence of Pacemaker or ICD:  no    Past Medical History:    Past Medical History:   Diagnosis Date    Agoraphobia with panic attacks     Anxiety     Awareness  under anesthesia     During back surgery    COPD (chronic obstructive pulmonary disease) (Multi)     Fatty liver disease, nonalcoholic     GERD (gastroesophageal reflux disease)     HLD (hyperlipidemia)     Melanoma (Multi)     with mets to brain, spine and lungs    FRANK on CPAP     PTSD (post-traumatic stress disorder)         Past Surgical History:    Past Surgical History:   Procedure Laterality Date    BACK SURGERY      MALIGNANT SKIN LESION EXCISION      left shoulder and armpit    TONSILLECTOMY          Family History:  Cancer-related family history includes Cervical cancer in his sister.    Social History:    Social History     Tobacco Use    Smoking status: Former     Current packs/day: 0.00     Average packs/day: 0.5 packs/day for 40.1 years (20.0 ttl pk-yrs)     Types: Cigarettes     Start date: 1984     Quit date: 2024     Years since quittin.9    Smokeless tobacco: Never   Vaping Use    Vaping status: Never Used   Substance Use Topics    Alcohol use: Never     Comment: tried it but never been a drinker    Drug use: Never       Allergies:    Allergies   Allergen Reactions    Penicillin Anaphylaxis    Bactrim [Sulfamethoxazole-Trimethoprim] Nausea/vomiting    Buspirone GI Upset        Medications:    Current Facility-Administered Medications:     acetaminophen (Tylenol) tablet 975 mg, 975 mg, oral, q8h, Jakob Dillard MD, 975 mg at 25 0620    albuterol 2.5 mg /3 mL (0.083 %) nebulizer solution 2.5 mg, 2.5 mg, nebulization, q6h PRN, Robert Montejo MD    ARIPiprazole (Abilify) tablet 10 mg, 10 mg, oral, Daily, Jakob Dillard MD, 10 mg at 25    cholecalciferol (Vitamin D-3) tablet 2,000 Units, 2,000 Units, oral, Daily, Robert Montejo MD, 2,000 Units at 25    dapsone tablet 100 mg, 100 mg, oral, Daily, Robert Montejo MD, 100 mg at 25    dexAMETHasone (Decadron) injection 4 mg, 4 mg, intravenous, q6h, Robert Montejo MD, 4 mg at 25 09     dextrose 50 % injection 12.5 g, 12.5 g, intravenous, q15 min PRN, Robert Montejo MD    dextrose 50 % injection 25 g, 25 g, intravenous, q15 min PRN, Robert Montejo MD    diazePAM (Valium) tablet 7.5 mg, 7.5 mg, oral, Daily, 7.5 mg at 04/08/25 0936 **AND** diazePAM (Valium) tablet 5 mg, 5 mg, oral, q PM, Jakob Dillard MD, 5 mg at 04/07/25 2026    [Held by provider] enoxaparin (Lovenox) syringe 40 mg, 40 mg, subcutaneous, q12h TING, Robert Montejo MD    furosemide (Lasix) tablet 40 mg, 40 mg, oral, Daily, Robert Montejo MD, 40 mg at 04/08/25 1052    glucagon (Glucagen) injection 1 mg, 1 mg, intramuscular, q15 min PRN, Robert Montejo MD    glucagon (Glucagen) injection 1 mg, 1 mg, intramuscular, q15 min PRN, Robert Montejo MD    guaiFENesin (Mucinex) 12 hr tablet 600 mg, 600 mg, oral, BID PRN, Jakob Dillard MD    HYDROmorphone (Dilaudid) injection 0.5 mg, 0.5 mg, intravenous, q3h PRN, Jakob Dillard MD    insulin lispro injection 0-5 Units, 0-5 Units, subcutaneous, TID AC, Robert Montejo MD, 1 Units at 04/08/25 0621    ipratropium-albuteroL (Duo-Neb) 0.5-2.5 mg/3 mL nebulizer solution 3 mL, 3 mL, nebulization, TID, Magdy Montague MD, 3 mL at 04/08/25 0936    levoFLOXacin (Levaquin) tablet 500 mg, 500 mg, oral, q24h, Robert Montejo MD, 500 mg at 04/07/25 1849    lidocaine 4 % patch 1 patch, 1 patch, transdermal, Daily, Jakob Dillard MD    loperamide (Imodium) capsule 6 mg, 6 mg, oral, Daily, Jakob Dillard MD    memantine (Namenda) tablet 10 mg, 10 mg, oral, BID, Robert Montejo MD, 10 mg at 04/08/25 0920    naloxone (Narcan) injection 0.2 mg, 0.2 mg, intravenous, q5 min PRN, Robert Montejo MD    ondansetron ODT (Zofran-ODT) disintegrating tablet 8 mg, 8 mg, oral, q8h PRN **OR** ondansetron (Zofran) injection 4 mg, 4 mg, intravenous, q8h PRN, Jakob Dillard MD    oxyCODONE (Roxicodone) immediate release tablet 10 mg, 10 mg, oral, q3h PRN, Jakob Dillard MD    oxyCODONE ER  "(OxyCONTIN) 12 hr tablet 10 mg, 10 mg, oral, q12h TING, Jakob Dillard MD, 10 mg at 04/08/25 0919    pantoprazole (ProtoNix) EC tablet 40 mg, 40 mg, oral, Daily before breakfast, Robert Montejo MD, 40 mg at 04/08/25 0620    polyethylene glycol (Glycolax, Miralax) packet 17 g, 17 g, oral, Daily PRN, Robert Montejo MD, 17 g at 04/06/25 2150    pregabalin (Lyrica) capsule 200 mg, 200 mg, oral, BID, Robert Montejo MD, 200 mg at 04/08/25 0919    propranolol LA (Inderal LA) 24 hr capsule 120 mg, 120 mg, oral, BID, Jakob Dillard MD, 120 mg at 04/08/25 0919    rosuvastatin (Crestor) tablet 10 mg, 10 mg, oral, Daily, Robert Montejo MD, 10 mg at 04/07/25 2037    sertraline (Zoloft) tablet 200 mg, 200 mg, oral, Daily, Robert Montejo MD, 200 mg at 04/07/25 2037    Facility-Administered Medications Ordered in Other Encounters:     hydrocortisone (Cortef) tablet 10 mg, 10 mg, oral, Daily, Jay Ramirez MD    hydrocortisone (Cortef) tablet 20 mg, 20 mg, oral, q AM, Jay Ramirez MD      Review of Systems:    Chronic deconditioning, does not report having any chest discomfort or difficulty breathing, no report of seizures, no report of syncope, arrhythmias, abdominal complaints, GI/ changes or abnormal bleeding.    Performance Status:  The Karnofsky performance scale today is 50, Requires considerable assistance and frequent medical care (ECOG equivalent 2).        OBJECTIVE  Physical Exam:  /72 (BP Location: Right arm, Patient Position: Lying)   Pulse 73   Temp 36.9 °C (98.4 °F) (Temporal)   Resp 18   Ht 1.727 m (5' 7.99\")   Wt 126 kg (277 lb 12.5 oz)   SpO2 96%   BMI 42.25 kg/m²    General: awake, alert, resting comfortably, well developed and well nourished in appearance  HEENT: pupils equal and round, no scleral icterus  Pulmonary: Breathing comfortably at rest with supplemental oxygen  Cardiac: regular rate  Neuro: A&O x 3, reports blurred, double vision in the left eye, cranial nerves II " through XII otherwise grossly intact, sensation intact, he has weakness in both hip flexors 3-4/5, he additionally has some dysmetria on finger-to-nose test  Psych: Normal affect     Laboratory Review:    04/08/25 1016  CBC and Auto Differential  Collected: 04/08/25 0741  Final result  Specimen: Blood, Venous    WBC 8.6 x10*3/uL MCH 20.9 Low  pg   nRBC 0.2 High  /100 WBCs MCHC 28.0 Low  g/dL   RBC 3.73 Low  x10*6/uL RDW 22.2 High  %   Hemoglobin 7.8 Low  g/dL Platelets 250 x10*3/uL   Hematocrit 27.9 Low  % Immature Granulocytes %, Automated 3.3 High  %    MCV 75 Low  fL Immature Granulocytes Absolute, Automated 0.28 x10*3/uL          04/08/25 1006  Magnesium  Collected: 04/08/25 0741  Final result  Specimen: Blood, Venous    Magnesium 2.18 mg/dL            04/08/25 1006  Renal function panel  Collected: 04/08/25 0741  Final result  Specimen: Blood, Venous    Glucose 124 High  mg/dL Urea Nitrogen 21 mg/dL   Sodium 140 mmol/L Creatinine 1.05 mg/dL   Potassium 4.4 mmol/L eGFR 82 mL/min/1.73m*2    Chloride 107 mmol/L Calcium 8.7 mg/dL   Bicarbonate 21 mmol/L Phosphorus 3.7 mg/dL    Anion Gap 16 mmol/L Albumin 3.4 g/dL          Imaging:      CTH wo contrast  IMPRESSION:  Increased size of now 1.7 cm right parietal metastatic lesion with  increased adjacent vasogenic edema when compared to 03/03/2025 CT.  Superimposed intralesional hemorrhage can not be excluded. No acute  intracranial hemorrhage or new mass effect otherwise. Further  evaluation with MRI may be warranted.      Multiple known intracranial metastatic lesions with vasogenic edema  better visualized on the prior 11/26/2024 MRI.    ASSESSMENT:  This is a 60 year old male with a [facility].  The patient presents to [facility] ED complaining of worsening headaches and generalized weakness. CTH wo contrast shows an interval increase in size and edema of 1.7 cm R parietal metastasis.  The patient has been  transferred to [facility] for further management.  He has evaluated by neurosurgery services, provided with dexamethasone, with pending requests for MRI brain with perfusion.  Radiation Oncology has been asked to follow for potential care coordination.    PLAN:    The patient will be discussed with my CNS radiation oncology team.    There are no plans for urgent radiotherapy.  Described changes on noncontrast CT head may be related to treatment effect vs. progression  The patient received whole brain radiotherapy approximately 3 months ago.   We will plan to review MRI brain with perfusion when available.  There are limits to how much dexamethasone the patient may receive with given the need for immunotherapy.  Continue dex 4mg BID with PPI.  The patient may ultimately benefit from considerations for surgical resection of the symptomatic brain lesion.     -No plans for re-irradiation at this time  -Agree with plans for MRI yuri with perfusion  -Continue dexamethasone 4mg BID w/ GI Ppx      I spent 45 minutes in the professional and overall care of this patient.

## 2025-04-08 NOTE — PROGRESS NOTES
Manuel Marrufo is a 60 y.o. male on day 2 of admission presenting with Metastatic melanoma (Multi).      Subjective   Episode of urinary retention, bladder scan showed about 700 cc urine. Spontaneously voided after straight catheter was offered. Otherwise, no new or worsening complaints.       Objective   Last Recorded Vitals  /71 (BP Location: Right arm, Patient Position: Lying)   Pulse 68   Temp 36.4 °C (97.5 °F) (Temporal)   Resp 18   Wt 126 kg (277 lb 12.5 oz)   SpO2 97%     Intake/Output last 3 Shifts:  Intake/Output Summary (Last 24 hours) at 4/8/2025 0712  Last data filed at 4/8/2025 0453  Gross per 24 hour   Intake 1388 ml   Output 750 ml   Net 638 ml     Admission Weight  Weight: 126 kg (277 lb 12.5 oz) (04/06/25 2100)    Daily Weight  04/06/25 : 126 kg (277 lb 12.5 oz)    Physical Exam   GEN: In no acute distress, eating breakfast; cushingoid habitus  HEENT: Mucous membranes moist; eyes, ears, and nose without exudates; no scleral icterus  CV: Regular rate and rhythm; no murmurs, rubs, or gallops  RESP: Clear to auscultation bilaterally; no accessory muscle use; normal work of breathing; NC in place  ABD: Soft, non-tender; bowel sounds present  EXT: Radial pulses strong bilaterally; no peripheral edema  SKIN: Warm and dry; no visible rashes  NEURO: A&O x 4, moves all four extremities against gravity, sensation to light touch intact in all four extremities    Relevant Results  Scheduled medications  acetaminophen, 975 mg, oral, q8h  ARIPiprazole, 10 mg, oral, Daily  cholecalciferol, 2,000 Units, oral, Daily  dapsone, 100 mg, oral, Daily  dexAMETHasone, 4 mg, intravenous, q6h  diazePAM, 7.5 mg, oral, Daily   And  diazePAM, 5 mg, oral, q PM  [Held by provider] enoxaparin, 40 mg, subcutaneous, q12h TING  insulin lispro, 0-5 Units, subcutaneous, TID AC  ipratropium-albuteroL, 3 mL, nebulization, TID  levoFLOXacin, 500 mg, oral, q24h  lidocaine, 1 patch, transdermal, Daily  loperamide, 6 mg, oral,  Daily  memantine, 10 mg, oral, BID  oxyCODONE ER, 10 mg, oral, q12h TING  pantoprazole, 40 mg, oral, Daily before breakfast  pregabalin, 200 mg, oral, BID  propranolol LA, 120 mg, oral, BID  rosuvastatin, 10 mg, oral, Daily  sertraline, 200 mg, oral, Daily    Continuous medications     PRN medications  PRN medications: albuterol, dextrose, dextrose, glucagon, glucagon, guaiFENesin, HYDROmorphone, naloxone, ondansetron ODT **OR** ondansetron, oxyCODONE, polyethylene glycol    Results from last 72 hours   Lab Units 04/07/25  0722 04/06/25  0836   WBC AUTO x10*3/uL 9.5 12.4*   HEMOGLOBIN g/dL 8.9* 9.9*   PLATELETS AUTO x10*3/uL 284 285   SODIUM mmol/L 143 145   POTASSIUM mmol/L 4.3 3.9   CO2 mmol/L 22 23   BUN mg/dL 12 11   MAGNESIUM mg/dL 2.06 1.95   GLUCOSE mg/dL 129* 100*      CXR (4/6/2025)  No radiographic evidence of an acute cardiopulmonary process.    CTH w/o contrast (4/6/2025)  Increased size of now 1.7 cm right parietal metastatic lesion with increased adjacent vasogenic edema when compared to 03/03/2025 CT.  Superimposed intralesional hemorrhage can not be excluded. No acute intracranial hemorrhage or new mass effect otherwise. Further evaluation with MRI may be warranted.  Multiple known intracranial metastatic lesions with vasogenic edema better visualized on the prior 11/26/2024 MRI    CT PE (4/6/2025)  No acute pulmonary embolism to the segmental level.  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 The differential diagnosis includes multifocal pneumonia, felt to be less likely. Given patient's presentation of shortness of breath, this could be grade 2 or grade 3 pneumonitis. Follow-up CT chest is recommended in 4-6 weeks.  Stable left adrenal nodule,  which is likely metastatic.  Unchanged indeterminate sclerotic lesions in the T10 vertebral body and right superior acetabulum, suspicious for metastasis. Confirmation with nonemergent PET-CT is recommended.  Patient's known pulmonary nodules are obscured by the above described pneumonitis. Tumor burden in this area can be  reassessed on follow-up evaluation.   Otherwise, no definite new site of disease is evident.       Assessment/Plan   Manuel Marrufo is a 60-year-old male with past medical history significant for metastatic melanoma (brain, lung, spine, and adrenal) s/p dual immunotherapy with nivulumab and ipilumab x 2 cycles (last dose Jan 2025) and palliative WBRT (Dec 2024) s/p left shoulder resection (2019), spinal stenosis s/p L4-S1 lumbar decompression (2021), HTN, HLD, MASH, obesity, FRANK on CPAP, PTSD, GERD, COPD, and depression s/p vagal nerve stimulator (SA x 2, 2007) admitted for management of hemorrhagic brain metastases with vasogenic edema. Neurosurgery consulted. CT PE showed findings consistent with pneumonitis. On high-dose dexamethasone for vasogenic edema. MRI brain pending. Radiation Oncology and Supportive Oncology consulted.    Updates (4/8)  - MRI brain pending  - Resume home furosemide 40 mg PO daily     #Metastatic Melanoma  #Brain Metastases, c/f intra-lesional hemorrhage and vasogenic edema  #Nausea, improved  #Fatigue  : : Increasing size of 1.7 cm right parietal lesion  : : Possible superimposed intralesional hemorrhage  : : S/p WBRT x 10 fractions (2024)  : : S/p two cycles of dual immunotherapy with nivulumab and ipilumab, last dose 1/10   : : Primary oncologist at [facility]: Dr. Schaefer (contacted at admission)  : : S/p dexamethasone load 10 mg IV  - Continue ondansetron PRN for nausea  - PT/OT  - Continue pregabalin 200 mg BID  - Continue dexamethasone 4 mg IV Q6H for vasogenic edema  - Neurosurgery consulted, appreciate recommendations  Obtain MRI presurgical perfusion and  fingerprinting () to further characterize lesions and edema  Agree with dex 4q6, PPI, sliding scale insulin  Discussion at tumor board  Radiation oncology consult for evaluation and further interventions  - Radiation Oncology consulted, appreciate recommendations  No plans for re-irradiation at this time  Agree with plans for MRI brain with perfusion  Continue dexamethasone 4 mg BID w/ GI Ppx  - Supportive Oncology consulted, appreciate recommendations  Start scheduled acetaminophen 975mg PO q8h  Start lidocaine 4% TD patch once daily   Start scheduled oxycodone ER (OxyContin) 10mg PO q12h  Discontinue oxycodone IR 5mg PO q6h PRN   Change oxycodone IR 10mg PO q3h PRN for moderate to severe pain  Change hydromorphone 0.5mg IV q3h PRN for breakthrough pain--please discontinue in anticipation for discharge  Start ondansetron 8mg PO/IV q8h PRN for n/v, first line  Start scheduled loperamide 6mg PO once daily   Start aripiprazole 10mg PO once daily  Start diazepam 7.5mg PO AM / 5mg PO PM  Start propanol LA 120mg PO BID  Start guaifenesin 12h tablet 600mg PO BID PRN for cough     #ICI Pneumonitis vs CAP  #COPD  : : Grade 2-3 pneumonitis on CT PE  : : Leukocytosis on admission  : : No O2 requirement at baseline  : : Procal 0.24  : : RSV/flu/COVID PCR negative  : : MRSA nares negative  : : Strep, Legionella urine antigens negative  - Discontinue vancomycin  - Continue levofloxacin 500 mg daily (due to penicillin allergy)  - Bcx NGTD  - Continue dapsone for PJP ppx   - Continue DuoNebs Q6H  - Continue albuterol PRN    #FRANK  - Continue CPAP QHS     #MIGEL  : : At baseline Hgb ~10 on admission  - Hold iron supplement     #HLD  - Continue rosuvastatin 10 mg daily     #Anxiety  #PTSD  #MDD, treatment-refractory, s/p vagal nerve stimulator   - Continue sertraline 200 mg daily  - Continue memantine 10 mg BID    Dispo: OT recommending moderate intensity at discharge, PT pending    F: Cautious, no documented echo  E: K >4, Mg  >2  N: Regular, 2 L fluid restriction  A: PIV  O2: 2 L NC  Abx: Levofloxacin  DVT ppx: None -> C/f intra-lesional hemorrhage  GI ppx: None    Code Status: DNR/DNI  Surrogate Decision-Maker: Mason Be (Brother, [phone])       This patient was seen, discussed and examined with the attending, Dr. Quinonez, who agrees with the management plan.    Jakob Dillard MD  PGY-1, Neurology

## 2025-04-08 NOTE — CARE PLAN
The patient's goals for the shift include      The clinical goals for the shift include PT to remain HDS, VSS, and free from falls or injury      Problem: Pain - Adult  Goal: Verbalizes/displays adequate comfort level or baseline comfort level  Outcome: Progressing     Problem: Safety - Adult  Goal: Free from fall injury  Outcome: Progressing     Problem: Discharge Planning  Goal: Discharge to home or other facility with appropriate resources  Outcome: Progressing     Problem: Chronic Conditions and Co-morbidities  Goal: Patient's chronic conditions and co-morbidity symptoms are monitored and maintained or improved  Outcome: Progressing     Problem: Nutrition  Goal: Nutrient intake appropriate for maintaining nutritional needs  Outcome: Progressing     Problem: Fall/Injury  Goal: Not fall by end of shift  Outcome: Progressing  Goal: Be free from injury by end of the shift  Outcome: Progressing  Goal: Verbalize understanding of personal risk factors for fall in the hospital  Outcome: Progressing  Goal: Verbalize understanding of risk factor reduction measures to prevent injury from fall in the home  Outcome: Progressing  Goal: Use assistive devices by end of the shift  Outcome: Progressing  Goal: Pace activities to prevent fatigue by end of the shift  Outcome: Progressing     Problem: Pain  Goal: Takes deep breaths with improved pain control throughout the shift  Outcome: Progressing  Goal: Turns in bed with improved pain control throughout the shift  Outcome: Progressing  Goal: Walks with improved pain control throughout the shift  Outcome: Progressing  Goal: Performs ADL's with improved pain control throughout shift  Outcome: Progressing  Goal: Participates in PT with improved pain control throughout the shift  Outcome: Progressing  Goal: Free from opioid side effects throughout the shift  Outcome: Progressing  Goal: Free from acute confusion related to pain meds throughout the shift  Outcome: Progressing      Problem: Respiratory  Goal: Clear secretions with interventions this shift  Outcome: Progressing  Goal: Minimize anxiety/maximize coping throughout shift  Outcome: Progressing  Goal: Minimal/no exertional discomfort or dyspnea this shift  Outcome: Progressing  Goal: No signs of respiratory distress (eg. Use of accessory muscles. Peds grunting)  Outcome: Progressing  Goal: Patent airway maintained this shift  Outcome: Progressing  Goal: Tolerate mechanical ventilation evidenced by VS/agitation level this shift  Outcome: Progressing  Goal: Tolerate pulmonary toileting this shift  Outcome: Progressing  Goal: Verbalize decreased shortness of breath this shift  Outcome: Progressing  Goal: Wean oxygen to maintain O2 saturation per order/standard this shift  Outcome: Progressing  Goal: Increase self care and/or family involvement in next 24 hours  Outcome: Progressing     Problem: Skin  Goal: Decreased wound size/increased tissue granulation at next dressing change  Outcome: Progressing  Goal: Participates in plan/prevention/treatment measures  Outcome: Progressing  Goal: Prevent/manage excess moisture  Outcome: Progressing  Flowsheets (Taken 4/8/2025 0603)  Prevent/manage excess moisture: Monitor for/manage infection if present  Goal: Prevent/minimize sheer/friction injuries  Outcome: Progressing  Goal: Promote/optimize nutrition  Outcome: Progressing  Goal: Promote skin healing  Outcome: Progressing

## 2025-04-08 NOTE — PROGRESS NOTES
Spiritual Care Visit  Spiritual Care Request    Reason for Visit:  Routine Visit: Introduction  Continue Visiting: Yes     Request Received From:       Focus of Care:  Visited With: Patient         Refer to :  Referral To:        Spiritual Care Assessment    Spiritual Assessment:                      Care Provided:       Sense of Community and or Presybeterian Affiliation:  [place]         Addressed Needs/Concerns and/or Ronni Through:  Presybeterian Encounters  Presybeterian Needs: Prayer  Sacramental Encounters  Sacrament of Sick-Anointing: Anointed    Outcome:        Advance Directives:         Spiritual Care Annotation    Annotation:  Patient received the Sacrament of the Anointing of the Sick by Fr. Amadeo Carmen,  [place] .       Within the [employer] the Sacrament of the Sick, also known as the Anointing of the Sick, is a prayer in which we invoke the healing power of God.  Although considered part of 'Last Rites' the Anointing of the Sick, along with Eucharist and Reconciliation, is a repeatable sacrament and should be received by anyone about to undergo surgery, those in recovery and the elderly.  Those who are actively dying should receive the Anointing of the Sick for physical and spiritual healing.    NB: Due to limited after-hours availability, if a patient has already received the Sacrament of the Anointing of the Sick and transitions to comfort care or is actively dying the [facility] on-call  may not be able to attend.

## 2025-04-09 ENCOUNTER — APPOINTMENT (OUTPATIENT)
Dept: RADIOLOGY | Facility: HOSPITAL | Age: 60
End: 2025-04-09
Payer: MEDICARE

## 2025-04-09 ENCOUNTER — TUMOR BOARD CONFERENCE (OUTPATIENT)
Dept: HEMATOLOGY/ONCOLOGY | Facility: HOSPITAL | Age: 60
End: 2025-04-09
Payer: MEDICARE

## 2025-04-09 LAB
ALBUMIN SERPL BCP-MCNC: 3.2 G/DL (ref 3.4–5)
ANION GAP SERPL CALC-SCNC: 15 MMOL/L (ref 10–20)
BASOPHILS # BLD MANUAL: 0 X10*3/UL (ref 0–0.1)
BASOPHILS NFR BLD MANUAL: 0 %
BUN SERPL-MCNC: 29 MG/DL (ref 6–23)
CALCIUM SERPL-MCNC: 9 MG/DL (ref 8.6–10.6)
CHLORIDE SERPL-SCNC: 105 MMOL/L (ref 98–107)
CO2 SERPL-SCNC: 23 MMOL/L (ref 21–32)
CREAT SERPL-MCNC: 1.22 MG/DL (ref 0.5–1.3)
EGFRCR SERPLBLD CKD-EPI 2021: 68 ML/MIN/1.73M*2
EOSINOPHIL # BLD MANUAL: 0 X10*3/UL (ref 0–0.7)
EOSINOPHIL NFR BLD MANUAL: 0 %
ERYTHROCYTE [DISTWIDTH] IN BLOOD BY AUTOMATED COUNT: 21.8 % (ref 11.5–14.5)
GLUCOSE BLD MANUAL STRIP-MCNC: 128 MG/DL (ref 74–99)
GLUCOSE BLD MANUAL STRIP-MCNC: 161 MG/DL (ref 74–99)
GLUCOSE BLD MANUAL STRIP-MCNC: 167 MG/DL (ref 74–99)
GLUCOSE BLD MANUAL STRIP-MCNC: 204 MG/DL (ref 74–99)
GLUCOSE BLD MANUAL STRIP-MCNC: 259 MG/DL (ref 74–99)
GLUCOSE SERPL-MCNC: 106 MG/DL (ref 74–99)
HCT VFR BLD AUTO: 29.9 % (ref 41–52)
HGB BLD-MCNC: 8.6 G/DL (ref 13.5–17.5)
IMM GRANULOCYTES # BLD AUTO: 0.34 X10*3/UL (ref 0–0.7)
IMM GRANULOCYTES NFR BLD AUTO: 4.1 % (ref 0–0.9)
LYMPHOCYTES # BLD MANUAL: 1.03 X10*3/UL (ref 1.2–4.8)
LYMPHOCYTES NFR BLD MANUAL: 12.4 %
MAGNESIUM SERPL-MCNC: 2.27 MG/DL (ref 1.6–2.4)
MCH RBC QN AUTO: 21.3 PG (ref 26–34)
MCHC RBC AUTO-ENTMCNC: 28.8 G/DL (ref 32–36)
MCV RBC AUTO: 74 FL (ref 80–100)
MONOCYTES # BLD MANUAL: 0.44 X10*3/UL (ref 0.1–1)
MONOCYTES NFR BLD MANUAL: 5.3 %
NEUTROPHILS # BLD MANUAL: 6.68 X10*3/UL (ref 1.2–7.7)
NEUTS BAND # BLD MANUAL: 0.15 X10*3/UL (ref 0–0.7)
NEUTS BAND NFR BLD MANUAL: 1.8 %
NEUTS SEG # BLD MANUAL: 6.53 X10*3/UL (ref 1.2–7)
NEUTS SEG NFR BLD MANUAL: 78.7 %
NRBC BLD-RTO: 0.4 /100 WBCS (ref 0–0)
OVALOCYTES BLD QL SMEAR: ABNORMAL
PHOSPHATE SERPL-MCNC: 4.2 MG/DL (ref 2.5–4.9)
PLATELET # BLD AUTO: 291 X10*3/UL (ref 150–450)
POTASSIUM SERPL-SCNC: 4.1 MMOL/L (ref 3.5–5.3)
RBC # BLD AUTO: 4.03 X10*6/UL (ref 4.5–5.9)
RBC MORPH BLD: ABNORMAL
SODIUM SERPL-SCNC: 139 MMOL/L (ref 136–145)
TOTAL CELLS COUNTED BLD: 113
VARIANT LYMPHS # BLD MANUAL: 0.15 X10*3/UL (ref 0–0.5)
VARIANT LYMPHS NFR BLD: 1.8 %
WBC # BLD AUTO: 8.3 X10*3/UL (ref 4.4–11.3)

## 2025-04-09 PROCEDURE — 2500000001 HC RX 250 WO HCPCS SELF ADMINISTERED DRUGS (ALT 637 FOR MEDICARE OP)

## 2025-04-09 PROCEDURE — 85027 COMPLETE CBC AUTOMATED: CPT

## 2025-04-09 PROCEDURE — 2500000002 HC RX 250 W HCPCS SELF ADMINISTERED DRUGS (ALT 637 FOR MEDICARE OP, ALT 636 FOR OP/ED): Performed by: INTERNAL MEDICINE

## 2025-04-09 PROCEDURE — 94640 AIRWAY INHALATION TREATMENT: CPT

## 2025-04-09 PROCEDURE — 85007 BL SMEAR W/DIFF WBC COUNT: CPT

## 2025-04-09 PROCEDURE — 82947 ASSAY GLUCOSE BLOOD QUANT: CPT

## 2025-04-09 PROCEDURE — 1200000003 HC ONCOLOGY  ROOM WITH TELEMETRY DAILY

## 2025-04-09 PROCEDURE — 2500000002 HC RX 250 W HCPCS SELF ADMINISTERED DRUGS (ALT 637 FOR MEDICARE OP, ALT 636 FOR OP/ED)

## 2025-04-09 PROCEDURE — 97530 THERAPEUTIC ACTIVITIES: CPT | Mod: GO

## 2025-04-09 PROCEDURE — 2500000004 HC RX 250 GENERAL PHARMACY W/ HCPCS (ALT 636 FOR OP/ED)

## 2025-04-09 PROCEDURE — 36415 COLL VENOUS BLD VENIPUNCTURE: CPT

## 2025-04-09 PROCEDURE — 99233 SBSQ HOSP IP/OBS HIGH 50: CPT

## 2025-04-09 PROCEDURE — 83735 ASSAY OF MAGNESIUM: CPT

## 2025-04-09 PROCEDURE — 99232 SBSQ HOSP IP/OBS MODERATE 35: CPT | Performed by: STUDENT IN AN ORGANIZED HEALTH CARE EDUCATION/TRAINING PROGRAM

## 2025-04-09 PROCEDURE — 80069 RENAL FUNCTION PANEL: CPT

## 2025-04-09 PROCEDURE — 97530 THERAPEUTIC ACTIVITIES: CPT | Mod: GP | Performed by: PHYSICAL THERAPIST

## 2025-04-09 RX ADMIN — Medication 2000 UNITS: at 20:40

## 2025-04-09 RX ADMIN — ROSUVASTATIN CALCIUM 10 MG: 10 TABLET, FILM COATED ORAL at 20:39

## 2025-04-09 RX ADMIN — LEVOFLOXACIN 500 MG: 500 TABLET, FILM COATED ORAL at 20:40

## 2025-04-09 RX ADMIN — ACETAMINOPHEN 975 MG: 325 TABLET, FILM COATED ORAL at 14:47

## 2025-04-09 RX ADMIN — ARIPIPRAZOLE 10 MG: 10 TABLET ORAL at 09:14

## 2025-04-09 RX ADMIN — DIAZEPAM 7.5 MG: 5 TABLET ORAL at 11:06

## 2025-04-09 RX ADMIN — ACETAMINOPHEN 975 MG: 325 TABLET, FILM COATED ORAL at 20:40

## 2025-04-09 RX ADMIN — OXYCODONE HYDROCHLORIDE 10 MG: 10 TABLET, FILM COATED, EXTENDED RELEASE ORAL at 09:13

## 2025-04-09 RX ADMIN — IPRATROPIUM BROMIDE AND ALBUTEROL SULFATE 3 ML: .5; 3 SOLUTION RESPIRATORY (INHALATION) at 10:09

## 2025-04-09 RX ADMIN — SERTRALINE 200 MG: 100 TABLET, FILM COATED ORAL at 20:41

## 2025-04-09 RX ADMIN — PREGABALIN 200 MG: 25 CAPSULE ORAL at 20:41

## 2025-04-09 RX ADMIN — MEMANTINE 10 MG: 10 TABLET ORAL at 20:39

## 2025-04-09 RX ADMIN — PREGABALIN 200 MG: 25 CAPSULE ORAL at 09:13

## 2025-04-09 RX ADMIN — OXYCODONE HYDROCHLORIDE 10 MG: 10 TABLET, FILM COATED, EXTENDED RELEASE ORAL at 20:41

## 2025-04-09 RX ADMIN — PROPRANOLOL HYDROCHLORIDE 120 MG: 120 CAPSULE, EXTENDED RELEASE ORAL at 20:37

## 2025-04-09 RX ADMIN — LOPERAMIDE HYDROCHLORIDE 6 MG: 2 CAPSULE ORAL at 20:40

## 2025-04-09 RX ADMIN — DAPSONE 100 MG: 100 TABLET ORAL at 20:40

## 2025-04-09 RX ADMIN — ACETAMINOPHEN 975 MG: 325 TABLET, FILM COATED ORAL at 04:51

## 2025-04-09 RX ADMIN — DEXAMETHASONE SODIUM PHOSPHATE 4 MG: 4 INJECTION, SOLUTION INTRA-ARTICULAR; INTRALESIONAL; INTRAMUSCULAR; INTRAVENOUS; SOFT TISSUE at 09:14

## 2025-04-09 RX ADMIN — OXYCODONE HYDROCHLORIDE 10 MG: 10 TABLET ORAL at 14:47

## 2025-04-09 RX ADMIN — PROPRANOLOL HYDROCHLORIDE 120 MG: 120 CAPSULE, EXTENDED RELEASE ORAL at 09:14

## 2025-04-09 RX ADMIN — DEXAMETHASONE SODIUM PHOSPHATE 4 MG: 4 INJECTION, SOLUTION INTRA-ARTICULAR; INTRALESIONAL; INTRAMUSCULAR; INTRAVENOUS; SOFT TISSUE at 04:51

## 2025-04-09 RX ADMIN — MEMANTINE 10 MG: 10 TABLET ORAL at 09:14

## 2025-04-09 RX ADMIN — PANTOPRAZOLE SODIUM 40 MG: 40 TABLET, DELAYED RELEASE ORAL at 09:13

## 2025-04-09 RX ADMIN — DEXAMETHASONE SODIUM PHOSPHATE 4 MG: 4 INJECTION, SOLUTION INTRA-ARTICULAR; INTRALESIONAL; INTRAMUSCULAR; INTRAVENOUS; SOFT TISSUE at 20:41

## 2025-04-09 RX ADMIN — INSULIN LISPRO 2 UNITS: 100 INJECTION, SOLUTION INTRAVENOUS; SUBCUTANEOUS at 16:49

## 2025-04-09 RX ADMIN — DEXAMETHASONE SODIUM PHOSPHATE 4 MG: 4 INJECTION, SOLUTION INTRA-ARTICULAR; INTRALESIONAL; INTRAMUSCULAR; INTRAVENOUS; SOFT TISSUE at 14:47

## 2025-04-09 RX ADMIN — INSULIN LISPRO 3 UNITS: 100 INJECTION, SOLUTION INTRAVENOUS; SUBCUTANEOUS at 09:15

## 2025-04-09 ASSESSMENT — COGNITIVE AND FUNCTIONAL STATUS - GENERAL
MOVING FROM LYING ON BACK TO SITTING ON SIDE OF FLAT BED WITH BEDRAILS: A LITTLE
DAILY ACTIVITIY SCORE: 11
STANDING UP FROM CHAIR USING ARMS: A LITTLE
DAILY ACTIVITIY SCORE: 18
DRESSING REGULAR LOWER BODY CLOTHING: A LITTLE
MOVING TO AND FROM BED TO CHAIR: A LITTLE
TURNING FROM BACK TO SIDE WHILE IN FLAT BAD: TOTAL
PERSONAL GROOMING: A LITTLE
TURNING FROM BACK TO SIDE WHILE IN FLAT BAD: A LITTLE
WALKING IN HOSPITAL ROOM: TOTAL
DRESSING REGULAR UPPER BODY CLOTHING: A LOT
TOILETING: TOTAL
MOVING FROM LYING ON BACK TO SITTING ON SIDE OF FLAT BED WITH BEDRAILS: A LOT
STANDING UP FROM CHAIR USING ARMS: TOTAL
PERSONAL GROOMING: A LITTLE
DRESSING REGULAR LOWER BODY CLOTHING: TOTAL
WALKING IN HOSPITAL ROOM: A LOT
TOILETING: A LITTLE
MOVING TO AND FROM BED TO CHAIR: TOTAL
MOBILITY SCORE: 7
HELP NEEDED FOR BATHING: A LITTLE
CLIMB 3 TO 5 STEPS WITH RAILING: TOTAL
MOBILITY SCORE: 16
EATING MEALS: A LITTLE
DRESSING REGULAR UPPER BODY CLOTHING: A LITTLE
HELP NEEDED FOR BATHING: TOTAL
CLIMB 3 TO 5 STEPS WITH RAILING: A LOT
EATING MEALS: A LITTLE

## 2025-04-09 ASSESSMENT — PAIN SCALES - GENERAL: PAINLEVEL_OUTOF10: 7

## 2025-04-09 ASSESSMENT — PAIN - FUNCTIONAL ASSESSMENT
PAIN_FUNCTIONAL_ASSESSMENT: 0-10
PAIN_FUNCTIONAL_ASSESSMENT: 0-10

## 2025-04-09 NOTE — TUMOR BOARD NOTE
CNS Tumor Board Recommendations       Patient was presented by Bowen Chowdhury PA-C at our CNS Tumor Board on 04/09/2025 which included representatives from Radiation oncology, Surgical oncology, Neuro-oncology, Pathology, Radiology, Research, Neurosurgery, Social Work (Neurosurgery).     Current patient presents with history of the following treatment history: PMH left shoulder melanoma s/p resection (2019), recent diagnosis of lung metastases in 06/2024 treated with one cycle of immunotherapy. P/w headaches, nausea, changes in balance and coordination. MRI was difficult to obtain due to spinal stimulator placement. CT with numerous hemorrhagic lesions. CTA with multiple pulmonary nodules. Treated with WBRT 12/14/24. P/w worsening headaches and generalized weakness. CTH with increase in edema and size of parietal lesions.        The CNS Tumor Board tumor board considered available treatment options and made the following recommendations: MRI Pending - present at  once available.     Clinical Trial Status: N/A     National site-specific guidelines were discussed with respect to the case.

## 2025-04-09 NOTE — PROGRESS NOTES
Physical Therapy    Physical Therapy Treatment    Patient Name: Manuel Marrufo  MRN: [id number]  Department: [facility]  Room: [address] [place] [address]  Today's Date: 4/9/2025  Time Calculation  Start Time: 1448  Stop Time: 1511  Time Calculation (min): 23 min         Assessment/Plan   PT Assessment  PT Assessment Results: Decreased strength, Decreased endurance, Impaired balance, Decreased mobility, Pain  Rehab Prognosis: Good  Barriers to Discharge Home: Caregiver assistance, Physical needs  Caregiver Assistance: Caregiver assistance needed per identified barriers - however, level of patient's required assistance exceeds assistance available at home  Physical Needs: Stair navigation into home limited by function/safety, Ambulating household distances limited by function/safety, 24hr mobility assistance needed, 24hr ADL assistance needed, High falls risk due to function or environment  Barriers to Participation: Comorbidities  End of Session Communication: Bedside nurse  End of Session Patient Position: Bed, 3 rail up, Alarm on  PT Plan  Inpatient/Swing Bed or Outpatient: Inpatient  PT Plan  Treatment/Interventions: Bed mobility, Gait training, Transfer training, Stair training, Balance training, Strengthening, Endurance training, Therapeutic exercise, Therapeutic activity, Positioning  PT Plan: Ongoing PT  PT Frequency: 3 times per week  PT Discharge Recommendations: Moderate intensity level of continued care  Equipment Recommended upon Discharge: Wheeled walker  PT Recommended Transfer Status: Assist x2  PT - OK to Discharge: Yes      General Visit Information:   PT  Visit  PT Received On: 04/09/25  General  Past Medical History Relevant to Rehab: PMHx s/f metastatic melanoma, to brain and lung (on C3 of dual immunotherapy with nivulumab and ipilumab and palliative WBRT 12/14/24), (s/p left shoulder resection 2019), spinal stenosis (s/p L4-S1 lumbar decompression 2021), HTN, HLD, nonalcoholic fatty liver disease, obesity, FRANK on CPAP,  PTSD, GERD, COPD, depression s/p vagal nerve stimulator (SA x2, 2007)  Co-Treatment: OT  Co-Treatment Reason: Cotx with OT for pt's safety with mobility, AMPAC<10  Prior to Session Communication: Bedside nurse  Patient Position Received: Bed, 3 rail up, Alarm on  Preferred Learning Style: verbal  General Comment: Pt in supine on arrival and willing to participate with encouragement. Fatigues with moiblity    Subjective   Precautions:  Precautions  Medical Precautions: Fall precautions (protective spinal precautions)     Date/Time Vitals Session Patient Position Pulse Resp SpO2 BP MAP (mmHg)           --     04/09/25 1448 During PT  --  77  --  95 %  --  --            --            Objective   Pain:  Pain Assessment  Pain Assessment: 0-10  0-10 (Numeric) Pain Score:  (did not rate)  Pain Location: Back (neck)  Cognition:  Cognition  Arousal/Alertness: Delayed responses to stimuli  Orientation Level: Oriented X4    Postural Control:  Static Sitting Balance  Static Sitting-Level of Assistance: Close supervision (> 6 mins)  Dynamic Sitting Balance  Dynamic Sitting-Level of Assistance: Contact guard  Static Standing Balance  Static Standing-Level of Assistance:  (MinAx2 with walker)  Dynamic Standing Balance  Dynamic Standing-Level of Assistance:  (MinAx2 with walker)       Treatments:    Bed Mobility  Bed Mobility: Yes  Bed Mobility 1  Bed Mobility 1: Supine to sitting  Level of Assistance 1: Moderate assistance, +2, Maximum verbal cues  Bed Mobility Comments 1: HOB elevated, cues for log roll  Bed Mobility 2  Bed Mobility  2: Sitting to supine  Level of Assistance 2: Moderate assistance  Bed Mobility Comments 2: cues for log roll    Ambulation/Gait Training  Ambulation/Gait Training Performed: Yes  Ambulation/Gait Training 1  Surface 1: Level tile  Device 1: Rolling walker  Assistance 1: Moderate verbal cues, Moderate tactile cues (MinAx2)  Comments/Distance (ft) 1: 4 sidesteps at EOB, cues for sequencing, walker  management  Transfers  Transfer: Yes  Transfer 1  Transfer From 1: Sit to, Stand to  Transfer to 1: Stand, Sit  Technique 1: Sit to stand, Stand to sit  Transfer Device 1: Walker  Transfer Level of Assistance 1: Moderate assistance, +2  Trials/Comments 1: elevated EOB, cues for safe hand placement    Stairs  Stairs: No         Outcome Measures:  [facility] Basic Mobility  Turning from your back to your side while in a flat bed without using bedrails: A lot  Moving from lying on your back to sitting on the side of a flat bed without using bedrails: Total  Moving to and from bed to chair (including a wheelchair): Total  Standing up from a chair using your arms (e.g. wheelchair or bedside chair): Total  To walk in hospital room: Total  Climbing 3-5 steps with railing: Total  Basic Mobility - Total Score: 7    Education Documentation  Precautions, taught by Mindy Mondragon PT at 4/9/2025  4:35 PM.  Learner: Patient  Readiness: Acceptance  Method: Explanation  Response: Verbalizes Understanding, Needs Reinforcement    Body Mechanics, taught by Mindy Mondragon PT at 4/9/2025  4:35 PM.  Learner: Patient  Readiness: Acceptance  Method: Explanation  Response: Verbalizes Understanding, Needs Reinforcement    Mobility Training, taught by Mindy Mondragon PT at 4/9/2025  4:35 PM.  Learner: Patient  Readiness: Acceptance  Method: Explanation  Response: Verbalizes Understanding, Needs Reinforcement    Education Comments  No comments found.        OP EDUCATION:       Encounter Problems       Encounter Problems (Active)       Balance       Pt will maintain static standing balance with upper extremity support for >1 min with CGA (Progressing)       Start:  04/07/25    Expected End:  04/21/25               Mobility       Pt will be Babak to ascend/descend 1 step with 1 handrail (Not Progressing)       Start:  04/07/25    Expected End:  04/21/25               Mobility       Pt will be Babak ambulation 40 ft with LRAD (Progressing)       Start:  04/07/25     Expected End:  04/21/25               PT Transfers       Pt will be Babak for sit to stand and bed to chair transfers with LRAD (Progressing)       Start:  04/07/25    Expected End:  04/21/25            Pt will be Babak for bed mobility (Progressing)       Start:  04/07/25    Expected End:  04/21/25               Pain - Adult

## 2025-04-09 NOTE — PROGRESS NOTES
Manuel Marrufo is a 60 y.o. male on day 3 of admission presenting with Metastatic melanoma (Multi).      Subjective   No acute events overnight. Patient's caregiver at bedside. Denies new or worsening complaints. States headache has not returned over the past day.    Patient's brother, Mason, updated via phone on 4/8 PM, and all questions were addressed.       Objective   Last Recorded Vitals  /84 (BP Location: Right arm, Patient Position: Lying)   Pulse 69   Temp 35.7 °C (96.3 °F) (Temporal)   Resp 14   Wt 126 kg (277 lb 12.5 oz)   SpO2 95%     Intake/Output last 3 Shifts:  Intake/Output Summary (Last 24 hours) at 4/9/2025 0744  Last data filed at 4/9/2025 0238  Gross per 24 hour   Intake 1240 ml   Output 2600 ml   Net -1360 ml     Admission Weight  Weight: 126 kg (277 lb 12.5 oz) (04/06/25 2100)    Daily Weight  04/06/25 : 126 kg (277 lb 12.5 oz)    Physical Exam   GEN: In no acute distress, eating breakfast; cushingoid habitus  HEENT: Mucous membranes moist; eyes, ears, and nose without exudates; no scleral icterus  CV: Regular rate and rhythm; no murmurs, rubs, or gallops  RESP: Clear to auscultation bilaterally; no accessory muscle use; normal work of breathing; NC in place  ABD: Soft, non-tender; bowel sounds present  EXT: Radial pulses strong bilaterally; no peripheral edema  SKIN: Warm and dry; no visible rashes  NEURO: A&O x 4, moves all four extremities against gravity, sensation to light touch intact in all four extremities    Relevant Results  Scheduled medications  acetaminophen, 975 mg, oral, q8h  ARIPiprazole, 10 mg, oral, Daily  cholecalciferol, 2,000 Units, oral, Daily  dapsone, 100 mg, oral, Daily  dexAMETHasone, 4 mg, intravenous, q6h  diazePAM, 7.5 mg, oral, Daily   And  diazePAM, 5 mg, oral, q PM  [Held by provider] enoxaparin, 40 mg, subcutaneous, q12h TING  furosemide, 40 mg, oral, Daily  insulin lispro, 0-5 Units, subcutaneous, TID AC  ipratropium-albuteroL, 3 mL, nebulization,  TID  levoFLOXacin, 500 mg, oral, q24h  lidocaine, 1 patch, transdermal, Daily  loperamide, 6 mg, oral, Daily  memantine, 10 mg, oral, BID  oxyCODONE ER, 10 mg, oral, q12h TING  pantoprazole, 40 mg, oral, Daily before breakfast  pregabalin, 200 mg, oral, BID  propranolol LA, 120 mg, oral, BID  rosuvastatin, 10 mg, oral, Daily  sertraline, 200 mg, oral, Daily    Continuous medications     PRN medications  PRN medications: albuterol, dextrose, dextrose, glucagon, glucagon, guaiFENesin, HYDROmorphone, naloxone, ondansetron ODT **OR** ondansetron, oxyCODONE, polyethylene glycol    Results from last 72 hours   Lab Units 04/08/25  0741 04/07/25  0722 04/06/25  0836   WBC AUTO x10*3/uL 8.6 9.5 12.4*   HEMOGLOBIN g/dL 7.8* 8.9* 9.9*   PLATELETS AUTO x10*3/uL 250 284 285   SODIUM mmol/L 140 143 145   POTASSIUM mmol/L 4.4 4.3 3.9   CO2 mmol/L 21 22 23   BUN mg/dL 21 12 11   MAGNESIUM mg/dL 2.18 2.06 1.95   GLUCOSE mg/dL 124* 129* 100*      CXR (4/6/2025)  No radiographic evidence of an acute cardiopulmonary process.    CTH w/o contrast (4/6/2025)  Increased size of now 1.7 cm right parietal metastatic lesion with increased adjacent vasogenic edema when compared to 03/03/2025 CT.  Superimposed intralesional hemorrhage can not be excluded. No acute intracranial hemorrhage or new mass effect otherwise. Further evaluation with MRI may be warranted.  Multiple known intracranial metastatic lesions with vasogenic edema better visualized on the prior 11/26/2024 MRI    CT PE (4/6/2025)  No acute pulmonary embolism to the segmental level.  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 The differential diagnosis includes multifocal pneumonia, felt to be less likely. Given patient's  presentation of shortness of breath, this could be grade 2 or grade 3 pneumonitis. Follow-up CT chest is recommended in 4-6 weeks.  Stable left adrenal nodule, which is likely metastatic.  Unchanged indeterminate sclerotic lesions in the T10 vertebral body and right superior acetabulum, suspicious for metastasis. Confirmation with nonemergent PET-CT is recommended.  Patient's known pulmonary nodules are obscured by the above described pneumonitis. Tumor burden in this area can be  reassessed on follow-up evaluation.   Otherwise, no definite new site of disease is evident.       Assessment/Plan   Manuel Marrufo is a 60-year-old male with past medical history significant for metastatic melanoma (brain, lung, spine, and adrenal) s/p dual immunotherapy with nivulumab and ipilumab x 2 cycles (last dose Jan 2025) and palliative WBRT (Dec 2024) s/p left shoulder resection (2019), spinal stenosis s/p L4-S1 lumbar decompression (2021), HTN, HLD, MASH, obesity, FRANK on CPAP, PTSD, GERD, COPD, and depression s/p vagal nerve stimulator (SA x 2, 2007) admitted for management of hemorrhagic brain metastases with vasogenic edema. Neurosurgery consulted. CT PE showed findings consistent with pneumonitis. On high-dose dexamethasone for vasogenic edema. MRI brain pending. Radiation Oncology and Supportive Oncology consulted.    Updates (4/9)   - MRI brain pending -> Vagal nerve stimulator representative contacted to assist with VNS safety form  - Hold home furosemide 40 mg PO daily (Cr 1.05 > 1.22)     #Metastatic Melanoma  #Brain Metastases, c/f intra-lesional hemorrhage and vasogenic edema  #Nausea, improved  #Fatigue  : : Increasing size of 1.7 cm right parietal lesion  : : Possible superimposed intralesional hemorrhage  : : S/p WBRT x 10 fractions (2024)  : : S/p two cycles of dual immunotherapy with nivulumab and ipilumab, last dose 1/10   : : [facility]ologist at [facility]: Dr. Schaefer (contacted at admission)  : : S/p dexamethasone  load 10 mg IV  - Continue ondansetron PRN  - PT/OT  - Continue pregabalin 200 mg BID  - Continue dexamethasone 4 mg IV Q6H for vasogenic edema  - Neurosurgery consulted, appreciate recommendations  Obtain MRI presurgical perfusion and fingerprinting () to further characterize lesions and edema  Agree with dex 4q6, PPI, sliding scale insulin  Discussion at tumor board  Radiation oncology consult for evaluation and further interventions  - Radiation Oncology consulted, appreciate recommendations  No plans for re-irradiation at this time  Agree with plans for MRI brain with perfusion  Continue dexamethasone 4 mg BID w/ GI Ppx  - Supportive Oncology consulted, appreciate recommendations  Start scheduled acetaminophen 975mg PO q8h  Start lidocaine 4% TD patch once daily   Start scheduled oxycodone ER (OxyContin) 10mg PO q12h  Discontinue oxycodone IR 5mg PO q6h PRN   Change oxycodone IR 10mg PO q3h PRN for moderate to severe pain  Change hydromorphone 0.5mg IV q3h PRN for breakthrough pain--please discontinue in anticipation for discharge  Start ondansetron 8mg PO/IV q8h PRN for n/v, first line  Start scheduled loperamide 6mg PO once daily   Start aripiprazole 10mg PO once daily  Start diazepam 7.5mg PO AM / 5mg PO PM  Start propanol LA 120mg PO BID  Start guaifenesin 12h tablet 600mg PO BID PRN for cough     #ICI Pneumonitis vs CAP  #COPD  : : Grade 2-3 pneumonitis on CT PE  : : Leukocytosis on admission  : : No O2 requirement at baseline  : : Procal 0.24  : : RSV/flu/COVID PCR negative  : : MRSA nares negative  : : Strep, Legionella urine antigens negative  - Continue levofloxacin 500 mg daily (due to penicillin allergy)  - Bcx NGTD  - Continue dapsone for PJP ppx   - Continue DuoNebs Q6H  - Continue albuterol PRN    #FRANK  - Continue CPAP QHS     #MIGEL  : : At baseline Hgb ~10 on admission  - Hold home iron supplement     #HLD  - Continue rosuvastatin 10 mg daily     #Anxiety  #PTSD  #MDD, treatment-refractory, s/p  vagal nerve stimulator   - Continue sertraline 200 mg daily  - Continue memantine 10 mg BID    Dispo: Moderate intensity care pending MRI brain    F: Cautious, no documented echo  E: K >4, Mg >2  N: Regular, 2 L fluid restriction  A: PIV  O2: 2 L NC  DVT ppx: None -> C/f intra-lesional hemorrhage  GI ppx: None    Code Status: DNR/DNI  Surrogate Decision-Maker: Mason Be (Brother, [phone])       This patient was seen, discussed and examined with the attending, Dr. Quinonez, who agrees with the management plan.    Jakob Dillard MD  PGY-1, Neurology

## 2025-04-09 NOTE — PROGRESS NOTES
SUPPORTIVE AND PALLIATIVE ONCOLOGY INPATIENT FOLLOW-UP    SERVICE DATE: 04/09/25     Updates and Recommendations (04/09/25):  Pt's pain and symptoms adequately controlled now that he is back on home regimen. Mild anxiety still persists but he feels like it is improving.     ASSESSMENT/PLAN:  Manuel Marrufo is a 60 y.o. 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 PMHx significant for spinal stenosis (s/p L4-S1 lumbar decompression in 2021), HTN, HLD, non-alcoholic fatty liver disease, COPD, obesity, FRANK on CPAP, depression s/p vagal nerve stimulator (SA x2, 2007), PTSD, and GERD. Admitted 4/6/2025 for further evaluation and management of generalized weakness, fatigue, SOB, and chronic worsening headache. MRI brain pending. Supportive and Palliative Oncology is consulted for assistance with pain and symptom management.      Neoplasm Related Pain  Headache, neck, and BLE/feet pain related to known malignancy with metastatic lung and brain involvement.   Type: Somatic, neuropathic  Pain control: Well controlled   Home regimen: oxycodone ER (OxyContin) 10mg q12h, oxycodone IR 10mg q6h PRN, lidocaine TD patch once daily, acetaminophen PRN [usually 3g/day]  Intolerances: Vicodin [rx: sweating, shaking]  Personalized pain goal: Mild  Total OME usage for the past 24 hours: 37.5 OME  Renal function and hepatic function WNL.  Continue scheduled acetaminophen 975mg PO q8h   Continue lidocaine 4% TD patch once daily   dexamethasone 4mg IV q6h per primary  Continue scheduled oxycodone ER (OxyContin) 10mg PO q12h  Continue oxycodone IR 10mg PO q3h PRN for moderate to severe pain  Continue hydromorphone 0.5mg IV q3h PRN for breakthrough pain--please discontinue in anticipation for discharge  Continue to monitor pain scores and administer PRN medications as  appropriate  Continue/initiate nonpharmacologic pain management strategies including ice/heat therapy, distraction techniques, deep breathing/relaxation techniques, calming music, and repositioning  Continue to monitor for signs of opioid efficacy (pain scores, improved functionality) and toxicity (pinpoint pupils, excess sedation/drowsiness/confusion, respiratory depression, etc.)     Nausea  At risk for nausea and vomiting related to opioid use.  Home regimen: PPI  EKG reviewed from 4/6/25, QTc 436.  Currently denies.   PPI per primary   Continue ondansetron 8mg PO/IV q8h PRN for n/v, first line     Constipation  At risk for constipation related to opioids and reduced mobility, currently denies feeling constipated.  Has hx of diarrhea prone IBS per pt.   Usual bowel pattern: Once daily while on home loperamide  Home regimen: loperamide 6mg once daily [scheduled]  Continue scheduled loperamide 6mg PO once daily  Continue Miralax 17g PO once daily PRN for constipation--encourage use as appropriate  Goal to have BM without straining q48-72h, adjust regimen as needed  Encourage mobility as tolerated, PT/OT following     Altered Mood  Hx of anxiety, PTSD, and depression s/p vagal nerve stimulator (SA x2, 2007).  Allergy to buspirone [rx: n/v]  Previously tried: alprazolam [rx: ineffective], clonazepam [rx: ineffective]  Home regimen: propanol LA 120mg BID (for anxiety per CPRS), diazepam 7.5mg AM / 5mg PM, aripiprazole 10mg once daily, sertraline 200mg once daily, pregabalin 200mg BID, follows with [facility]  Continue aripiprazole 10mg PO once daily  Continue diazepam 7.5mg PO AM / 5mg PO PM  Continue pregabalin 200mg PO BID  Continue propanol LA 120mg PO BID  Continue sertraline 200mg PO once daily   If need arises, consider consulting Psych if medications need adjusting given complexity of existing regimen and extensive psych hx  Spiritual Care consulted for support     Sleeping Difficulty  Impaired sleep related to  pain, anxiety, and hospital environment.  Hx of chronic insomnia per pt.   Home regimen: None, see pain and anxiety home meds  See pain and anxiety recs     Cough  Recent productive cough likely 2/2 recent c/f pna vs ICI pneumonitis.   Pt states his recent phlegm has been clear, non-odorous.   Home regimen: Mucinex PRN  Continue guaifenesin 12h tablet 600mg PO BID PRN for cough      Disposition:  Please start the process of having prior authorization with meds to beds deliver medications to patient prior to discharge via [facility] pharmacy. Prescriptions will need to be sent 48-72 hours prior to discharge so that a prior authorization can be completed.      Discharge date pending resolution of acute hospital issues and pain control.  Pt will continue to follow with [facility] with Mandi Crisostomo MD.     SIGNATURE: ROSSI Brito   PAGER/CONTACT:  Contact information:  Supportive and Palliative Oncology  Monday-Friday 8 AM-5 PM  Epic Secure chat or pager [phone].  After hours and weekends:  pager [phone]    =====================================================================================================    SUBJECTIVE:    Interval Events:  MRI brain still pending. Pending plan of care thereafter.     Pain Assessment:  Location: Head, posterior neck, low back--radiates down BLE to feet  Duration: Constant  Characteristics:  Ratin/10 [hx good level for pt]  Descriptors: Aching, sore, sharp, shooting  Aggravating: Bright lights, loud noises, movement   Relieving: Analgesics--see EMR, positioning, and modifying activity  Interference with Function: A little    Opioid Requirements  Past 24h opioid requirements:  (-4/10, 5704-9306)  oxycodone ER 10mg x 2 = 20mg = 25 OME  oxycodone IR 10mg x 1 = 12.5 OME    Total 24h OME use: 37.5 OME    Symptom Assessment:  Nausea: none  Constipation: none  Anxiety: a little    Information obtained from: chart review, interview of patient, and discussion with  primary team  ______________________________________________________________________     OBJECTIVE:    Lab Results   Component Value Date    WBC 8.3 04/09/2025    HGB 8.6 (L) 04/09/2025    HCT 29.9 (L) 04/09/2025    MCV 74 (L) 04/09/2025     04/09/2025     Lab Results   Component Value Date    GLUCOSE 106 (H) 04/09/2025    CALCIUM 9.0 04/09/2025     04/09/2025    K 4.1 04/09/2025    CO2 23 04/09/2025     04/09/2025    BUN 29 (H) 04/09/2025    CREATININE 1.22 04/09/2025     Lab Results   Component Value Date    ALT 36 04/07/2025    AST 29 04/07/2025    ALKPHOS 83 04/07/2025    BILITOT 0.5 04/07/2025     Estimated Creatinine Clearance: 84.3 mL/min (by C-G formula based on SCr of 1.22 mg/dL).    Scheduled medications   acetaminophen, 975 mg, oral, q8h  ARIPiprazole, 10 mg, oral, Daily  cholecalciferol, 2,000 Units, oral, Daily  dapsone, 100 mg, oral, Daily  dexAMETHasone, 4 mg, intravenous, q6h  diazePAM, 7.5 mg, oral, Daily   And  diazePAM, 5 mg, oral, q PM  [Held by provider] enoxaparin, 40 mg, subcutaneous, q12h TING  [Held by provider] furosemide, 40 mg, oral, Daily  insulin lispro, 0-5 Units, subcutaneous, TID AC  ipratropium-albuteroL, 3 mL, nebulization, TID  levoFLOXacin, 500 mg, oral, q24h  lidocaine, 1 patch, transdermal, Daily  loperamide, 6 mg, oral, Daily  memantine, 10 mg, oral, BID  oxyCODONE ER, 10 mg, oral, q12h TING  pantoprazole, 40 mg, oral, Daily before breakfast  pregabalin, 200 mg, oral, BID  propranolol LA, 120 mg, oral, BID  rosuvastatin, 10 mg, oral, Daily  sertraline, 200 mg, oral, Daily    Continuous medications     PRN medications  albuterol, 2.5 mg, q6h PRN  dextrose, 12.5 g, q15 min PRN  dextrose, 25 g, q15 min PRN  glucagon, 1 mg, q15 min PRN  glucagon, 1 mg, q15 min PRN  guaiFENesin, 600 mg, BID PRN  HYDROmorphone, 0.5 mg, q3h PRN  naloxone, 0.2 mg, q5 min PRN  ondansetron ODT, 8 mg, q8h PRN   Or  ondansetron, 4 mg, q8h PRN  oxyCODONE, 10 mg, q3h PRN  polyethylene  glycol, 17 g, Daily PRN    PHYSICAL EXAMINATION:    Vital Signs:   Vital signs reviewed  Visit Vitals  /68 (BP Location: Right arm, Patient Position: Lying)   Pulse 78   Temp 35.9 °C (96.6 °F) (Temporal)   Resp 16      0-10 (Numeric) Pain Score: 6     Physical Exam   Vitals reviewed.   Constitutional:       Comments: Alert, awake, ill appearing gentleman laying in bed. No signs of acute distress. Pleasant, cooperative, and participating in interview.  HENT:   Head:      Comments: Normocephalic, atraumatic.   Eyes:      Comments: Sclera clear, EOM intact, wearing glasses.    Pulmonary:      Comments: Symmetrical chest rise. Regular rate and depth of respirations. NC.   Abdominal:      Comments: Abdomen obese, slightly distended, non tender, and semi-firm.   Musculoskeletal:      Comments: Normal muscle strength and tone. RANDLE x4. Generalized non-pitting edema.   Skin:     Comments: No lesions, rash, or abrasions present on visible skin. Skin color appropriate for ethnicity.   Neurological:      Comments: A&Ox4, follows commands, no apparent sensory deficits.   Psychiatric:      Comments: Mild anxiety and sadness, but behavior otherwise appropriate.     PALLIATIVE CARE ENCOUNTER:    Supportive and Palliative Oncology encounter:  Spoke with patient at bedside.  Emotional support provided.  Coordination of care: medication and symptom re-evaluation    Medical Decision Making/Goals of Care/Advance Care Planning:  (4/7/2025) (3/5/2025) Patient's current clinical condition, including diagnosis, prognosis, and management plan, and goals of care were discussed.   Life limiting disease: Metastatic malignancy   Family: Supportive, mother, brother, and sister  Performance status: Moderate limitations due to progressive physical weakness, anxiety, and pain.  Joys/meaning/strength: [place]  Understanding of health: Demonstrates good prognostic understanding of disease process, understands plan for updated pain and  symptom recs.   Information: Appreciates full disclosure  Goals: Cancer tx, pain/symptom control  Worries and fears now and future: Not being offered cancer tx   Code status discussion: DNR-DNI     (4/7/35) Code status discussion: Confirmed DNR-DNI. Rad Onc attempting to visit at this time and will address above GOC further during next visit.      Advance Directives  Existence of Advance Directives: Yes, but NOT on file in medical record  Decision maker: HCPCHANDLER is pt's brother, Mason Be  Code Status: DNR-DNI    =====================================================================================================    Signature and billing:  Medical complexity was high level due to due to complexity of problems, extensive data review, and high risk of management/treatment.    I spent 50 minutes in the care of this patient which included chart review, interviewing patient/family, discussion with primary team, coordination of care, and documentation.    Data:   Diagnostic tests and information reviewed for today's visit:  Conversation with primary team, Most recent labs, Most recent EKG, Medications    Some elements copied from my note on 4/7/25, the elements have been updated and all reflect current decision making from today, 04/09/25.    Plan of Care discussed with: Primary team, pt     Thank you for asking Supportive and Palliative Oncology to assist with care of this patient.  Recommendations will be communicated back to the consulting service by way of shared electronic medical record/secure chat/email or face-to-face.   We will continue to follow.  Please contact us for additional questions or concerns.    SIGNATURE: ROSSI Brito   PAGER/CONTACT:  Contact information:  Supportive and Palliative Oncology  Monday-Friday 8 AM-5 PM, Epic Secure chat or pager [phone].  After hours and weekends: pager [phone]

## 2025-04-09 NOTE — PROGRESS NOTES
Occupational Therapy      Occupational Therapy Treatment    Name: Manuel Marrufo  MRN: [id number]  : 1965  Date: 25  Room: [address] [place] [address]      Time Calculation  Start Time: 1447  Stop Time: 1510  Time Calculation (min): 23 min    Assessment:  OT Assessment: Pt continues to be appropriate for skilled OT at D/C, pt tolerate standing and taking side steps with assistance in preparation for ADLs and functional mobility to bathroom.  Prognosis: Good  Barriers to Discharge Home: Physical needs  Physical Needs: 24hr ADL assistance needed, High falls risk due to function or environment, 24hr mobility assistance needed  Evaluation/Treatment Tolerance: Patient limited by fatigue, Patient limited by pain  End of Session Communication: Bedside nurse  End of Session Patient Position: Bed, 3 rail up, Alarm on  Plan:  Treatment Interventions: ADL retraining, Compensatory technique education, UE strengthening/ROM, Functional transfer training  OT Frequency: 3 times per week  OT Discharge Recommendations: Moderate intensity level of continued care  Equipment Recommended upon Discharge: Wheeled walker  OT Recommended Transfer Status: Assist of 2  OT - OK to Discharge: Yes    Subjective   General:  OT Last Visit  OT Received On: 25  Reason for Referral: presenting with generalized weakness, fatigue, SOB, and chronic worsening headache. CTH incr R parietal lesion and L temporal edema  Past Medical History Relevant to Rehab: PMHx s/f metastatic melanoma, to brain and lung (on C3 of dual immunotherapy with nivulumab and ipilumab and palliative WBRT 24), (s/p left shoulder resection ), spinal stenosis (s/p L4-S1 lumbar decompression ), HTN, HLD, nonalcoholic fatty liver disease, obesity, FRANK on CPAP, PTSD, GERD, COPD, depression s/p vagal nerve stimulator (SA x2, )  Co-Treatment: PT  Co-Treatment Reason: Cotx with OT for pt's safety with mobility, AMPAC<10  Prior to Session Communication: Bedside  nurse  Patient Position Received: Bed, 3 rail up, Alarm on  General Comment: Pt in supine on arrival, willing to participate with encouragement   Precautions:  Medical Precautions: Fall precautions, Oxygen therapy device and L/min  Vitals:   Date/Time Vitals Session Patient Position Pulse Resp SpO2 BP MAP (mmHg)    04/09/25 1447 --  --  77  --  95 %  --  --           Lines/Tubes/Drains:  External Urinary Catheter Male (Active)   Number of days: 3       Cognition:  Arousal/Alertness: Delayed responses to stimuli  Orientation Level: Oriented X4    Pain Assessment:  Pain Assessment  Pain Assessment: 0-10  0-10 (Numeric) Pain Score:  (unrated)  Pain Location:  (neck and back)     Objective        Bed Mobility/Transfers:   Bed Mobility  Bed Mobility: Yes  Bed Mobility 1  Bed Mobility 1: Supine to sitting  Level of Assistance 1: Moderate assistance, +2, Maximum verbal cues  Bed Mobility Comments 1: HOB up, use of bedrails, cues to follow log roll technique  Transfers  Transfer: Yes  Transfer 1  Transfer From 1: Sit to, Stand to  Transfer to 1: Stand, Sit  Technique 1: Sit to stand, Stand to sit  Transfer Device 1: Walker  Transfer Level of Assistance 1: Moderate assistance, +2  Trials/Comments 1: raised bed surface, provided cues on safe hand placement                Balance:  Dynamic Standing Balance  Dynamic Standing-Comments: Pt tolerated taking few side steps with Min A x2 using WW, provided cues on upright posture and safe walker use    Therapy/Activity:      Therapeutic Activity  Therapeutic Activity Performed: Yes  Therapeutic Activity 1: Pt toelrated sitting EOB for > 6 min prior to attempting standing              Outcome Measures:  [facility] Daily Activity  Putting on and taking off regular lower body clothing: Total  Bathing (including washing, rinsing, drying): Total  Putting on and taking off regular upper body clothing: A lot  Toileting, which includes using toilet, bedpan or urinal: Total  Taking care of  personal grooming such as brushing teeth: A little  Eating Meals: A little  Daily Activity - Total Score: 11     Education Documentation  No documentation found.  Education Comments  No comments found.        Goals:  Encounter Problems       Encounter Problems (Active)       ADLs       Patient with complete upper body dressing with contact guard assist level of assistance donning and doffing all UE clothes with no adaptive equipment while edge of bed  (Progressing)       Start:  04/07/25    Expected End:  04/21/25            Patient with complete lower body dressing with minimal assist  level of assistance donning and doffing all LE clothes  with PRN adaptive equipment while edge of bed  (Progressing)       Start:  04/07/25    Expected End:  04/21/25            Patient will complete daily grooming tasks brushing teeth and washing face/hair with stand by assist level of assistance and PRN adaptive equipment while edge of bed . (Progressing)       Start:  04/07/25    Expected End:  04/21/25            Patient will complete toileting including hygiene clothing management/hygiene with contact guard assist level of assistance and bedside commode. (Progressing)       Start:  04/07/25    Expected End:  04/21/25               EXERCISE/STRENGTHENING       Patient with increase BUE strength in order to maximize independence with ADLs. (Progressing)       Start:  04/07/25    Expected End:  04/21/25               MOBILITY       Patient will perform Functional mobility min Household distances with minimal assist  level of assistance and least restrictive device in order to improve safety and functional mobility. (Progressing)       Start:  04/07/25    Expected End:  04/21/25               TRANSFERS       Patient will perform bed mobility contact guard assist level of assistance and bed rails in order to improve safety and independence with mobility (Progressing)       Start:  04/07/25    Expected End:  04/21/25             Patient will complete sit to stand transfer with minimal assist  level of assistance and least restrictive device in order to improve safety and prepare for out of bed mobility. (Progressing)       Start:  04/07/25    Expected End:  04/21/25 04/09/25 at 3:38 PM   Tiki Taylor, OT   [phone]

## 2025-04-09 NOTE — HOSPITAL COURSE
Manuel Marrufo is a 60-year-old male with past medical history significant for metastatic melanoma (brain, lung, spine, and adrenal) s/p dual immunotherapy with nivulumab and ipilumab x 2 cycles (last dose Jan 2025) and palliative WBRT (Dec 2024) s/p left shoulder resection (2019), spinal stenosis s/p L4-S1 lumbar decompression (2021), HTN, HLD, MASH, obesity, FRANK on CPAP, PTSD, GERD, COPD, and depression s/p vagal nerve stimulator (SA x 2, 2007) admitted for management of hemorrhagic brain metastases with vasogenic edema on 4/6. Neurosurgery consulted for recommendations regarding metastatic brain lesions. Radiation Oncology and Supportive Oncology consulted for same. MRI brain showed diffuse metastatic burden. Neurosurgery does not recommend surgical intervention nor follow-up at this time. On high-dose dexamethasone for vasogenic edema, decreased from 4 mg IV Q6H decreased to 4 mg IV Q12H on 4/11.    On arrival, patient required O2 via NC. CT PE showed possible pneumonia vs pneumonitis. Finished levofloxacin to complete 5-day course for possible pneumonia. Currently on room air.    To-Do:  [ ] Consider resuming DVT ppx given MRI brain findings  [ ] Monitor for BLE edema -> Home furosemide ordered every other day  [ ] AM labs de-escalated to every other day    Issues Requiring Follow-up:  Psychiatry -> Interrogate VNS following inpatient MRI

## 2025-04-09 NOTE — CARE PLAN
The patient's goals for the shift include      The clinical goals for the shift include Patient to remain HDS, VSS, and free from falls or injury      Problem: Pain - Adult  Goal: Verbalizes/displays adequate comfort level or baseline comfort level  Outcome: Progressing     Problem: Safety - Adult  Goal: Free from fall injury  Outcome: Progressing     Problem: Discharge Planning  Goal: Discharge to home or other facility with appropriate resources  Outcome: Progressing     Problem: Chronic Conditions and Co-morbidities  Goal: Patient's chronic conditions and co-morbidity symptoms are monitored and maintained or improved  Outcome: Progressing     Problem: Nutrition  Goal: Nutrient intake appropriate for maintaining nutritional needs  Outcome: Progressing     Problem: Fall/Injury  Goal: Not fall by end of shift  Outcome: Progressing  Goal: Be free from injury by end of the shift  Outcome: Progressing  Goal: Verbalize understanding of personal risk factors for fall in the hospital  Outcome: Progressing  Goal: Verbalize understanding of risk factor reduction measures to prevent injury from fall in the home  Outcome: Progressing  Goal: Use assistive devices by end of the shift  Outcome: Progressing  Goal: Pace activities to prevent fatigue by end of the shift  Outcome: Progressing     Problem: Pain  Goal: Takes deep breaths with improved pain control throughout the shift  Outcome: Progressing  Goal: Turns in bed with improved pain control throughout the shift  Outcome: Progressing  Goal: Walks with improved pain control throughout the shift  Outcome: Progressing  Goal: Performs ADL's with improved pain control throughout shift  Outcome: Progressing  Goal: Participates in PT with improved pain control throughout the shift  Outcome: Progressing  Goal: Free from opioid side effects throughout the shift  Outcome: Progressing  Goal: Free from acute confusion related to pain meds throughout the shift  Outcome: Progressing      Problem: Respiratory  Goal: Clear secretions with interventions this shift  Outcome: Progressing  Goal: Minimize anxiety/maximize coping throughout shift  Outcome: Progressing  Goal: Minimal/no exertional discomfort or dyspnea this shift  Outcome: Progressing  Goal: No signs of respiratory distress (eg. Use of accessory muscles. Peds grunting)  Outcome: Progressing  Goal: Patent airway maintained this shift  Outcome: Progressing  Goal: Tolerate mechanical ventilation evidenced by VS/agitation level this shift  Outcome: Progressing  Goal: Tolerate pulmonary toileting this shift  Outcome: Progressing  Goal: Verbalize decreased shortness of breath this shift  Outcome: Progressing  Goal: Wean oxygen to maintain O2 saturation per order/standard this shift  Outcome: Progressing  Goal: Increase self care and/or family involvement in next 24 hours  Outcome: Progressing     Problem: Skin  Goal: Decreased wound size/increased tissue granulation at next dressing change  Outcome: Progressing  Goal: Participates in plan/prevention/treatment measures  Outcome: Progressing  Flowsheets (Taken 4/9/2025 0833)  Participates in plan/prevention/treatment measures: Elevate heels  Goal: Prevent/manage excess moisture  Outcome: Progressing  Goal: Prevent/minimize sheer/friction injuries  Outcome: Progressing  Goal: Promote/optimize nutrition  Outcome: Progressing  Goal: Promote skin healing  Outcome: Progressing

## 2025-04-10 ENCOUNTER — APPOINTMENT (OUTPATIENT)
Dept: RADIOLOGY | Facility: HOSPITAL | Age: 60
End: 2025-04-10
Payer: MEDICARE

## 2025-04-10 LAB
ALBUMIN SERPL BCP-MCNC: 3.4 G/DL (ref 3.4–5)
ANION GAP SERPL CALC-SCNC: 15 MMOL/L (ref 10–20)
BACTERIA BLD CULT: NORMAL
BACTERIA BLD CULT: NORMAL
BASOPHILS # BLD MANUAL: 0 X10*3/UL (ref 0–0.1)
BASOPHILS NFR BLD MANUAL: 0 %
BUN SERPL-MCNC: 27 MG/DL (ref 6–23)
CALCIUM SERPL-MCNC: 9.4 MG/DL (ref 8.6–10.6)
CHLORIDE SERPL-SCNC: 105 MMOL/L (ref 98–107)
CO2 SERPL-SCNC: 24 MMOL/L (ref 21–32)
CREAT SERPL-MCNC: 1.01 MG/DL (ref 0.5–1.3)
EGFRCR SERPLBLD CKD-EPI 2021: 86 ML/MIN/1.73M*2
EOSINOPHIL # BLD MANUAL: 0 X10*3/UL (ref 0–0.7)
EOSINOPHIL NFR BLD MANUAL: 0 %
ERYTHROCYTE [DISTWIDTH] IN BLOOD BY AUTOMATED COUNT: 21.8 % (ref 11.5–14.5)
GLUCOSE BLD MANUAL STRIP-MCNC: 139 MG/DL (ref 74–99)
GLUCOSE BLD MANUAL STRIP-MCNC: 164 MG/DL (ref 74–99)
GLUCOSE BLD MANUAL STRIP-MCNC: 166 MG/DL (ref 74–99)
GLUCOSE BLD MANUAL STRIP-MCNC: 284 MG/DL (ref 74–99)
GLUCOSE SERPL-MCNC: 101 MG/DL (ref 74–99)
HCT VFR BLD AUTO: 30.2 % (ref 41–52)
HGB BLD-MCNC: 8.5 G/DL (ref 13.5–17.5)
IMM GRANULOCYTES # BLD AUTO: 0.6 X10*3/UL (ref 0–0.7)
IMM GRANULOCYTES NFR BLD AUTO: 8.2 % (ref 0–0.9)
LYMPHOCYTES # BLD MANUAL: 1.09 X10*3/UL (ref 1.2–4.8)
LYMPHOCYTES NFR BLD MANUAL: 14.7 %
MAGNESIUM SERPL-MCNC: 2.26 MG/DL (ref 1.6–2.4)
MCH RBC QN AUTO: 20.5 PG (ref 26–34)
MCHC RBC AUTO-ENTMCNC: 28.1 G/DL (ref 32–36)
MCV RBC AUTO: 73 FL (ref 80–100)
MONOCYTES # BLD MANUAL: 0.76 X10*3/UL (ref 0.1–1)
MONOCYTES NFR BLD MANUAL: 10.3 %
MYELOCYTES # BLD MANUAL: 0.25 X10*3/UL
MYELOCYTES NFR BLD MANUAL: 3.4 %
NEUTS SEG # BLD MANUAL: 5.3 X10*3/UL (ref 1.2–7)
NEUTS SEG NFR BLD MANUAL: 71.6 %
NRBC BLD-RTO: 1 /100 WBCS (ref 0–0)
OVALOCYTES BLD QL SMEAR: ABNORMAL
PHOSPHATE SERPL-MCNC: 4.3 MG/DL (ref 2.5–4.9)
PLATELET # BLD AUTO: 289 X10*3/UL (ref 150–450)
POTASSIUM SERPL-SCNC: 4.2 MMOL/L (ref 3.5–5.3)
RBC # BLD AUTO: 4.14 X10*6/UL (ref 4.5–5.9)
RBC MORPH BLD: ABNORMAL
SODIUM SERPL-SCNC: 140 MMOL/L (ref 136–145)
TOTAL CELLS COUNTED BLD: 116
WBC # BLD AUTO: 7.4 X10*3/UL (ref 4.4–11.3)

## 2025-04-10 PROCEDURE — 2500000001 HC RX 250 WO HCPCS SELF ADMINISTERED DRUGS (ALT 637 FOR MEDICARE OP)

## 2025-04-10 PROCEDURE — 85007 BL SMEAR W/DIFF WBC COUNT: CPT

## 2025-04-10 PROCEDURE — 2500000004 HC RX 250 GENERAL PHARMACY W/ HCPCS (ALT 636 FOR OP/ED)

## 2025-04-10 PROCEDURE — 2550000001 HC RX 255 CONTRASTS: Performed by: STUDENT IN AN ORGANIZED HEALTH CARE EDUCATION/TRAINING PROGRAM

## 2025-04-10 PROCEDURE — 85027 COMPLETE CBC AUTOMATED: CPT

## 2025-04-10 PROCEDURE — 70553 MRI BRAIN STEM W/O & W/DYE: CPT

## 2025-04-10 PROCEDURE — 36415 COLL VENOUS BLD VENIPUNCTURE: CPT

## 2025-04-10 PROCEDURE — 99232 SBSQ HOSP IP/OBS MODERATE 35: CPT | Performed by: STUDENT IN AN ORGANIZED HEALTH CARE EDUCATION/TRAINING PROGRAM

## 2025-04-10 PROCEDURE — 82947 ASSAY GLUCOSE BLOOD QUANT: CPT

## 2025-04-10 PROCEDURE — 2500000002 HC RX 250 W HCPCS SELF ADMINISTERED DRUGS (ALT 637 FOR MEDICARE OP, ALT 636 FOR OP/ED)

## 2025-04-10 PROCEDURE — 83735 ASSAY OF MAGNESIUM: CPT

## 2025-04-10 PROCEDURE — A9575 INJ GADOTERATE MEGLUMI 0.1ML: HCPCS | Performed by: STUDENT IN AN ORGANIZED HEALTH CARE EDUCATION/TRAINING PROGRAM

## 2025-04-10 PROCEDURE — 80069 RENAL FUNCTION PANEL: CPT

## 2025-04-10 PROCEDURE — 1170000001 HC PRIVATE ONCOLOGY ROOM DAILY

## 2025-04-10 PROCEDURE — 70553 MRI BRAIN STEM W/O & W/DYE: CPT | Performed by: RADIOLOGY

## 2025-04-10 RX ORDER — FUROSEMIDE 40 MG/1
40 TABLET ORAL DAILY
Status: DISCONTINUED | OUTPATIENT
Start: 2025-04-10 | End: 2025-04-11

## 2025-04-10 RX ORDER — AMOXICILLIN 250 MG
1 CAPSULE ORAL 2 TIMES DAILY
Status: DISCONTINUED | OUTPATIENT
Start: 2025-04-10 | End: 2025-04-17 | Stop reason: HOSPADM

## 2025-04-10 RX ORDER — POLYETHYLENE GLYCOL 3350 17 G/17G
17 POWDER, FOR SOLUTION ORAL DAILY
Status: DISCONTINUED | OUTPATIENT
Start: 2025-04-10 | End: 2025-04-17 | Stop reason: HOSPADM

## 2025-04-10 RX ORDER — GADOTERATE MEGLUMINE 376.9 MG/ML
20 INJECTION INTRAVENOUS
Status: COMPLETED | OUTPATIENT
Start: 2025-04-10 | End: 2025-04-10

## 2025-04-10 RX ADMIN — ARIPIPRAZOLE 10 MG: 10 TABLET ORAL at 09:08

## 2025-04-10 RX ADMIN — DAPSONE 100 MG: 100 TABLET ORAL at 21:28

## 2025-04-10 RX ADMIN — PREGABALIN 200 MG: 25 CAPSULE ORAL at 09:08

## 2025-04-10 RX ADMIN — MEMANTINE 10 MG: 10 TABLET ORAL at 09:08

## 2025-04-10 RX ADMIN — DEXAMETHASONE SODIUM PHOSPHATE 4 MG: 4 INJECTION, SOLUTION INTRA-ARTICULAR; INTRALESIONAL; INTRAMUSCULAR; INTRAVENOUS; SOFT TISSUE at 09:08

## 2025-04-10 RX ADMIN — MEMANTINE 10 MG: 10 TABLET ORAL at 21:26

## 2025-04-10 RX ADMIN — OXYCODONE HYDROCHLORIDE 10 MG: 10 TABLET ORAL at 00:36

## 2025-04-10 RX ADMIN — GUAIFENESIN 600 MG: 600 TABLET ORAL at 09:11

## 2025-04-10 RX ADMIN — ACETAMINOPHEN 975 MG: 325 TABLET, FILM COATED ORAL at 21:25

## 2025-04-10 RX ADMIN — PREGABALIN 200 MG: 25 CAPSULE ORAL at 21:24

## 2025-04-10 RX ADMIN — DIAZEPAM 7.5 MG: 5 TABLET ORAL at 09:07

## 2025-04-10 RX ADMIN — ROSUVASTATIN CALCIUM 10 MG: 10 TABLET, FILM COATED ORAL at 21:26

## 2025-04-10 RX ADMIN — DEXAMETHASONE SODIUM PHOSPHATE 4 MG: 4 INJECTION, SOLUTION INTRA-ARTICULAR; INTRALESIONAL; INTRAMUSCULAR; INTRAVENOUS; SOFT TISSUE at 18:19

## 2025-04-10 RX ADMIN — PROPRANOLOL HYDROCHLORIDE 120 MG: 120 CAPSULE, EXTENDED RELEASE ORAL at 09:08

## 2025-04-10 RX ADMIN — FUROSEMIDE 40 MG: 40 TABLET ORAL at 18:19

## 2025-04-10 RX ADMIN — DEXAMETHASONE SODIUM PHOSPHATE 4 MG: 4 INJECTION, SOLUTION INTRA-ARTICULAR; INTRALESIONAL; INTRAMUSCULAR; INTRAVENOUS; SOFT TISSUE at 03:34

## 2025-04-10 RX ADMIN — OXYCODONE HYDROCHLORIDE 10 MG: 10 TABLET, FILM COATED, EXTENDED RELEASE ORAL at 21:25

## 2025-04-10 RX ADMIN — SERTRALINE 200 MG: 100 TABLET, FILM COATED ORAL at 21:31

## 2025-04-10 RX ADMIN — ACETAMINOPHEN 975 MG: 325 TABLET, FILM COATED ORAL at 14:14

## 2025-04-10 RX ADMIN — DIAZEPAM 5 MG: 10 TABLET ORAL at 00:36

## 2025-04-10 RX ADMIN — LEVOFLOXACIN 500 MG: 500 TABLET, FILM COATED ORAL at 18:19

## 2025-04-10 RX ADMIN — LOPERAMIDE HYDROCHLORIDE 6 MG: 2 CAPSULE ORAL at 21:26

## 2025-04-10 RX ADMIN — PANTOPRAZOLE SODIUM 40 MG: 40 TABLET, DELAYED RELEASE ORAL at 09:08

## 2025-04-10 RX ADMIN — Medication 2000 UNITS: at 21:28

## 2025-04-10 RX ADMIN — OXYCODONE HYDROCHLORIDE 10 MG: 10 TABLET, FILM COATED, EXTENDED RELEASE ORAL at 09:07

## 2025-04-10 RX ADMIN — PROPRANOLOL HYDROCHLORIDE 120 MG: 120 CAPSULE, EXTENDED RELEASE ORAL at 21:26

## 2025-04-10 RX ADMIN — DIAZEPAM 5 MG: 10 TABLET ORAL at 21:28

## 2025-04-10 RX ADMIN — DEXAMETHASONE SODIUM PHOSPHATE 4 MG: 4 INJECTION, SOLUTION INTRA-ARTICULAR; INTRALESIONAL; INTRAMUSCULAR; INTRAVENOUS; SOFT TISSUE at 21:27

## 2025-04-10 RX ADMIN — ACETAMINOPHEN 975 MG: 325 TABLET, FILM COATED ORAL at 04:34

## 2025-04-10 RX ADMIN — GADOTERATE MEGLUMINE 38 ML: 376.9 INJECTION INTRAVENOUS at 16:27

## 2025-04-10 ASSESSMENT — COGNITIVE AND FUNCTIONAL STATUS - GENERAL
PERSONAL GROOMING: A LITTLE
TOILETING: TOTAL
MOBILITY SCORE: 6
TOILETING: TOTAL
DRESSING REGULAR UPPER BODY CLOTHING: A LOT
MOBILITY SCORE: 6
TURNING FROM BACK TO SIDE WHILE IN FLAT BAD: TOTAL
MOVING FROM LYING ON BACK TO SITTING ON SIDE OF FLAT BED WITH BEDRAILS: TOTAL
TURNING FROM BACK TO SIDE WHILE IN FLAT BAD: TOTAL
STANDING UP FROM CHAIR USING ARMS: TOTAL
CLIMB 3 TO 5 STEPS WITH RAILING: TOTAL
DRESSING REGULAR UPPER BODY CLOTHING: A LOT
HELP NEEDED FOR BATHING: TOTAL
DRESSING REGULAR LOWER BODY CLOTHING: TOTAL
EATING MEALS: A LITTLE
HELP NEEDED FOR BATHING: TOTAL
PERSONAL GROOMING: A LITTLE
DAILY ACTIVITIY SCORE: 11
EATING MEALS: A LITTLE
WALKING IN HOSPITAL ROOM: TOTAL
MOVING FROM LYING ON BACK TO SITTING ON SIDE OF FLAT BED WITH BEDRAILS: TOTAL
WALKING IN HOSPITAL ROOM: TOTAL
DAILY ACTIVITIY SCORE: 11
DRESSING REGULAR LOWER BODY CLOTHING: TOTAL
MOVING TO AND FROM BED TO CHAIR: TOTAL
MOVING TO AND FROM BED TO CHAIR: TOTAL
CLIMB 3 TO 5 STEPS WITH RAILING: TOTAL
STANDING UP FROM CHAIR USING ARMS: TOTAL

## 2025-04-10 ASSESSMENT — PAIN SCALES - GENERAL
PAINLEVEL_OUTOF10: 7

## 2025-04-10 ASSESSMENT — PAIN DESCRIPTION - LOCATION
LOCATION: NECK

## 2025-04-10 ASSESSMENT — PAIN - FUNCTIONAL ASSESSMENT: PAIN_FUNCTIONAL_ASSESSMENT: 0-10

## 2025-04-10 NOTE — CARE PLAN
The patient's goals for the shift include  Rest     The clinical goals for the shift include Patient to remain HDS, VSS, and free from falls or injury    Over the shift, the patient did make progress toward the following goals.       Problem: Pain - Adult  Goal: Verbalizes/displays adequate comfort level or baseline comfort level  Outcome: Progressing     Problem: Safety - Adult  Goal: Free from fall injury  Outcome: Progressing     Problem: Discharge Planning  Goal: Discharge to home or other facility with appropriate resources  Outcome: Progressing     Problem: Chronic Conditions and Co-morbidities  Goal: Patient's chronic conditions and co-morbidity symptoms are monitored and maintained or improved  Outcome: Progressing     Problem: Nutrition  Goal: Nutrient intake appropriate for maintaining nutritional needs  Outcome: Progressing     Problem: Fall/Injury  Goal: Not fall by end of shift  Outcome: Progressing  Goal: Be free from injury by end of the shift  Outcome: Progressing  Goal: Verbalize understanding of personal risk factors for fall in the hospital  Outcome: Progressing  Goal: Verbalize understanding of risk factor reduction measures to prevent injury from fall in the home  Outcome: Progressing  Goal: Use assistive devices by end of the shift  Outcome: Progressing  Goal: Pace activities to prevent fatigue by end of the shift  Outcome: Progressing     Problem: Pain  Goal: Takes deep breaths with improved pain control throughout the shift  Outcome: Progressing  Goal: Turns in bed with improved pain control throughout the shift  Outcome: Progressing  Goal: Walks with improved pain control throughout the shift  Outcome: Progressing  Goal: Performs ADL's with improved pain control throughout shift  Outcome: Progressing  Goal: Participates in PT with improved pain control throughout the shift  Outcome: Progressing  Goal: Free from opioid side effects throughout the shift  Outcome: Progressing  Goal: Free from  acute confusion related to pain meds throughout the shift  Outcome: Progressing     Problem: Respiratory  Goal: Clear secretions with interventions this shift  Outcome: Progressing  Goal: Minimize anxiety/maximize coping throughout shift  Outcome: Progressing  Goal: Minimal/no exertional discomfort or dyspnea this shift  Outcome: Progressing  Goal: No signs of respiratory distress (eg. Use of accessory muscles. Peds grunting)  Outcome: Progressing  Goal: Patent airway maintained this shift  Outcome: Progressing  Goal: Tolerate mechanical ventilation evidenced by VS/agitation level this shift  Outcome: Progressing  Goal: Tolerate pulmonary toileting this shift  Outcome: Progressing  Goal: Verbalize decreased shortness of breath this shift  Outcome: Progressing  Goal: Wean oxygen to maintain O2 saturation per order/standard this shift  Outcome: Progressing  Goal: Increase self care and/or family involvement in next 24 hours  Outcome: Progressing     Problem: Skin  Goal: Decreased wound size/increased tissue granulation at next dressing change  Outcome: Progressing  Flowsheets (Taken 4/9/2025 2333)  Decreased wound size/increased tissue granulation at next dressing change: Protective dressings over bony prominences  Goal: Participates in plan/prevention/treatment measures  Outcome: Progressing  Flowsheets (Taken 4/9/2025 2333)  Participates in plan/prevention/treatment measures: Elevate heels  Goal: Prevent/manage excess moisture  Outcome: Progressing  Flowsheets (Taken 4/9/2025 2333)  Prevent/manage excess moisture: Cleanse incontinence/protect with barrier cream  Goal: Prevent/minimize sheer/friction injuries  Outcome: Progressing  Flowsheets (Taken 4/9/2025 2333)  Prevent/minimize sheer/friction injuries: Turn/reposition every 2 hours/use positioning/transfer devices  Goal: Promote/optimize nutrition  Outcome: Progressing  Flowsheets (Taken 4/9/2025 2333)  Promote/optimize nutrition: Monitor/record intake including  meals  Goal: Promote skin healing  Outcome: Progressing  Flowsheets (Taken 4/9/2025 6495)  Promote skin healing: Turn/reposition every 2 hours/use positioning/transfer devices

## 2025-04-10 NOTE — PROGRESS NOTES
Physical Therapy                 Therapy Communication Note    Patient Name: Manuel Marrufo  MRN: [id number]  Department: [facility] [place] [place] [facility]  Room: [address] [place] [address]A  Today's Date: 4/10/2025     Discipline: Physical Therapy    PT Missed Visit: Yes     Missed Visit Reason: Missed Visit Reason: Other (Comment) (Pt off the floor)    Missed Time:16:00 PM

## 2025-04-10 NOTE — PROGRESS NOTES
Manuel Marrufo is a 60 y.o. male on day 4 of admission presenting with Metastatic melanoma (Multi).      Subjective   No acute events overnight. Denies new or worsening complaints. Reports shortness of breath is improved, now on room air.       Objective   Last Recorded Vitals  /83 (BP Location: Left arm, Patient Position: Lying)   Pulse 70   Temp 36.4 °C (97.5 °F) (Temporal)   Resp 16   Wt 126 kg (277 lb 12.5 oz)   SpO2 91%     Intake/Output last 3 Shifts:  Intake/Output Summary (Last 24 hours) at 4/10/2025 0757  Last data filed at 4/10/2025 0630  Gross per 24 hour   Intake 884 ml   Output 900 ml   Net -16 ml     Admission Weight  Weight: 126 kg (277 lb 12.5 oz) (04/06/25 2100)    Daily Weight  04/06/25 : 126 kg (277 lb 12.5 oz)    Physical Exam  GEN: In no acute distress; cushingoid habitus  HEENT: Mucous membranes moist; eyes, ears, and nose without exudates; no scleral icterus  CV: Regular rate and rhythm; no murmurs, rubs, or gallops  RESP: Clear to auscultation bilaterally; no accessory muscle use; normal work of breathing; NC in place  ABD: Soft, non-tender; bowel sounds present  EXT: Radial pulses strong bilaterally; no peripheral edema  SKIN: Warm and dry; no visible rashes  NEURO: A&O x 4, moves all four extremities against gravity, sensation to light touch intact in all four extremities    Relevant Results  Scheduled medications  acetaminophen, 975 mg, oral, q8h  ARIPiprazole, 10 mg, oral, Daily  cholecalciferol, 2,000 Units, oral, Daily  dapsone, 100 mg, oral, Daily  dexAMETHasone, 4 mg, intravenous, q6h  diazePAM, 7.5 mg, oral, Daily   And  diazePAM, 5 mg, oral, q PM  [Held by provider] enoxaparin, 40 mg, subcutaneous, q12h TING  [Held by provider] furosemide, 40 mg, oral, Daily  insulin lispro, 0-5 Units, subcutaneous, TID AC  levoFLOXacin, 500 mg, oral, q24h  lidocaine, 1 patch, transdermal, Daily  loperamide, 6 mg, oral, Daily  memantine, 10 mg, oral, BID  oxyCODONE ER, 10 mg, oral, q12h  TING  pantoprazole, 40 mg, oral, Daily before breakfast  pregabalin, 200 mg, oral, BID  propranolol LA, 120 mg, oral, BID  rosuvastatin, 10 mg, oral, Daily  sertraline, 200 mg, oral, Daily    Continuous medications     PRN medications  PRN medications: albuterol, dextrose, dextrose, glucagon, glucagon, guaiFENesin, HYDROmorphone, naloxone, ondansetron ODT **OR** ondansetron, oxyCODONE, polyethylene glycol    Results from last 72 hours   Lab Units 04/09/25  0702 04/08/25  0741   WBC AUTO x10*3/uL 8.3 8.6   HEMOGLOBIN g/dL 8.6* 7.8*   PLATELETS AUTO x10*3/uL 291 250   SODIUM mmol/L 139 140   POTASSIUM mmol/L 4.1 4.4   CO2 mmol/L 23 21   BUN mg/dL 29* 21   MAGNESIUM mg/dL 2.27 2.18   GLUCOSE mg/dL 106* 124*      CXR (4/6/2025)  No radiographic evidence of an acute cardiopulmonary process.    CTH w/o contrast (4/6/2025)  Increased size of now 1.7 cm right parietal metastatic lesion with increased adjacent vasogenic edema when compared to 03/03/2025 CT.  Superimposed intralesional hemorrhage can not be excluded. No acute intracranial hemorrhage or new mass effect otherwise. Further evaluation with MRI may be warranted.  Multiple known intracranial metastatic lesions with vasogenic edema better visualized on the prior 11/26/2024 MRI    CT PE (4/6/2025)  No acute pulmonary embolism to the segmental level.  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 The differential diagnosis includes multifocal pneumonia, felt to be less likely. Given patient's presentation of shortness of breath, this could be grade 2 or grade 3 pneumonitis. Follow-up CT chest is recommended in 4-6 weeks.  Stable left adrenal nodule, which is likely metastatic.  Unchanged indeterminate sclerotic lesions  in the T10 vertebral body and right superior acetabulum, suspicious for metastasis. Confirmation with nonemergent PET-CT is recommended.  Patient's known pulmonary nodules are obscured by the above described pneumonitis. Tumor burden in this area can be  reassessed on follow-up evaluation.   Otherwise, no definite new site of disease is evident.       Assessment/Plan   Manuel Marrufo is a 60-year-old male with past medical history significant for metastatic melanoma (brain, lung, spine, and adrenal) s/p dual immunotherapy with nivulumab and ipilumab x 2 cycles (last dose Jan 2025) and palliative WBRT (Dec 2024) s/p left shoulder resection (2019), spinal stenosis s/p L4-S1 lumbar decompression (2021), HTN, HLD, MASH, obesity, FRANK on CPAP, PTSD, GERD, COPD, and depression s/p vagal nerve stimulator (SA x 2, 2007) admitted for management of hemorrhagic brain metastases with vasogenic edema. Neurosurgery consulted. CT PE showed findings consistent with pneumonitis. On high-dose dexamethasone for vasogenic edema. MRI brain pending. Radiation Oncology and Supportive Oncology consulted.    Updates (4/10)  - MRI brain pending -> Vagal nerve stimulator representative interrogated system on 4/10 AM, VNS form sent to MRI technician  - Resume home furosemide 40 mg PO daily (Cr 1.05 > 1.22 > 1.01)  - Tumor board to discuss further following MRI brain results  - Schedule PEG, docusate-senna  - Continue levofloxacin 500 mg daily (last dose 4/10, to complete 5-day course)     #Metastatic Melanoma  #Brain Metastases, c/f intra-lesional hemorrhage and vasogenic edema  #Nausea, improved  #Fatigue  : : Increasing size of 1.7 cm right parietal lesion  : : Possible superimposed intralesional hemorrhage  : : S/p WBRT x 10 fractions (2024)  : : S/p two cycles of dual immunotherapy with nivulumab and ipilumab, last dose 1/10   : : Primary oncologist at [facility]: Dr. Schaefer (contacted at admission)  : : S/p dexamethasone load 10 mg IV  - Continue  ondansetron PRN  - PT/OT  - Continue pregabalin 200 mg BID  - Continue dexamethasone 4 mg IV Q6H for vasogenic edema  - Neurosurgery consulted, appreciate recommendations  Obtain MRI presurgical perfusion and fingerprinting () to further characterize lesions and edema  Agree with dex 4q6, PPI, sliding scale insulin  Discussion at tumor board  Radiation oncology consult for evaluation and further interventions  - Radiation Oncology consulted, appreciate recommendations  No plans for re-irradiation at this time  Agree with plans for MRI brain with perfusion  Continue dexamethasone 4 mg BID w/ GI Ppx  - Supportive Oncology consulted, appreciate recommendations  Start scheduled acetaminophen 975mg PO q8h  Start lidocaine 4% TD patch once daily   Start scheduled oxycodone ER (OxyContin) 10mg PO q12h  Discontinue oxycodone IR 5mg PO q6h PRN   Change oxycodone IR 10mg PO q3h PRN for moderate to severe pain  Change hydromorphone 0.5mg IV q3h PRN for breakthrough pain--please discontinue in anticipation for discharge  Start ondansetron 8mg PO/IV q8h PRN for n/v, first line  Start scheduled loperamide 6mg PO once daily   Start aripiprazole 10mg PO once daily  Start diazepam 7.5mg PO AM / 5mg PO PM  Start propanol LA 120mg PO BID  Start guaifenesin 12h tablet 600mg PO BID PRN for cough     #ICI Pneumonitis vs CAP  #COPD  : : Grade 2-3 pneumonitis on CT PE  : : Leukocytosis on admission  : : No O2 requirement at baseline  : : Procal 0.24  : : RSV/flu/COVID PCR negative  : : MRSA nares negative  : : Strep, Legionella urine antigens negative  - Continue levofloxacin 500 mg daily (last dose 4/10, to complete 5-day course)  - Bcx NGTD  - Continue dapsone for PJP ppx   - Continue DuoNebs Q6H  - Continue albuterol PRN    #FRANK  - Continue CPAP QHS     #MIGEL  : : At baseline Hgb ~10 on admission  - Hold home iron supplement     #HLD  - Continue rosuvastatin 10 mg daily     #Anxiety  #PTSD  #MDD, treatment-refractory, s/p vagal  nerve stimulator   - Continue sertraline 200 mg daily  - Continue memantine 10 mg BID    Dispo: Moderate intensity care pending MRI brain    F: Cautious, no documented echo  E: K >4, Mg >2  N: Regular, 2 L fluid restriction  A: PIV  O2: RA  DVT ppx: None -> C/f intra-lesional hemorrhage  GI ppx: None    Code Status: DNR/DNI  Surrogate Decision-Maker: Mason Be (Brother, [phone])       This patient was seen, discussed and examined with the attending, Dr. Quinonez, who agrees with the management plan.    Jakob Dillard MD  PGY-1, Neurology

## 2025-04-10 NOTE — CARE PLAN
The patient's goals for the shift include      The clinical goals for the shift include Pt to remain HDS and VSS this shift

## 2025-04-10 NOTE — DOCUMENTATION CLARIFICATION NOTE
"    PATIENT:               HENRY CARRILLO  ACCT #:                  [id number]  MRN:                       [id number]  :                       1965  ADMIT DATE:       3/3/2025 3:50 PM  DISCH DATE:        3/6/2025 2:52 PM  RESPONDING PROVIDER #:        [id number]          PROVIDER RESPONSE TEXT:    Sepsis with multi-system organ dysfunction of PAUL and Liver dysfunction evident by Transaminitis    CDI QUERY TEXT:    Clarification        Instruction:  Based on your assessment of the patient and the clinical information, please provide the requested documentation by clicking on the appropriate radio button and enter any additional information if prompted.    Question: Is there a diagnosis indicative of a patient meeting SIRS criteria and with organ dysfunction in the setting of PNA    When answering this query, please exercise your independent professional judgment. The fact that a question is being asked, does not imply that any particular answer is desired or expected.    The patient's clinical indicators include:  Clinical Information: 58 yo M with SIRS, treated for c/f PNA vs ICI pneumonitis.    Clinical Indicators:  Vitals 3/3 from 1312 -1730:  T: 39.1, 38.8  HR: 89, 92  RR: 22, 16, 22    Labs 3/3-3/6:  WBC: 13.2  PLT: 123  Cr: 1.38, 1.36  ALT: 74, 66  AST: 62, 56  Protein: 6.3    3/6 PN- Oncology:  \"-CT patchy GGOs in DINO concerning for PNA.  Concern for PNA vs ICI pneumonitis  PAUL - improved  -Cr 1.38 from bl approximately 1  -reports good PO intake, suspect s/t starting lasix and hypotension iso infection\"    Treatment:  -Vancomycin 3/3 1,000mg x2 IV; 3/4 2,000mg IV  -Cefepime 2g IV q8h 3/4-3/5  -Flagyl 500mg IV q8h 3/3-3/5  -Levaquin 3/5- DC (x5days) 500mg PO q day  -LR 500ml IV bolus 3/3  -Lasix 40mg PO held    Risk Factors:  -c/f PNA vs ICI pneumonitis  -SIRS criteria  -PAUL  -elevated LFTs  Options provided:  -- Sepsis with renal organ dysfunction of PAUL  -- Sepsis with hepatic organ dysfunction of Liver " dysfunction evident by Transaminitis  -- Sepsis with multi-system organ dysfunction of PAUL and Liver dysfunction evident by Transaminitis  -- Sepsis with other organ dysfunction, Please specify sepsis associated organ dysfunction below  -- Patient treated for PNA without Sepsis  -- Other - I will add my own diagnosis  -- Refer to Clinical Documentation Reviewer    Query created by: Jackie Santos on 4/10/2025 3:25 PM      Electronically signed by:  MAI ORTA MD MPH 4/10/2025 5:24 PM

## 2025-04-11 ENCOUNTER — APPOINTMENT (OUTPATIENT)
Dept: HEMATOLOGY/ONCOLOGY | Facility: HOSPITAL | Age: 60
End: 2025-04-11
Payer: MEDICARE

## 2025-04-11 ENCOUNTER — APPOINTMENT (OUTPATIENT)
Dept: PALLIATIVE MEDICINE | Facility: CLINIC | Age: 60
End: 2025-04-11
Payer: MEDICARE

## 2025-04-11 DIAGNOSIS — Z51.81 THERAPEUTIC DRUG MONITORING: Primary | ICD-10-CM

## 2025-04-11 LAB
ALBUMIN SERPL BCP-MCNC: 3.6 G/DL (ref 3.4–5)
ANION GAP SERPL CALC-SCNC: 14 MMOL/L (ref 10–20)
BASOPHILS # BLD MANUAL: 0 X10*3/UL (ref 0–0.1)
BASOPHILS NFR BLD MANUAL: 0 %
BUN SERPL-MCNC: 32 MG/DL (ref 6–23)
CALCIUM SERPL-MCNC: 8.8 MG/DL (ref 8.6–10.6)
CHLORIDE SERPL-SCNC: 106 MMOL/L (ref 98–107)
CO2 SERPL-SCNC: 22 MMOL/L (ref 21–32)
CREAT SERPL-MCNC: 1.15 MG/DL (ref 0.5–1.3)
EGFRCR SERPLBLD CKD-EPI 2021: 73 ML/MIN/1.73M*2
EOSINOPHIL # BLD MANUAL: 0 X10*3/UL (ref 0–0.7)
EOSINOPHIL NFR BLD MANUAL: 0 %
ERYTHROCYTE [DISTWIDTH] IN BLOOD BY AUTOMATED COUNT: 22 % (ref 11.5–14.5)
GLUCOSE BLD MANUAL STRIP-MCNC: 134 MG/DL (ref 74–99)
GLUCOSE BLD MANUAL STRIP-MCNC: 141 MG/DL (ref 74–99)
GLUCOSE BLD MANUAL STRIP-MCNC: 222 MG/DL (ref 74–99)
GLUCOSE SERPL-MCNC: 219 MG/DL (ref 74–99)
HCT VFR BLD AUTO: 34.5 % (ref 41–52)
HGB BLD-MCNC: 9.8 G/DL (ref 13.5–17.5)
IMM GRANULOCYTES # BLD AUTO: 0.72 X10*3/UL (ref 0–0.7)
IMM GRANULOCYTES NFR BLD AUTO: 9.7 % (ref 0–0.9)
LYMPHOCYTES # BLD MANUAL: 1.94 X10*3/UL (ref 1.2–4.8)
LYMPHOCYTES NFR BLD MANUAL: 25.9 %
MAGNESIUM SERPL-MCNC: 2.15 MG/DL (ref 1.6–2.4)
MCH RBC QN AUTO: 20.9 PG (ref 26–34)
MCHC RBC AUTO-ENTMCNC: 28.4 G/DL (ref 32–36)
MCV RBC AUTO: 74 FL (ref 80–100)
METAMYELOCYTES # BLD MANUAL: 0.2 X10*3/UL
METAMYELOCYTES NFR BLD MANUAL: 2.6 %
MONOCYTES # BLD MANUAL: 0.2 X10*3/UL (ref 0.1–1)
MONOCYTES NFR BLD MANUAL: 2.6 %
MYELOCYTES # BLD MANUAL: 0.2 X10*3/UL
MYELOCYTES NFR BLD MANUAL: 2.6 %
NEUTS SEG # BLD MANUAL: 4.85 X10*3/UL (ref 1.2–7)
NEUTS SEG NFR BLD MANUAL: 64.6 %
NRBC BLD-RTO: 1.7 /100 WBCS (ref 0–0)
PHOSPHATE SERPL-MCNC: 4.4 MG/DL (ref 2.5–4.9)
PLATELET # BLD AUTO: 336 X10*3/UL (ref 150–450)
POTASSIUM SERPL-SCNC: 3.6 MMOL/L (ref 3.5–5.3)
RBC # BLD AUTO: 4.69 X10*6/UL (ref 4.5–5.9)
RBC MORPH BLD: NORMAL
SODIUM SERPL-SCNC: 138 MMOL/L (ref 136–145)
TOTAL CELLS COUNTED BLD: 116
VARIANT LYMPHS # BLD MANUAL: 0.13 X10*3/UL (ref 0–0.5)
VARIANT LYMPHS NFR BLD: 1.7 %
WBC # BLD AUTO: 7.5 X10*3/UL (ref 4.4–11.3)

## 2025-04-11 PROCEDURE — 82947 ASSAY GLUCOSE BLOOD QUANT: CPT

## 2025-04-11 PROCEDURE — 83735 ASSAY OF MAGNESIUM: CPT

## 2025-04-11 PROCEDURE — 85007 BL SMEAR W/DIFF WBC COUNT: CPT

## 2025-04-11 PROCEDURE — 85027 COMPLETE CBC AUTOMATED: CPT

## 2025-04-11 PROCEDURE — 2500000002 HC RX 250 W HCPCS SELF ADMINISTERED DRUGS (ALT 637 FOR MEDICARE OP, ALT 636 FOR OP/ED)

## 2025-04-11 PROCEDURE — 2500000004 HC RX 250 GENERAL PHARMACY W/ HCPCS (ALT 636 FOR OP/ED)

## 2025-04-11 PROCEDURE — 2500000001 HC RX 250 WO HCPCS SELF ADMINISTERED DRUGS (ALT 637 FOR MEDICARE OP)

## 2025-04-11 PROCEDURE — 99232 SBSQ HOSP IP/OBS MODERATE 35: CPT | Performed by: STUDENT IN AN ORGANIZED HEALTH CARE EDUCATION/TRAINING PROGRAM

## 2025-04-11 PROCEDURE — 36415 COLL VENOUS BLD VENIPUNCTURE: CPT

## 2025-04-11 PROCEDURE — 80069 RENAL FUNCTION PANEL: CPT

## 2025-04-11 PROCEDURE — 1170000001 HC PRIVATE ONCOLOGY ROOM DAILY

## 2025-04-11 RX ORDER — FUROSEMIDE 40 MG/1
40 TABLET ORAL EVERY OTHER DAY
Status: DISCONTINUED | OUTPATIENT
Start: 2025-04-13 | End: 2025-04-17 | Stop reason: HOSPADM

## 2025-04-11 RX ORDER — FERROUS SULFATE 325(65) MG
65 TABLET ORAL
Status: DISCONTINUED | OUTPATIENT
Start: 2025-04-12 | End: 2025-04-17 | Stop reason: HOSPADM

## 2025-04-11 RX ORDER — ASCORBIC ACID 500 MG
500 TABLET ORAL DAILY
Status: DISCONTINUED | OUTPATIENT
Start: 2025-04-11 | End: 2025-04-17 | Stop reason: HOSPADM

## 2025-04-11 RX ORDER — POTASSIUM CHLORIDE 20 MEQ/1
40 TABLET, EXTENDED RELEASE ORAL ONCE
Status: COMPLETED | OUTPATIENT
Start: 2025-04-11 | End: 2025-04-11

## 2025-04-11 RX ADMIN — PROPRANOLOL HYDROCHLORIDE 120 MG: 120 CAPSULE, EXTENDED RELEASE ORAL at 09:14

## 2025-04-11 RX ADMIN — ACETAMINOPHEN 975 MG: 325 TABLET, FILM COATED ORAL at 20:31

## 2025-04-11 RX ADMIN — DAPSONE 100 MG: 100 TABLET ORAL at 20:34

## 2025-04-11 RX ADMIN — MEMANTINE 10 MG: 10 TABLET ORAL at 20:34

## 2025-04-11 RX ADMIN — DIAZEPAM 7.5 MG: 5 TABLET ORAL at 09:14

## 2025-04-11 RX ADMIN — OXYCODONE HYDROCHLORIDE 10 MG: 10 TABLET, FILM COATED, EXTENDED RELEASE ORAL at 09:23

## 2025-04-11 RX ADMIN — DIAZEPAM 5 MG: 10 TABLET ORAL at 20:31

## 2025-04-11 RX ADMIN — OXYCODONE HYDROCHLORIDE AND ACETAMINOPHEN 500 MG: 500 TABLET ORAL at 15:06

## 2025-04-11 RX ADMIN — ROSUVASTATIN CALCIUM 10 MG: 10 TABLET, FILM COATED ORAL at 20:39

## 2025-04-11 RX ADMIN — OXYCODONE HYDROCHLORIDE 10 MG: 10 TABLET ORAL at 03:24

## 2025-04-11 RX ADMIN — POTASSIUM CHLORIDE 40 MEQ: 1500 TABLET, EXTENDED RELEASE ORAL at 15:06

## 2025-04-11 RX ADMIN — OXYCODONE HYDROCHLORIDE 10 MG: 10 TABLET, FILM COATED, EXTENDED RELEASE ORAL at 20:31

## 2025-04-11 RX ADMIN — PROPRANOLOL HYDROCHLORIDE 120 MG: 120 CAPSULE, EXTENDED RELEASE ORAL at 20:34

## 2025-04-11 RX ADMIN — ARIPIPRAZOLE 10 MG: 10 TABLET ORAL at 09:14

## 2025-04-11 RX ADMIN — DEXAMETHASONE SODIUM PHOSPHATE 4 MG: 4 INJECTION, SOLUTION INTRA-ARTICULAR; INTRALESIONAL; INTRAMUSCULAR; INTRAVENOUS; SOFT TISSUE at 20:31

## 2025-04-11 RX ADMIN — FUROSEMIDE 40 MG: 40 TABLET ORAL at 09:15

## 2025-04-11 RX ADMIN — ACETAMINOPHEN 975 MG: 325 TABLET, FILM COATED ORAL at 04:56

## 2025-04-11 RX ADMIN — MEMANTINE 10 MG: 10 TABLET ORAL at 09:14

## 2025-04-11 RX ADMIN — PREGABALIN 200 MG: 25 CAPSULE ORAL at 09:23

## 2025-04-11 RX ADMIN — PANTOPRAZOLE SODIUM 40 MG: 40 TABLET, DELAYED RELEASE ORAL at 06:02

## 2025-04-11 RX ADMIN — Medication 2000 UNITS: at 20:32

## 2025-04-11 RX ADMIN — DEXAMETHASONE SODIUM PHOSPHATE 4 MG: 4 INJECTION, SOLUTION INTRA-ARTICULAR; INTRALESIONAL; INTRAMUSCULAR; INTRAVENOUS; SOFT TISSUE at 09:15

## 2025-04-11 RX ADMIN — PREGABALIN 200 MG: 25 CAPSULE ORAL at 20:31

## 2025-04-11 RX ADMIN — SERTRALINE 200 MG: 100 TABLET, FILM COATED ORAL at 20:31

## 2025-04-11 RX ADMIN — ACETAMINOPHEN 975 MG: 325 TABLET, FILM COATED ORAL at 13:22

## 2025-04-11 RX ADMIN — DEXAMETHASONE SODIUM PHOSPHATE 4 MG: 4 INJECTION, SOLUTION INTRA-ARTICULAR; INTRALESIONAL; INTRAMUSCULAR; INTRAVENOUS; SOFT TISSUE at 03:18

## 2025-04-11 RX ADMIN — LOPERAMIDE HYDROCHLORIDE 6 MG: 2 CAPSULE ORAL at 20:31

## 2025-04-11 ASSESSMENT — COGNITIVE AND FUNCTIONAL STATUS - GENERAL
EATING MEALS: A LITTLE
PERSONAL GROOMING: A LITTLE
TURNING FROM BACK TO SIDE WHILE IN FLAT BAD: TOTAL
CLIMB 3 TO 5 STEPS WITH RAILING: TOTAL
MOVING TO AND FROM BED TO CHAIR: TOTAL
DRESSING REGULAR UPPER BODY CLOTHING: A LOT
TOILETING: TOTAL
MOVING FROM LYING ON BACK TO SITTING ON SIDE OF FLAT BED WITH BEDRAILS: TOTAL
DAILY ACTIVITIY SCORE: 11
HELP NEEDED FOR BATHING: TOTAL
DRESSING REGULAR LOWER BODY CLOTHING: TOTAL
MOBILITY SCORE: 6
WALKING IN HOSPITAL ROOM: TOTAL
STANDING UP FROM CHAIR USING ARMS: TOTAL

## 2025-04-11 ASSESSMENT — PAIN SCALES - GENERAL
PAINLEVEL_OUTOF10: 6
PAINLEVEL_OUTOF10: 7
PAINLEVEL_OUTOF10: 8
PAINLEVEL_OUTOF10: 0 - NO PAIN
PAINLEVEL_OUTOF10: 7

## 2025-04-11 ASSESSMENT — PAIN DESCRIPTION - ORIENTATION: ORIENTATION: POSTERIOR;LOWER

## 2025-04-11 ASSESSMENT — PAIN DESCRIPTION - LOCATION
LOCATION: NECK

## 2025-04-11 ASSESSMENT — PAIN SCALES - PAIN ASSESSMENT IN ADVANCED DEMENTIA (PAINAD): TOTALSCORE: MEDICATION (SEE MAR)

## 2025-04-11 NOTE — SIGNIFICANT EVENT
Imaging reviewed with NSGY team and significant for diffuse small metastatic lesions to the brain. Lesions are non-operative. Would recommend radiation oncology consult for further management.    No acute neurosurgical intervention or additional neuroimaging needed at this time. Will defer rest of treatment to primary team. Patient does not need neurosurgical follow up. Thank you for allowing us to participate in the care of this patient. Will sign off at this time. Please page with any questions or concerns.    Travon Patterson MD  Department of Neurosurgery  Page: [phone]

## 2025-04-11 NOTE — PROGRESS NOTES
Nutrition Note:   Nutrition Assessment    The patient is a 60 y.o. male who is hospital day #5.  Pt admitted for management of hemorrhagic brain mets w/ vasogenic edema. On high-dose dexamethasone for vasogenic edema.     PMHx: metastatic melanoma (brain, lung, spine, and adrenal) s/p dual immunotherapy with nivulumab and ipilumab x 2 cycles (last dose Jan 2025) and palliative WBRT (Dec 2024) s/p left shoulder resection (2019)    Reason for Assessment: Dietitian discretion   >> No MST score for this admission.     Wt Readings from Last 30 Encounters:   04/06/25 126 kg (277 lb 12.5 oz)   04/06/25 126 kg (278 lb)   03/12/25 123 kg (272 lb)   03/03/25 129 kg (283 lb 12.8 oz)   01/29/25 125 kg (275 lb 5.7 oz)   01/27/25 119 kg (262 lb 5.6 oz)   11/26/24 117 kg (257 lb 15 oz)   11/24/24 117 kg (258 lb)   06/18/24 123 kg (272 lb 1.6 oz)   06/17/24 120 kg (265 lb)       Food and Nutrient History: No MST screening done this admission. Wt hx reviewed -- stable wt x10 months. PO intake in flowsheets documented as % of meals. Health touch shows pt receiving 3 meals a day. Appears pt is meeting >75% of nutrient needs. No indication for a nutrition assessment or interventions. Please consult if clinical status changes.

## 2025-04-11 NOTE — CARE PLAN
The clinical goals for the shift include Patient will remain free of falls throughout this shift.    Problem: Pain - Adult  Goal: Verbalizes/displays adequate comfort level or baseline comfort level  Outcome: Progressing     Problem: Safety - Adult  Goal: Free from fall injury  Outcome: Progressing     Problem: Discharge Planning  Goal: Discharge to home or other facility with appropriate resources  Outcome: Progressing     Problem: Chronic Conditions and Co-morbidities  Goal: Patient's chronic conditions and co-morbidity symptoms are monitored and maintained or improved  Outcome: Progressing     Problem: Nutrition  Goal: Nutrient intake appropriate for maintaining nutritional needs  Outcome: Progressing     Problem: Fall/Injury  Goal: Not fall by end of shift  Outcome: Progressing  Goal: Be free from injury by end of the shift  Outcome: Progressing     Problem: Pain  Goal: Takes deep breaths with improved pain control throughout the shift  Outcome: Progressing  Goal: Turns in bed with improved pain control throughout the shift  Outcome: Progressing     Problem: Skin  Goal: Decreased wound size/increased tissue granulation at next dressing change  Outcome: Progressing  Goal: Participates in plan/prevention/treatment measures  Outcome: Progressing  Goal: Prevent/manage excess moisture  Outcome: Progressing  Goal: Prevent/minimize sheer/friction injuries  Outcome: Progressing  Goal: Promote/optimize nutrition  Outcome: Progressing  Goal: Promote skin healing  Outcome: Progressing     LANCE TAYLOR RN

## 2025-04-11 NOTE — PROGRESS NOTES
Manuel Marrufo is a 60 y.o. male on day 5 of admission presenting with Metastatic melanoma (Multi).      Subjective   No acute events overnight. Denies new or worsening complaints. Patient's brother, Mason, at bedside during rounds. Patient reports willingness to go to a skilled nursing facility when he leaves the hospital.       Objective   Last Recorded Vitals  /70 (BP Location: Left arm, Patient Position: Lying)   Pulse 61   Temp 36 °C (96.8 °F) (Temporal)   Resp 16   Wt 126 kg (277 lb 12.5 oz)   SpO2 95%     Intake/Output last 3 Shifts:  Intake/Output Summary (Last 24 hours) at 4/11/2025 0741  Last data filed at 4/11/2025 0326  Gross per 24 hour   Intake 221 ml   Output 900 ml   Net -679 ml     Admission Weight  Weight: 126 kg (277 lb 12.5 oz) (04/06/25 2100)    Daily Weight  04/06/25 : 126 kg (277 lb 12.5 oz)    Physical Exam   GEN: In no acute distress; cushingoid habitus  HEENT: Mucous membranes moist; eyes, ears, and nose without exudates; no scleral icterus  CV: Regular rate and rhythm; no murmurs, rubs, or gallops  RESP: Clear to auscultation bilaterally; no accessory muscle use; normal work of breathing; NC in place  ABD: Soft, non-tender; bowel sounds present  EXT: Radial pulses strong bilaterally; no peripheral edema  SKIN: Warm and dry; no visible rashes  NEURO: A&O x 4, moves all four extremities against gravity, sensation to light touch intact in all four extremities    Relevant Results  Scheduled medications  acetaminophen, 975 mg, oral, q8h  ARIPiprazole, 10 mg, oral, Daily  cholecalciferol, 2,000 Units, oral, Daily  dapsone, 100 mg, oral, Daily  dexAMETHasone, 4 mg, intravenous, q6h  diazePAM, 7.5 mg, oral, Daily   And  diazePAM, 5 mg, oral, q PM  [Held by provider] enoxaparin, 40 mg, subcutaneous, q12h TING  furosemide, 40 mg, oral, Daily  insulin lispro, 0-5 Units, subcutaneous, TID AC  lidocaine, 1 patch, transdermal, Daily  loperamide, 6 mg, oral, Daily  memantine, 10 mg, oral,  BID  oxyCODONE ER, 10 mg, oral, q12h TING  pantoprazole, 40 mg, oral, Daily before breakfast  polyethylene glycol, 17 g, oral, Daily  pregabalin, 200 mg, oral, BID  propranolol LA, 120 mg, oral, BID  rosuvastatin, 10 mg, oral, Daily  sennosides-docusate sodium, 1 tablet, oral, BID  sertraline, 200 mg, oral, Daily    Continuous medications     PRN medications  PRN medications: albuterol, dextrose, dextrose, glucagon, glucagon, guaiFENesin, HYDROmorphone, naloxone, ondansetron ODT **OR** ondansetron, oxyCODONE    Results from last 72 hours   Lab Units 04/10/25  0725 04/09/25  0702   WBC AUTO x10*3/uL 7.4 8.3   HEMOGLOBIN g/dL 8.5* 8.6*   PLATELETS AUTO x10*3/uL 289 291   SODIUM mmol/L 140 139   POTASSIUM mmol/L 4.2 4.1   CO2 mmol/L 24 23   BUN mg/dL 27* 29*   MAGNESIUM mg/dL 2.26 2.27   GLUCOSE mg/dL 101* 106*      CXR (4/6/2025)  No radiographic evidence of an acute cardiopulmonary process.    CTH w/o contrast (4/6/2025)  Increased size of now 1.7 cm right parietal metastatic lesion with increased adjacent vasogenic edema when compared to 03/03/2025 CT.  Superimposed intralesional hemorrhage can not be excluded. No acute intracranial hemorrhage or new mass effect otherwise. Further evaluation with MRI may be warranted.  Multiple known intracranial metastatic lesions with vasogenic edema better visualized on the prior 11/26/2024 MRI    CT PE (4/6/2025)  No acute pulmonary embolism to the segmental level.  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 The differential diagnosis includes multifocal pneumonia, felt to be less likely. Given patient's presentation of shortness of breath, this could be grade 2 or grade 3 pneumonitis. Follow-up CT chest is recommended in 4-6  weeks.  Stable left adrenal nodule, which is likely metastatic.  Unchanged indeterminate sclerotic lesions in the T10 vertebral body and right superior acetabulum, suspicious for metastasis. Confirmation with nonemergent PET-CT is recommended.  Patient's known pulmonary nodules are obscured by the above described pneumonitis. Tumor burden in this area can be  reassessed on follow-up evaluation.   Otherwise, no definite new site of disease is evident.    MRI brain tumor perfusion protocol w/wo contrast (4/10/2025)  Too numerous to count intra-axial foci of contrast enhancement involving both the supratentorial and infratentorial compartments.  Dominant lesions in the left frontal operculum and right parietal lobe selected for perfusion and permeability assessment. The lesion in the left frontal operculum reveals a corrected rBV ratio of 1.89, which is borderline increased. The lesion in the right parietal lobe demonstrates abnormal enhancement kinetics and 0 rBV ratio, likely artifactual.  Areas with some waxing and and others with waning burden of white matter FLAIR signal hyperintensity also observed.   The constellation of findings are suspicious for mixed response to therapy as well as areas of disease progression.   No MR evidence of acute intracranial infarct, hemorrhage, or mass effect.       Assessment/Plan   Manuel Marrufo is a 60-year-old male with past medical history significant for metastatic melanoma (brain, lung, spine, and adrenal) s/p dual immunotherapy with nivulumab and ipilumab x 2 cycles (last dose Jan 2025) and palliative WBRT (Dec 2024) s/p left shoulder resection (2019), spinal stenosis s/p L4-S1 lumbar decompression (2021), HTN, HLD, MASH, obesity, FRANK on CPAP, PTSD, GERD, COPD, and depression s/p vagal nerve stimulator (SA x 2, 2007) admitted for management of hemorrhagic brain metastases with vasogenic edema on 4/6. Neurosurgery consulted for recommendations regarding metastatic brain  lesions. Radiation Oncology and Supportive Oncology consulted for same. MRI brain showed diffuse metastatic burden. Neurosurgery does not recommend surgical intervention nor follow-up at this time. On high-dose dexamethasone for vasogenic edema, decreased from 4 mg IV Q6H decreased to 4 mg IV Q12H on 4/11. On arrival, patient required O2 via NC. CT PE showed possible pneumonia vs pneumonitis. Finished levofloxacin to complete 5-day course for possible pneumonia. Currently on room air.    Updates (4/11)   - Change home furosemide 40 mg PO daily to every other day  - Tumor board to discuss further following MRI brain results  - Neurosurgery signed off  - Decrease dexamethasone 4 mg Q6H IV to Q12H IV  - Give K 40 mEq PO  - Resume home iron supplement every other day w/ vitamin C  - De-escalate AM labs to every other day     #Metastatic Melanoma  #Brain Metastases, c/f intra-lesional hemorrhage and vasogenic edema  #Nausea, improved  #Fatigue  : : Increasing size of 1.7 cm right parietal lesion  : : Possible superimposed intralesional hemorrhage  : : S/p WBRT x 10 fractions (2024)  : : S/p two cycles of dual immunotherapy with nivulumab and ipilumab, last dose 1/10   : : [facility]ef[facility] (contacted at admission)  : : S/p dexamethasone load 10 mg IV  - Continue ondansetron PRN  - PT/OT  - Continue pregabalin 200 mg BID  - Continue dexamethasone 4 mg IV Q6H for vasogenic edema  - Neurosurgery consulted, appreciate recommendations (4/11)  Imaging... significant for diffuse small metastatic lesions to the brain. Lesions are non-operative. Would recommend Radiation Oncology consult for further management.  No acute neurosurgical intervention or additional neuroimaging needed at this time... defer rest of treatment to primary team... does not need neurosurgical follow up.  - Radiation Oncology consulted, appreciate recommendations (4/11)  No plans for addition RT at this time.  MRI perfusion will be  reviewed at [facility] tumor board, 4/16.  Please have the film [facility] as well.  - Supportive Oncology consulted, appreciate recommendations (4/9)  Start scheduled acetaminophen 975mg PO q8h  Start lidocaine 4% TD patch once daily   Start scheduled oxycodone ER (OxyContin) 10mg PO q12h  Discontinue oxycodone IR 5mg PO q6h PRN   Change oxycodone IR 10mg PO q3h PRN for moderate to severe pain  Change hydromorphone 0.5mg IV q3h PRN for breakthrough pain--please discontinue in anticipation for discharge  Start ondansetron 8mg PO/IV q8h PRN for n/v, first line  Start scheduled loperamide 6mg PO once daily   Start aripiprazole 10mg PO once daily  Start diazepam 7.5mg PO AM / 5mg PO PM  Start propanol LA 120mg PO BID  Start guaifenesin 12h tablet 600mg PO BID PRN for cough     #ICI Pneumonitis vs CAP  #COPD  : : Grade 2-3 pneumonitis on CT PE  : : Leukocytosis on admission  : : No O2 requirement at baseline  : : Procal 0.24  : : RSV/flu/COVID PCR negative  : : MRSA nares negative  : : Strep, Legionella urine antigens negative  : : S/p levofloxacin 500 mg daily (last dose 4/10, completed 5-day course)  - Continue dapsone for PJP ppx   - Continue DuoNebs Q6H  - Continue albuterol PRN    #FRANK  - Continue CPAP QHS     #MIGEL  : : At baseline Hgb ~10 on admission  - Resume home iron supplement every other day w/ vitamin C     #HLD  - Continue rosuvastatin 10 mg daily     #Anxiety  #PTSD  #MDD, treatment-refractory, s/p vagal nerve stimulator   - Continue sertraline 200 mg daily  - Continue memantine 10 mg BID    Dispo: Moderate intensity care pending MRI brain    F: Cautious, no documented echo  E: K >4, Mg >2  N: Regular, 2 L fluid restriction  A: PIV  O2: RA  DVT ppx: None -> C/f intra-lesional hemorrhage  GI ppx: None    Code Status: DNR/DNI  Surrogate Decision-Maker: Mason Be (Brother, [phone])       This patient was seen, discussed and examined with the attending, Dr. Quinonez,  who agrees with the management plan.    Jakob Dillard MD  PGY-1, Neurology

## 2025-04-11 NOTE — SIGNIFICANT EVENT
No plans for addition RT at this time.    MRI perfusion will be reviewed at CNS tumor board, 4/16.    Please have the film [facility], [facility] as well.

## 2025-04-11 NOTE — PROGRESS NOTES
04/11/25 1200   Discharge Planning   Living Arrangements Alone   Support Systems Family members   Type of Residence Private residence   Number of Stairs to Enter Residence 0   Number of Stairs Within Residence 10   Who is requesting discharge planning? Provider   Home or Post Acute Services Post acute facilities (Rehab/SNF/etc)   Type of Post Acute Facility Services Skilled nursing   Type of Home Care Services Home OT;Home PT   Expected Discharge Disposition SNF   Does the patient need discharge transport arranged? Yes   RoundTrip coordination needed? Yes   Has discharge transport been arranged? No   Financial Resource Strain   How hard is it for you to pay for the very basics like food, housing, medical care, and heating? Not hard     04/11/2025: CARMELITA spoke with patient at bedside regarding plan of care. Patient states he would like to receive PT/OT in a facility close to home. [facility]. CARMELITA left a VMM on an identified voicemail for [facility]  [facility]. TCC will continue to follow for any additional needs. Shannon MAE     04/11/2025  1442: CARMELITA spoke with Joi([phone] ext: [phone]) at [facility] regarding [facility] placement. Joi states prior to this admission, patient was Hospice certified and was receiving [facility]. Patient would need approval through the [facility] for benefits for SNF facilities in the community. Per Joi, patient would more than like be denied admission to [facility]. [facility] was given the  [facility] for [facility]. CARMELITA called and left a VMM with Neo Underwood ([phone]) for a return phone call. Gabby updated on current plan of care. Shannon MAE

## 2025-04-12 LAB
GLUCOSE BLD MANUAL STRIP-MCNC: 110 MG/DL (ref 74–99)
GLUCOSE BLD MANUAL STRIP-MCNC: 113 MG/DL (ref 74–99)
GLUCOSE BLD MANUAL STRIP-MCNC: 137 MG/DL (ref 74–99)
GLUCOSE BLD MANUAL STRIP-MCNC: 167 MG/DL (ref 74–99)

## 2025-04-12 PROCEDURE — 82947 ASSAY GLUCOSE BLOOD QUANT: CPT

## 2025-04-12 PROCEDURE — 2500000001 HC RX 250 WO HCPCS SELF ADMINISTERED DRUGS (ALT 637 FOR MEDICARE OP)

## 2025-04-12 PROCEDURE — 2500000002 HC RX 250 W HCPCS SELF ADMINISTERED DRUGS (ALT 637 FOR MEDICARE OP, ALT 636 FOR OP/ED)

## 2025-04-12 PROCEDURE — 2500000004 HC RX 250 GENERAL PHARMACY W/ HCPCS (ALT 636 FOR OP/ED)

## 2025-04-12 PROCEDURE — 1170000001 HC PRIVATE ONCOLOGY ROOM DAILY

## 2025-04-12 PROCEDURE — 99232 SBSQ HOSP IP/OBS MODERATE 35: CPT | Performed by: STUDENT IN AN ORGANIZED HEALTH CARE EDUCATION/TRAINING PROGRAM

## 2025-04-12 RX ADMIN — PREGABALIN 200 MG: 25 CAPSULE ORAL at 09:27

## 2025-04-12 RX ADMIN — PREGABALIN 200 MG: 25 CAPSULE ORAL at 20:28

## 2025-04-12 RX ADMIN — ACETAMINOPHEN 975 MG: 325 TABLET, FILM COATED ORAL at 20:28

## 2025-04-12 RX ADMIN — Medication 2000 UNITS: at 20:28

## 2025-04-12 RX ADMIN — OXYCODONE HYDROCHLORIDE AND ACETAMINOPHEN 500 MG: 500 TABLET ORAL at 09:27

## 2025-04-12 RX ADMIN — DIAZEPAM 5 MG: 10 TABLET ORAL at 20:29

## 2025-04-12 RX ADMIN — OXYCODONE HYDROCHLORIDE 10 MG: 10 TABLET, FILM COATED, EXTENDED RELEASE ORAL at 09:27

## 2025-04-12 RX ADMIN — DEXAMETHASONE SODIUM PHOSPHATE 4 MG: 4 INJECTION, SOLUTION INTRA-ARTICULAR; INTRALESIONAL; INTRAMUSCULAR; INTRAVENOUS; SOFT TISSUE at 20:29

## 2025-04-12 RX ADMIN — PROPRANOLOL HYDROCHLORIDE 120 MG: 120 CAPSULE, EXTENDED RELEASE ORAL at 09:28

## 2025-04-12 RX ADMIN — ENOXAPARIN SODIUM 40 MG: 100 INJECTION SUBCUTANEOUS at 20:29

## 2025-04-12 RX ADMIN — DIAZEPAM 7.5 MG: 5 TABLET ORAL at 09:27

## 2025-04-12 RX ADMIN — DEXAMETHASONE SODIUM PHOSPHATE 4 MG: 4 INJECTION, SOLUTION INTRA-ARTICULAR; INTRALESIONAL; INTRAMUSCULAR; INTRAVENOUS; SOFT TISSUE at 09:27

## 2025-04-12 RX ADMIN — OXYCODONE HYDROCHLORIDE 10 MG: 10 TABLET, FILM COATED, EXTENDED RELEASE ORAL at 20:29

## 2025-04-12 RX ADMIN — LOPERAMIDE HYDROCHLORIDE 6 MG: 2 CAPSULE ORAL at 20:29

## 2025-04-12 RX ADMIN — ACETAMINOPHEN 975 MG: 325 TABLET, FILM COATED ORAL at 12:57

## 2025-04-12 RX ADMIN — PROPRANOLOL HYDROCHLORIDE 120 MG: 120 CAPSULE, EXTENDED RELEASE ORAL at 20:30

## 2025-04-12 RX ADMIN — PANTOPRAZOLE SODIUM 40 MG: 40 TABLET, DELAYED RELEASE ORAL at 06:26

## 2025-04-12 RX ADMIN — ARIPIPRAZOLE 10 MG: 10 TABLET ORAL at 09:28

## 2025-04-12 RX ADMIN — ACETAMINOPHEN 975 MG: 325 TABLET, FILM COATED ORAL at 05:13

## 2025-04-12 RX ADMIN — SERTRALINE 200 MG: 100 TABLET, FILM COATED ORAL at 20:29

## 2025-04-12 RX ADMIN — SENNOSIDES AND DOCUSATE SODIUM 1 TABLET: 50; 8.6 TABLET ORAL at 20:28

## 2025-04-12 RX ADMIN — MEMANTINE 10 MG: 10 TABLET ORAL at 20:29

## 2025-04-12 RX ADMIN — FERROUS SULFATE TAB 325 MG (65 MG ELEMENTAL FE) 325 MG: 325 (65 FE) TAB at 09:31

## 2025-04-12 RX ADMIN — MEMANTINE 10 MG: 10 TABLET ORAL at 09:28

## 2025-04-12 RX ADMIN — DAPSONE 100 MG: 100 TABLET ORAL at 20:30

## 2025-04-12 RX ADMIN — ROSUVASTATIN CALCIUM 10 MG: 10 TABLET, FILM COATED ORAL at 20:37

## 2025-04-12 ASSESSMENT — PAIN SCALES - GENERAL
PAINLEVEL_OUTOF10: 0 - NO PAIN
PAINLEVEL_OUTOF10: 0 - NO PAIN

## 2025-04-12 ASSESSMENT — PAIN - FUNCTIONAL ASSESSMENT: PAIN_FUNCTIONAL_ASSESSMENT: 0-10

## 2025-04-12 NOTE — PROGRESS NOTES
Manuel Marrufo is a 60 y.o. male on day 6 of admission presenting with Metastatic melanoma (Multi).    TCC spoke with team about weekend updates. Patient stated to team, that he was not aware that [facility] was previously [facility].  Per TCC notes from 4-11-25 a message was [facility] for [facility]  [facility] hours M-F unable to reach on weekend.     TCC will need to follow up on Monday. Continue to follow for SNF placement as per notes, patient would more than likely be denied admission to [facility].        Sonny Finn RN

## 2025-04-12 NOTE — CARE PLAN
The patient's goals for the shift include      The clinical goals for the shift include Patient will remain safe; free of injuries and falls throughout shift    Problem: Pain - Adult  Goal: Verbalizes/displays adequate comfort level or baseline comfort level  Outcome: Progressing     Problem: Safety - Adult  Goal: Free from fall injury  Outcome: Progressing     Problem: Discharge Planning  Goal: Discharge to home or other facility with appropriate resources  Outcome: Progressing     Problem: Chronic Conditions and Co-morbidities  Goal: Patient's chronic conditions and co-morbidity symptoms are monitored and maintained or improved  Outcome: Progressing     Problem: Nutrition  Goal: Nutrient intake appropriate for maintaining nutritional needs  Outcome: Progressing     Problem: Fall/Injury  Goal: Not fall by end of shift  Outcome: Progressing  Goal: Be free from injury by end of the shift  Outcome: Progressing  Goal: Verbalize understanding of personal risk factors for fall in the hospital  Outcome: Progressing  Goal: Verbalize understanding of risk factor reduction measures to prevent injury from fall in the home  Outcome: Progressing  Goal: Use assistive devices by end of the shift  Outcome: Progressing  Goal: Pace activities to prevent fatigue by end of the shift  Outcome: Progressing     Problem: Pain  Goal: Takes deep breaths with improved pain control throughout the shift  Outcome: Progressing  Goal: Turns in bed with improved pain control throughout the shift  Outcome: Progressing  Goal: Walks with improved pain control throughout the shift  Outcome: Progressing  Goal: Performs ADL's with improved pain control throughout shift  Outcome: Progressing  Goal: Participates in PT with improved pain control throughout the shift  Outcome: Progressing  Goal: Free from opioid side effects throughout the shift  Outcome: Progressing  Goal: Free from acute confusion related to pain meds throughout the shift  Outcome:  Progressing     Problem: Respiratory  Goal: Clear secretions with interventions this shift  Outcome: Progressing  Goal: Minimize anxiety/maximize coping throughout shift  Outcome: Progressing  Goal: Minimal/no exertional discomfort or dyspnea this shift  Outcome: Progressing  Goal: No signs of respiratory distress (eg. Use of accessory muscles. Peds grunting)  Outcome: Progressing  Goal: Patent airway maintained this shift  Outcome: Progressing  Goal: Tolerate mechanical ventilation evidenced by VS/agitation level this shift  Outcome: Progressing  Goal: Tolerate pulmonary toileting this shift  Outcome: Progressing  Goal: Verbalize decreased shortness of breath this shift  Outcome: Progressing  Goal: Wean oxygen to maintain O2 saturation per order/standard this shift  Outcome: Progressing  Goal: Increase self care and/or family involvement in next 24 hours  Outcome: Progressing     Problem: Skin  Goal: Decreased wound size/increased tissue granulation at next dressing change  Outcome: Progressing  Flowsheets (Taken 4/12/2025 1948)  Decreased wound size/increased tissue granulation at next dressing change: Promote sleep for wound healing  Goal: Participates in plan/prevention/treatment measures  Outcome: Progressing  Flowsheets (Taken 4/12/2025 1948)  Participates in plan/prevention/treatment measures: Discuss with provider PT/OT consult  Goal: Prevent/manage excess moisture  Outcome: Progressing  Flowsheets (Taken 4/12/2025 1514 by Fabiola Rondon, RN)  Prevent/manage excess moisture: Moisturize dry skin  Goal: Prevent/minimize sheer/friction injuries  Outcome: Progressing  Flowsheets (Taken 4/12/2025 1948)  Prevent/minimize sheer/friction injuries: Increase activity/out of bed for meals  Goal: Promote/optimize nutrition  Outcome: Progressing  Flowsheets (Taken 4/12/2025 1948)  Promote/optimize nutrition: Monitor/record intake including meals  Goal: Promote skin healing  Outcome: Progressing  Flowsheets (Taken  4/12/2025 1948)  Promote skin healing: Turn/reposition every 2 hours/use positioning/transfer devices

## 2025-04-12 NOTE — CARE PLAN
Problem: Pain - Adult  Goal: Verbalizes/displays adequate comfort level or baseline comfort level  Outcome: Progressing     Problem: Safety - Adult  Goal: Free from fall injury  Outcome: Progressing     Problem: Discharge Planning  Goal: Discharge to home or other facility with appropriate resources  Outcome: Progressing     Problem: Chronic Conditions and Co-morbidities  Goal: Patient's chronic conditions and co-morbidity symptoms are monitored and maintained or improved  Outcome: Progressing     Problem: Nutrition  Goal: Nutrient intake appropriate for maintaining nutritional needs  Outcome: Progressing     Problem: Fall/Injury  Goal: Not fall by end of shift  Outcome: Progressing  Goal: Be free from injury by end of the shift  Outcome: Progressing  Goal: Verbalize understanding of personal risk factors for fall in the hospital  Outcome: Progressing  Goal: Verbalize understanding of risk factor reduction measures to prevent injury from fall in the home  Outcome: Progressing  Goal: Use assistive devices by end of the shift  Outcome: Progressing  Goal: Pace activities to prevent fatigue by end of the shift  Outcome: Progressing     Problem: Pain  Goal: Takes deep breaths with improved pain control throughout the shift  Outcome: Progressing  Goal: Turns in bed with improved pain control throughout the shift  Outcome: Progressing  Goal: Walks with improved pain control throughout the shift  Outcome: Progressing  Goal: Performs ADL's with improved pain control throughout shift  Outcome: Progressing  Goal: Participates in PT with improved pain control throughout the shift  Outcome: Progressing  Goal: Free from opioid side effects throughout the shift  Outcome: Progressing  Goal: Free from acute confusion related to pain meds throughout the shift  Outcome: Progressing     Problem: Respiratory  Goal: Clear secretions with interventions this shift  Outcome: Progressing  Goal: Minimize anxiety/maximize coping throughout  shift  Outcome: Progressing  Goal: Minimal/no exertional discomfort or dyspnea this shift  Outcome: Progressing  Goal: No signs of respiratory distress (eg. Use of accessory muscles. Peds grunting)  Outcome: Progressing  Goal: Patent airway maintained this shift  Outcome: Progressing  Goal: Tolerate mechanical ventilation evidenced by VS/agitation level this shift  Outcome: Progressing  Goal: Tolerate pulmonary toileting this shift  Outcome: Progressing  Goal: Verbalize decreased shortness of breath this shift  Outcome: Progressing  Goal: Wean oxygen to maintain O2 saturation per order/standard this shift  Outcome: Progressing  Goal: Increase self care and/or family involvement in next 24 hours  Outcome: Progressing     Problem: Skin  Goal: Decreased wound size/increased tissue granulation at next dressing change  Outcome: Progressing  Flowsheets (Taken 4/12/2025 1514)  Decreased wound size/increased tissue granulation at next dressing change: Promote sleep for wound healing  Goal: Participates in plan/prevention/treatment measures  Outcome: Progressing  Flowsheets (Taken 4/12/2025 1514)  Participates in plan/prevention/treatment measures:   Elevate heels   Discuss with provider PT/OT consult  Goal: Prevent/manage excess moisture  Outcome: Progressing  Flowsheets (Taken 4/12/2025 1514)  Prevent/manage excess moisture: Moisturize dry skin  Goal: Prevent/minimize sheer/friction injuries  Outcome: Progressing  Flowsheets (Taken 4/12/2025 1514)  Prevent/minimize sheer/friction injuries:   Use pull sheet   HOB 30 degrees or less   Complete micro-shifts as needed if patient unable. Adjust patient position to relieve pressure points, not a full turn  Goal: Promote/optimize nutrition  Outcome: Progressing  Flowsheets (Taken 4/12/2025 1514)  Promote/optimize nutrition: Monitor/record intake including meals  Goal: Promote skin healing  Outcome: Progressing  Flowsheets (Taken 4/12/2025 1514)  Promote skin healing: Assess  skin/pad under line(s)/device(s)       The clinical goals for the shift include Patient will remain safe; free of injuries and falls throughout shift

## 2025-04-12 NOTE — PROGRESS NOTES
Manuel Marrufo is a 60 y.o. male on day 6 of admission presenting with Metastatic melanoma (Multi).      Subjective   Mr. Marrufo was seen and examined.  Reports neck pain and headache, similar compared to before.  ROS otherwise is non pertinent.         Objective     Last Recorded Vitals  /83 (BP Location: Left arm, Patient Position: Lying)   Pulse 67   Temp 36.3 °C (97.3 °F) (Temporal)   Resp 18   Wt 126 kg (277 lb 12.5 oz)   SpO2 95%   Intake/Output last 3 Shifts:  No intake or output data in the 24 hours ending 04/12/25 0814    Admission Weight  Weight: 126 kg (277 lb 12.5 oz) (04/06/25 2100)    Daily Weight  04/06/25 : 126 kg (277 lb 12.5 oz)    Image Results  MR brain tumor perfusion protocol w and wo IV contrast  Narrative: Interpreted By:  Romel Myers and Hooper Grayson   STUDY:  MR BRAIN TUMOR PERFUSION PROTOCOL W AND WO IV CONTRAST;  4/10/2025  5:04 pm      INDICATION:  Signs/Symptoms:brain mets..  Stroke protocol.              Diffuse intracranial metastatic disease status post whole-brain  radiotherapy approximately 3 months ago.      COMPARISON:  CT of the head obtained March 3rd 2025  MRI of the brain obtained November 26, 2024      ACCESSION NUMBER(S):  [id number]      ORDERING CLINICIAN:  VERO SALEEM      TECHNIQUE:  MRI of the brain was obtained on a 1.5 alisa magnet before and after  the fractionated administration of 38 mL Dotarem gadolinium contrast  agent. Routine imaging includes axial diffusion-weighted imaging with  ADC map, axial T2 with fat saturation, axial precontrast T1, axial  gradient sequencing, 2D postcontrast T1 sequencing, and postcontrast  multiplanar volumetric T1 sequencing. MR perfusion imaging was  obtained after the placement of regions of interest in the immediate  vicinity of suspicious contrast-enhancing masses with contralateral  left-sided normal white matter acting as control. Perfusion and  permeability maps as well as rBV corrected ratios were  provided.      Note that this examination is limited for MR perfusion secondary to  truncation artifact.      FINDINGS:  Diffuse bilateral subcortical and periventricular DWI signal  hyperintensity is observed, which corresponds to T2 shine through on  ADC mapping. There is no focal or territorial restricted diffusion to  suggest acute intracranial vascular infarct.      There are many intra-axial punctate and rounded gradient signal  hypointensities scattered throughout the bilateral supratentorial  hemispheres. Rounded gradient signal hypointensity observed within  the midline cerebellar vermis, paramedian left cerebellar hemisphere,  and lateral left cerebellar hemisphere. Many of these gradient signal  hypointensities demonstrate concomitant postcontrast enhancement.  However, there is an additional significant burden of rounded,  punctate, and irregular foci of intra-axial contrast enhancement.      Many of these enhancing masses are decreased in size when compared to  the previous examination, for example the left middle frontal gyrus  lesion (axial postcontrast T1 volumetric series 16, image 54), and  the right middle frontal gyrus lesion (axial volumetric T1  postcontrast series 16, image 84).      However, some of the lesions demonstrate interval growth and  increased conspicuity of enhancement, for example the right parietal  enhancing mass now measuring 12 mm versus 10 mm on the comparison  examination (axial T1 volumetric postcontrast series 16, image 98)  and the mass observed in the vicinity of the right basal frontal  lobe/orbital frontal gyrus now measuring 9 mm versus 4 mm on the  comparison examination (axial T1 postcontrast series 16, image 132).      [employer] and conspicuity of many enhancing masses has improved based on  assessment of axial 2D T1 fat saturated postcontrast imaging (series  20).      No central mass effect. No midline shift. Stable symmetric prominence  of the ventricles observed.  There is a substantial burden of white  matter FLAIR signal hyperintensity observed throughout the bilateral  supratentorial cerebral hemispheres, most significant in the vicinity  of the basal right frontal lobe and the right temporal lobe. FLAIR  signal hyperintensity in the basal right frontal lobe has increased  since the comparison exam. FLAIR signal hyperintensity in the right  temporal lobe has increased since the comparison exam. Additional  FLAIR signal hyperintensity is observed in the left cerebellar  hemisphere appears improved from the comparison exam.      T2 vascular flow voids appear intact. No suspicious dural venous  sinus filling defects.      Trace bilateral mastoid effusions. Moderate T2 signal hyperintensity  observed within the bilateral maxillary sinuses and ethmoid air cells.      MR PERFUSION:      Examination is somewhat constrained secondary to truncation artifact  and small size of lesions. Control white matter selected as the left  corona radiata. Selection of the dominant right parietal lobe and  left frontal operculum foci of contrast enhancement regions of  interest yielded rBV corrected values of 0 and 1.89 respectively.      Perfusion and permeability mapping reveals somewhat suspicious  kinetics of region of interest 3 over the left frontal operculum.      Impression: 1. Too numerous to count intra-axial foci of contrast enhancement  involving both the supratentorial and infratentorial compartments.  2. Dominant lesions in the left frontal operculum and right parietal  lobe selected for perfusion and permeability assessment. The lesion  in the left frontal operculum reveals a corrected rBV ratio of 1.89,  which is borderline increased. The lesion in the right parietal lobe  demonstrates abnormal enhancement kinetics and 0 rBV ratio, likely  artifactual.  3. Areas with some waxing and and others with waning burden of white  matter FLAIR signal hyperintensity also observed.  4. The  constellation of findings are suspicious for mixed response to  therapy as well as areas of disease progression.  5. No MR evidence of acute intracranial infarct, hemorrhage, or mass  effect.      I personally reviewed the images/study and I agree with the findings  as stated. This study was interpreted at [facility], [place], [place].      MACRO:  None      Signed by: Romel Myers 4/11/2025 1:58 PM  Dictation workstation:   [id number]      Physical Exam  GEN: In no acute distress; cushingoid habitus  HEENT: Mucous membranes moist; eyes, ears, and nose without exudates; no scleral icterus  CV: Regular rate and rhythm; no murmurs, rubs, or gallops  RESP: Clear to auscultation bilaterally; no accessory muscle use; normal work of breathing; NC in place  ABD: Soft, non-tender; bowel sounds present  EXT: Radial pulses strong bilaterally; no peripheral edema  SKIN: Warm and dry; no visible rashes  NEURO: A&O x 4, moves all four extremities against gravity, sensation to light touch intact in all four extremities    No labs today    Assessment/Plan      Manuel Marrufo is a 60-year-old male with past medical history significant for metastatic melanoma (brain, lung, spine, and adrenal) s/p dual immunotherapy with nivulumab and ipilumab x 2 cycles (last dose Jan 2025) and palliative WBRT (Dec 2024) s/p left shoulder resection (2019), spinal stenosis s/p L4-S1 lumbar decompression (2021), HTN, HLD, MASH, obesity, FRANK on CPAP, PTSD, GERD, COPD, and depression s/p vagal nerve stimulator (SA x 2, 2007) admitted for management of hemorrhagic brain metastases with vasogenic edema on 4/6. Neurosurgery consulted for recommendations regarding metastatic brain lesions. Radiation Oncology and Supportive Oncology consulted for same. MRI brain showed diffuse metastatic burden. Neurosurgery does not recommend surgical intervention nor follow-up at this time. On high-dose dexamethasone for vasogenic  edema, decreased from 4 mg IV Q6H decreased to 4 mg IV Q12H on 4/11. On arrival, patient required O2 via NC. CT PE showed possible pneumonia vs pneumonitis. Finished levofloxacin to complete 5-day course for possible pneumonia. Currently on room air.     Updates (4/12)   - Tumor board to discuss further following MRI brain results  - Decreased dexamethasone 4 mg Q6H IV to Q12H IV yesterday  -Proph lovenox resumed       #Metastatic Melanoma  #Brain Metastases, c/f intra-lesional hemorrhage and vasogenic edema  #Nausea, improved  #Fatigue  : : Increasing size of 1.7 cm right parietal lesion  : : Possible superimposed intralesional hemorrhage  : : S/p WBRT x 10 fractions (2024)  : : S/p two cycles of dual immunotherapy with nivulumab and ipilumab, last dose 1/10   : : [facility]: Dr. Schaefer (contacted at admission)  : : S/p dexamethasone load 10 mg IV  - Continue ondansetron PRN  - PT/OT  - Continue pregabalin 200 mg BID  - Continue dexamethasone 4 mg IV Q6H for vasogenic edema  - Neurosurgery consulted, appreciate recommendations (4/11)  Imaging... significant for diffuse small metastatic lesions to the brain. Lesions are non-operative. Would recommend Radiation Oncology consult for further management.  No acute neurosurgical intervention or additional neuroimaging needed at this time... defer rest of treatment to primary team... does not need neurosurgical follow up.  - Radiation Oncology consulted, appreciate recommendations (4/11)  No plans for addition RT at this time.  MRI perfusion will be reviewed at [facility] tumor board, 4/16.  Please have the film library [facility], for review as well.  - Supportive Oncology consulted, appreciate recommendations (4/9)  Start scheduled acetaminophen 975mg PO q8h  Start lidocaine 4% TD patch once daily   Start scheduled oxycodone ER (OxyContin) 10mg PO q12h  Discontinue oxycodone IR 5mg PO q6h PRN   Change oxycodone IR 10mg PO q3h PRN for  Home moderate to severe pain  Change hydromorphone 0.5mg IV q3h PRN for breakthrough pain--please discontinue in anticipation for discharge  Start ondansetron 8mg PO/IV q8h PRN for n/v, first line  Start scheduled loperamide 6mg PO once daily   Start aripiprazole 10mg PO once daily  Start diazepam 7.5mg PO AM / 5mg PO PM  Start propanol LA 120mg PO BID  Start guaifenesin 12h tablet 600mg PO BID PRN for cough      #ICI Pneumonitis vs CAP  #COPD  : : Grade 2-3 pneumonitis on CT PE  : : Leukocytosis on admission  : : No O2 requirement at baseline  : : Procal 0.24  : : RSV/flu/COVID PCR negative  : : MRSA nares negative  : : Strep, Legionella urine antigens negative  : : S/p levofloxacin 500 mg daily (last dose 4/10, completed 5-day course)  - Continue dapsone for PJP ppx   - Continue DuoNebs Q6H  - Continue albuterol PRN     #FRANK  - Continue CPAP QHS     #MIGEL  : : At baseline Hgb ~10 on admission  - Resume home iron supplement every other day w/ vitamin C     #HLD  - Continue rosuvastatin 10 mg daily     #Anxiety  #PTSD  #MDD, treatment-refractory, s/p vagal nerve stimulator   - Continue sertraline 200 mg daily  - Continue memantine 10 mg BID     Dispo: Moderate intensity care pending MRI brain     F: Cautious, no documented echo  E: K >4, Mg >2  N: Regular, 2 L fluid restriction  A: PIV  O2: RA  DVT ppx: None -> C/f intra-lesional hemorrhage  GI ppx: None     Code Status: DNR/DNI  Surrogate Decision-Maker: Mason Be (Brother, [phone])        This patient was seen, discussed and examined with the attending, Dr. Quinonez, who agrees with the management plan.    Damien Amos MD  PGY-II  Department of IM

## 2025-04-13 VITALS
OXYGEN SATURATION: 95 % | BODY MASS INDEX: 42.1 KG/M2 | WEIGHT: 277.78 LBS | HEART RATE: 62 BPM | SYSTOLIC BLOOD PRESSURE: 103 MMHG | DIASTOLIC BLOOD PRESSURE: 70 MMHG | RESPIRATION RATE: 16 BRPM | TEMPERATURE: 97.7 F | HEIGHT: 68 IN

## 2025-04-13 LAB
ALBUMIN SERPL BCP-MCNC: 3.5 G/DL (ref 3.4–5)
ANION GAP SERPL CALC-SCNC: 15 MMOL/L (ref 10–20)
BASOPHILS # BLD MANUAL: 0 X10*3/UL (ref 0–0.1)
BASOPHILS NFR BLD MANUAL: 0 %
BLASTS # BLD MANUAL: 0 X10*3/UL
BLASTS NFR BLD MANUAL: 0 %
BUN SERPL-MCNC: 35 MG/DL (ref 6–23)
CALCIUM SERPL-MCNC: 8.9 MG/DL (ref 8.6–10.6)
CHLORIDE SERPL-SCNC: 105 MMOL/L (ref 98–107)
CO2 SERPL-SCNC: 24 MMOL/L (ref 21–32)
CREAT SERPL-MCNC: 1.04 MG/DL (ref 0.5–1.3)
EGFRCR SERPLBLD CKD-EPI 2021: 83 ML/MIN/1.73M*2
EOSINOPHIL # BLD MANUAL: 0 X10*3/UL (ref 0–0.7)
EOSINOPHIL NFR BLD MANUAL: 0 %
ERYTHROCYTE [DISTWIDTH] IN BLOOD BY AUTOMATED COUNT: 21.9 % (ref 11.5–14.5)
GLUCOSE BLD MANUAL STRIP-MCNC: 107 MG/DL (ref 74–99)
GLUCOSE BLD MANUAL STRIP-MCNC: 151 MG/DL (ref 74–99)
GLUCOSE BLD MANUAL STRIP-MCNC: 214 MG/DL (ref 74–99)
GLUCOSE BLD MANUAL STRIP-MCNC: 294 MG/DL (ref 74–99)
GLUCOSE SERPL-MCNC: 85 MG/DL (ref 74–99)
HCT VFR BLD AUTO: 34.3 % (ref 41–52)
HGB BLD-MCNC: 9.5 G/DL (ref 13.5–17.5)
IMM GRANULOCYTES # BLD AUTO: 1.27 X10*3/UL (ref 0–0.7)
IMM GRANULOCYTES NFR BLD AUTO: 11.6 % (ref 0–0.9)
LYMPHOCYTES # BLD MANUAL: 3.05 X10*3/UL (ref 1.2–4.8)
LYMPHOCYTES NFR BLD MANUAL: 28 %
MAGNESIUM SERPL-MCNC: 2.51 MG/DL (ref 1.6–2.4)
MCH RBC QN AUTO: 20.6 PG (ref 26–34)
MCHC RBC AUTO-ENTMCNC: 27.7 G/DL (ref 32–36)
MCV RBC AUTO: 74 FL (ref 80–100)
METAMYELOCYTES # BLD MANUAL: 0 X10*3/UL
METAMYELOCYTES NFR BLD MANUAL: 0 %
MONOCYTES # BLD MANUAL: 0.56 X10*3/UL (ref 0.1–1)
MONOCYTES NFR BLD MANUAL: 5.1 %
MYELOCYTES # BLD MANUAL: 0.19 X10*3/UL
MYELOCYTES NFR BLD MANUAL: 1.7 %
NEUTROPHILS # BLD MANUAL: 7.1 X10*3/UL (ref 1.2–7.7)
NEUTS BAND # BLD MANUAL: 0.27 X10*3/UL (ref 0–0.7)
NEUTS BAND NFR BLD MANUAL: 2.5 %
NEUTS SEG # BLD MANUAL: 6.83 X10*3/UL (ref 1.2–7)
NEUTS SEG NFR BLD MANUAL: 62.7 %
NRBC BLD MANUAL-RTO: 0 % (ref 0–0)
NRBC BLD-RTO: 0.3 /100 WBCS (ref 0–0)
OVALOCYTES BLD QL SMEAR: NORMAL
PHOSPHATE SERPL-MCNC: 4.5 MG/DL (ref 2.5–4.9)
PLASMA CELLS # BLD MANUAL: 0 X10*3/UL
PLASMA CELLS NFR BLD MANUAL: 0 %
PLATELET # BLD AUTO: 328 X10*3/UL (ref 150–450)
POTASSIUM SERPL-SCNC: 4.2 MMOL/L (ref 3.5–5.3)
PROMYELOCYTES # BLD MANUAL: 0 X10*3/UL
PROMYELOCYTES NFR BLD MANUAL: 0 %
RBC # BLD AUTO: 4.62 X10*6/UL (ref 4.5–5.9)
RBC MORPH BLD: NORMAL
SODIUM SERPL-SCNC: 140 MMOL/L (ref 136–145)
TOTAL CELLS COUNTED BLD: 118
VARIANT LYMPHS # BLD MANUAL: 0 X10*3/UL (ref 0–0.5)
VARIANT LYMPHS NFR BLD: 0 %
WBC # BLD AUTO: 10.9 X10*3/UL (ref 4.4–11.3)

## 2025-04-13 PROCEDURE — 2500000001 HC RX 250 WO HCPCS SELF ADMINISTERED DRUGS (ALT 637 FOR MEDICARE OP)

## 2025-04-13 PROCEDURE — 85027 COMPLETE CBC AUTOMATED: CPT

## 2025-04-13 PROCEDURE — 2500000002 HC RX 250 W HCPCS SELF ADMINISTERED DRUGS (ALT 637 FOR MEDICARE OP, ALT 636 FOR OP/ED)

## 2025-04-13 PROCEDURE — 84100 ASSAY OF PHOSPHORUS: CPT

## 2025-04-13 PROCEDURE — 36415 COLL VENOUS BLD VENIPUNCTURE: CPT

## 2025-04-13 PROCEDURE — 85007 BL SMEAR W/DIFF WBC COUNT: CPT

## 2025-04-13 PROCEDURE — 1170000001 HC PRIVATE ONCOLOGY ROOM DAILY

## 2025-04-13 PROCEDURE — 2500000004 HC RX 250 GENERAL PHARMACY W/ HCPCS (ALT 636 FOR OP/ED)

## 2025-04-13 PROCEDURE — 83735 ASSAY OF MAGNESIUM: CPT

## 2025-04-13 PROCEDURE — 99232 SBSQ HOSP IP/OBS MODERATE 35: CPT | Performed by: STUDENT IN AN ORGANIZED HEALTH CARE EDUCATION/TRAINING PROGRAM

## 2025-04-13 PROCEDURE — 82947 ASSAY GLUCOSE BLOOD QUANT: CPT

## 2025-04-13 RX ADMIN — ACETAMINOPHEN 975 MG: 325 TABLET, FILM COATED ORAL at 22:02

## 2025-04-13 RX ADMIN — INSULIN LISPRO 2 UNITS: 100 INJECTION, SOLUTION INTRAVENOUS; SUBCUTANEOUS at 16:10

## 2025-04-13 RX ADMIN — Medication 2000 UNITS: at 22:01

## 2025-04-13 RX ADMIN — PREGABALIN 200 MG: 25 CAPSULE ORAL at 22:02

## 2025-04-13 RX ADMIN — ROSUVASTATIN CALCIUM 10 MG: 10 TABLET, FILM COATED ORAL at 22:01

## 2025-04-13 RX ADMIN — OXYCODONE HYDROCHLORIDE AND ACETAMINOPHEN 500 MG: 500 TABLET ORAL at 09:43

## 2025-04-13 RX ADMIN — OXYCODONE HYDROCHLORIDE 10 MG: 10 TABLET, FILM COATED, EXTENDED RELEASE ORAL at 09:43

## 2025-04-13 RX ADMIN — PROPRANOLOL HYDROCHLORIDE 120 MG: 120 CAPSULE, EXTENDED RELEASE ORAL at 22:01

## 2025-04-13 RX ADMIN — DIAZEPAM 5 MG: 10 TABLET ORAL at 22:02

## 2025-04-13 RX ADMIN — ARIPIPRAZOLE 10 MG: 10 TABLET ORAL at 09:42

## 2025-04-13 RX ADMIN — OXYCODONE HYDROCHLORIDE 10 MG: 10 TABLET, FILM COATED, EXTENDED RELEASE ORAL at 22:02

## 2025-04-13 RX ADMIN — ACETAMINOPHEN 975 MG: 325 TABLET, FILM COATED ORAL at 04:39

## 2025-04-13 RX ADMIN — ENOXAPARIN SODIUM 40 MG: 100 INJECTION SUBCUTANEOUS at 09:43

## 2025-04-13 RX ADMIN — ENOXAPARIN SODIUM 40 MG: 100 INJECTION SUBCUTANEOUS at 22:03

## 2025-04-13 RX ADMIN — LOPERAMIDE HYDROCHLORIDE 6 MG: 2 CAPSULE ORAL at 22:01

## 2025-04-13 RX ADMIN — PANTOPRAZOLE SODIUM 40 MG: 40 TABLET, DELAYED RELEASE ORAL at 04:39

## 2025-04-13 RX ADMIN — ACETAMINOPHEN 975 MG: 325 TABLET, FILM COATED ORAL at 12:29

## 2025-04-13 RX ADMIN — DIAZEPAM 7.5 MG: 5 TABLET ORAL at 09:41

## 2025-04-13 RX ADMIN — DEXAMETHASONE SODIUM PHOSPHATE 4 MG: 4 INJECTION, SOLUTION INTRA-ARTICULAR; INTRALESIONAL; INTRAMUSCULAR; INTRAVENOUS; SOFT TISSUE at 22:01

## 2025-04-13 RX ADMIN — SENNOSIDES AND DOCUSATE SODIUM 1 TABLET: 50; 8.6 TABLET ORAL at 22:01

## 2025-04-13 RX ADMIN — FUROSEMIDE 40 MG: 40 TABLET ORAL at 09:43

## 2025-04-13 RX ADMIN — MEMANTINE 10 MG: 10 TABLET ORAL at 09:42

## 2025-04-13 RX ADMIN — SERTRALINE 200 MG: 100 TABLET, FILM COATED ORAL at 22:01

## 2025-04-13 RX ADMIN — PROPRANOLOL HYDROCHLORIDE 120 MG: 120 CAPSULE, EXTENDED RELEASE ORAL at 09:42

## 2025-04-13 RX ADMIN — DAPSONE 100 MG: 100 TABLET ORAL at 22:02

## 2025-04-13 RX ADMIN — DEXAMETHASONE SODIUM PHOSPHATE 4 MG: 4 INJECTION, SOLUTION INTRA-ARTICULAR; INTRALESIONAL; INTRAMUSCULAR; INTRAVENOUS; SOFT TISSUE at 09:41

## 2025-04-13 RX ADMIN — PREGABALIN 200 MG: 25 CAPSULE ORAL at 09:43

## 2025-04-13 RX ADMIN — MEMANTINE 10 MG: 10 TABLET ORAL at 22:03

## 2025-04-13 ASSESSMENT — PAIN SCALES - GENERAL
PAINLEVEL_OUTOF10: 0 - NO PAIN

## 2025-04-13 ASSESSMENT — COGNITIVE AND FUNCTIONAL STATUS - GENERAL
EATING MEALS: A LITTLE
STANDING UP FROM CHAIR USING ARMS: A LITTLE
MOVING TO AND FROM BED TO CHAIR: A LITTLE
WALKING IN HOSPITAL ROOM: A LITTLE
CLIMB 3 TO 5 STEPS WITH RAILING: A LITTLE
DAILY ACTIVITIY SCORE: 18
DRESSING REGULAR LOWER BODY CLOTHING: A LITTLE
TOILETING: A LITTLE
TURNING FROM BACK TO SIDE WHILE IN FLAT BAD: A LITTLE
MOVING FROM LYING ON BACK TO SITTING ON SIDE OF FLAT BED WITH BEDRAILS: A LITTLE
PERSONAL GROOMING: A LITTLE
HELP NEEDED FOR BATHING: A LITTLE
MOBILITY SCORE: 18
DRESSING REGULAR UPPER BODY CLOTHING: A LITTLE

## 2025-04-13 ASSESSMENT — PAIN - FUNCTIONAL ASSESSMENT
PAIN_FUNCTIONAL_ASSESSMENT: 0-10
PAIN_FUNCTIONAL_ASSESSMENT: 0-10

## 2025-04-13 NOTE — PROGRESS NOTES
Manuel Marrufo is a 60 y.o. male on day 7 of admission presenting with Metastatic melanoma (Multi).      Subjective   Mr. Marrufo was seen and examined.Reports neck pain, unchanged from prior.     Objective   Vitals:    04/13/25 0737   BP: 129/76   Pulse: 69   Resp: 18   Temp: 36.9 °C (98.4 °F)   SpO2: 96%     Physical Exam  GEN: In no acute distress; cushingoid habitus  HEENT: Mucous membranes moist; eyes, ears, and nose without exudates; no scleral icterus  CV: Regular rate and rhythm; no murmurs, rubs, or gallops  RESP: Clear to auscultation bilaterally; no accessory muscle use; normal work of breathing; NC in place  ABD: Soft, non-tender; bowel sounds present  EXT: Radial pulses strong bilaterally; no peripheral edema  SKIN: Warm and dry; no visible rashes  NEURO: A&O x 4, moves all four extremities against gravity, sensation to light touch intact in all four extremities    Results from last 7 days   Lab Units 04/13/25  0753 04/11/25  0806 04/10/25  0725   WBC AUTO x10*3/uL 10.9 7.5 7.4   HEMOGLOBIN g/dL 9.5* 9.8* 8.5*   HEMATOCRIT % 34.3* 34.5* 30.2*   PLATELETS AUTO x10*3/uL 328 336 289     Results from last 7 days   Lab Units 04/13/25  0753 04/11/25  0806 04/10/25  0725   SODIUM mmol/L 140 138 140   POTASSIUM mmol/L 4.2 3.6 4.2   CHLORIDE mmol/L 105 106 105   CO2 mmol/L 24 22 24   BUN mg/dL 35* 32* 27*   CREATININE mg/dL 1.04 1.15 1.01   EGFR mL/min/1.73m*2 83 73 86   GLUCOSE mg/dL 85 219* 101*   CALCIUM mg/dL 8.9 8.8 9.4   PHOSPHORUS mg/dL 4.5 4.4 4.3       Assessment/Plan      Manuel Marrufo is a 60-year-old male with past medical history significant for metastatic melanoma (brain, lung, spine, and adrenal) s/p dual immunotherapy with nivulumab and ipilumab x 2 cycles (last dose Jan 2025) and palliative WBRT (Dec 2024) s/p left shoulder resection (2019), spinal stenosis s/p L4-S1 lumbar decompression (2021), HTN, HLD, MASH, obesity, FRANK on CPAP, PTSD, GERD, COPD, and depression s/p vagal nerve stimulator (SA x 2,  2007) admitted for management of hemorrhagic brain metastases with vasogenic edema on 4/6. Neurosurgery consulted for recommendations regarding metastatic brain lesions. Radiation Oncology and Supportive Oncology consulted for same. MRI brain showed diffuse metastatic burden. Neurosurgery does not recommend surgical intervention nor follow-up at this time. On high-dose dexamethasone for vasogenic edema, decreased from 4 mg IV Q6H decreased to 4 mg IV Q12H on 4/11. On arrival, patient required O2 via NC. CT PE showed possible pneumonia vs pneumonitis. Finished levofloxacin to complete 5-day course for possible pneumonia. Currently on room air.     Updates (4/13):  - [facility]e [facility] and [facility]  - c/w dex 4 Q12       #Metastatic Melanoma  #Brain Metastases, c/f intra-lesional hemorrhage and vasogenic edema  #Nausea, improved  #Fatigue  : : Increasing size of 1.7 cm right parietal lesion  : : Possible superimposed intralesional hemorrhage  : : S/p WBRT x 10 fractions (2024)  : : S/p two cycles of dual immunotherapy with nivulumab and ipilumab, last dose 1/10   : : [facility]: Dr. Schaefer (contacted at admission)  : : S/p dexamethasone load 10 mg IV  - Continue ondansetron PRN  - PT/OT  - Continue pregabalin 200 mg BID  - Continue dexamethasone 4 mg IV Q6H for vasogenic edema  - Neurosurgery consulted, appreciate recommendations (4/11)  Imaging... significant for diffuse small metastatic lesions to the brain. Lesions are non-operative. Would recommend Radiation Oncology consult for further management.  No acute neurosurgical intervention or additional neuroimaging needed at this time... defer rest of treatment to primary team... does not need neurosurgical follow up.  - Radiation Oncology consulted, appreciate recommendations (4/11)  No plans for addition RT at this time.  MRI perfusion will be reviewed at CNS tumor board, 4/16.  Please have the film library push [facility], for review as well.  - Supportive Oncology consulted, appreciate recommendations (4/9)  Start scheduled acetaminophen 975mg PO q8h  Start lidocaine 4% TD patch once daily   Start scheduled oxycodone ER (OxyContin) 10mg PO q12h  Discontinue oxycodone IR 5mg PO q6h PRN   Change oxycodone IR 10mg PO q3h PRN for moderate to severe pain  Change hydromorphone 0.5mg IV q3h PRN for breakthrough pain--please discontinue in anticipation for discharge  Start ondansetron 8mg PO/IV q8h PRN for n/v, first line  Start scheduled loperamide 6mg PO once daily   Start aripiprazole 10mg PO once daily  Start diazepam 7.5mg PO AM / 5mg PO PM  Start propanol LA 120mg PO BID  Start guaifenesin 12h tablet 600mg PO BID PRN for cough      #ICI Pneumonitis vs CAP  #COPD  : : Grade 2-3 pneumonitis on CT PE  : : Leukocytosis on admission  : : No O2 requirement at baseline  : : Procal 0.24  : : RSV/flu/COVID PCR negative  : : MRSA nares negative  : : Strep, Legionella urine antigens negative  : : S/p levofloxacin 500 mg daily (last dose 4/10, completed 5-day course)  - Continue dapsone for PJP ppx   - Continue DuoNebs Q6H  - Continue albuterol PRN     #FRANK  - Continue CPAP QHS     #MIGEL  : : At baseline Hgb ~10 on admission  - Resume home iron supplement every other day w/ vitamin C     #HLD  - Continue rosuvastatin 10 mg daily     #Anxiety  #PTSD  #MDD, treatment-refractory, s/p vagal nerve stimulator   - Continue sertraline 200 mg daily  - Continue memantine 10 mg BID     Dispo: Moderate intensity care pending MRI brain     F: Cautious, no documented echo  E: K >4, Mg >2  N: Regular, 2 L fluid restriction  A: PIV  O2: RA  DVT ppx: None -> C/f intra-lesional hemorrhage  GI ppx: None     Code Status: DNR/DNI  Surrogate Decision-Maker: Mason Be (Brother, [phone])        This patient was seen, discussed and examined with the attending, Dr. Quinonez, who agrees with the management plan.    Татьяна Steinberg,  MD  PGY-II  Department of IM

## 2025-04-13 NOTE — CARE PLAN
The patient's goals for the shift include      The clinical goals for the shift include pt will remain free of injury    Problem: Pain - Adult  Goal: Verbalizes/displays adequate comfort level or baseline comfort level  Outcome: Progressing     Problem: Safety - Adult  Goal: Free from fall injury  Outcome: Progressing     Problem: Discharge Planning  Goal: Discharge to home or other facility with appropriate resources  Outcome: Progressing     Problem: Chronic Conditions and Co-morbidities  Goal: Patient's chronic conditions and co-morbidity symptoms are monitored and maintained or improved  Outcome: Progressing     Problem: Nutrition  Goal: Nutrient intake appropriate for maintaining nutritional needs  Outcome: Progressing     Problem: Fall/Injury  Goal: Not fall by end of shift  Outcome: Progressing  Goal: Be free from injury by end of the shift  Outcome: Progressing  Goal: Verbalize understanding of personal risk factors for fall in the hospital  Outcome: Progressing  Goal: Verbalize understanding of risk factor reduction measures to prevent injury from fall in the home  Outcome: Progressing  Goal: Use assistive devices by end of the shift  Outcome: Progressing  Goal: Pace activities to prevent fatigue by end of the shift  Outcome: Progressing     Problem: Pain  Goal: Takes deep breaths with improved pain control throughout the shift  Outcome: Progressing  Goal: Turns in bed with improved pain control throughout the shift  Outcome: Progressing  Goal: Walks with improved pain control throughout the shift  Outcome: Progressing  Goal: Performs ADL's with improved pain control throughout shift  Outcome: Progressing  Goal: Participates in PT with improved pain control throughout the shift  Outcome: Progressing  Goal: Free from opioid side effects throughout the shift  Outcome: Progressing  Goal: Free from acute confusion related to pain meds throughout the shift  Outcome: Progressing     Problem: Respiratory  Goal:  Clear secretions with interventions this shift  Outcome: Progressing  Goal: Minimize anxiety/maximize coping throughout shift  Outcome: Progressing  Goal: Minimal/no exertional discomfort or dyspnea this shift  Outcome: Progressing  Goal: No signs of respiratory distress (eg. Use of accessory muscles. Peds grunting)  Outcome: Progressing  Goal: Patent airway maintained this shift  Outcome: Progressing  Goal: Tolerate mechanical ventilation evidenced by VS/agitation level this shift  Outcome: Progressing  Goal: Tolerate pulmonary toileting this shift  Outcome: Progressing  Goal: Verbalize decreased shortness of breath this shift  Outcome: Progressing  Goal: Wean oxygen to maintain O2 saturation per order/standard this shift  Outcome: Progressing  Goal: Increase self care and/or family involvement in next 24 hours  Outcome: Progressing     Problem: Skin  Goal: Decreased wound size/increased tissue granulation at next dressing change  Outcome: Progressing  Flowsheets (Taken 4/13/2025 6026)  Decreased wound size/increased tissue granulation at next dressing change: Promote sleep for wound healing  Goal: Participates in plan/prevention/treatment measures  Outcome: Progressing  Goal: Prevent/manage excess moisture  Outcome: Progressing  Goal: Prevent/minimize sheer/friction injuries  Outcome: Progressing  Goal: Promote/optimize nutrition  Outcome: Progressing  Goal: Promote skin healing  Outcome: Progressing

## 2025-04-14 LAB
GLUCOSE BLD MANUAL STRIP-MCNC: 131 MG/DL (ref 74–99)
GLUCOSE BLD MANUAL STRIP-MCNC: 138 MG/DL (ref 74–99)
GLUCOSE BLD MANUAL STRIP-MCNC: 142 MG/DL (ref 74–99)
GLUCOSE BLD MANUAL STRIP-MCNC: 93 MG/DL (ref 74–99)

## 2025-04-14 PROCEDURE — 1170000001 HC PRIVATE ONCOLOGY ROOM DAILY

## 2025-04-14 PROCEDURE — 2500000002 HC RX 250 W HCPCS SELF ADMINISTERED DRUGS (ALT 637 FOR MEDICARE OP, ALT 636 FOR OP/ED)

## 2025-04-14 PROCEDURE — 2500000001 HC RX 250 WO HCPCS SELF ADMINISTERED DRUGS (ALT 637 FOR MEDICARE OP)

## 2025-04-14 PROCEDURE — 82947 ASSAY GLUCOSE BLOOD QUANT: CPT

## 2025-04-14 PROCEDURE — 97530 THERAPEUTIC ACTIVITIES: CPT | Mod: GP | Performed by: PHYSICAL THERAPIST

## 2025-04-14 PROCEDURE — 2500000004 HC RX 250 GENERAL PHARMACY W/ HCPCS (ALT 636 FOR OP/ED)

## 2025-04-14 PROCEDURE — 99233 SBSQ HOSP IP/OBS HIGH 50: CPT

## 2025-04-14 RX ORDER — DEXAMETHASONE 4 MG/1
4 TABLET ORAL EVERY 12 HOURS SCHEDULED
Status: DISCONTINUED | OUTPATIENT
Start: 2025-04-14 | End: 2025-04-16

## 2025-04-14 RX ADMIN — PROPRANOLOL HYDROCHLORIDE 120 MG: 120 CAPSULE, EXTENDED RELEASE ORAL at 20:59

## 2025-04-14 RX ADMIN — OXYCODONE HYDROCHLORIDE AND ACETAMINOPHEN 500 MG: 500 TABLET ORAL at 09:52

## 2025-04-14 RX ADMIN — ENOXAPARIN SODIUM 40 MG: 100 INJECTION SUBCUTANEOUS at 21:01

## 2025-04-14 RX ADMIN — OXYCODONE HYDROCHLORIDE 10 MG: 10 TABLET, FILM COATED, EXTENDED RELEASE ORAL at 09:50

## 2025-04-14 RX ADMIN — DIAZEPAM 7.5 MG: 5 TABLET ORAL at 09:50

## 2025-04-14 RX ADMIN — MEMANTINE 10 MG: 10 TABLET ORAL at 09:52

## 2025-04-14 RX ADMIN — PREGABALIN 200 MG: 25 CAPSULE ORAL at 09:50

## 2025-04-14 RX ADMIN — SERTRALINE 200 MG: 100 TABLET, FILM COATED ORAL at 21:00

## 2025-04-14 RX ADMIN — DEXAMETHASONE 4 MG: 4 TABLET ORAL at 09:56

## 2025-04-14 RX ADMIN — PROPRANOLOL HYDROCHLORIDE 120 MG: 120 CAPSULE, EXTENDED RELEASE ORAL at 09:52

## 2025-04-14 RX ADMIN — ACETAMINOPHEN 975 MG: 325 TABLET, FILM COATED ORAL at 07:58

## 2025-04-14 RX ADMIN — Medication 2000 UNITS: at 21:00

## 2025-04-14 RX ADMIN — DAPSONE 100 MG: 100 TABLET ORAL at 20:59

## 2025-04-14 RX ADMIN — ENOXAPARIN SODIUM 40 MG: 100 INJECTION SUBCUTANEOUS at 09:50

## 2025-04-14 RX ADMIN — OXYCODONE HYDROCHLORIDE 10 MG: 10 TABLET, FILM COATED, EXTENDED RELEASE ORAL at 21:00

## 2025-04-14 RX ADMIN — SENNOSIDES AND DOCUSATE SODIUM 1 TABLET: 50; 8.6 TABLET ORAL at 09:51

## 2025-04-14 RX ADMIN — PANTOPRAZOLE SODIUM 40 MG: 40 TABLET, DELAYED RELEASE ORAL at 07:58

## 2025-04-14 RX ADMIN — FERROUS SULFATE TAB 325 MG (65 MG ELEMENTAL FE) 325 MG: 325 (65 FE) TAB at 07:58

## 2025-04-14 RX ADMIN — DEXAMETHASONE 4 MG: 4 TABLET ORAL at 21:00

## 2025-04-14 RX ADMIN — ACETAMINOPHEN 975 MG: 325 TABLET, FILM COATED ORAL at 17:04

## 2025-04-14 RX ADMIN — OXYCODONE HYDROCHLORIDE 10 MG: 10 TABLET ORAL at 07:58

## 2025-04-14 RX ADMIN — SENNOSIDES AND DOCUSATE SODIUM 1 TABLET: 50; 8.6 TABLET ORAL at 21:00

## 2025-04-14 RX ADMIN — DIAZEPAM 5 MG: 10 TABLET ORAL at 20:59

## 2025-04-14 RX ADMIN — PREGABALIN 200 MG: 25 CAPSULE ORAL at 21:00

## 2025-04-14 RX ADMIN — ARIPIPRAZOLE 10 MG: 10 TABLET ORAL at 09:52

## 2025-04-14 RX ADMIN — MEMANTINE 10 MG: 10 TABLET ORAL at 21:00

## 2025-04-14 RX ADMIN — ROSUVASTATIN CALCIUM 10 MG: 10 TABLET, FILM COATED ORAL at 21:09

## 2025-04-14 ASSESSMENT — COGNITIVE AND FUNCTIONAL STATUS - GENERAL
MOVING FROM LYING ON BACK TO SITTING ON SIDE OF FLAT BED WITH BEDRAILS: A LOT
MOVING FROM LYING ON BACK TO SITTING ON SIDE OF FLAT BED WITH BEDRAILS: A LITTLE
CLIMB 3 TO 5 STEPS WITH RAILING: TOTAL
CLIMB 3 TO 5 STEPS WITH RAILING: TOTAL
DRESSING REGULAR UPPER BODY CLOTHING: A LOT
TURNING FROM BACK TO SIDE WHILE IN FLAT BAD: A LITTLE
STANDING UP FROM CHAIR USING ARMS: TOTAL
DAILY ACTIVITIY SCORE: 11
TOILETING: TOTAL
DRESSING REGULAR UPPER BODY CLOTHING: A LITTLE
WALKING IN HOSPITAL ROOM: TOTAL
EATING MEALS: A LITTLE
MOVING FROM LYING ON BACK TO SITTING ON SIDE OF FLAT BED WITH BEDRAILS: A LITTLE
CLIMB 3 TO 5 STEPS WITH RAILING: TOTAL
TURNING FROM BACK TO SIDE WHILE IN FLAT BAD: TOTAL
PERSONAL GROOMING: A LOT
HELP NEEDED FOR BATHING: A LOT
WALKING IN HOSPITAL ROOM: TOTAL
MOVING TO AND FROM BED TO CHAIR: TOTAL
MOVING TO AND FROM BED TO CHAIR: TOTAL
WALKING IN HOSPITAL ROOM: TOTAL
TURNING FROM BACK TO SIDE WHILE IN FLAT BAD: A LITTLE
DRESSING REGULAR LOWER BODY CLOTHING: TOTAL
TOILETING: TOTAL
STANDING UP FROM CHAIR USING ARMS: TOTAL
MOBILITY SCORE: 10
PERSONAL GROOMING: A LOT
EATING MEALS: A LITTLE
DRESSING REGULAR LOWER BODY CLOTHING: TOTAL
MOBILITY SCORE: 8
STANDING UP FROM CHAIR USING ARMS: A LOT
HELP NEEDED FOR BATHING: A LOT
DAILY ACTIVITIY SCORE: 12
MOVING TO AND FROM BED TO CHAIR: TOTAL
MOBILITY SCORE: 10

## 2025-04-14 ASSESSMENT — PAIN SCALES - WONG BAKER: WONGBAKER_NUMERICALRESPONSE: NO HURT

## 2025-04-14 ASSESSMENT — PAIN SCALES - GENERAL
PAINLEVEL_OUTOF10: 8
PAINLEVEL_OUTOF10: 0 - NO PAIN
PAINLEVEL_OUTOF10: 7
PAINLEVEL_OUTOF10: 0 - NO PAIN

## 2025-04-14 ASSESSMENT — PAIN - FUNCTIONAL ASSESSMENT: PAIN_FUNCTIONAL_ASSESSMENT: 0-10

## 2025-04-14 NOTE — CARE PLAN
The patient's goals for the shift include      The clinical goals for the shift include pt will remain injury free    Problem: Pain - Adult  Goal: Verbalizes/displays adequate comfort level or baseline comfort level  Outcome: Progressing     Problem: Safety - Adult  Goal: Free from fall injury  Outcome: Progressing     Problem: Discharge Planning  Goal: Discharge to home or other facility with appropriate resources  Outcome: Progressing     Problem: Chronic Conditions and Co-morbidities  Goal: Patient's chronic conditions and co-morbidity symptoms are monitored and maintained or improved  Outcome: Progressing     Problem: Nutrition  Goal: Nutrient intake appropriate for maintaining nutritional needs  Outcome: Progressing     Problem: Fall/Injury  Goal: Not fall by end of shift  Outcome: Progressing  Goal: Be free from injury by end of the shift  Outcome: Progressing  Goal: Verbalize understanding of personal risk factors for fall in the hospital  Outcome: Progressing  Goal: Verbalize understanding of risk factor reduction measures to prevent injury from fall in the home  Outcome: Progressing  Goal: Use assistive devices by end of the shift  Outcome: Progressing  Goal: Pace activities to prevent fatigue by end of the shift  Outcome: Progressing     Problem: Pain  Goal: Takes deep breaths with improved pain control throughout the shift  Outcome: Progressing  Goal: Turns in bed with improved pain control throughout the shift  Outcome: Progressing  Goal: Walks with improved pain control throughout the shift  Outcome: Progressing  Goal: Performs ADL's with improved pain control throughout shift  Outcome: Progressing  Goal: Participates in PT with improved pain control throughout the shift  Outcome: Progressing  Goal: Free from opioid side effects throughout the shift  Outcome: Progressing  Goal: Free from acute confusion related to pain meds throughout the shift  Outcome: Progressing     Problem: Respiratory  Goal: Clear  secretions with interventions this shift  Outcome: Progressing  Goal: Minimize anxiety/maximize coping throughout shift  Outcome: Progressing  Goal: Minimal/no exertional discomfort or dyspnea this shift  Outcome: Progressing  Goal: No signs of respiratory distress (eg. Use of accessory muscles. Peds grunting)  Outcome: Progressing  Goal: Patent airway maintained this shift  Outcome: Progressing  Goal: Tolerate mechanical ventilation evidenced by VS/agitation level this shift  Outcome: Progressing  Goal: Tolerate pulmonary toileting this shift  Outcome: Progressing  Goal: Verbalize decreased shortness of breath this shift  Outcome: Progressing  Goal: Wean oxygen to maintain O2 saturation per order/standard this shift  Outcome: Progressing  Goal: Increase self care and/or family involvement in next 24 hours  Outcome: Progressing     Problem: Skin  Goal: Decreased wound size/increased tissue granulation at next dressing change  Outcome: Progressing  Flowsheets (Taken 4/14/2025 1537)  Decreased wound size/increased tissue granulation at next dressing change: Promote sleep for wound healing  Goal: Participates in plan/prevention/treatment measures  Outcome: Progressing  Goal: Prevent/manage excess moisture  Outcome: Progressing  Goal: Prevent/minimize sheer/friction injuries  Outcome: Progressing  Goal: Promote/optimize nutrition  Outcome: Progressing  Goal: Promote skin healing  Outcome: Progressing

## 2025-04-14 NOTE — PROGRESS NOTES
Manuel Marrufo is a 60 y.o. male on day 8 of admission presenting with Metastatic melanoma (Multi).      Subjective   No acute events overnight. Patient reports feeling well. Denies new or worsening complaints.       Objective   Last Recorded Vitals  /70 (BP Location: Left arm, Patient Position: Lying)   Pulse 62   Temp 36.5 °C (97.7 °F) (Temporal)   Resp 16   Wt 126 kg (277 lb 12.5 oz)   SpO2 95%     Intake/Output last 3 Shifts:  Intake/Output Summary (Last 24 hours) at 4/14/2025 0714  Last data filed at 4/13/2025 2321  Gross per 24 hour   Intake --   Output 250 ml   Net -250 ml     Admission Weight  Weight: 126 kg (277 lb 12.5 oz) (04/06/25 2100)    Daily Weight  04/06/25 : 126 kg (277 lb 12.5 oz)    Physical Exam  GEN: In no acute distress; cushingoid habitus  HEENT: Mucous membranes moist; eyes, ears, and nose without exudates; no scleral icterus  CV: Regular rate and rhythm; no murmurs, rubs, or gallops  RESP: Clear to auscultation bilaterally; no accessory muscle use; normal work of breathing; NC in place  ABD: Soft, non-tender; bowel sounds present  EXT: Radial pulses strong bilaterally; no peripheral edema  SKIN: Warm and dry; no visible rashes  NEURO: A&O x 4, moves all four extremities against gravity, sensation to light touch intact in all four extremities    Relevant Results  Scheduled medications  acetaminophen, 975 mg, oral, q8h  ARIPiprazole, 10 mg, oral, Daily  ascorbic acid, 500 mg, oral, Daily  cholecalciferol, 2,000 Units, oral, Daily  dapsone, 100 mg, oral, Daily  dexAMETHasone, 4 mg, intravenous, q12h  diazePAM, 7.5 mg, oral, Daily   And  diazePAM, 5 mg, oral, q PM  enoxaparin, 40 mg, subcutaneous, q12h TING  ferrous sulfate, 65 mg of iron, oral, Once every other day  furosemide, 40 mg, oral, Every other day  insulin lispro, 0-5 Units, subcutaneous, TID AC  lidocaine, 1 patch, transdermal, Daily  loperamide, 6 mg, oral, Daily  memantine, 10 mg, oral, BID  oxyCODONE ER, 10 mg, oral, q12h  TING  pantoprazole, 40 mg, oral, Daily before breakfast  polyethylene glycol, 17 g, oral, Daily  pregabalin, 200 mg, oral, BID  propranolol LA, 120 mg, oral, BID  rosuvastatin, 10 mg, oral, Daily  sennosides-docusate sodium, 1 tablet, oral, BID  sertraline, 200 mg, oral, Daily    Continuous medications     PRN medications  PRN medications: albuterol, dextrose, dextrose, glucagon, glucagon, guaiFENesin, HYDROmorphone, naloxone, ondansetron ODT **OR** ondansetron, oxyCODONE    Results from last 72 hours   Lab Units 04/13/25  0753 04/11/25  0806   WBC AUTO x10*3/uL 10.9 7.5   HEMOGLOBIN g/dL 9.5* 9.8*   PLATELETS AUTO x10*3/uL 328 336   SODIUM mmol/L 140 138   POTASSIUM mmol/L 4.2 3.6   CO2 mmol/L 24 22   BUN mg/dL 35* 32*   MAGNESIUM mg/dL 2.51* 2.15   GLUCOSE mg/dL 85 219*      CTH w/o contrast (4/6/2025)  Increased size of now 1.7 cm right parietal metastatic lesion with increased adjacent vasogenic edema when compared to 03/03/2025 CT.  Superimposed intralesional hemorrhage can not be excluded. No acute intracranial hemorrhage or new mass effect otherwise. Further evaluation with MRI may be warranted.  Multiple known intracranial metastatic lesions with vasogenic edema better visualized on the prior 11/26/2024 MRI     CT PE (4/6/2025)  No acute pulmonary embolism to the segmental level.  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 The differential diagnosis includes multifocal pneumonia, felt to be less likely. Given patient's presentation of shortness of breath, this could be grade 2 or grade 3 pneumonitis. Follow-up CT chest is recommended in 4-6 weeks.  Stable left adrenal nodule, which is likely metastatic.  Unchanged indeterminate sclerotic lesions in the T10  vertebral body and right superior acetabulum, suspicious for metastasis. Confirmation with nonemergent PET-CT is recommended.  Patient's known pulmonary nodules are obscured by the above described pneumonitis. Tumor burden in this area can be  reassessed on follow-up evaluation.   Otherwise, no definite new site of disease is evident.     MRI brain tumor perfusion protocol w/wo contrast (4/10/2025)  Too numerous to count intra-axial foci of contrast enhancement involving both the supratentorial and infratentorial compartments.  Dominant lesions in the left frontal operculum and right parietal lobe selected for perfusion and permeability assessment. The lesion in the left frontal operculum reveals a corrected rBV ratio of 1.89, which is borderline increased. The lesion in the right parietal lobe demonstrates abnormal enhancement kinetics and 0 rBV ratio, likely artifactual.  Areas with some waxing and and others with waning burden of white matter FLAIR signal hyperintensity also observed.   The constellation of findings are suspicious for mixed response to therapy as well as areas of disease progression.   No MR evidence of acute intracranial infarct, hemorrhage, or mass effect.       Assessment/Plan   Manuel Marrufo is a 60-year-old male with past medical history significant for metastatic melanoma (brain, lung, spine, and adrenal) s/p dual immunotherapy with nivulumab and ipilumab x 2 cycles (last dose Jan 2025) and palliative WBRT (Dec 2024) s/p left shoulder resection (2019), spinal stenosis s/p L4-S1 lumbar decompression (2021), HTN, HLD, MASH, obesity, FRANK on CPAP, PTSD, GERD, COPD, and depression s/p vagal nerve stimulator (SA x 2, 2007) admitted for management of hemorrhagic brain metastases with vasogenic edema on 4/6. Neurosurgery consulted for recommendations regarding metastatic brain lesions. Radiation Oncology and Supportive Oncology consulted for same. MRI brain showed diffuse metastatic burden.  Neurosurgery does not recommend surgical intervention nor follow-up at this time. On high-dose dexamethasone for vasogenic edema, decreased from 4 mg IV Q6H decreased to 4 mg IV Q12H on 4/11. On arrival, patient required O2 via NC. CT PE showed possible pneumonia vs pneumonitis. Finished levofloxacin to complete 5-day course for possible pneumonia. Currently on room air.     Updates (4/14)  - Treatment decisions pending tumor board  - Continue dexamethasone 4 mg PO BID      #Metastatic Melanoma  #Brain Metastases, c/f intra-lesional hemorrhage and vasogenic edema  #Nausea, improved  #Fatigue  : : Increasing size of 1.7 cm right parietal lesion  : : Possible superimposed intralesional hemorrhage  : : S/p WBRT x 10 fractions (2024)  : : S/p two cycles of dual immunotherapy with nivulumab and ipilumab, last dose 1/10   : : [facility]: Dr. Schaefer (contacted at admission)  : : S/p dexamethasone load 10 mg IV  - Continue ondansetron PRN  - Continue pregabalin 200 mg BID  - Continue dexamethasone 4 mg PO BID for vasogenic edema  - PT/OT  - Neurosurgery consulted, appreciate recommendations (4/11)  Imaging... significant for diffuse small metastatic lesions to the brain. Lesions are non-operative. Would recommend Radiation Oncology consult for further management.  No acute neurosurgical intervention or additional neuroimaging needed at this time... defer rest of treatment to primary team... does not need neurosurgical follow up.  - Radiation Oncology consulted, appreciate recommendations (4/11)  No plans for addition RT at this time.  MRI perfusion will be reviewed at [facility] tumor board, 4/16.  Please have the film library push [facility] perfusion [facility], for review as well.  - Supportive Oncology consulted, appreciate recommendations (4/9)  Start scheduled acetaminophen 975mg PO q8h  Start lidocaine 4% TD patch once daily   Start scheduled oxycodone ER (OxyContin) 10mg PO q12h  Discontinue oxycodone IR 5mg  PO q6h PRN   Change oxycodone IR 10mg PO q3h PRN for moderate to severe pain  Change hydromorphone 0.5mg IV q3h PRN for breakthrough pain--please discontinue in anticipation for discharge  Start ondansetron 8mg PO/IV q8h PRN for n/v, first line  Start scheduled loperamide 6mg PO once daily   Start aripiprazole 10mg PO once daily  Start diazepam 7.5mg PO AM / 5mg PO PM  Start propanol LA 120mg PO BID  Start guaifenesin 12h tablet 600mg PO BID PRN for cough      #ICI Pneumonitis vs CAP  #COPD  : : Grade 2-3 pneumonitis on CT PE  : : Leukocytosis on admission  : : No O2 requirement at baseline  : : Procal 0.24  : : RSV/flu/COVID PCR negative  : : MRSA nares negative  : : Strep, Legionella urine antigens negative  : : S/p levofloxacin 500 mg daily (last dose 4/10, completed 5-day course)  - Continue dapsone for PJP ppx   - Continue DuoNebs Q6H  - Continue albuterol PRN     #FRANK  - Continue CPAP QHS     #MIGEL  : : At baseline Hgb ~10 on admission  - Continue home iron supplement every other day w/ vitamin C     #HLD  - Continue rosuvastatin 10 mg daily     #Anxiety  #PTSD  #MDD, treatment-refractory, s/p vagal nerve stimulator   - Continue sertraline 200 mg daily  - Continue memantine 10 mg BID     Dispo: Moderate intensity rehab pending coordinating care with [facility] team     F: Cautious, no documented echo  E: K >4, Mg >2  N: Regular, 2 L fluid restriction  A: PIV  O2: RA  GI ppx: None  DVT ppx: Enoxaparin SC     Code Status: DNR/DNI  Surrogate Decision-Maker: Mason Be (Brother, [phone])       This patient was seen, discussed and examined with the attending, Dr. Schroeder, who agrees with the management plan.    Jkaob Dillard MD  PGY-1, Neurology

## 2025-04-14 NOTE — SIGNIFICANT EVENT
[facility]     Onc hx:   7/29/2024: Biopsy left shoulder lesion showing malignant melanoma Breslow 1.1 mm margin positive Pedrito level IV 1/mm2 mitosis, pT2a  9/20/2024: Wide local excision and sentinel lymph node biopsy with melanoma in situ, margins negative.  1 lymph node with individual Melan-A, HMB-45 cells suspicious but not diagnostic for micrometastatic melanoma, 0.04 mm in largest dimension    7/29/2024: Low-dose CT chest for lung cancer screening showing 4 cm right lower lobe nodule, smaller nodule in the superior right lower lobe 1.2 cm  8/6/2024: PET/CT showing 4 cm nodule SUV 29 in the right lower lobe, 1.2 cm right lower lobe nodule with SUV greater than 12, no evidence of other FDG avid lesion  8/7/2024: EBUS guided biopsy: Malignant pleomorphic neoplasm, sections from the RB 10 right lower lobe biopsy reveals a markedly pleomorphic malignant neoplasm involving respiratory mucosa.  Tumor cells positive for vimentin and negative for TTF-1, p40, SOX10, mart 1, MOC-31, CD45, keratin AE1/AE3, CK5/6 and CK7 (faint nonspecific staining with some keratin is noted).  Additional stains were performed at [facility] showing tumor cells to be positive for PRAME and negative for CK8/18, S100, SOX10.  HMB45, BRAF, NRAS, and ERG, MITF is positive in occasional cells.    The findings are consistent with a malignant pleomorphic neoplasm not otherwise classifiable based on these biopsy findings.  However, given the history of melanoma and presence of 2 lung lesions, the morphologic and immunohistochemical findings in the current biopsy although not entirely specific raise the possibility of metastatic undifferentiated melanoma.  Comprehensive broad molecular testing or testing for UV signature mutations may be helpful to further classify the tumor.    8/8/2024: MRI brain with no evidence of disease  9/13/2024: Patient taken for lobar resection on the right, noted to have enlarged abnormal appearing  "lymphadenopathy.  10R, 11 RS, 7, 9 LN were all negative for malignancy    10/22/2024: PET/CT showing mildly enlarging right lower lobe lesion.  4 mm left lower lobe lesion with SUV 4.5, left adrenal gland with minimal nodularity SUV 8.5.  Multiple tiny densities in the subcutaneous tissues and musculature with moderate to significant metabolic activity (SUV 5-9)    11/5/2024 biopsy midline back nodule and right posterior neck nodules consistent with metastatic melanoma.  EBUS guided lung biopsy with pleomorphic neoplasm    11/8/2024: C1 D1 ipilimumab/nivolumab  11/24/2024: [place] [facility] [place] changes, nausea, headaches.  CT head showing innumerable supratentorial and infratentorial enhancing metastatic lesions with vasogenic edema.  CT C-spine with no evidence of disease.  CT chest with several enlarged right axillary lymph nodes enlarged subcarinal lymph node, mass lesion 3.4 X3.5X 3.6 cm (prior 4.5 x 3.2 x 4.6 cm).  Bilateral pulmonary nodules have increased.  Skin lesions have mildly progressed  11/26/2024: MRI brain with numerous hyperdense metastatic lesions through the cerebrum, cerebellum, and brainstem with vasogenic edema    12/24/2024: Whole brain radiation completed  1/8/2025: C2 D1 ipilimumab/nivolumab  - Hospitalized with swelling of his head, MRI brain unable to be obtained.  2/14/2025: C3 D1 ipilimumab/nivolumab  3/3/2025: CT chest abdomen pelvis with improvement of lung lesions, 2.8 cm adrenal nodule  3/26/2025: C1 D1 nivolumab single agent    Per chart review, C4 D1 ipilimumab/nivolumab originally scheduled 3/12/2025 was held for hospitalization the week prior.  At that time, patient was de-escalated to single agent nivolumab given \"difficulty tolerating dual agent immunotherapy and good response\"  "

## 2025-04-14 NOTE — PROGRESS NOTES
04/14/25 1400   Discharge Planning   Living Arrangements Alone   Support Systems Family members   Type of Residence Private residence   Number of Stairs to Enter Residence 0   Number of Stairs Within Residence 10   Who is requesting discharge planning? Provider   Home or Post Acute Services Post acute facilities (Rehab/SNF/etc)   Type of Post Acute Facility Services Skilled nursing   Type of Home Care Services Home OT;Home PT   Expected Discharge Disposition SNF   Financial Resource Strain   How hard is it for you to pay for the very basics like food, housing, medical care, and heating? Not hard     04/14/2025:  [facility] outreached to [facility]  , Neo Underwood ([phone]) and JOHN PAUL Angulo([phone]) regarding assistance with SNF placement as patient is 90% service connected. TCC left contact information for a return phone call. RN CM also outreached to PCP office, Dr. Cecelia Brown ([phone]) regarding additional information regarding placement. TCC will continue to follow for any additional concerns. Shannon HUANG [facility] [address]

## 2025-04-14 NOTE — CARE PLAN
Problem: Pain - Adult  Goal: Verbalizes/displays adequate comfort level or baseline comfort level  Outcome: Progressing     Problem: Safety - Adult  Goal: Free from fall injury  Outcome: Progressing     Problem: Discharge Planning  Goal: Discharge to home or other facility with appropriate resources  Outcome: Progressing     Problem: Chronic Conditions and Co-morbidities  Goal: Patient's chronic conditions and co-morbidity symptoms are monitored and maintained or improved  Outcome: Progressing     Problem: Nutrition  Goal: Nutrient intake appropriate for maintaining nutritional needs  Outcome: Progressing     Problem: Fall/Injury  Goal: Not fall by end of shift  Outcome: Progressing  Goal: Be free from injury by end of the shift  Outcome: Progressing  Goal: Verbalize understanding of personal risk factors for fall in the hospital  Outcome: Progressing  Goal: Verbalize understanding of risk factor reduction measures to prevent injury from fall in the home  Outcome: Progressing  Goal: Use assistive devices by end of the shift  Outcome: Progressing  Goal: Pace activities to prevent fatigue by end of the shift  Outcome: Progressing     Problem: Pain  Goal: Takes deep breaths with improved pain control throughout the shift  Outcome: Progressing  Goal: Turns in bed with improved pain control throughout the shift  Outcome: Progressing  Goal: Walks with improved pain control throughout the shift  Outcome: Progressing  Goal: Performs ADL's with improved pain control throughout shift  Outcome: Progressing  Goal: Participates in PT with improved pain control throughout the shift  Outcome: Progressing  Goal: Free from opioid side effects throughout the shift  Outcome: Progressing  Goal: Free from acute confusion related to pain meds throughout the shift  Outcome: Progressing     Problem: Respiratory  Goal: Clear secretions with interventions this shift  Outcome: Progressing  Goal: Minimize anxiety/maximize coping throughout  shift  Outcome: Progressing  Goal: Minimal/no exertional discomfort or dyspnea this shift  Outcome: Progressing  Goal: No signs of respiratory distress (eg. Use of accessory muscles. Peds grunting)  Outcome: Progressing  Goal: Patent airway maintained this shift  Outcome: Progressing  Goal: Tolerate mechanical ventilation evidenced by VS/agitation level this shift  Outcome: Progressing  Goal: Tolerate pulmonary toileting this shift  Outcome: Progressing  Goal: Verbalize decreased shortness of breath this shift  Outcome: Progressing  Goal: Wean oxygen to maintain O2 saturation per order/standard this shift  Outcome: Progressing  Goal: Increase self care and/or family involvement in next 24 hours  Outcome: Progressing     Problem: Skin  Goal: Decreased wound size/increased tissue granulation at next dressing change  Outcome: Progressing  Goal: Participates in plan/prevention/treatment measures  Outcome: Progressing  Goal: Prevent/manage excess moisture  Outcome: Progressing  Goal: Prevent/minimize sheer/friction injuries  Outcome: Progressing  Goal: Promote/optimize nutrition  Outcome: Progressing  Goal: Promote skin healing  Outcome: Progressing   The patient's goals for the shift include  rest and comfort    The clinical goals for the shift include pt will remain free of injury    Over the shift, the patient did not make progress toward the following goals. Barriers to progression include safety. Recommendations to address these barriers include orient to room.

## 2025-04-14 NOTE — PROGRESS NOTES
Physical Therapy    Physical Therapy Treatment    Patient Name: Manuel Marrufo  MRN: [id number]  Department: [facility]  Room: [address] [place] [address]  Today's Date: 4/14/2025  Time Calculation  Start Time: 1633  Stop Time: 1711  Time Calculation (min): 38 min         Assessment/Plan   PT Assessment  PT Assessment Results: Decreased strength, Decreased endurance, Impaired balance, Decreased mobility, Pain  Rehab Prognosis: Good  Barriers to Discharge Home: Caregiver assistance, Physical needs  Caregiver Assistance: Caregiver assistance needed per identified barriers - however, level of patient's required assistance exceeds assistance available at home  Physical Needs: Stair navigation into home limited by function/safety, Ambulating household distances limited by function/safety, 24hr mobility assistance needed, 24hr ADL assistance needed, High falls risk due to function or environment  End of Session Communication: Bedside nurse  End of Session Patient Position: Bed, 3 rail up, Alarm on  PT Plan  Inpatient/Swing Bed or Outpatient: Inpatient  PT Plan  Treatment/Interventions: Bed mobility, Transfer training, Gait training, Stair training, Balance training, Strengthening, Endurance training, Therapeutic exercise, Therapeutic activity, Positioning  PT Plan: Ongoing PT  PT Frequency: 3 times per week  PT Discharge Recommendations: Moderate intensity level of continued care  Equipment Recommended upon Discharge: Wheeled walker  PT Recommended Transfer Status: Assist x2  PT - OK to Discharge: Yes      General Visit Information:   PT  Visit  PT Received On: 04/14/25  General  Reason for Referral: presenting with generalized weakness, fatigue, SOB, and chronic worsening headache. CTH incr R parietal lesion and L temporal edema  Past Medical History Relevant to Rehab: PMHx s/f metastatic melanoma, to brain and lung (on C3 of dual immunotherapy with nivulumab and ipilumab and palliative WBRT 12/14/24), (s/p left shoulder resection 2019), spinal  stenosis (s/p L4-S1 lumbar decompression 2021), HTN, HLD, nonalcoholic fatty liver disease, obesity, FRANK on CPAP, PTSD, GERD, COPD, depression s/p vagal nerve stimulator (SA x2, 2007)  Prior to Session Communication: Bedside nurse  Patient Position Received: Bed, 3 rail up, Alarm on  Preferred Learning Style: verbal  General Comment: Pt in supine on arrival and willing to participatte in Therapy session. Fatigues with moiblity and required rest breaks to manage    Subjective   Precautions:  Precautions  Medical Precautions: Fall precautions (protective spinal precautions)     Date/Time Vitals Session Patient Position Pulse Resp SpO2 BP MAP (mmHg)    04/14/25 1542 --  --  72  16  94 %  137/82  101     04/14/25 1633 During PT  Sitting  70  --  95 %  131/87  --                 Objective   Pain:  Pain Assessment  Pain Assessment: 0-10  0-10 (Numeric) Pain Score: 0 - No pain  Cognition:  Cognition  Arousal/Alertness: Delayed responses to stimuli  Orientation Level: Oriented X4 (VC's for date)    Postural Control:  Static Sitting Balance  Static Sitting-Level of Assistance: Close supervision (~ 20 mins)  Dynamic Sitting Balance  Dynamic Sitting-Level of Assistance: Contact guard  Static Standing Balance  Static Standing-Level of Assistance: Moderate assistance (RW)    Treatments:    Therapeutic Activity  Therapeutic Activity Performed: Yes  Therapeutic Activity 1: Pt tolerated seated EOB ~20 mins with one/two UE support, used bedrail for supportr with SBA-CGA, required occasional cues due to mild retrolean. Pt also completed seated BLE's AP,LAQ 2x10 and supine AP, QS 1x10 reps. Educated on spinal precautions, log roll,taking rest breaks as needed and importance of OOB      Bed Mobility  Bed Mobility: Yes  Bed Mobility 1  Bed Mobility 1: Supine to sitting  Level of Assistance 1: Maximum assistance  Bed Mobility Comments 1: HOB elevated, bedrail, cues for log roll  Bed Mobility 2  Bed Mobility  2: Sitting to supine  Level  of Assistance 2: Moderate assistance  Bed Mobility Comments 2: HOB elevated  Bed Mobility 3  Bed Mobility 3: Rolling right  Level of Assistance 3: Moderate assistance  Bed Mobility 4  Bed Mobility 4: Scooting (to HOB)  Level of Assistance 4: Dependent, +2  Bed Mobility Comments 4: use of draw sheet, attempted lateral scooting seated at EOB, pt unable, CGA for fwd scooting seated at EOB    Ambulation/Gait Training  Ambulation/Gait Training Performed: No (not safe to attempt 2/2 B LE's unsteadiness)  Transfers  Transfer: Yes  Transfer 1  Transfer From 1: Sit to, Stand to  Transfer to 1: Stand, Sit  Technique 1: Sit to stand, Stand to sit  Transfer Device 1: Walker  Transfer Level of Assistance 1: Maximum assistance, Moderate verbal cues, Moderate tactile cues  Trials/Comments 1: elevated EOB,partial stance 1st and full stance 2nd trial (cues for safe hand placemet, sequencing and walker safety, pt with B LE's unsteady, but tolerated standing ~ 45 secs with no LOB with walker support and ModA)      Outcome Measures:  [facility] Basic Mobility  Turning from your back to your side while in a flat bed without using bedrails: A lot  Moving from lying on your back to sitting on the side of a flat bed without using bedrails: Total  Moving to and from bed to chair (including a wheelchair): Total  Standing up from a chair using your arms (e.g. wheelchair or bedside chair): A lot  To walk in hospital room: Total  Climbing 3-5 steps with railing: Total  Basic Mobility - Total Score: 8    Education Documentation  Precautions, taught by Mindy Mondragon PT at 4/14/2025  5:25 PM.  Learner: Patient  Readiness: Acceptance  Method: Explanation  Response: Verbalizes Understanding, Needs Reinforcement    Body Mechanics, taught by Mindy Mondragon PT at 4/14/2025  5:25 PM.  Learner: Patient  Readiness: Acceptance  Method: Explanation  Response: Verbalizes Understanding, Needs Reinforcement    Mobility Training, taught by Mindy Mondragon PT at 4/14/2025  5:25  PM.  Learner: Patient  Readiness: Acceptance  Method: Explanation  Response: Verbalizes Understanding, Needs Reinforcement    Education Comments  No comments found.        OP EDUCATION:       Encounter Problems       Encounter Problems (Active)       Balance       Pt will maintain static standing balance with upper extremity support for >1 min with CGA (Progressing)       Start:  04/07/25    Expected End:  04/21/25               Mobility       Pt will be Babak to ascend/descend 1 step with 1 handrail (Not Progressing)       Start:  04/07/25    Expected End:  04/21/25               Mobility       Pt will be Babak ambulation 40 ft with LRAD (Not Progressing)       Start:  04/07/25    Expected End:  04/21/25               PT Transfers       Pt will be Babak for sit to stand and bed to chair transfers with LRAD (Progressing)       Start:  04/07/25    Expected End:  04/21/25            Pt will be Babak for bed mobility (Progressing)       Start:  04/07/25    Expected End:  04/21/25               Pain - Adult

## 2025-04-15 LAB
ALBUMIN SERPL BCP-MCNC: 3.8 G/DL (ref 3.4–5)
ANION GAP SERPL CALC-SCNC: 17 MMOL/L (ref 10–20)
BASOPHILS # BLD MANUAL: 0.15 X10*3/UL (ref 0–0.1)
BASOPHILS NFR BLD MANUAL: 0.9 %
BUN SERPL-MCNC: 36 MG/DL (ref 6–23)
BURR CELLS BLD QL SMEAR: ABNORMAL
CALCIUM SERPL-MCNC: 9 MG/DL (ref 8.6–10.6)
CHLORIDE SERPL-SCNC: 102 MMOL/L (ref 98–107)
CO2 SERPL-SCNC: 21 MMOL/L (ref 21–32)
CREAT SERPL-MCNC: 1.14 MG/DL (ref 0.5–1.3)
EGFRCR SERPLBLD CKD-EPI 2021: 74 ML/MIN/1.73M*2
EOSINOPHIL # BLD MANUAL: 0.15 X10*3/UL (ref 0–0.7)
EOSINOPHIL NFR BLD MANUAL: 0.9 %
ERYTHROCYTE [DISTWIDTH] IN BLOOD BY AUTOMATED COUNT: 22.1 % (ref 11.5–14.5)
GLUCOSE BLD MANUAL STRIP-MCNC: 123 MG/DL (ref 74–99)
GLUCOSE BLD MANUAL STRIP-MCNC: 144 MG/DL (ref 74–99)
GLUCOSE BLD MANUAL STRIP-MCNC: 186 MG/DL (ref 74–99)
GLUCOSE SERPL-MCNC: 159 MG/DL (ref 74–99)
HCT VFR BLD AUTO: 41.6 % (ref 41–52)
HGB BLD-MCNC: 10.6 G/DL (ref 13.5–17.5)
IMM GRANULOCYTES # BLD AUTO: 1.56 X10*3/UL (ref 0–0.7)
IMM GRANULOCYTES NFR BLD AUTO: 9.3 % (ref 0–0.9)
LYMPHOCYTES # BLD MANUAL: 2.03 X10*3/UL (ref 1.2–4.8)
LYMPHOCYTES NFR BLD MANUAL: 12.1 %
MAGNESIUM SERPL-MCNC: 2.61 MG/DL (ref 1.6–2.4)
MCH RBC QN AUTO: 20.9 PG (ref 26–34)
MCHC RBC AUTO-ENTMCNC: 25.5 G/DL (ref 32–36)
MCV RBC AUTO: 82 FL (ref 80–100)
METAMYELOCYTES # BLD MANUAL: 0.29 X10*3/UL
METAMYELOCYTES NFR BLD MANUAL: 1.7 %
MONOCYTES # BLD MANUAL: 0.29 X10*3/UL (ref 0.1–1)
MONOCYTES NFR BLD MANUAL: 1.7 %
MYELOCYTES # BLD MANUAL: 0.29 X10*3/UL
MYELOCYTES NFR BLD MANUAL: 1.7 %
NEUTS SEG # BLD MANUAL: 13.61 X10*3/UL (ref 1.2–7)
NEUTS SEG NFR BLD MANUAL: 81 %
NRBC BLD-RTO: 0.4 /100 WBCS (ref 0–0)
PHOSPHATE SERPL-MCNC: 4.4 MG/DL (ref 2.5–4.9)
PLATELET # BLD AUTO: 357 X10*3/UL (ref 150–450)
POTASSIUM SERPL-SCNC: 4.1 MMOL/L (ref 3.5–5.3)
RBC # BLD AUTO: 5.07 X10*6/UL (ref 4.5–5.9)
RBC MORPH BLD: ABNORMAL
SODIUM SERPL-SCNC: 136 MMOL/L (ref 136–145)
TOTAL CELLS COUNTED BLD: 116
WBC # BLD AUTO: 16.8 X10*3/UL (ref 4.4–11.3)

## 2025-04-15 PROCEDURE — 80069 RENAL FUNCTION PANEL: CPT

## 2025-04-15 PROCEDURE — 99233 SBSQ HOSP IP/OBS HIGH 50: CPT

## 2025-04-15 PROCEDURE — 2500000001 HC RX 250 WO HCPCS SELF ADMINISTERED DRUGS (ALT 637 FOR MEDICARE OP)

## 2025-04-15 PROCEDURE — 85027 COMPLETE CBC AUTOMATED: CPT

## 2025-04-15 PROCEDURE — 85007 BL SMEAR W/DIFF WBC COUNT: CPT

## 2025-04-15 PROCEDURE — 2500000004 HC RX 250 GENERAL PHARMACY W/ HCPCS (ALT 636 FOR OP/ED)

## 2025-04-15 PROCEDURE — 2500000002 HC RX 250 W HCPCS SELF ADMINISTERED DRUGS (ALT 637 FOR MEDICARE OP, ALT 636 FOR OP/ED)

## 2025-04-15 PROCEDURE — 83735 ASSAY OF MAGNESIUM: CPT

## 2025-04-15 PROCEDURE — 36415 COLL VENOUS BLD VENIPUNCTURE: CPT

## 2025-04-15 PROCEDURE — 2500000004 HC RX 250 GENERAL PHARMACY W/ HCPCS (ALT 636 FOR OP/ED): Mod: JZ,TB

## 2025-04-15 PROCEDURE — 1170000001 HC PRIVATE ONCOLOGY ROOM DAILY

## 2025-04-15 PROCEDURE — 82947 ASSAY GLUCOSE BLOOD QUANT: CPT

## 2025-04-15 RX ORDER — ONDANSETRON 8 MG/1
8 TABLET, ORALLY DISINTEGRATING ORAL EVERY 8 HOURS PRN
Status: DISCONTINUED | OUTPATIENT
Start: 2025-04-15 | End: 2025-04-17 | Stop reason: HOSPADM

## 2025-04-15 RX ORDER — ONDANSETRON HYDROCHLORIDE 2 MG/ML
8 INJECTION, SOLUTION INTRAVENOUS EVERY 8 HOURS PRN
Status: DISCONTINUED | OUTPATIENT
Start: 2025-04-15 | End: 2025-04-17 | Stop reason: HOSPADM

## 2025-04-15 RX ADMIN — SENNOSIDES AND DOCUSATE SODIUM 1 TABLET: 50; 8.6 TABLET ORAL at 20:23

## 2025-04-15 RX ADMIN — FUROSEMIDE 40 MG: 40 TABLET ORAL at 08:26

## 2025-04-15 RX ADMIN — DIAZEPAM 7.5 MG: 5 TABLET ORAL at 08:25

## 2025-04-15 RX ADMIN — PREGABALIN 200 MG: 25 CAPSULE ORAL at 08:26

## 2025-04-15 RX ADMIN — MEMANTINE 10 MG: 10 TABLET ORAL at 08:25

## 2025-04-15 RX ADMIN — OXYCODONE HYDROCHLORIDE 10 MG: 10 TABLET, FILM COATED, EXTENDED RELEASE ORAL at 08:25

## 2025-04-15 RX ADMIN — SERTRALINE 200 MG: 100 TABLET, FILM COATED ORAL at 20:23

## 2025-04-15 RX ADMIN — MEMANTINE 10 MG: 10 TABLET ORAL at 20:23

## 2025-04-15 RX ADMIN — PANTOPRAZOLE SODIUM 40 MG: 40 TABLET, DELAYED RELEASE ORAL at 08:25

## 2025-04-15 RX ADMIN — ENOXAPARIN SODIUM 40 MG: 100 INJECTION SUBCUTANEOUS at 08:26

## 2025-04-15 RX ADMIN — ENOXAPARIN SODIUM 40 MG: 100 INJECTION SUBCUTANEOUS at 20:23

## 2025-04-15 RX ADMIN — OXYCODONE HYDROCHLORIDE AND ACETAMINOPHEN 500 MG: 500 TABLET ORAL at 08:25

## 2025-04-15 RX ADMIN — Medication 2000 UNITS: at 20:24

## 2025-04-15 RX ADMIN — OXYCODONE HYDROCHLORIDE 10 MG: 10 TABLET, FILM COATED, EXTENDED RELEASE ORAL at 20:23

## 2025-04-15 RX ADMIN — DEXAMETHASONE 4 MG: 4 TABLET ORAL at 08:25

## 2025-04-15 RX ADMIN — HYDROMORPHONE HYDROCHLORIDE 0.5 MG: 0.5 INJECTION, SOLUTION INTRAMUSCULAR; INTRAVENOUS; SUBCUTANEOUS at 09:23

## 2025-04-15 RX ADMIN — ROSUVASTATIN CALCIUM 10 MG: 10 TABLET, FILM COATED ORAL at 20:23

## 2025-04-15 RX ADMIN — OXYCODONE HYDROCHLORIDE 10 MG: 10 TABLET ORAL at 19:34

## 2025-04-15 RX ADMIN — PROPRANOLOL HYDROCHLORIDE 120 MG: 120 CAPSULE, EXTENDED RELEASE ORAL at 20:24

## 2025-04-15 RX ADMIN — DEXAMETHASONE 4 MG: 4 TABLET ORAL at 20:23

## 2025-04-15 RX ADMIN — ARIPIPRAZOLE 10 MG: 10 TABLET ORAL at 08:25

## 2025-04-15 RX ADMIN — OXYCODONE HYDROCHLORIDE 10 MG: 10 TABLET ORAL at 08:25

## 2025-04-15 RX ADMIN — OXYCODONE HYDROCHLORIDE 10 MG: 10 TABLET ORAL at 11:42

## 2025-04-15 RX ADMIN — PREGABALIN 200 MG: 25 CAPSULE ORAL at 20:23

## 2025-04-15 RX ADMIN — DAPSONE 100 MG: 100 TABLET ORAL at 20:23

## 2025-04-15 RX ADMIN — PROPRANOLOL HYDROCHLORIDE 120 MG: 120 CAPSULE, EXTENDED RELEASE ORAL at 08:26

## 2025-04-15 RX ADMIN — DIAZEPAM 5 MG: 10 TABLET ORAL at 20:23

## 2025-04-15 RX ADMIN — ACETAMINOPHEN 975 MG: 325 TABLET, FILM COATED ORAL at 08:26

## 2025-04-15 ASSESSMENT — PAIN - FUNCTIONAL ASSESSMENT
PAIN_FUNCTIONAL_ASSESSMENT: 0-10

## 2025-04-15 ASSESSMENT — COGNITIVE AND FUNCTIONAL STATUS - GENERAL
PERSONAL GROOMING: A LITTLE
EATING MEALS: A LITTLE
MOVING TO AND FROM BED TO CHAIR: A LOT
CLIMB 3 TO 5 STEPS WITH RAILING: TOTAL
MOVING FROM LYING ON BACK TO SITTING ON SIDE OF FLAT BED WITH BEDRAILS: A LITTLE
PERSONAL GROOMING: A LITTLE
WALKING IN HOSPITAL ROOM: TOTAL
HELP NEEDED FOR BATHING: A LOT
WALKING IN HOSPITAL ROOM: TOTAL
MOBILITY SCORE: 12
TURNING FROM BACK TO SIDE WHILE IN FLAT BAD: A LITTLE
CLIMB 3 TO 5 STEPS WITH RAILING: TOTAL
DRESSING REGULAR UPPER BODY CLOTHING: A LOT
DRESSING REGULAR UPPER BODY CLOTHING: A LOT
TOILETING: TOTAL
HELP NEEDED FOR BATHING: A LOT
TURNING FROM BACK TO SIDE WHILE IN FLAT BAD: A LITTLE
DRESSING REGULAR LOWER BODY CLOTHING: TOTAL
EATING MEALS: A LITTLE
MOVING TO AND FROM BED TO CHAIR: A LOT
STANDING UP FROM CHAIR USING ARMS: A LOT
MOVING FROM LYING ON BACK TO SITTING ON SIDE OF FLAT BED WITH BEDRAILS: A LITTLE
DAILY ACTIVITIY SCORE: 12
DAILY ACTIVITIY SCORE: 12
MOBILITY SCORE: 12
DRESSING REGULAR LOWER BODY CLOTHING: TOTAL
TOILETING: TOTAL
STANDING UP FROM CHAIR USING ARMS: A LOT

## 2025-04-15 ASSESSMENT — PAIN SCALES - GENERAL
PAINLEVEL_OUTOF10: 7
PAINLEVEL_OUTOF10: 7
PAINLEVEL_OUTOF10: 5 - MODERATE PAIN
PAINLEVEL_OUTOF10: 7
PAINLEVEL_OUTOF10: 6
PAINLEVEL_OUTOF10: 7

## 2025-04-15 NOTE — CARE PLAN
The patient's goals for the shift include      The clinical goals for the shift include Pt will remain HDS and VSS throughout shift end on 4/15/25 @1900    Problem: Pain - Adult  Goal: Verbalizes/displays adequate comfort level or baseline comfort level  Outcome: Progressing     Problem: Safety - Adult  Goal: Free from fall injury  Outcome: Progressing     Problem: Discharge Planning  Goal: Discharge to home or other facility with appropriate resources  Outcome: Progressing     Problem: Chronic Conditions and Co-morbidities  Goal: Patient's chronic conditions and co-morbidity symptoms are monitored and maintained or improved  Outcome: Progressing     Problem: Nutrition  Goal: Nutrient intake appropriate for maintaining nutritional needs  Outcome: Progressing     Problem: Fall/Injury  Goal: Not fall by end of shift  Outcome: Progressing  Goal: Be free from injury by end of the shift  Outcome: Progressing  Goal: Verbalize understanding of personal risk factors for fall in the hospital  Outcome: Progressing  Goal: Verbalize understanding of risk factor reduction measures to prevent injury from fall in the home  Outcome: Progressing  Goal: Use assistive devices by end of the shift  Outcome: Progressing  Goal: Pace activities to prevent fatigue by end of the shift  Outcome: Progressing     Problem: Pain  Goal: Takes deep breaths with improved pain control throughout the shift  Outcome: Progressing  Goal: Turns in bed with improved pain control throughout the shift  Outcome: Progressing  Goal: Walks with improved pain control throughout the shift  Outcome: Progressing  Goal: Performs ADL's with improved pain control throughout shift  Outcome: Progressing  Goal: Participates in PT with improved pain control throughout the shift  Outcome: Progressing  Goal: Free from opioid side effects throughout the shift  Outcome: Progressing  Goal: Free from acute confusion related to pain meds throughout the shift  Outcome:  Progressing     Problem: Respiratory  Goal: Clear secretions with interventions this shift  Outcome: Progressing  Goal: Minimize anxiety/maximize coping throughout shift  Outcome: Progressing  Goal: Minimal/no exertional discomfort or dyspnea this shift  Outcome: Progressing  Goal: No signs of respiratory distress (eg. Use of accessory muscles. Peds grunting)  Outcome: Progressing  Goal: Patent airway maintained this shift  Outcome: Progressing  Goal: Tolerate mechanical ventilation evidenced by VS/agitation level this shift  Outcome: Progressing  Goal: Tolerate pulmonary toileting this shift  Outcome: Progressing  Goal: Verbalize decreased shortness of breath this shift  Outcome: Progressing  Goal: Wean oxygen to maintain O2 saturation per order/standard this shift  Outcome: Progressing  Goal: Increase self care and/or family involvement in next 24 hours  Outcome: Progressing     Problem: Skin  Goal: Decreased wound size/increased tissue granulation at next dressing change  Outcome: Progressing  Goal: Participates in plan/prevention/treatment measures  Outcome: Progressing  Goal: Prevent/manage excess moisture  Outcome: Progressing  Goal: Prevent/minimize sheer/friction injuries  Outcome: Progressing  Goal: Promote/optimize nutrition  Outcome: Progressing  Goal: Promote skin healing  Outcome: Progressing

## 2025-04-15 NOTE — CARE PLAN
The patient's goals for the shift include  Rest     The clinical goals for the shift include pt will remain injury free    Over the shift, the patient not make progress toward the following goals.       Problem: Pain - Adult  Goal: Verbalizes/displays adequate comfort level or baseline comfort level  Outcome: Progressing     Problem: Safety - Adult  Goal: Free from fall injury  Outcome: Progressing     Problem: Discharge Planning  Goal: Discharge to home or other facility with appropriate resources  Outcome: Progressing     Problem: Chronic Conditions and Co-morbidities  Goal: Patient's chronic conditions and co-morbidity symptoms are monitored and maintained or improved  Outcome: Progressing     Problem: Nutrition  Goal: Nutrient intake appropriate for maintaining nutritional needs  Outcome: Progressing     Problem: Fall/Injury  Goal: Not fall by end of shift  Outcome: Progressing  Goal: Be free from injury by end of the shift  Outcome: Progressing  Goal: Verbalize understanding of personal risk factors for fall in the hospital  Outcome: Progressing  Goal: Verbalize understanding of risk factor reduction measures to prevent injury from fall in the home  Outcome: Progressing  Goal: Use assistive devices by end of the shift  Outcome: Progressing  Goal: Pace activities to prevent fatigue by end of the shift  Outcome: Progressing     Problem: Pain  Goal: Takes deep breaths with improved pain control throughout the shift  Outcome: Progressing  Goal: Turns in bed with improved pain control throughout the shift  Outcome: Progressing  Goal: Walks with improved pain control throughout the shift  Outcome: Progressing  Goal: Performs ADL's with improved pain control throughout shift  Outcome: Progressing  Goal: Participates in PT with improved pain control throughout the shift  Outcome: Progressing  Goal: Free from opioid side effects throughout the shift  Outcome: Progressing  Goal: Free from acute confusion related to pain  meds throughout the shift  Outcome: Progressing     Problem: Respiratory  Goal: Clear secretions with interventions this shift  Outcome: Progressing  Goal: Minimize anxiety/maximize coping throughout shift  Outcome: Progressing  Goal: Minimal/no exertional discomfort or dyspnea this shift  Outcome: Progressing  Goal: No signs of respiratory distress (eg. Use of accessory muscles. Peds grunting)  Outcome: Progressing  Goal: Patent airway maintained this shift  Outcome: Progressing  Goal: Tolerate mechanical ventilation evidenced by VS/agitation level this shift  Outcome: Progressing  Goal: Tolerate pulmonary toileting this shift  Outcome: Progressing  Goal: Verbalize decreased shortness of breath this shift  Outcome: Progressing  Goal: Wean oxygen to maintain O2 saturation per order/standard this shift  Outcome: Progressing  Goal: Increase self care and/or family involvement in next 24 hours  Outcome: Progressing     Problem: Skin  Goal: Decreased wound size/increased tissue granulation at next dressing change  Outcome: Progressing  Flowsheets (Taken 4/15/2025 0106)  Decreased wound size/increased tissue granulation at next dressing change: Promote sleep for wound healing  Goal: Participates in plan/prevention/treatment measures  Outcome: Progressing  Flowsheets (Taken 4/15/2025 0106)  Participates in plan/prevention/treatment measures: Elevate heels  Goal: Prevent/manage excess moisture  Outcome: Progressing  Flowsheets (Taken 4/15/2025 0106)  Prevent/manage excess moisture: Monitor for/manage infection if present  Goal: Prevent/minimize sheer/friction injuries  Outcome: Progressing  Flowsheets (Taken 4/15/2025 0106)  Prevent/minimize sheer/friction injuries: Increase activity/out of bed for meals  Goal: Promote/optimize nutrition  Outcome: Progressing  Flowsheets (Taken 4/15/2025 0106)  Promote/optimize nutrition: Offer water/supplements/favorite foods  Goal: Promote skin healing  Outcome: Progressing  Flowsheets  (Taken 4/15/2025 0106)  Promote skin healing: Turn/reposition every 2 hours/use positioning/transfer devices

## 2025-04-15 NOTE — PROGRESS NOTES
"Manuel Marrufo is a 60 y.o. male on day 9 of admission presenting with Metastatic melanoma (Multi).      Subjective   No acute events overnight. Patient reports feeling \"okay\". Denies new or worsening complaints. States decreased appetite today.       Objective   Last Recorded Vitals  /90 (BP Location: Left arm, Patient Position: Lying)   Pulse 62   Temp 35.9 °C (96.6 °F) (Temporal)   Resp 16   Wt 126 kg (277 lb 12.5 oz)   SpO2 93%     Intake/Output last 3 Shifts:  Intake/Output Summary (Last 24 hours) at 4/15/2025 0720  Last data filed at 4/14/2025 1228  Gross per 24 hour   Intake --   Output 800 ml   Net -800 ml     Admission Weight  Weight: 126 kg (277 lb 12.5 oz) (04/06/25 2100)    Daily Weight  04/06/25 : 126 kg (277 lb 12.5 oz)    Physical Exam   GEN: In no acute distress; cushingoid habitus  HEENT: Mucous membranes moist; eyes, ears, and nose without exudates; no scleral icterus  CV: Regular rate and rhythm; no murmurs, rubs, or gallops  RESP: Clear to auscultation bilaterally; no accessory muscle use; normal work of breathing  ABD: Soft, non-tender; bowel sounds present  EXT: Radial pulses strong bilaterally; no peripheral edema  SKIN: Warm and dry; no visible rashes  NEURO: A&O x 4, moves all four extremities against gravity without drift, sensation to light touch intact in all four extremities    Relevant Results  Scheduled medications  acetaminophen, 975 mg, oral, q8h  ARIPiprazole, 10 mg, oral, Daily  ascorbic acid, 500 mg, oral, Daily  cholecalciferol, 2,000 Units, oral, Daily  dapsone, 100 mg, oral, Daily  dexAMETHasone, 4 mg, oral, q12h TING  diazePAM, 7.5 mg, oral, Daily   And  diazePAM, 5 mg, oral, q PM  enoxaparin, 40 mg, subcutaneous, q12h TING  ferrous sulfate, 65 mg of iron, oral, Once every other day  furosemide, 40 mg, oral, Every other day  insulin lispro, 0-5 Units, subcutaneous, TID AC  lidocaine, 1 patch, transdermal, Daily  memantine, 10 mg, oral, BID  oxyCODONE ER, 10 mg, oral, " q12h TING  pantoprazole, 40 mg, oral, Daily before breakfast  polyethylene glycol, 17 g, oral, Daily  pregabalin, 200 mg, oral, BID  propranolol LA, 120 mg, oral, BID  rosuvastatin, 10 mg, oral, Daily  sennosides-docusate sodium, 1 tablet, oral, BID  sertraline, 200 mg, oral, Daily    Continuous medications     PRN medications  PRN medications: albuterol, dextrose, dextrose, glucagon, glucagon, guaiFENesin, HYDROmorphone, naloxone, ondansetron ODT **OR** ondansetron, oxyCODONE    Results from last 72 hours   Lab Units 04/13/25  0753   WBC AUTO x10*3/uL 10.9   HEMOGLOBIN g/dL 9.5*   PLATELETS AUTO x10*3/uL 328   SODIUM mmol/L 140   POTASSIUM mmol/L 4.2   CO2 mmol/L 24   BUN mg/dL 35*   MAGNESIUM mg/dL 2.51*   GLUCOSE mg/dL 85      CTH w/o contrast (4/6/2025)  Increased size of now 1.7 cm right parietal metastatic lesion with increased adjacent vasogenic edema when compared to 03/03/2025 CT.  Superimposed intralesional hemorrhage can not be excluded. No acute intracranial hemorrhage or new mass effect otherwise. Further evaluation with MRI may be warranted.  Multiple known intracranial metastatic lesions with vasogenic edema better visualized on the prior 11/26/2024 MRI     CT PE (4/6/2025)  No acute pulmonary embolism to the segmental level.  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 The differential diagnosis includes multifocal pneumonia, felt to be less likely. Given patient's presentation of shortness of breath, this could be grade 2 or grade 3 pneumonitis. Follow-up CT chest is recommended in 4-6 weeks.  Stable left adrenal nodule, which is likely metastatic.  Unchanged indeterminate sclerotic lesions in the T10 vertebral body and right superior acetabulum,  suspicious for metastasis. Confirmation with nonemergent PET-CT is recommended.  Patient's known pulmonary nodules are obscured by the above described pneumonitis. Tumor burden in this area can be  reassessed on follow-up evaluation.   Otherwise, no definite new site of disease is evident.     MRI brain tumor perfusion protocol w/wo contrast (4/10/2025)  Too numerous to count intra-axial foci of contrast enhancement involving both the supratentorial and infratentorial compartments.  Dominant lesions in the left frontal operculum and right parietal lobe selected for perfusion and permeability assessment. The lesion in the left frontal operculum reveals a corrected rBV ratio of 1.89, which is borderline increased. The lesion in the right parietal lobe demonstrates abnormal enhancement kinetics and 0 rBV ratio, likely artifactual.  Areas with some waxing and and others with waning burden of white matter FLAIR signal hyperintensity also observed.   The constellation of findings are suspicious for mixed response to therapy as well as areas of disease progression.   No MR evidence of acute intracranial infarct, hemorrhage, or mass effect.       Assessment/Plan   Manuel Marrufo is a 60-year-old male with past medical history significant for metastatic melanoma (brain, lung, spine, and adrenal) s/p dual immunotherapy with nivulumab and ipilumab x 2 cycles (last dose Jan 2025) and palliative WBRT (Dec 2024) s/p left shoulder resection (2019), spinal stenosis s/p L4-S1 lumbar decompression (2021), HTN, HLD, MASH, obesity, FRANK on CPAP, PTSD, GERD, COPD, and depression s/p vagal nerve stimulator (SA x 2, 2007) admitted for management of hemorrhagic brain metastases with vasogenic edema on 4/6. Neurosurgery consulted for recommendations regarding metastatic brain lesions. Radiation Oncology and Supportive Oncology consulted for same. MRI brain showed diffuse metastatic burden. Neurosurgery does not recommend surgical intervention  nor follow-up at this time. On high-dose dexamethasone for vasogenic edema, decreased from 4 mg IV Q6H decreased to 4 mg IV Q12H on 4/11. On arrival, patient required O2 via NC. CT PE showed possible pneumonia vs pneumonitis. Finished levofloxacin to complete 5-day course for possible pneumonia. Currently on room air.     Updates (4/15)  - Dr. Osullivan to meet with patient  - Treatment decisions pending tumor board  - Continue dexamethasone 4 mg PO BID  - Suspect hemoconcentration on AM labs, especially CBC -> Continue to monitor      #Metastatic Melanoma  #Brain Metastases, c/f intra-lesional hemorrhage and vasogenic edema  #Nausea, improved  #Fatigue  : : Increasing size of 1.7 cm right parietal lesion  : : Possible superimposed intralesional hemorrhage  : : S/p WBRT x 10 fractions (2024)  : : S/p two cycles of dual immunotherapy with nivulumab and ipilumab, last dose 1/10   : : [facility]: Dr. Schaefer (contacted at admission)  : : S/p dexamethasone load 10 mg IV  - Continue ondansetron PRN  - Continue pregabalin 200 mg BID  - Continue dexamethasone 4 mg PO BID for vasogenic edema  - PT/OT  - Neurosurgery consulted, appreciate recommendations (4/11)  Imaging... significant for diffuse small metastatic lesions to the brain. Lesions are non-operative. Would recommend Radiation Oncology consult for further management.  No acute neurosurgical intervention or additional neuroimaging needed at this time... defer rest of treatment to primary team... does not need neurosurgical follow up.  - Radiation Oncology consulted, appreciate recommendations (4/11)  No plans for addition RT at this time.  MRI perfusion will be reviewed at CNS tumor board, 4/16.  Please have the film library push [facility] perfusion [facility] the [facility], for review as well.  - Supportive Oncology consulted, appreciate recommendations (4/9)  Continue scheduled acetaminophen 975 mg PO q8h   Continue lidocaine 4% TD patch once daily   Continue  scheduled oxycodone ER (OxyContin) 10mg PO q12h  Continue oxycodone IR 10mg PO q3h PRN for moderate to severe pain  Continue hydromorphone 0.5mg IV q3h PRN for breakthrough pain--please discontinue in anticipation for discharge  Continue ondansetron 8mg PO/IV q8h PRN for n/v, first line--remind pt of availability as appropriate, please ensure dose is the same for both IV and PO orders  Continue scheduled Miralax 17g PO once daily  Continue scheduled Amber-Colace 1 tab BID   Goal to have BM without straining q48-72h, adjust regimen as needed  Continue aripiprazole 10mg PO once daily  Continue diazepam 7.5mg PO AM / 5mg PO PM  Continue pregabalin 200mg PO BID  Continue propanol LA 120mg PO BID  Continue sertraline 200mg PO once daily   If need arises, consider consulting Psych if medications need adjusting given complexity of existing regimen and extensive psych hx  Spiritual Care consulted for support  Continue guaifenesin 12h tablet 600mg PO BID PRN for cough      #ICI Pneumonitis vs CAP  #COPD  : : Grade 2-3 pneumonitis on CT PE  : : Leukocytosis on admission  : : No O2 requirement at baseline  : : Procal 0.24  : : RSV/flu/COVID PCR negative  : : MRSA nares negative  : : Strep, Legionella urine antigens negative  : : S/p levofloxacin 500 mg daily (last dose 4/10, completed 5-day course)  - Continue dapsone for PJP ppx   - Continue DuoNebs Q6H  - Continue albuterol PRN     #FARNK  - Continue CPAP QHS     #MIGEL  : : At baseline Hgb ~10 on admission  - Continue home iron supplement every other day w/ vitamin C     #HLD  - Continue rosuvastatin 10 mg daily     #Anxiety  #PTSD  #MDD, treatment-refractory, s/p vagal nerve stimulator   - Continue sertraline 200 mg daily  - Continue memantine 10 mg BID     Dispo: Moderate intensity rehab pending coordinating care with [facility] team     F: Cautious, no documented echo  E: K >4, Mg >2  N: Regular, 2 L fluid restriction  A: PIV  O2: RA  GI ppx: None  DVT ppx: Enoxaparin SC     Code  Status: DNR/DNI  Surrogate Decision-Maker: Mason Be (Brother, [phone])       This patient was seen, discussed and examined with the attending, Dr. Schroeder, who agrees with the management plan.    Jakob Dillard MD  PGY-1, Neurology

## 2025-04-15 NOTE — PROGRESS NOTES
"SUPPORTIVE AND PALLIATIVE ONCOLOGY INPATIENT FOLLOW-UP    SERVICE DATE: 04/15/25     Updates and Recommendations (04/15/25):  Awaiting Tumor Board recommendations, pending 4/16/25 meeting.     Pt endorsing mild R chest pressure and R forearm cramping, \"like a Alberto horse,\" this morning. Notified primary team.     Head/neck pain well controlled per pt. No anticipated changes to regimen today.    Remind pt of ondansetron availability as appropriate, please ensure dose is the same for both IV and PO orders    ASSESSMENT/PLAN:  Manuel Marrufo is a 60 y.o. 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 PMHx significant for spinal stenosis (s/p L4-S1 lumbar decompression in 2021), HTN, HLD, non-alcoholic fatty liver disease, COPD, obesity, FRANK on CPAP, depression s/p vagal nerve stimulator (SA x2, 2007), PTSD, and GERD. Admitted 4/6/2025 for further evaluation and management of generalized weakness, fatigue, SOB, and chronic worsening headache. Pending Tumor Board recommendations s/p MRI brain. Supportive and Palliative Oncology is consulted for assistance with pain and symptom management.      Neoplasm Related Pain  Headache, neck, and BLE/feet pain related to known malignancy with metastatic lung and brain involvement.   Type: Somatic, neuropathic  Pain control: Well controlled   Home regimen: oxycodone ER (OxyContin) 10mg q12h, oxycodone IR 10mg q6h PRN, lidocaine TD patch once daily, acetaminophen PRN [usually 3g/day]  Intolerances: Vicodin [rx: sweating, shaking]  Personalized pain goal: Mild  Total OME usage for the past 24 hours: 25 OME  Renal function and hepatic function WNL.  Continue scheduled acetaminophen 975mg PO q8h   Continue lidocaine 4% TD patch once daily   dexamethasone 4mg IV q12h per primary  Continue scheduled oxycodone ER (OxyContin) " 10mg PO q12h  Continue oxycodone IR 10mg PO q3h PRN for moderate to severe pain  Continue hydromorphone 0.5mg IV q3h PRN for breakthrough pain--please discontinue in anticipation for discharge  Continue to monitor pain scores and administer PRN medications as appropriate  Continue/initiate nonpharmacologic pain management strategies including ice/heat therapy, distraction techniques, deep breathing/relaxation techniques, calming music, and repositioning  Continue to monitor for signs of opioid efficacy (pain scores, improved functionality) and toxicity (pinpoint pupils, excess sedation/drowsiness/confusion, respiratory depression, etc.)     Nausea  Intermittent nausea without vomiting related to opioid use and malignancy.  Home regimen: PPI  EKG reviewed from 4/6/25, QTc 436.  PPI per primary   Continue ondansetron 8mg PO/IV q8h PRN for n/v, first line--remind pt of availability as appropriate, please ensure dose is the same for both IV and PO orders     Constipation  At risk for constipation related to opioids and reduced mobility, currently not constipated.  Has hx of diarrhea prone IBS per pt.   Usual bowel pattern: Once daily while on home loperamide  Home regimen: loperamide 6mg once daily [scheduled]  LBM: 4/14/25 per pt   Home loperamide on hold per primary   Continue scheduled Miralax 17g PO once daily  Continue scheduled Amber-Colace 1 tab BID   Goal to have BM without straining q48-72h, adjust regimen as needed  Encourage mobility as tolerated, PT/OT following     Altered Mood  Hx of anxiety, PTSD, and depression s/p vagal nerve stimulator (SA x2, 2007).  Allergy to buspirone [rx: n/v]  Previously tried: alprazolam [rx: ineffective], clonazepam [rx: ineffective]  Home regimen: propanol LA 120mg BID (for anxiety per CPRS), diazepam 7.5mg AM / 5mg PM, aripiprazole 10mg once daily, sertraline 200mg once daily, pregabalin 200mg BID, [facility] with [facility]  Continue aripiprazole 10mg PO once daily  Continue  diazepam 7.5mg PO AM / 5mg PO PM  Continue pregabalin 200mg PO BID  Continue propanol LA 120mg PO BID  Continue sertraline 200mg PO once daily   If need arises, consider consulting Psych if medications need adjusting given complexity of existing regimen and extensive psych hx  Spiritual Care consulted for support     Sleeping Difficulty  Impaired sleep related to pain, anxiety, and hospital environment.  Hx of chronic insomnia per pt.   Home regimen: None, see pain and anxiety home meds  See pain and anxiety recs     Cough  Recent productive cough likely 2/2 recent c/f pna vs ICI pneumonitis.   Pt states his recent phlegm has been clear, non-odorous.   Home regimen: Mucinex PRN  Continue guaifenesin 12h tablet 600mg PO BID PRN for cough      Disposition:  Please start the process of having prior authorization with meds to beds deliver medications to patient prior to discharge via [facility] pharmacy. Prescriptions will need to be sent 48-72 hours prior to discharge so that a prior authorization can be completed.      Discharge date pending resolution of acute hospital issues and pain control.  Pt will continue to [facility] with Mandi Crisostomo MD.    SIGNATURE: ROSSI Brito   PAGER/CONTACT:  Contact information:  Supportive and Palliative Oncology  Monday-Friday 8 AM-5 PM  Epic Secure chat or pager [phone].  After hours and weekends:  pager [phone]    =====================================================================================================    SUBJECTIVE:    Pain Assessment:  Location: Head, posterior neck, low back--radiates down BLE to feet  Duration: Constant  Characteristics:  Ratin/10, pt states level is still good and he has enough medications to keep controlled  Descriptors: Aching, sore, sharp, shooting  Aggravating: Bright lights, loud noises, movement   Relieving: Analgesics--see EMR, positioning, and modifying activity  Interference with Function: A  little    Opioid Requirements  Past 24h opioid requirements:  (4/14-4/15, 9726-9461)  oxycodone ER 10mg x 2 = 20mg = 25 OME    Total 24h OME use: 25 OME    Symptom Assessment:  Nausea: a little  Vomiting: none  Constipation: none  Anxiety: a little--though well controlled  Shortness of Breath: none  Cough: a little    Information obtained from: chart review, interview of patient, and discussion with primary team  ______________________________________________________________________     OBJECTIVE:    Lab Results   Component Value Date    WBC 10.9 04/13/2025    HGB 9.5 (L) 04/13/2025    HCT 34.3 (L) 04/13/2025    MCV 74 (L) 04/13/2025     04/13/2025     Lab Results   Component Value Date    GLUCOSE 85 04/13/2025    CALCIUM 8.9 04/13/2025     04/13/2025    K 4.2 04/13/2025    CO2 24 04/13/2025     04/13/2025    BUN 35 (H) 04/13/2025    CREATININE 1.04 04/13/2025     Lab Results   Component Value Date    ALT 36 04/07/2025    AST 29 04/07/2025    ALKPHOS 83 04/07/2025    BILITOT 0.5 04/07/2025     Estimated Creatinine Clearance: 98.9 mL/min (by C-G formula based on SCr of 1.04 mg/dL).    Scheduled medications   acetaminophen, 975 mg, oral, q8h  ARIPiprazole, 10 mg, oral, Daily  ascorbic acid, 500 mg, oral, Daily  cholecalciferol, 2,000 Units, oral, Daily  dapsone, 100 mg, oral, Daily  dexAMETHasone, 4 mg, oral, q12h TING  diazePAM, 7.5 mg, oral, Daily   And  diazePAM, 5 mg, oral, q PM  enoxaparin, 40 mg, subcutaneous, q12h TING  ferrous sulfate, 65 mg of iron, oral, Once every other day  furosemide, 40 mg, oral, Every other day  insulin lispro, 0-5 Units, subcutaneous, TID AC  lidocaine, 1 patch, transdermal, Daily  memantine, 10 mg, oral, BID  oxyCODONE ER, 10 mg, oral, q12h TING  pantoprazole, 40 mg, oral, Daily before breakfast  polyethylene glycol, 17 g, oral, Daily  pregabalin, 200 mg, oral, BID  propranolol LA, 120 mg, oral, BID  rosuvastatin, 10 mg, oral, Daily  sennosides-docusate sodium, 1  tablet, oral, BID  sertraline, 200 mg, oral, Daily    Continuous medications     PRN medications  albuterol, 2.5 mg, q6h PRN  dextrose, 12.5 g, q15 min PRN  dextrose, 25 g, q15 min PRN  glucagon, 1 mg, q15 min PRN  glucagon, 1 mg, q15 min PRN  guaiFENesin, 600 mg, BID PRN  HYDROmorphone, 0.5 mg, q3h PRN  naloxone, 0.2 mg, q5 min PRN  ondansetron ODT, 8 mg, q8h PRN   Or  ondansetron, 4 mg, q8h PRN  oxyCODONE, 10 mg, q3h PRN    PHYSICAL EXAMINATION:    Vital Signs:   Vital signs reviewed  Visit Vitals  /90 (BP Location: Left arm, Patient Position: Lying)   Pulse 63   Temp 36.2 °C (97.2 °F) (Temporal)   Resp 16     0-10 (Numeric) Pain Score: 7     Physical Exam   Vitals reviewed.   Constitutional:       Comments: Alert, awake, ill appearing gentleman laying in bed. No signs of acute distress. Pleasant, cooperative, and participating in interview.  HENT:   Head:      Comments: Normocephalic, atraumatic.   Eyes:      Comments: Sclera clear, EOM intact, wearing glasses.    Pulmonary:      Comments: Symmetrical chest rise. Regular rate and depth of respirations. Room air.   Abdominal:      Comments: Abdomen obese, slightly distended, non tender.  Musculoskeletal:      Comments: Normal muscle strength and tone. RANDLE x4. Generalized non-pitting edema.   Skin:     Comments: No lesions, rash, or abrasions present on visible skin. Skin color appropriate for ethnicity.   Neurological:      Comments: A&Ox4, follows commands, no apparent sensory deficits.   Psychiatric:      Comments: Mild anxiety and sadness, but behavior otherwise appropriate.     PALLIATIVE CARE ENCOUNTER:    Supportive and Palliative Oncology encounter:  Spoke with patient at bedside.  Emotional support provided.  Coordination of care: medication and symptom re-evaluation    Medical Decision Making/Goals of Care/Advance Care Planning:  (4/7/2025) (3/5/2025) Patient's current clinical condition, including diagnosis, prognosis, and management plan, and  goals of care were discussed.   Life limiting disease: Metastatic malignancy   Family: Supportive, mother, brother, and sister  Performance status: Moderate limitations due to progressive physical weakness, anxiety, and pain.  Joys/meaning/strength: [place]  Understanding of health: Demonstrates good prognostic understanding of disease process, understands plan for updated pain and symptom recs.   Information: Appreciates full disclosure  Goals: Cancer tx, pain/symptom control  Worries and fears now and future: Not being offered cancer tx   Code status discussion: DNR-DNI     (4/7/35) Code status discussion: Confirmed DNR-DNI. Rad Onc attempting to visit at this time and will address above GOC further during next visit.      Advance Directives  Existence of Advance Directives: Yes, but NOT on file in medical record  Decision maker: FRANCISCO is pt's brotherMason  Code Status: DNR-DNI    =====================================================================================================    Signature and billing:  Medical complexity was high level due to due to complexity of problems, extensive data review, and high risk of management/treatment.    I spent 50 minutes in the care of this patient which included chart review, interviewing patient/family, discussion with primary team, coordination of care, and documentation.    Data:   Diagnostic tests and information reviewed for today's visit:  Conversation with primary team, Most recent labs, Most recent EKG, Medications    Some elements copied from my note on 4/9/25, the elements have been updated and all reflect current decision making from today, 04/15/25.    Plan of Care discussed with: Primary team, pt     Thank you for asking Supportive and Palliative Oncology to assist with care of this patient.  Recommendations will be communicated back to the consulting service by way of shared electronic medical record/secure chat/email or face-to-face.   We will  continue to follow.  Please contact us for additional questions or concerns.    SIGNATURE: ANNELISE Brito-BRAD   PAGER/CONTACT:  Contact information:  Supportive and Palliative Oncology  Monday-Friday 8 AM-5 PM, Epic Secure chat or pager [phone].  After hours and weekends: pager [phone]

## 2025-04-15 NOTE — PROGRESS NOTES
04/15/25 1400   Discharge Planning   Living Arrangements Alone   Support Systems Family members   Type of Residence Private residence   Number of Stairs to Enter Residence 0   Number of Stairs Within Residence 10   Who is requesting discharge planning? Provider   Home or Post Acute Services Post acute facilities (Rehab/SNF/etc)   Type of Post Acute Facility Services Skilled nursing   Type of Home Care Services Home OT;Home PT   Expected Discharge Disposition SNF   Does the patient need discharge transport arranged? Yes   RoundTrip coordination needed? Yes   Has discharge transport been arranged? No   Financial Resource Strain   How hard is it for you to pay for the very basics like food, housing, medical care, and heating? Not hard     04/15/2025: TCC spoke with patient at bedside regarding plan of care. Patient was given an additional SNF list for review. Patient chose three facilities: [facility],  [facility], and [facility]. [facility]. Updated OT note needed before initiating precert. TCC will continue to follow for additional needs. Shannon HUANG TCC

## 2025-04-16 ENCOUNTER — APPOINTMENT (OUTPATIENT)
Dept: HEMATOLOGY/ONCOLOGY | Facility: HOSPITAL | Age: 60
End: 2025-04-16
Payer: MEDICARE

## 2025-04-16 LAB
GLUCOSE BLD MANUAL STRIP-MCNC: 100 MG/DL (ref 74–99)
GLUCOSE BLD MANUAL STRIP-MCNC: 131 MG/DL (ref 74–99)
GLUCOSE BLD MANUAL STRIP-MCNC: 150 MG/DL (ref 74–99)
GLUCOSE BLD MANUAL STRIP-MCNC: 168 MG/DL (ref 74–99)

## 2025-04-16 PROCEDURE — 2500000004 HC RX 250 GENERAL PHARMACY W/ HCPCS (ALT 636 FOR OP/ED): Mod: JZ

## 2025-04-16 PROCEDURE — 2500000002 HC RX 250 W HCPCS SELF ADMINISTERED DRUGS (ALT 637 FOR MEDICARE OP, ALT 636 FOR OP/ED)

## 2025-04-16 PROCEDURE — 82947 ASSAY GLUCOSE BLOOD QUANT: CPT

## 2025-04-16 PROCEDURE — 2500000004 HC RX 250 GENERAL PHARMACY W/ HCPCS (ALT 636 FOR OP/ED)

## 2025-04-16 PROCEDURE — 1170000001 HC PRIVATE ONCOLOGY ROOM DAILY

## 2025-04-16 PROCEDURE — 2500000001 HC RX 250 WO HCPCS SELF ADMINISTERED DRUGS (ALT 637 FOR MEDICARE OP)

## 2025-04-16 PROCEDURE — 99233 SBSQ HOSP IP/OBS HIGH 50: CPT

## 2025-04-16 PROCEDURE — 97530 THERAPEUTIC ACTIVITIES: CPT | Mod: GO

## 2025-04-16 RX ORDER — DEXAMETHASONE 2 MG/1
2 TABLET ORAL DAILY
Status: DISCONTINUED | OUTPATIENT
Start: 2025-04-26 | End: 2025-04-17 | Stop reason: HOSPADM

## 2025-04-16 RX ORDER — ASCORBIC ACID 500 MG
500 TABLET ORAL DAILY
Start: 2025-04-17

## 2025-04-16 RX ORDER — AMOXICILLIN 250 MG
1 CAPSULE ORAL 2 TIMES DAILY
Start: 2025-04-16

## 2025-04-16 RX ORDER — POLYETHYLENE GLYCOL 3350 17 G/17G
17 POWDER, FOR SOLUTION ORAL DAILY
Start: 2025-04-17

## 2025-04-16 RX ORDER — DIAZEPAM 5 MG/1
TABLET ORAL
Start: 2025-04-16 | End: 2025-04-17

## 2025-04-16 RX ORDER — OXYCODONE HYDROCHLORIDE 10 MG/1
10 TABLET ORAL
Qty: 10 TABLET | Refills: 0 | Status: SHIPPED | OUTPATIENT
Start: 2025-04-16

## 2025-04-16 RX ORDER — DEXAMETHASONE 4 MG/1
4 TABLET ORAL EVERY 12 HOURS SCHEDULED
Status: DISCONTINUED | OUTPATIENT
Start: 2025-04-16 | End: 2025-04-17 | Stop reason: HOSPADM

## 2025-04-16 RX ORDER — DEXAMETHASONE 2 MG/1
TABLET ORAL
Start: 2025-04-16 | End: 2025-05-26

## 2025-04-16 RX ORDER — DEXAMETHASONE 2 MG/1
2 TABLET ORAL EVERY 12 HOURS SCHEDULED
Status: DISCONTINUED | OUTPATIENT
Start: 2025-04-19 | End: 2025-04-17 | Stop reason: HOSPADM

## 2025-04-16 RX ORDER — FUROSEMIDE 40 MG/1
40 TABLET ORAL EVERY OTHER DAY
Start: 2025-04-17

## 2025-04-16 RX ORDER — PREGABALIN 200 MG/1
200 CAPSULE ORAL 2 TIMES DAILY
Start: 2025-04-16 | End: 2025-04-17

## 2025-04-16 RX ORDER — PANTOPRAZOLE SODIUM 40 MG/1
40 TABLET, DELAYED RELEASE ORAL
Start: 2025-04-17

## 2025-04-16 RX ADMIN — OXYCODONE HYDROCHLORIDE AND ACETAMINOPHEN 500 MG: 500 TABLET ORAL at 08:34

## 2025-04-16 RX ADMIN — FERROUS SULFATE TAB 325 MG (65 MG ELEMENTAL FE) 325 MG: 325 (65 FE) TAB at 08:34

## 2025-04-16 RX ADMIN — DEXAMETHASONE 4 MG: 4 TABLET ORAL at 21:21

## 2025-04-16 RX ADMIN — DIAZEPAM 7.5 MG: 5 TABLET ORAL at 08:35

## 2025-04-16 RX ADMIN — PREGABALIN 200 MG: 25 CAPSULE ORAL at 08:34

## 2025-04-16 RX ADMIN — ENOXAPARIN SODIUM 40 MG: 100 INJECTION SUBCUTANEOUS at 21:20

## 2025-04-16 RX ADMIN — OXYCODONE HYDROCHLORIDE 10 MG: 10 TABLET, FILM COATED, EXTENDED RELEASE ORAL at 21:21

## 2025-04-16 RX ADMIN — OXYCODONE HYDROCHLORIDE 10 MG: 10 TABLET, FILM COATED, EXTENDED RELEASE ORAL at 08:34

## 2025-04-16 RX ADMIN — ACETAMINOPHEN 975 MG: 325 TABLET, FILM COATED ORAL at 22:56

## 2025-04-16 RX ADMIN — DIAZEPAM 5 MG: 10 TABLET ORAL at 21:20

## 2025-04-16 RX ADMIN — MEMANTINE 10 MG: 10 TABLET ORAL at 21:20

## 2025-04-16 RX ADMIN — PROPRANOLOL HYDROCHLORIDE 120 MG: 120 CAPSULE, EXTENDED RELEASE ORAL at 21:20

## 2025-04-16 RX ADMIN — ARIPIPRAZOLE 10 MG: 10 TABLET ORAL at 08:35

## 2025-04-16 RX ADMIN — Medication 2000 UNITS: at 21:21

## 2025-04-16 RX ADMIN — SERTRALINE 200 MG: 100 TABLET, FILM COATED ORAL at 21:20

## 2025-04-16 RX ADMIN — DAPSONE 100 MG: 100 TABLET ORAL at 21:20

## 2025-04-16 RX ADMIN — MEMANTINE 10 MG: 10 TABLET ORAL at 08:34

## 2025-04-16 RX ADMIN — ROSUVASTATIN CALCIUM 10 MG: 10 TABLET, FILM COATED ORAL at 21:20

## 2025-04-16 RX ADMIN — PANTOPRAZOLE SODIUM 40 MG: 40 TABLET, DELAYED RELEASE ORAL at 08:34

## 2025-04-16 RX ADMIN — PREGABALIN 200 MG: 25 CAPSULE ORAL at 21:21

## 2025-04-16 RX ADMIN — ENOXAPARIN SODIUM 40 MG: 100 INJECTION SUBCUTANEOUS at 08:35

## 2025-04-16 RX ADMIN — OXYCODONE HYDROCHLORIDE 10 MG: 10 TABLET ORAL at 22:56

## 2025-04-16 RX ADMIN — DEXAMETHASONE 4 MG: 4 TABLET ORAL at 08:35

## 2025-04-16 RX ADMIN — ACETAMINOPHEN 975 MG: 325 TABLET, FILM COATED ORAL at 08:34

## 2025-04-16 RX ADMIN — PROPRANOLOL HYDROCHLORIDE 120 MG: 120 CAPSULE, EXTENDED RELEASE ORAL at 08:34

## 2025-04-16 ASSESSMENT — COGNITIVE AND FUNCTIONAL STATUS - GENERAL
MOVING FROM LYING ON BACK TO SITTING ON SIDE OF FLAT BED WITH BEDRAILS: A LITTLE
TOILETING: A LOT
TURNING FROM BACK TO SIDE WHILE IN FLAT BAD: A LITTLE
MOVING FROM LYING ON BACK TO SITTING ON SIDE OF FLAT BED WITH BEDRAILS: A LITTLE
CLIMB 3 TO 5 STEPS WITH RAILING: TOTAL
MOBILITY SCORE: 12
DRESSING REGULAR UPPER BODY CLOTHING: A LOT
MOVING TO AND FROM BED TO CHAIR: A LOT
DRESSING REGULAR LOWER BODY CLOTHING: TOTAL
DAILY ACTIVITIY SCORE: 13
HELP NEEDED FOR BATHING: A LOT
DRESSING REGULAR UPPER BODY CLOTHING: A LOT
EATING MEALS: A LITTLE
STANDING UP FROM CHAIR USING ARMS: A LOT
CLIMB 3 TO 5 STEPS WITH RAILING: TOTAL
WALKING IN HOSPITAL ROOM: TOTAL
HELP NEEDED FOR BATHING: A LOT
HELP NEEDED FOR BATHING: A LOT
STANDING UP FROM CHAIR USING ARMS: A LOT
TOILETING: A LOT
DRESSING REGULAR LOWER BODY CLOTHING: TOTAL
MOVING TO AND FROM BED TO CHAIR: A LOT
TOILETING: A LOT
PERSONAL GROOMING: A LITTLE
WALKING IN HOSPITAL ROOM: TOTAL
TURNING FROM BACK TO SIDE WHILE IN FLAT BAD: A LITTLE
DRESSING REGULAR LOWER BODY CLOTHING: A LOT
DAILY ACTIVITIY SCORE: 15
DRESSING REGULAR UPPER BODY CLOTHING: A LOT
PERSONAL GROOMING: A LITTLE
DAILY ACTIVITIY SCORE: 13
MOBILITY SCORE: 12
EATING MEALS: A LITTLE
PERSONAL GROOMING: A LITTLE

## 2025-04-16 ASSESSMENT — PAIN SCALES - GENERAL
PAINLEVEL_OUTOF10: 0 - NO PAIN
PAINLEVEL_OUTOF10: 6
PAINLEVEL_OUTOF10: 2
PAINLEVEL_OUTOF10: 5 - MODERATE PAIN

## 2025-04-16 ASSESSMENT — PAIN - FUNCTIONAL ASSESSMENT
PAIN_FUNCTIONAL_ASSESSMENT: 0-10
PAIN_FUNCTIONAL_ASSESSMENT: 0-10

## 2025-04-16 NOTE — TREATMENT PLAN
Discussed at CNS tumor board.     MRI reviewed.  No new metastases.  Interval increase in size a few lesions, consistent with treatment effect from WBRT in Dec/2024.    No plans for additional RT or surgery.    Recommend follow up with [facility] Dias[facility] at [facility].     Repeat MRI brain w/wo contrast in 2 months.    Continue Dex with GI ppx, slow taper.    --4mg BID x 7 days  --2mg BID x 7 days  --2mg daily, until seen in follow up by Radiation Oncology

## 2025-04-16 NOTE — CARE PLAN
The patient's goals for the shift include      The clinical goals for the shift include Pt will remain HDS and VSS through shift end on 4/16/25 @1900      Problem: Pain - Adult  Goal: Verbalizes/displays adequate comfort level or baseline comfort level  Outcome: Progressing     Problem: Safety - Adult  Goal: Free from fall injury  Outcome: Progressing     Problem: Discharge Planning  Goal: Discharge to home or other facility with appropriate resources  Outcome: Progressing     Problem: Chronic Conditions and Co-morbidities  Goal: Patient's chronic conditions and co-morbidity symptoms are monitored and maintained or improved  Outcome: Progressing     Problem: Nutrition  Goal: Nutrient intake appropriate for maintaining nutritional needs  Outcome: Progressing     Problem: Fall/Injury  Goal: Not fall by end of shift  Outcome: Progressing  Goal: Be free from injury by end of the shift  Outcome: Progressing  Goal: Verbalize understanding of personal risk factors for fall in the hospital  Outcome: Progressing  Goal: Verbalize understanding of risk factor reduction measures to prevent injury from fall in the home  Outcome: Progressing  Goal: Use assistive devices by end of the shift  Outcome: Progressing  Goal: Pace activities to prevent fatigue by end of the shift  Outcome: Progressing     Problem: Pain  Goal: Takes deep breaths with improved pain control throughout the shift  Outcome: Progressing  Goal: Turns in bed with improved pain control throughout the shift  Outcome: Progressing  Goal: Walks with improved pain control throughout the shift  Outcome: Progressing  Goal: Performs ADL's with improved pain control throughout shift  Outcome: Progressing  Goal: Participates in PT with improved pain control throughout the shift  Outcome: Progressing  Goal: Free from opioid side effects throughout the shift  Outcome: Progressing  Goal: Free from acute confusion related to pain meds throughout the shift  Outcome:  Progressing     Problem: Respiratory  Goal: Clear secretions with interventions this shift  Outcome: Progressing  Goal: Minimize anxiety/maximize coping throughout shift  Outcome: Progressing  Goal: Minimal/no exertional discomfort or dyspnea this shift  Outcome: Progressing  Goal: No signs of respiratory distress (eg. Use of accessory muscles. Peds grunting)  Outcome: Progressing  Goal: Patent airway maintained this shift  Outcome: Progressing  Goal: Tolerate mechanical ventilation evidenced by VS/agitation level this shift  Outcome: Progressing  Goal: Tolerate pulmonary toileting this shift  Outcome: Progressing  Goal: Verbalize decreased shortness of breath this shift  Outcome: Progressing  Goal: Wean oxygen to maintain O2 saturation per order/standard this shift  Outcome: Progressing  Goal: Increase self care and/or family involvement in next 24 hours  Outcome: Progressing     Problem: Skin  Goal: Decreased wound size/increased tissue granulation at next dressing change  Outcome: Progressing  Flowsheets (Taken 4/15/2025 0106 by Danial Kerr RN)  Decreased wound size/increased tissue granulation at next dressing change: Promote sleep for wound healing  Goal: Participates in plan/prevention/treatment measures  Outcome: Progressing  Flowsheets (Taken 4/16/2025 1736)  Participates in plan/prevention/treatment measures: Elevate heels  Goal: Prevent/manage excess moisture  Outcome: Progressing  Flowsheets (Taken 4/16/2025 1736)  Prevent/manage excess moisture: Cleanse incontinence/protect with barrier cream  Goal: Prevent/minimize sheer/friction injuries  Outcome: Progressing  Flowsheets (Taken 4/16/2025 1736)  Prevent/minimize sheer/friction injuries: Turn/reposition every 2 hours/use positioning/transfer devices  Goal: Promote/optimize nutrition  Outcome: Progressing  Flowsheets (Taken 4/16/2025 1736)  Promote/optimize nutrition: Offer water/supplements/favorite foods  Goal: Promote skin healing  Outcome:  Progressing  Flowsheets (Taken 4/16/2025 1736)  Promote skin healing: Assess skin/pad under line(s)/device(s)

## 2025-04-16 NOTE — TUMOR BOARD NOTE
CNS Tumor Board Recommendations       Patient was presented by Bowen Chowdhury PA-C at our CNS Tumor Board on 04/16/2025 which included representatives from Radiation oncology, Surgical oncology, Neuro-oncology, Pathology, Radiology, Research, Neurosurgery, Social Work (Neurosurgery).     Current patient presents with history of the following treatment history:PMH FRANK on CPAP, alcohol fatty liver disease, GERD, s/p vagal nerve stimulator (2007), left shoulder melanoma s/p resection (2019), recent diagnosis of lung metastases in 06/2024 treated with one cycle of immunotherapy. P/w headaches, nausea, changes in balance and coordination. MRI was difficult to obtain due to spinal stimulator placement. CT with numerous hemorrhagic lesions. CTA with multiple pulmonary nodules.         04/10/25: Increased FLAIR signal changes with numerous intra-axial lesions both decreasing in size but with dominant lesions in LF and R parietal lobe with increased enhancement.         The CNS Tumor Board tumor board considered available treatment options and made the following recommendations: Treat with steroids. Repeat MRI brain w/wo in 2 months. Perfusion imaging not recommended.     Clinical Trial Status: N/A     National site-specific guidelines were discussed with respect to the case.

## 2025-04-16 NOTE — PROGRESS NOTES
Occupational Therapy      Occupational Therapy Treatment    Name: Manuel Marrufo  MRN: [id number]  : 1965  Date: 25  Room: [address] [place] [address]      Time Calculation  Start Time: 0951  Stop Time: 1014  Time Calculation (min): 23 min    Assessment:  OT Assessment: Pt progressing with functional transfers, tolerated increased time sitting EOB and completed side steps with one person assist. Pt continues to be appropriate for moderate intensity OT.  Prognosis: Good  Barriers to Discharge Home: Physical needs  Physical Needs: 24hr ADL assistance needed, High falls risk due to function or environment, 24hr mobility assistance needed  Evaluation/Treatment Tolerance: Patient limited by fatigue, Patient limited by pain  End of Session Communication: Bedside nurse  End of Session Patient Position: Bed, 3 rail up, Alarm on  Plan:  Treatment Interventions: ADL retraining, Compensatory technique education, UE strengthening/ROM, Functional transfer training  OT Frequency: 3 times per week  OT Discharge Recommendations: Moderate intensity level of continued care  Equipment Recommended upon Discharge: Wheeled walker  OT Recommended Transfer Status: Assist of 2  OT - OK to Discharge: Yes    Subjective   General:  OT Last Visit  OT Received On: 25  Prior to Session Communication: Bedside nurse  Patient Position Received: Bed, 3 rail up, Alarm on  General Comment: Pt in supine on arrival, willing to participate in OT   Precautions:  Medical Precautions: Fall precautions  Vitals:    Lines/Tubes/Drains:  External Urinary Catheter Male (Active)   Number of days: 10       Cognition:  Arousal/Alertness: Delayed responses to stimuli  Orientation Level: Oriented X4    Pain Assessment:  Pain Assessment  Pain Assessment: 0-10  0-10 (Numeric) Pain Score:  (c/o soreness)     Objective     Bed Mobility/Transfers:   Bed Mobility  Bed Mobility: Yes  Bed Mobility 1  Bed Mobility 1: Supine to sitting  Level of Assistance 1: Moderate  assistance  Bed Mobility Comments 1: HOB elevated  Bed Mobility 2  Bed Mobility  2: Sitting to supine  Level of Assistance 2: Minimum assistance, Moderate verbal cues  Bed Mobility Comments 2: assisted with LEs back into bed, used HOB up and bed rails  Transfers  Transfer: Yes  Transfer 1  Transfer From 1: Sit to, Stand to  Transfer to 1: Stand, Sit  Technique 1: Sit to stand, Stand to sit  Transfer Device 1: Walker  Trials/Comments 1: Completed 2 trials, required elevated bed surface and verbal cues on safe hand placement                Balance:  Dynamic Standing Balance  Dynamic Standing-Level of Assistance: Minimum assistance  Dynamic Standing-Comments: Using WW, pt took 5 side steps  Static Standing Balance  Static Standing-Level of Assistance: Minimum assistance  Static Standing-Comment/Number of Minutes: using WW       Therapy/Activity:      Therapeutic Activity  Therapeutic Activity Performed: Yes  Therapeutic Activity 1: Pt tolerated sitting EOB for ~ 10 min, practiced standing and side stepping.           Outcome Measures:  [facility] Daily Activity  Putting on and taking off regular lower body clothing: A lot  Bathing (including washing, rinsing, drying): A lot  Putting on and taking off regular upper body clothing: A lot  Toileting, which includes using toilet, bedpan or urinal: A lot  Taking care of personal grooming such as brushing teeth: A little  Eating Meals: None  Daily Activity - Total Score: 15     Education Documentation  Body Mechanics, taught by Tiki Taylor OT at 4/16/2025 11:30 AM.  Learner: Patient  Readiness: Acceptance  Method: Explanation  Response: Verbalizes Understanding    Precautions, taught by Tiki Taylor OT at 4/16/2025 11:30 AM.  Learner: Patient  Readiness: Acceptance  Method: Explanation  Response: Verbalizes Understanding    ADL Training, taught by Tiki Taylor OT at 4/16/2025 11:30 AM.  Learner: Patient  Readiness: Acceptance  Method: Explanation  Response:  Verbalizes Understanding    Education Comments  No comments found.        Goals:  Encounter Problems       Encounter Problems (Active)       ADLs       Patient with complete upper body dressing with contact guard assist level of assistance donning and doffing all UE clothes with no adaptive equipment while edge of bed  (Progressing)       Start:  04/07/25    Expected End:  04/21/25            Patient with complete lower body dressing with minimal assist  level of assistance donning and doffing all LE clothes  with PRN adaptive equipment while edge of bed  (Progressing)       Start:  04/07/25    Expected End:  04/21/25            Patient will complete daily grooming tasks brushing teeth and washing face/hair with stand by assist level of assistance and PRN adaptive equipment while edge of bed . (Progressing)       Start:  04/07/25    Expected End:  04/21/25            Patient will complete toileting including hygiene clothing management/hygiene with contact guard assist level of assistance and bedside commode. (Progressing)       Start:  04/07/25    Expected End:  04/21/25               EXERCISE/STRENGTHENING       Patient with increase BUE strength in order to maximize independence with ADLs. (Progressing)       Start:  04/07/25    Expected End:  04/21/25               MOBILITY       Patient will perform Functional mobility min Household distances with minimal assist  level of assistance and least restrictive device in order to improve safety and functional mobility. (Progressing)       Start:  04/07/25    Expected End:  04/21/25               TRANSFERS       Patient will perform bed mobility contact guard assist level of assistance and bed rails in order to improve safety and independence with mobility (Progressing)       Start:  04/07/25    Expected End:  04/21/25            Patient will complete sit to stand transfer with minimal assist  level of assistance and least restrictive device in order to improve safety  and prepare for out of bed mobility. (Progressing)       Start:  04/07/25    Expected End:  04/21/25 04/16/25 at 11:31 AM   Tiki Taylor, OT   [phone]

## 2025-04-16 NOTE — PROGRESS NOTES
Manuel Marrufo is a 60 y.o. male on day 10 of admission presenting with Metastatic melanoma (Multi).      Subjective   No acute events overnight. Denies new or worsening complaints. Patient expressed appreciation for Dr. Osullivan's visit on 4/15, and he looks forward to working with her in the future.       Objective   Last Recorded Vitals  /81 (BP Location: Left arm, Patient Position: Lying)   Pulse 60   Temp 36.2 °C (97.2 °F) (Temporal)   Resp 16   Wt 126 kg (277 lb 12.5 oz)   SpO2 95%     Intake/Output last 3 Shifts:  Intake/Output Summary (Last 24 hours) at 4/16/2025 0723  Last data filed at 4/15/2025 2015  Gross per 24 hour   Intake 350 ml   Output 2950 ml   Net -2600 ml     Admission Weight  Weight: 126 kg (277 lb 12.5 oz) (04/06/25 2100)    Daily Weight  04/06/25 : 126 kg (277 lb 12.5 oz)    Physical Exam  GEN: In no acute distress; cushingoid habitus  HEENT: Mucous membranes moist; eyes, ears, and nose without exudates; no scleral icterus  CV: Regular rate and rhythm; no murmurs, rubs, or gallops  RESP: Clear to auscultation bilaterally; no accessory muscle use; normal work of breathing  ABD: Soft, non-tender; bowel sounds present  EXT: Radial pulses strong bilaterally; no peripheral edema  SKIN: Warm and dry; no visible rashes  NEURO: A&O x 4, moves all four extremities against gravity without drift, sensation to light touch intact in all four extremities    Relevant Results  Scheduled medications  acetaminophen, 975 mg, oral, q8h  ARIPiprazole, 10 mg, oral, Daily  ascorbic acid, 500 mg, oral, Daily  cholecalciferol, 2,000 Units, oral, Daily  dapsone, 100 mg, oral, Daily  dexAMETHasone, 4 mg, oral, q12h TING  diazePAM, 7.5 mg, oral, Daily   And  diazePAM, 5 mg, oral, q PM  enoxaparin, 40 mg, subcutaneous, q12h TING  ferrous sulfate, 65 mg of iron, oral, Once every other day  furosemide, 40 mg, oral, Every other day  insulin lispro, 0-5 Units, subcutaneous, TID AC  lidocaine, 1 patch, transdermal,  Daily  memantine, 10 mg, oral, BID  oxyCODONE ER, 10 mg, oral, q12h TING  pantoprazole, 40 mg, oral, Daily before breakfast  polyethylene glycol, 17 g, oral, Daily  pregabalin, 200 mg, oral, BID  propranolol LA, 120 mg, oral, BID  rosuvastatin, 10 mg, oral, Daily  sennosides-docusate sodium, 1 tablet, oral, BID  sertraline, 200 mg, oral, Daily    Continuous medications     PRN medications  PRN medications: albuterol, dextrose, dextrose, glucagon, glucagon, guaiFENesin, HYDROmorphone, naloxone, ondansetron ODT **OR** ondansetron, oxyCODONE    Results from last 72 hours   Lab Units 04/15/25  0959 04/13/25  0753   WBC AUTO x10*3/uL 16.8* 10.9   HEMOGLOBIN g/dL 10.6* 9.5*   PLATELETS AUTO x10*3/uL 357 328   SODIUM mmol/L 136 140   POTASSIUM mmol/L 4.1 4.2   CO2 mmol/L 21 24   BUN mg/dL 36* 35*   MAGNESIUM mg/dL 2.61* 2.51*   GLUCOSE mg/dL 159* 85      CTH w/o contrast (4/6/2025)  Increased size of now 1.7 cm right parietal metastatic lesion with increased adjacent vasogenic edema when compared to 03/03/2025 CT.  Superimposed intralesional hemorrhage can not be excluded. No acute intracranial hemorrhage or new mass effect otherwise. Further evaluation with MRI may be warranted.  Multiple known intracranial metastatic lesions with vasogenic edema better visualized on the prior 11/26/2024 MRI     CT PE (4/6/2025)  No acute pulmonary embolism to the segmental level.  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 The differential diagnosis includes multifocal pneumonia, felt to be less likely. Given patient's presentation of shortness of breath, this could be grade 2 or grade 3 pneumonitis. Follow-up CT chest is recommended in 4-6 weeks.  Stable left adrenal nodule, which is  likely metastatic.  Unchanged indeterminate sclerotic lesions in the T10 vertebral body and right superior acetabulum, suspicious for metastasis. Confirmation with nonemergent PET-CT is recommended.  Patient's known pulmonary nodules are obscured by the above described pneumonitis. Tumor burden in this area can be  reassessed on follow-up evaluation.   Otherwise, no definite new site of disease is evident.     MRI brain tumor perfusion protocol w/wo contrast (4/10/2025)  Too numerous to count intra-axial foci of contrast enhancement involving both the supratentorial and infratentorial compartments.  Dominant lesions in the left frontal operculum and right parietal lobe selected for perfusion and permeability assessment. The lesion in the left frontal operculum reveals a corrected rBV ratio of 1.89, which is borderline increased. The lesion in the right parietal lobe demonstrates abnormal enhancement kinetics and 0 rBV ratio, likely artifactual.  Areas with some waxing and and others with waning burden of white matter FLAIR signal hyperintensity also observed.   The constellation of findings are suspicious for mixed response to therapy as well as areas of disease progression.   No MR evidence of acute intracranial infarct, hemorrhage, or mass effect.       Assessment/Plan   Manuel Marrufo is a 60-year-old male with past medical history significant for metastatic melanoma (brain, lung, spine, and adrenal) s/p dual immunotherapy with nivulumab and ipilumab x 2 cycles (last dose Jan 2025) and palliative WBRT (Dec 2024) s/p left shoulder resection (2019), spinal stenosis s/p L4-S1 lumbar decompression (2021), HTN, HLD, MASH, obesity, FRANK on CPAP, PTSD, GERD, COPD, and depression s/p vagal nerve stimulator (SA x 2, 2007) admitted for management of hemorrhagic brain metastases with vasogenic edema on 4/6. Neurosurgery consulted for recommendations regarding metastatic brain lesions. Radiation Oncology and Supportive Oncology  consulted for same. MRI brain showed diffuse metastatic burden. Neurosurgery does not recommend surgical intervention nor follow-up at this time. On high-dose dexamethasone for vasogenic edema, decreased from 4 mg IV Q6H decreased to 4 mg IV Q12H on 4/11. On arrival, patient required O2 via NC. CT PE showed possible pneumonia vs pneumonitis. Finished levofloxacin to complete 5-day course for possible pneumonia. Currently on room air. Radiation Oncology recommends dexamethasone taper: 4 mg BID x 7 days, then 2 mg BID x 7 days, then 2 mg daily until follow-up.     Updates (4/16)  - Radiation Oncology -> Dexamethasone taper as described below  - Tumor board recommendations: Treat with steroids. Repeat MRI brain w/wo in 2 months. Perfusion imaging not recommended.      #Metastatic Melanoma  #Brain Metastases, c/f intra-lesional hemorrhage and vasogenic edema  #Nausea, improved  #Fatigue  : : Increasing size of 1.7 cm right parietal lesion  : : Possible superimposed intralesional hemorrhage  : : S/p WBRT x 10 fractions (2024)  : : S/p two cycles of dual immunotherapy with nivulumab and ipilumab, last dose 1/10   : : Primary oncologist at [facility]: Dr. Schaefer (contacted at admission)  : : S/p dexamethasone load 10 mg IV  - Continue ondansetron PRN  - Continue pregabalin 200 mg BID  - Continue dexamethasone as below  - Continue pantoprazole 40 mg daily  - PT/OT  - Radiation Oncology consulted, appreciate recommendations (4/16)  No plans for additional RT or surgery  Recommend follow-up with Dr. Dias at [facility] for Radiation Oncology  Repeat MRI brain w/wo in two months  Continue dexamethasone with GI ppx: 4 mg BID x 7 days, then 2 mg BID x 7 days, then 2 mg daily until follow-up.  - Supportive Oncology consulted, appreciate recommendations (4/15)  Continue scheduled acetaminophen 975 mg PO q8h   Continue lidocaine 4% TD patch once daily   Continue scheduled oxycodone ER (OxyContin) 10mg PO q12h  Continue oxycodone IR 10mg PO q3h  PRN for moderate to severe pain  Continue hydromorphone 0.5mg IV q3h PRN for breakthrough pain--please discontinue in anticipation for discharge  Continue ondansetron 8mg PO/IV q8h PRN for n/v, first line--remind pt of availability as appropriate, please ensure dose is the same for both IV and PO orders  Continue scheduled Miralax 17g PO once daily  Continue scheduled Amber-Colace 1 tab BID   Goal to have BM without straining q48-72h, adjust regimen as needed  Continue aripiprazole 10mg PO once daily  Continue diazepam 7.5mg PO AM / 5mg PO PM  Continue pregabalin 200mg PO BID  Continue propanol LA 120mg PO BID  Continue sertraline 200mg PO once daily   If need arises, consider consulting Psych if medications need adjusting given complexity of existing regimen and extensive psych hx  Spiritual Care consulted for support  Continue guaifenesin 12h tablet 600mg PO BID PRN for cough   - Neurosurgery consulted, appreciate recommendations (4/11)  Imaging... significant for diffuse small metastatic lesions to the brain. Lesions are non-operative. Would recommend Radiation Oncology consult for further management.  No acute neurosurgical intervention or additional neuroimaging needed at this time... defer rest of treatment to primary team... does not need neurosurgical follow up.     #ICI Pneumonitis vs CAP  #COPD  : : Grade 2-3 pneumonitis on CT PE  : : Leukocytosis on admission  : : No O2 requirement at baseline  : : Procal 0.24  : : RSV/flu/COVID PCR negative  : : MRSA nares negative  : : Strep, Legionella urine antigens negative  : : S/p levofloxacin 500 mg daily (last dose 4/10, completed 5-day course)  - Continue dapsone for PJP ppx   - Continue DuoNebs Q6H  - Continue albuterol PRN     #FRANK  - Continue CPAP QHS     #MIGEL  : : At baseline Hgb ~10 on admission  - Continue home iron supplement every other day w/ vitamin C     #HLD  - Continue rosuvastatin 10 mg daily     #Anxiety  #PTSD  #MDD, treatment-refractory, s/p  vagal nerve stimulator   - Continue sertraline 200 mg daily  - Continue memantine 10 mg BID     Dispo: Moderate intensity rehab pending coordinating care with [facility] team     F: Cautious, no documented echo  E: K >4, Mg >2  N: Regular, 2 L fluid restriction  A: PIV  O2: RA  GI ppx: Pantoprazole 40 mg daily  DVT ppx: Enoxaparin SC     Code Status: DNR/DNI  Surrogate Decision-Maker: Mason Be (Brother, [phone])       This patient was seen, discussed and examined with the attending, Dr. Schroeder, who agrees with the management plan.    Jakob Dillard MD  PGY-1, Neurology

## 2025-04-16 NOTE — PROGRESS NOTES
04/16/25 1400   Discharge Planning   Living Arrangements Alone   Support Systems Family members   Type of Residence Private residence   Number of Stairs to Enter Residence 0   Number of Stairs Within Residence 10   Who is requesting discharge planning? Provider   Home or Post Acute Services Post acute facilities (Rehab/SNF/etc)   Type of Post Acute Facility Services Skilled nursing   Type of Home Care Services Home OT;Home PT   Expected Discharge Disposition SNF   Does the patient need discharge transport arranged? Yes   RoundTrip coordination needed? Yes   Has discharge transport been arranged? No   Financial Resource Strain   How hard is it for you to pay for the very basics like food, housing, medical care, and heating? Not hard     04/16/2025: Patient has been accepted to the [facility]. TCC sent over updated clinicals to facility. Precert initiated on 04/16/2025. TCC to set up transport once precert is received, Shannon HUANG TCC

## 2025-04-16 NOTE — CARE PLAN
The clinical goals for the shift include pt will remain safe and free from injury throughout shift 4/16/2025 0700      Problem: Pain - Adult  Goal: Verbalizes/displays adequate comfort level or baseline comfort level  Outcome: Progressing     Problem: Safety - Adult  Goal: Free from fall injury  Outcome: Progressing     Problem: Fall/Injury  Goal: Not fall by end of shift  Outcome: Progressing     Problem: Fall/Injury  Goal: Be free from injury by end of the shift  Outcome: Progressing     Problem: Pain  Goal: Takes deep breaths with improved pain control throughout the shift  Outcome: Progressing     Problem: Pain  Goal: Turns in bed with improved pain control throughout the shift  Outcome: Progressing     Problem: Skin  Goal: Promote skin healing  Outcome: Progressing  Flowsheets (Taken 4/16/2025 0252)  Promote skin healing:   Assess skin/pad under line(s)/device(s)   Rotate device position/do not position patient on device     Problem: Respiratory  Goal: Minimize anxiety/maximize coping throughout shift  Outcome: Progressing     Problem: Respiratory  Goal: Minimal/no exertional discomfort or dyspnea this shift  Outcome: Progressing     Problem: Respiratory  Goal: No signs of respiratory distress (eg. Use of accessory muscles. Peds grunting)  Outcome: Progressing     Problem: Respiratory  Goal: Patent airway maintained this shift  Outcome: Progressing

## 2025-04-17 ENCOUNTER — HOSPITAL ENCOUNTER (OUTPATIENT)
Dept: RADIOLOGY | Facility: EXTERNAL LOCATION | Age: 60
Discharge: HOME | End: 2025-04-17

## 2025-04-17 VITALS
HEART RATE: 66 BPM | TEMPERATURE: 97 F | DIASTOLIC BLOOD PRESSURE: 66 MMHG | BODY MASS INDEX: 42.1 KG/M2 | SYSTOLIC BLOOD PRESSURE: 103 MMHG | RESPIRATION RATE: 16 BRPM | OXYGEN SATURATION: 95 % | WEIGHT: 277.78 LBS | HEIGHT: 68 IN

## 2025-04-17 LAB — GLUCOSE BLD MANUAL STRIP-MCNC: 103 MG/DL (ref 74–99)

## 2025-04-17 PROCEDURE — 2500000004 HC RX 250 GENERAL PHARMACY W/ HCPCS (ALT 636 FOR OP/ED)

## 2025-04-17 PROCEDURE — 82947 ASSAY GLUCOSE BLOOD QUANT: CPT

## 2025-04-17 PROCEDURE — 2500000002 HC RX 250 W HCPCS SELF ADMINISTERED DRUGS (ALT 637 FOR MEDICARE OP, ALT 636 FOR OP/ED)

## 2025-04-17 PROCEDURE — 99239 HOSP IP/OBS DSCHRG MGMT >30: CPT

## 2025-04-17 PROCEDURE — 2500000001 HC RX 250 WO HCPCS SELF ADMINISTERED DRUGS (ALT 637 FOR MEDICARE OP)

## 2025-04-17 PROCEDURE — 2500000004 HC RX 250 GENERAL PHARMACY W/ HCPCS (ALT 636 FOR OP/ED): Mod: JZ

## 2025-04-17 RX ORDER — DIAZEPAM 5 MG/1
TABLET ORAL
Qty: 25 TABLET | Refills: 0 | Status: SHIPPED | OUTPATIENT
Start: 2025-04-17 | End: 2025-04-17

## 2025-04-17 RX ORDER — OXYCODONE HCL 10 MG/1
10 TABLET, FILM COATED, EXTENDED RELEASE ORAL 2 TIMES DAILY
Qty: 20 TABLET | Refills: 0 | Status: SHIPPED | OUTPATIENT
Start: 2025-04-17 | End: 2025-04-27

## 2025-04-17 RX ORDER — PREGABALIN 200 MG/1
200 CAPSULE ORAL 2 TIMES DAILY
Qty: 20 CAPSULE | Refills: 0 | Status: SHIPPED | OUTPATIENT
Start: 2025-04-17 | End: 2025-04-27

## 2025-04-17 RX ORDER — DIAZEPAM 5 MG/1
TABLET ORAL
Qty: 25 TABLET | Refills: 0 | Status: SHIPPED | OUTPATIENT
Start: 2025-04-17 | End: 2025-04-27

## 2025-04-17 RX ADMIN — DEXAMETHASONE 4 MG: 4 TABLET ORAL at 08:06

## 2025-04-17 RX ADMIN — MEMANTINE 10 MG: 10 TABLET ORAL at 08:05

## 2025-04-17 RX ADMIN — PROPRANOLOL HYDROCHLORIDE 120 MG: 120 CAPSULE, EXTENDED RELEASE ORAL at 08:05

## 2025-04-17 RX ADMIN — OXYCODONE HYDROCHLORIDE AND ACETAMINOPHEN 500 MG: 500 TABLET ORAL at 08:05

## 2025-04-17 RX ADMIN — FUROSEMIDE 40 MG: 40 TABLET ORAL at 08:05

## 2025-04-17 RX ADMIN — PANTOPRAZOLE SODIUM 40 MG: 40 TABLET, DELAYED RELEASE ORAL at 08:05

## 2025-04-17 RX ADMIN — ACETAMINOPHEN 975 MG: 325 TABLET, FILM COATED ORAL at 08:06

## 2025-04-17 RX ADMIN — DIAZEPAM 7.5 MG: 5 TABLET ORAL at 08:05

## 2025-04-17 RX ADMIN — ONDANSETRON 8 MG: 8 TABLET, ORALLY DISINTEGRATING ORAL at 07:24

## 2025-04-17 RX ADMIN — OXYCODONE HYDROCHLORIDE 10 MG: 10 TABLET, FILM COATED, EXTENDED RELEASE ORAL at 08:05

## 2025-04-17 RX ADMIN — PREGABALIN 200 MG: 25 CAPSULE ORAL at 08:05

## 2025-04-17 RX ADMIN — ENOXAPARIN SODIUM 40 MG: 100 INJECTION SUBCUTANEOUS at 08:04

## 2025-04-17 RX ADMIN — ARIPIPRAZOLE 10 MG: 10 TABLET ORAL at 08:05

## 2025-04-17 ASSESSMENT — COGNITIVE AND FUNCTIONAL STATUS - GENERAL
STANDING UP FROM CHAIR USING ARMS: A LOT
MOBILITY SCORE: 13
MOVING TO AND FROM BED TO CHAIR: A LOT
TOILETING: A LOT
DAILY ACTIVITIY SCORE: 14
PERSONAL GROOMING: A LITTLE
DRESSING REGULAR UPPER BODY CLOTHING: A LITTLE
MOVING FROM LYING ON BACK TO SITTING ON SIDE OF FLAT BED WITH BEDRAILS: A LITTLE
HELP NEEDED FOR BATHING: A LOT
CLIMB 3 TO 5 STEPS WITH RAILING: TOTAL
EATING MEALS: A LITTLE
DRESSING REGULAR LOWER BODY CLOTHING: TOTAL
WALKING IN HOSPITAL ROOM: A LOT
TURNING FROM BACK TO SIDE WHILE IN FLAT BAD: A LITTLE

## 2025-04-17 ASSESSMENT — PAIN - FUNCTIONAL ASSESSMENT: PAIN_FUNCTIONAL_ASSESSMENT: 0-10

## 2025-04-17 ASSESSMENT — PAIN SCALES - GENERAL: PAINLEVEL_OUTOF10: 0 - NO PAIN

## 2025-04-17 NOTE — PROGRESS NOTES
04/17/25 0900   Discharge Planning   Living Arrangements Alone   Support Systems Family members   Type of Residence Private residence   Number of Stairs to Enter Residence 0   Number of Stairs Within Residence 10   Who is requesting discharge planning? Provider   Home or Post Acute Services Post acute facilities (Rehab/SNF/etc)   Type of Post Acute Facility Services Skilled nursing   Expected Discharge Disposition SNF   Does the patient need discharge transport arranged? Yes   RoundTrip coordination needed? Yes   Has discharge transport been arranged? Yes   What day is the transport expected? 04/17/25   What time is the transport expected? 1000   Financial Resource Strain   How hard is it for you to pay for the very basics like food, housing, medical care, and heating? Not hard     Care Transitions -     Discharge Transfer to Facility Note 4/17/2025     Patient will discharge today to: SNF      SNF Facility name: [facility]     Bedside nurse: SAL Raymond     Report # given to RN: Yes. [phone]    Transport company name:  [facility]      time: 7000 completed in HENS? 10:00a. 7000 completed    IMM needed and signed by patient? Yes     Patient/family aware of plan?  TCC spoke with patient bedside regarding discharge plan. Patient receptive to information. TCC will continue to follow for any additional discharge needs. Shannon HUANG TCC

## 2025-04-17 NOTE — NURSING NOTE
Pt discharged to SNF. Pt sent with 4 paper prescriptions for controlled medications. Pt packed and sent with belongings. Report called prior and peripheral IV removed. Mandi Givens RN

## 2025-04-17 NOTE — NURSING NOTE
[facility]      Mandi Givens RN

## 2025-04-17 NOTE — DISCHARGE INSTRUCTIONS
Dear  Niru,    You were admitted to the hospital for a headache related to your known metastatic melanoma with brain metastases. An MRI of your brain was obtained and showed no new metastases. There was an increase in size in some of the known metastases, but this is an expected finding after receiving whole brain radiation therapy. You were evaluated by Neurosurgery who did not recommend surgery at this time. Radiation Oncology also reviewed your case and recommended a steroid taper at this time. Lastly, you met with Dr. Osullivan, a specialist in melanoma, who discussed next steps for you moving forward in managing your melanoma.    Thank you for the opportunity to care for you here at [facility].    Sincerely,  Your [facility] Oncology Team

## 2025-04-17 NOTE — DISCHARGE SUMMARY
Discharge Diagnosis  Metastatic melanoma (Multi)      Issues Requiring Follow-Up  Oncology -> For metastatic melanoma  Previously followed with Dr. Schaefer at [facility]  Patient introduced to Dr. Osullivan this admission; outpatient follow-up with  ordered  Psychiatry -> Consider interrogating VNS following inpatient MRI, if needed      Discharge Meds   Medication List      PAUSE taking these medications     hydrocortisone 10 mg tablet; Wait to take this until: April 26, 2025   Around Noon; Commonly known as: Cortef; Take 1 tablet (10 mg) by mouth   once daily. Take daily every afternoon     START taking these medications     ascorbic acid 500 mg tablet; Commonly known as: Vitamin C; Take 1 tablet   (500 mg) by mouth once daily.   ferrous sulfate 325 mg (65 mg elemental) tablet; Take 1 tablet (325 mg)   by mouth once daily with breakfast.   pantoprazole 40 mg EC tablet; Commonly known as: ProtoNix; Take 1 tablet   (40 mg) by mouth once daily in the morning. Take before meals. Do not   crush, chew, or split.   sennosides-docusate sodium 8.6-50 mg tablet; Commonly known as:   Amber-Colace; Take 1 tablet by mouth 2 times a day.     CHANGE how you take these medications     dexAMETHasone 2 mg tablet; Commonly known as: Decadron; Take 2 tablets   (4 mg) by mouth every 12 hours for 3 days, THEN 1 tablet (2 mg) every 12   hours for 7 days, THEN 1 tablet (2 mg) once daily.; Start taking on: April 16, 2025; What changed: medication strength, See the new instructions.,   Another medication with the same name was removed. Continue taking this   medication, and follow the directions you see here.   diazePAM 5 mg tablet; Commonly known as: Valium; Take 1.5 tablets (7.5   mg) by mouth once daily AND 1 tablet (5 mg) once daily in the evening. Do   all this for 10 days.; What changed: See the new instructions.   furosemide 40 mg tablet; Commonly known as: Lasix; Take 1 tablet (40 mg)   by mouth every other day.; What changed:  when to take this, additional   instructions   * oxyCODONE 10 mg immediate release tablet; Commonly known as:   Roxicodone; Take 1 tablet (10 mg) by mouth every 3 hours if needed for   severe pain (7 - 10).; What changed: when to take this, additional   instructions   * oxyCODONE ER 10 mg 12 hr tablet; Commonly known as: OxyCONTIN; Take 1   tablet (10 mg) by mouth 2 times a day for 10 days. Do not crush, chew, or   split.; What changed: Another medication with the same name was changed.   Make sure you understand how and when to take each.   polyethylene glycol 17 gram packet; Commonly known as: Glycolax,   Miralax; Take 17 g by mouth once daily.; What changed: when to take this,   reasons to take this  * This list has 2 medication(s) that are the same as other medications   prescribed for you. Read the directions carefully, and ask your doctor or   other care provider to review them with you.     CONTINUE taking these medications     acetaminophen 325 mg tablet; Commonly known as: Tylenol; Take 3 tablets   (975 mg) by mouth every 8 hours.   albuterol 90 mcg/actuation inhaler   ARIPiprazole 10 mg tablet; Commonly known as: Abilify   budesonide-glycopyr-formoterol 160-9-4.8 mcg/actuation HFA aerosol   inhaler; Commonly known as: BREZTRI   cholecalciferol 50 mcg (2,000 units) tablet; Commonly known as: Vitamin   D-3   dapsone 100 mg tablet; Take 1 tablet (100 mg) by mouth once daily.   fluticasone 50 mcg/actuation nasal spray; Commonly known as: Flonase   lidocaine 5 % patch; Commonly known as: Lidoderm   memantine 5 mg tablet; Commonly known as: Namenda   ondansetron ODT 8 mg disintegrating tablet; Commonly known as:   Zofran-ODT   pregabalin 200 mg capsule; Commonly known as: Lyrica; Take 1 capsule   (200 mg) by mouth 2 times a day for 10 days.   propranolol  mg 24 hr capsule; Commonly known as: Inderal LA; Take   1 capsule (120 mg) by mouth 2 times a day. Do not crush, chew, or split.   rosuvastatin 10 mg  tablet; Commonly known as: Crestor   sertraline 100 mg tablet; Commonly known as: Zoloft     STOP taking these medications     loperamide 2 mg tablet; Commonly known as: Imodium A-D   Mucus Relief  mg 12 hr tablet; Generic drug: guaiFENesin   POTASSIUM ORAL   tadalafil 5 mg tablet; Commonly known as: Cialis       Test Results Pending At Discharge  Pending Labs       No current pending labs.        Hospital Course  Manuel Marrufo is a 60-year-old male with past medical history significant for metastatic melanoma (brain, lung, spine, and adrenal) s/p dual immunotherapy with nivulumab and ipilumumab x 2 cycles (last dose Jan 2025) and palliative WBRT (Dec 2024) s/p left shoulder resection (2019), spinal stenosis s/p L4-S1 lumbar decompression (2021), HTN, HLD, MASH, obesity, FRANK on CPAP, PTSD, GERD, COPD, and depression s/p vagal nerve stimulator (SA x 2, 2007) admitted for headache in the setting of brain metastases with vasogenic edema. Neurosurgery consulted for recommendations regarding metastatic brain lesions. Radiation Oncology and Supportive Oncology consulted for same. MRI brain showed diffuse metastatic burden. Neurosurgery does not recommend surgical intervention nor follow-up at this time, and Radiation Oncology does not recommend additional radiation at this time. Started on high-dose dexamethasone for vasogenic edema. Dexamethasone decreased from 4 mg IV Q6H to Q12H on 4/11. Prior to discharge, according to Radiation Oncology recommendations, patient was discharged with the following taper: 4 mg BID x 7 days, then 2 mg BID x 7 days, then 2 mg daily until follow-up. Patient continued on home pantoprazole and dapsone while on dexamethasone. Glucose at goal on steroids. No indication for insulin at this time.    Of note, on admission, patient required O2 via NC. CT PE showed possible pneumonia vs pneumonitis. Finished levofloxacin to complete 5-day course for possible pneumonia. Currently on room  air.    Tumor board recommendations (4/16): Treat with steroids. Repeat MRI brain w/wo in 2 months. Perfusion imaging not recommended.     Issues Requiring Follow-up:  Oncology -> For metastatic melanoma  Previously followed with Dr. Schaefer at [facility]  Patient introduced to Dr. Osullivan this admission; outpatient follow-up with  ordered  Psychiatry -> Consider interrogating VNS following inpatient MRI, if needed      Pertinent Physical Exam At Time of Discharge  GEN: In no acute distress; cushingoid habitus  HEENT: Mucous membranes moist; eyes, ears, and nose without exudates; no scleral icterus  CV: Regular rate and rhythm; no murmurs, rubs, or gallops  RESP: Clear to auscultation bilaterally; no accessory muscle use; normal work of breathing  ABD: Soft, non-tender; bowel sounds present  EXT: Radial pulses strong bilaterally; no peripheral edema  SKIN: Warm and dry; no visible rashes  NEURO: A&O x 4, moves all four extremities against gravity without drift, sensation to light touch intact in all four extremities      Outpatient Follow-Up  Future Appointments   Date Time Provider Department Center   4/18/2025 10:30 AM Medardo Vanegas MD [id number]   4/18/2025  1:00 PM Padmaja Osullivan MD [place] [id number] Academic       Jakob Dillard MD

## 2025-04-17 NOTE — CARE PLAN
The clinical goals for the shift include Pt will remain HDS and VSS throughout shift 4/17/25 by 0700.      Problem: Pain - Adult  Goal: Verbalizes/displays adequate comfort level or baseline comfort level  Outcome: Progressing     Problem: Safety - Adult  Goal: Free from fall injury  Outcome: Progressing     Problem: Discharge Planning  Goal: Discharge to home or other facility with appropriate resources  Outcome: Progressing     Problem: Chronic Conditions and Co-morbidities  Goal: Patient's chronic conditions and co-morbidity symptoms are monitored and maintained or improved  Outcome: Progressing     Problem: Nutrition  Goal: Nutrient intake appropriate for maintaining nutritional needs  Outcome: Progressing     Problem: Fall/Injury  Goal: Not fall by end of shift  Outcome: Progressing  Goal: Be free from injury by end of the shift  Outcome: Progressing  Goal: Verbalize understanding of personal risk factors for fall in the hospital  Outcome: Progressing  Goal: Verbalize understanding of risk factor reduction measures to prevent injury from fall in the home  Outcome: Progressing  Goal: Use assistive devices by end of the shift  Outcome: Progressing  Goal: Pace activities to prevent fatigue by end of the shift  Outcome: Progressing     Problem: Pain  Goal: Takes deep breaths with improved pain control throughout the shift  Outcome: Progressing  Goal: Turns in bed with improved pain control throughout the shift  Outcome: Progressing  Goal: Walks with improved pain control throughout the shift  Outcome: Progressing  Goal: Performs ADL's with improved pain control throughout shift  Outcome: Progressing  Goal: Participates in PT with improved pain control throughout the shift  Outcome: Progressing  Goal: Free from opioid side effects throughout the shift  Outcome: Progressing  Goal: Free from acute confusion related to pain meds throughout the shift  Outcome: Progressing     Problem: Respiratory  Goal: Clear secretions  with interventions this shift  Outcome: Progressing  Goal: Minimize anxiety/maximize coping throughout shift  Outcome: Progressing  Goal: Minimal/no exertional discomfort or dyspnea this shift  Outcome: Progressing  Goal: No signs of respiratory distress (eg. Use of accessory muscles. Peds grunting)  Outcome: Progressing  Goal: Patent airway maintained this shift  Outcome: Progressing  Goal: Tolerate mechanical ventilation evidenced by VS/agitation level this shift  Outcome: Progressing  Goal: Tolerate pulmonary toileting this shift  Outcome: Progressing  Goal: Verbalize decreased shortness of breath this shift  Outcome: Progressing  Goal: Wean oxygen to maintain O2 saturation per order/standard this shift  Outcome: Progressing  Goal: Increase self care and/or family involvement in next 24 hours  Outcome: Progressing     Problem: Skin  Goal: Decreased wound size/increased tissue granulation at next dressing change  Outcome: Progressing  Goal: Participates in plan/prevention/treatment measures  Outcome: Progressing  Goal: Prevent/manage excess moisture  Outcome: Progressing  Goal: Prevent/minimize sheer/friction injuries  Outcome: Progressing  Goal: Promote/optimize nutrition  Outcome: Progressing  Goal: Promote skin healing  Outcome: Progressing

## 2025-04-18 ENCOUNTER — APPOINTMENT (OUTPATIENT)
Dept: HEMATOLOGY/ONCOLOGY | Facility: HOSPITAL | Age: 60
End: 2025-04-18
Payer: MEDICARE

## 2025-04-29 ENCOUNTER — TELEPHONE (OUTPATIENT)
Dept: HEMATOLOGY/ONCOLOGY | Facility: HOSPITAL | Age: 60
End: 2025-04-29
Payer: MEDICARE

## 2025-04-29 NOTE — TELEPHONE ENCOUNTER
Reason for Conversation  No chief complaint on file.    Background   [facility] appointment with Dr. Vanegas on 5/2.    Called Mason, patient's brother, to make his aware of the appointment. He is aware and requests that Dr. Newton call him on speaker phone during the appointment so that he is updated. He reports that Manuel is  not the best historian. Manuel's sister will also try to be at the visit.     Disposition   Spoke with Manuel's RN. She is aware of the appointment.

## 2025-05-02 ENCOUNTER — APPOINTMENT (OUTPATIENT)
Dept: HEMATOLOGY/ONCOLOGY | Facility: HOSPITAL | Age: 60
End: 2025-05-02
Payer: MEDICARE

## 2025-05-02 ENCOUNTER — OFFICE VISIT (OUTPATIENT)
Dept: HEMATOLOGY/ONCOLOGY | Facility: HOSPITAL | Age: 60
End: 2025-05-02
Payer: MEDICARE

## 2025-05-02 VITALS
RESPIRATION RATE: 18 BRPM | SYSTOLIC BLOOD PRESSURE: 121 MMHG | DIASTOLIC BLOOD PRESSURE: 72 MMHG | TEMPERATURE: 97 F | HEART RATE: 76 BPM | OXYGEN SATURATION: 94 %

## 2025-05-02 DIAGNOSIS — C79.31 MALIGNANT MELANOMA METASTATIC TO BRAIN (MULTI): ICD-10-CM

## 2025-05-02 PROCEDURE — 99215 OFFICE O/P EST HI 40 MIN: CPT | Performed by: PSYCHIATRY & NEUROLOGY

## 2025-05-02 PROCEDURE — 3078F DIAST BP <80 MM HG: CPT | Performed by: PSYCHIATRY & NEUROLOGY

## 2025-05-02 PROCEDURE — 1036F TOBACCO NON-USER: CPT | Performed by: PSYCHIATRY & NEUROLOGY

## 2025-05-02 PROCEDURE — 3074F SYST BP LT 130 MM HG: CPT | Performed by: PSYCHIATRY & NEUROLOGY

## 2025-05-02 ASSESSMENT — PAIN SCALES - GENERAL: PAINLEVEL_OUTOF10: 8

## 2025-05-02 NOTE — PROGRESS NOTES
Patient ID: Manuel Marrufo is a 60 y.o. male.  Referring Physician: Medardo Vanegas MD  [id number] [place] [address]  Primary Care Provider: No Assigned PCP Generic Provider, MD Villegas    Manuel Marrufo is a 59 y.o. 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 He initially [facility] the  [facility] with a severe headache rated 9 out of 10, characterized as throbbing and located in the parieto-occipital region. The headache was not accompanied by nausea or vomiting but was associated with tinnitus, blurred vision, and photophobia. The pain responded to oxycodone and was positional, worsening with forward bending and improving when lying flat. He has experienced these headaches since July 2024, following a diagnosis of brain metastasis from previously diagnosed melanoma.     [facility], a [facility] was later discovered during a lung cancer screening in the right lower lobe, leading to a PET scan that confirmed malignancy. An attempt at robotic surgery was aborted upon discovering metastatic implants in the intrathoracic area, with pathology confirming metastatic melanoma. He was admitted to [facility] in November 2024 due to concerns of hemorrhagic brain metastases identified on a CT scan after [facility] a [facility], resulting in his transfer to . He was started on steroids and underwent an MRI, which revealed diffuse metastatic disease. Consequently, a decision was made to [facility] through the [facility], and he completed ten sessions of palliative WBRT in December 2024. In January, the patient has undergone two cycles of dual immunotherapy with nivolumab and ipilimumab, with the last dose administered on January 10, 2025. Dr[facility], while Dr. Schaefer is seeing chemo/immunotherapy.     Past Medical History  He has a past medical history of  Agoraphobia with panic attacks, Anxiety, Awareness under anesthesia (2021), COPD (chronic obstructive pulmonary disease) (Multi), Fatty liver disease, nonalcoholic, GERD (gastroesophageal reflux disease), HLD (hyperlipidemia), Melanoma (Multi), FRANK on CPAP, and PTSD (post-traumatic stress disorder).     Surgical History  He has a past surgical history that includes Back surgery; Malignant skin lesion excision; and Tonsillectomy.    INTERVAL HISTORY (3/03/2025): He has melanoma with recent metastases to the lung and brain. He had > 25 brain mets diagnosed in December 2024 and has undergone a course of [facility] brain [facility]. Since then he has felt about the same or worse -- no improvement yet. He has also started a course of systemic chemotherapy with Ipilimumab & Nivolumab q 3 weeks at the [facility]. He complains of poor follow-up with the [facility]c Team at the [facility] since treatment began. He notes frequent and severe headaches, neck pain, diffuse body swelling, poor gait and balance, and being unable to stand without assistance. He has not been having any seizures. He has not been doing any PT thus far in 2025. He remains on Decadron 4 mg bid and remains off of an AED. He has not yet had a post-RT follow-up brain MRI at [facility] or [facility] the [facility]. He complains of SOB and feeling dizzy today.     INTERVAL HISTORY (5/03/2025): Since the last visit, he has undergone the course of whole brain RT x [facility] fractions at the [facility]. Over the past few months, he has been admitted with severe headaches, with MRI scans showing the numerous brain metastases being relatively stable. His case was reviewed at the Neuro-Oncology Tumor Board, with the recommendation to hold off on any further CNS therapy and doing serial MRI scans. He has been in a local SNF doing rehab since discharge, doing PT and OT. He remains very symptomatic lately with frequent severe headaches, improved somewhat on Decadron -- now tapered down to 2 mg bid. He also  notes severe leg weakness, gait difficulty, poor balance, confusion, and urinary and fecal incontinence. The plan is to have him transferred to a [facility] and to set up Hospice. He is now off of active treatment for the melanoma.     The ROS is as per documentation in the HPI.     Objective   BSA: There is no height or weight on file to calculate BSA.  There were no vitals taken for this visit.    Family History   Problem Relation Name Age of Onset    Arthritis Mother Patrizia Marrufo     Alcohol abuse Father      Cirrhosis Father      Cervical cancer Sister Kaity Rebolledo     Hypertension Sister Kaity Rebolledo     Other (bladder cancer) Maternal Grandmother Coral Ferrera      Oncology History    No history exists.       Manuel Marrufo  reports that he quit smoking about a year ago. His smoking use included cigarettes. He started smoking about 40 years ago. He has a 20 pack-year smoking history. He has never used smokeless tobacco.  He  reports no history of alcohol use.  He  reports no history of drug use.    Physical Exam  Constitutional:       Appearance: He is normal weight.   HENT:      Head: Normocephalic.   Eyes:      Extraocular Movements: Extraocular movements intact.      Pupils: Pupils are equal, round, and reactive to light.   Musculoskeletal:         General: Swelling present. Normal range of motion.      Cervical back: Normal range of motion.      Right lower leg: Edema present.      Left lower leg: Edema present.   Neurological:      Mental Status: He is alert and oriented to person, place, and time.      Cranial Nerves: Cranial nerves 2-12 are intact.      Sensory: Sensation is intact.      Motor: Weakness present.      Coordination: Coordination is intact.      Gait: Gait abnormal and tandem walk abnormal.      Deep Tendon Reflexes: Babinski sign absent on the right side. Babinski sign absent on the left side.      Reflex Scores:       Tricep reflexes are 1+ on the right side and 1+ on the left side.       Bicep  reflexes are 1+ on the right side and 1+ on the left side.       Brachioradialis reflexes are 1+ on the right side and 1+ on the left side.       Patellar reflexes are 1+ on the right side and 1+ on the left side.       Achilles reflexes are 1+ on the right side and 1+ on the left side.     Comments: Intact STM and cognition, fluent speech, CN's II-XII intact, strength UE 5/5, 3-4/5 LE, unable to ambulate, poor balance, sensation grossly intact.    Psychiatric:         Attention and Perception: Attention and perception normal.         Mood and Affect: Mood is anxious and depressed.         Speech: Speech normal.         Behavior: Behavior normal. Behavior is cooperative.         Thought Content: Thought content normal.         Cognition and Memory: Cognition and memory normal.         Judgment: Judgment normal.     Performance Status:  Symptomatic; in bed <50% of the day      Lab Results   Component Value Date    WBC 11.9 (H) 04/21/2025    HGB 9.7 (L) 04/21/2025    HCT 34.5 (L) 04/21/2025    MCV 75 (L) 04/21/2025     04/21/2025       The new and recent brain MRI scans were reviewed with the patient and family:       IMPRESSION:  1. Too numerous to count intra-axial foci of contrast enhancement  involving both the supratentorial and infratentorial compartments.  2. Dominant lesions in the left frontal operculum and right parietal  lobe selected for perfusion and permeability assessment. The lesion  in the left frontal operculum reveals a corrected rBV ratio of 1.89,  which is borderline increased. The lesion in the right parietal lobe  demonstrates abnormal enhancement kinetics and 0 rBV ratio, likely  artifactual.  3. Areas with some waxing and and others with waning burden of white  matter FLAIR signal hyperintensity also observed.  4. The constellation of findings are suspicious for mixed response to  therapy as well as areas of disease progression.  5. No MR evidence of acute intracranial infarct,  hemorrhage, or mass  effect.      I personally reviewed the images/study and I agree with the findings  as stated. This study was interpreted at [facility], [place], [place].      MACRO:  None      Signed by: Romel Myers 4/11/2025 1:58 PM  Dictation workstation:   [id number]      === 11/24/24 ===    MR BRAIN W AND WO CONTRAST    - Impression -  1. Diffuse, innumerable infratentorial and supratentorial  intraparenchymal avidly enhancing and mildly diffusion restricting  lesions consistent with metastatic disease. No midline shift or  herniation.  2. Additional findings as described above.    I personally reviewed the images/study and I agree with the findings  as stated by Resident Ki Toribio MD.    MACRO:  None    Signed by: Mary Daniel 11/26/2024 6:41 PM  Dictation workstation:   [id number]      Assessment/Plan      -Manuel has melanoma with metastases to the brain (>25), lungs, and spine who has [facility].     -He has undergone a course of [facility] x [facility] fractions [facility] back in [facility] 2024.      -He has been doing worse clinically over the past few months, and has been admitted twice for severe headaches.     -Recent brain MRI scans reveal > 20 brain metastases that are relatively stable at the moment.     -The Neuro-Oncology Tumor Board does NOT recommend any further active CNS treatment at this time.     -He is [facility] to a [facility] soon, and will begin Hospice services, including PT.     -He is to remain on the Decadron 2 mg bid for now, with possible increases if needed to control the headaches.     -We will have him return to this clinic PRN (likely Tele visits) for further evaluation.     -I spent > 40 minutes in face to face consultation to review and discuss the above; 50% of which or more was dedicated to counseling.     Medardo Vanegas MD

## 2025-05-02 NOTE — PATIENT INSTRUCTIONS
Thank you for coming to see Dr. Vanegas today.   Please don't hesitate to call us if you need anything.   We can always scheduled a virtual appointment if needed.     Please call us with any questions or concerns at [phone] opt. 5, opt. 2  For scheduling concerns please call [phone] option 1

## 2025-05-07 NOTE — CONSULTS
"Nutrition Initial Assessment:   Nutrition Assessment    ---->Reason for Assessment: Admission nursing screening    Patient is a 59 y.o. male with spinal stenosis, h/o L shoulder Melanoma and recently diagnosed RLL CA, admitted d/t c/f hemorrhagic brain mets. Plans to possibly begin radiation during admit.    Nutrition History:  This service met with pt earlier today, was sitting up  in bed eating lunch meal of meatball sub, macaroni and cheese, and Pepsi.    Tells usually appetite is not all that great, \"been that way for years now.\" Was put on steroids ~2 weeks ago and now feels hungry all the time. Typically eats several meals/snacks on a regular basis.    Since admit, denied any issues with n/v/d or trouble chewing/swallowing. Appetite is good, tells he consumed 100% of his breakfast meal earlier today.    --Vitamin/Herbal Supplement Use: D3--per review of home meds in EMR  --Food Allergies/Intolerances: feels he has an intolerance to \"drinking straight milk,\" causes diarrhea but is able to consume items with milk in them, such as cheeses       Anthropometrics:  Height: 172.7 cm (5' 7.99\")   Weight: 117 kg (257 lb 15 oz)   BMI (Calculated): 39.23  IBW/kg (Dietitian Calculated): 70 kg  Percent of IBW: 167 %       Weight History:   --Pt tells he was put on a weight loss drug a couple mos ago, lost ~24 lb, \"but I gained it all back as soon as I stopped the medicine.\" Despite reports of decreased PO, \"for years,\" denied any unintentional weight losses before and after weight loss drug.    Wt Readings per review of EMR:   11/26/24 117 kg (257 lb 15 oz) (current wt)   11/24/24 117 kg (258 lb)   06/18/24 123 kg (272 lb 1.6 oz)--->5% wt loss from this date to present (not significant)   06/17/24 120 kg (265 lb)   04/25/.1 kg    Nutrition Focused Physical Exam Findings:  Subcutaneous Fat Loss:   Orbital Fat Pads: Well nourished (slightly bulging fat pads)  Buccal Fat Pads: Well nourished (full, rounded " cheeks)  Triceps: Well nourished (ample fat tissue)  Ribs: Defer  Muscle Wasting:  Temporalis: Well nourished (well-defined muscle)  Pectoralis (Clavicular Region): Well nourished (clavicle not visible)  Deltoid/Trapezius: Well nourished (rounded appearance at arm, shoulder, neck)  Interosseous: Well nourished (muscle bulges)  Trapezius/Infraspinatus/Supraspinatus (Scapular Region): Defer  Quadriceps: Defer  Gastrocnemius: Defer  Edema:  Edema: none  Physical Findings:  Hair: Negative  Eyes: Negative  Nails: Negative  Skin: Negative    Nutrition Significant Labs:  CBC Trend:   Results from last 7 days   Lab Units 11/26/24  0750 11/25/24  0954 11/24/24  1608   WBC AUTO x10*3/uL 11.8* 8.5 9.2   RBC AUTO x10*6/uL 4.26* 4.51 4.35*   HEMOGLOBIN g/dL 8.7* 9.4* 8.8*   HEMATOCRIT % 29.6* 29.3* 29.5*   MCV fL 70* 65* 68*   PLATELETS AUTO x10*3/uL 419 299 421   BMP Trend:   Results from last 7 days   Lab Units 11/26/24  0750 11/25/24  0954 11/24/24  1608   GLUCOSE mg/dL 114* 127* 94   CALCIUM mg/dL 9.5 9.5 8.7   SODIUM mmol/L 137 137 136   POTASSIUM mmol/L 3.9 4.2 4.4   CO2 mmol/L 25 21 25   CHLORIDE mmol/L 102 105 102   BUN mg/dL 23 13 13   CREATININE mg/dL 1.07 0.84 1.00   Liver Function Trend:   Results from last 7 days   Lab Units 11/24/24  1608   ALK PHOS U/L 93   AST U/L 42*   ALT U/L 29   BILIRUBIN TOTAL mg/dL 0.3   Renal Lab Trend:   Results from last 7 days   Lab Units 11/26/24  0750 11/25/24  0954 11/24/24  1608 11/24/24  1608   POTASSIUM mmol/L 3.9 4.2  --  4.4   PHOSPHORUS mg/dL 3.7 3.7   < >  --    SODIUM mmol/L 137 137  --  136   MAGNESIUM mg/dL 2.33 2.30   < >  --    EGFR mL/min/1.73m*2 80 >90  --  87   BUN mg/dL 23 13  --  13   CREATININE mg/dL 1.07 0.84  --  1.00    < > = values in this interval not displayed.     Medications:  Scheduled medications  acetaminophen, 975 mg, oral, q8h  ARIPiprazole, 10 mg, oral, Daily  cholecalciferol, 2,000 Units, oral, Daily  dexAMETHasone, 4 mg, intravenous, q6h  diazePAM,  10 mg, oral, Daily  diazePAM, 5 mg, oral, Nightly  tiotropium, 2 puff, inhalation, Daily   And  fluticasone furoate-vilanteroL, 1 puff, inhalation, Daily  lisinopril, 5 mg, oral, Daily  montelukast, 10 mg, oral, Nightly  pantoprazole, 40 mg, oral, Daily before breakfast  pregabalin, 200 mg, oral, BID  propranolol LA, 120 mg, oral, BID  rosuvastatin, 10 mg, oral, Daily  sertraline, 200 mg, oral, Daily    PRN medications  PRN medications: albuterol, alteplase, HYDROmorphone, loperamide, oxyCODONE, oxygen, polyethylene glycol, prochlorperazine    I/O:   Last BM Date: 11/25/24    Dietary Orders (From admission, onward)       Start     Ordered    11/26/24 0812  Adult diet Low microbial  Diet effective now        Comments: Patient understands that dairy could cause him diarrhea and still wants it, OK to give dairy   Question:  Diet type  Answer:  Low microbial    11/26/24 0811 11/26/24 0453  May Participate in Room Service  ( ROOM SERVICE MAY PARTICIPATE)  Once        Question:  .  Answer:  Yes    11/26/24 0452                Estimated Needs:   Total Energy Estimated Needs (kCal): ~8277-1517  Method for Estimating Needs: MSJ (1968) x ~1.2-1.3  Total Protein Estimated Needs (g): 105+  Method for Estimating Needs: 70 (IBW) x ~1.5g/kg+  Total Fluid Estimated Needs (mL): 1ml/kcal or per MD/team        Nutrition Diagnosis   Malnutrition Diagnosis  Patient has Malnutrition Diagnosis: No    Nutrition Diagnosis  Patient has Nutrition Diagnosis: Yes  Diagnosis Status (1): New  Nutrition Diagnosis 1: Increased nutrient needs  Related to (1): increased metabolic demand  As Evidenced by (1): pt with RLL CA, new brain mets       Nutrition Interventions/Recommendations     1. Low Microbial Diet not indicated. RDN to liberalize to Regular. Please continue PO diet, only as tolerated/as medically appropriate.  --Considering pt report of increase in PO, do not feel oral nutrition supplements are indicated. If PO again declines, would  consider addition of Ensure Plus daily-BID for added nutrition.    2. Check baseline 25(OH)-D level.    3. Please weigh pt at least once/week.        Nutrition Prescription for Oral Nutrition: ~1456-9353 kcals/day, 105+ g pro/day        Nutrition Interventions:   Interventions: Meals and snacks  Meals and Snacks: General healthful diet  Goal: Regular Diet    Nutrition Education: Encouraged ongoing good PO + protein. Pt denied any questions for RDN at this time.       Nutrition Monitoring and Evaluation   Food/Nutrient Related History Monitoring  Monitoring and Evaluation Plan: Amount of food  Amount of Food: Estimated amout of food  Criteria: consume >75% of meals/snacks    Body Composition/Growth/Weight History  Monitoring and Evaluation Plan: Weight  Weight: Measured weight  Criteria: maintain stable wt    Biochemical Data, Medical Tests and Procedures  Monitoring and Evaluation Plan: Electrolyte/renal panel, Glucose/endocrine profile  Electrolyte and Renal Panel: Phosphorus, Magnesium, Potassium, Sodium  Criteria: WNL  Glucose/Endocrine Profile: Glucose, casual, Glucose, fasting  Criteria: WNL          Time Spent (min): 45 minutes   07-May-2025 13:00